# Patient Record
Sex: FEMALE | Race: WHITE | NOT HISPANIC OR LATINO | Employment: OTHER | URBAN - NONMETROPOLITAN AREA
[De-identification: names, ages, dates, MRNs, and addresses within clinical notes are randomized per-mention and may not be internally consistent; named-entity substitution may affect disease eponyms.]

---

## 2017-01-02 ENCOUNTER — OFFICE VISIT (OUTPATIENT)
Dept: INTERNAL MEDICINE | Age: 62
End: 2017-01-02

## 2017-01-02 ENCOUNTER — TELEPHONE (OUTPATIENT)
Dept: ORTHOPEDICS | Age: 62
End: 2017-01-02

## 2017-01-02 VITALS
OXYGEN SATURATION: 99 % | WEIGHT: 141.09 LBS | TEMPERATURE: 99 F | SYSTOLIC BLOOD PRESSURE: 138 MMHG | DIASTOLIC BLOOD PRESSURE: 70 MMHG | BODY MASS INDEX: 24.09 KG/M2 | HEART RATE: 90 BPM | HEIGHT: 64 IN

## 2017-01-02 DIAGNOSIS — E03.9 HYPOTHYROIDISM, UNSPECIFIED TYPE: ICD-10-CM

## 2017-01-02 DIAGNOSIS — Z79.4 TYPE 2 DIABETES MELLITUS WITH BOTH EYES AFFECTED BY MILD NONPROLIFERATIVE RETINOPATHY WITHOUT MACULAR EDEMA, WITH LONG-TERM CURRENT USE OF INSULIN (CMD): Primary | ICD-10-CM

## 2017-01-02 DIAGNOSIS — I25.10 CORONARY ARTERY DISEASE INVOLVING NATIVE CORONARY ARTERY OF NATIVE HEART, ANGINA PRESENCE UNSPECIFIED: ICD-10-CM

## 2017-01-02 DIAGNOSIS — F17.210 CIGARETTE SMOKER: ICD-10-CM

## 2017-01-02 DIAGNOSIS — G47.33 OSA ON CPAP: ICD-10-CM

## 2017-01-02 DIAGNOSIS — F17.200 TOBACCO DEPENDENCE: ICD-10-CM

## 2017-01-02 DIAGNOSIS — G25.0 ESSENTIAL TREMOR: ICD-10-CM

## 2017-01-02 DIAGNOSIS — I10 ESSENTIAL HYPERTENSION: ICD-10-CM

## 2017-01-02 DIAGNOSIS — E11.3293 TYPE 2 DIABETES MELLITUS WITH BOTH EYES AFFECTED BY MILD NONPROLIFERATIVE RETINOPATHY WITHOUT MACULAR EDEMA, WITH LONG-TERM CURRENT USE OF INSULIN (CMD): Primary | ICD-10-CM

## 2017-01-02 DIAGNOSIS — K27.9 PUD (PEPTIC ULCER DISEASE): ICD-10-CM

## 2017-01-02 PROCEDURE — 99214 OFFICE O/P EST MOD 30 MIN: CPT | Performed by: INTERNAL MEDICINE

## 2017-01-02 RX ORDER — METFORMIN HYDROCHLORIDE 500 MG/1
TABLET, EXTENDED RELEASE ORAL
Qty: 120 TABLET | Refills: 0 | OUTPATIENT
Start: 2017-01-02

## 2017-01-02 RX ORDER — OMEPRAZOLE 40 MG/1
40 CAPSULE, DELAYED RELEASE ORAL DAILY
Qty: 30 CAPSULE | Refills: 3 | Status: SHIPPED | OUTPATIENT
Start: 2017-01-02 | End: 2017-03-20 | Stop reason: SDUPTHER

## 2017-01-02 RX ORDER — PEN NEEDLE, DIABETIC, SAFETY 30 GX3/16"
NEEDLE, DISPOSABLE MISCELLANEOUS
Qty: 100 EACH | Refills: 1 | Status: SHIPPED | OUTPATIENT
Start: 2017-01-02 | End: 2017-03-01 | Stop reason: SDUPTHER

## 2017-01-05 ENCOUNTER — TELEPHONE (OUTPATIENT)
Dept: OPHTHALMOLOGY | Age: 62
End: 2017-01-05

## 2017-01-05 ENCOUNTER — OFFICE VISIT (OUTPATIENT)
Dept: ORTHOPEDICS | Age: 62
End: 2017-01-05

## 2017-01-05 VITALS — BODY MASS INDEX: 24.09 KG/M2 | HEIGHT: 64 IN | WEIGHT: 141.09 LBS

## 2017-01-05 DIAGNOSIS — M65.30 TRIGGER FINGER OF LEFT HAND, UNSPECIFIED FINGER: Primary | ICD-10-CM

## 2017-01-05 PROCEDURE — 99214 OFFICE O/P EST MOD 30 MIN: CPT | Performed by: ORTHOPAEDIC SURGERY

## 2017-01-12 ENCOUNTER — OFFICE VISIT (OUTPATIENT)
Dept: CARDIOLOGY | Age: 62
End: 2017-01-12

## 2017-01-12 VITALS
HEART RATE: 77 BPM | HEIGHT: 64 IN | WEIGHT: 139 LBS | DIASTOLIC BLOOD PRESSURE: 81 MMHG | SYSTOLIC BLOOD PRESSURE: 145 MMHG | BODY MASS INDEX: 23.73 KG/M2

## 2017-01-12 DIAGNOSIS — E03.9 HYPOTHYROIDISM, UNSPECIFIED TYPE: Primary | ICD-10-CM

## 2017-01-12 DIAGNOSIS — I25.10 CORONARY ARTERY DISEASE INVOLVING NATIVE CORONARY ARTERY OF NATIVE HEART, ANGINA PRESENCE UNSPECIFIED: ICD-10-CM

## 2017-01-12 DIAGNOSIS — E78.2 MIXED HYPERLIPIDEMIA: ICD-10-CM

## 2017-01-12 DIAGNOSIS — Z79.4 TYPE 2 DIABETES MELLITUS WITH BOTH EYES AFFECTED BY MILD NONPROLIFERATIVE RETINOPATHY WITHOUT MACULAR EDEMA, WITH LONG-TERM CURRENT USE OF INSULIN (CMD): ICD-10-CM

## 2017-01-12 DIAGNOSIS — E11.3293 TYPE 2 DIABETES MELLITUS WITH BOTH EYES AFFECTED BY MILD NONPROLIFERATIVE RETINOPATHY WITHOUT MACULAR EDEMA, WITH LONG-TERM CURRENT USE OF INSULIN (CMD): ICD-10-CM

## 2017-01-12 DIAGNOSIS — I10 ESSENTIAL HYPERTENSION: ICD-10-CM

## 2017-01-12 PROCEDURE — 99214 OFFICE O/P EST MOD 30 MIN: CPT | Performed by: INTERNAL MEDICINE

## 2017-01-12 RX ORDER — NICOTINE 21-14-7MG
KIT TRANSDERMAL
Qty: 1 KIT | Refills: 0 | Status: SHIPPED | OUTPATIENT
Start: 2017-01-12 | End: 2017-01-19

## 2017-01-12 RX ORDER — LOSARTAN POTASSIUM 25 MG/1
25 TABLET ORAL DAILY
Qty: 90 TABLET | Refills: 3 | Status: SHIPPED | OUTPATIENT
Start: 2017-01-12 | End: 2017-02-14 | Stop reason: DRUGHIGH

## 2017-01-18 ENCOUNTER — TELEPHONE (OUTPATIENT)
Dept: NEUROLOGY | Age: 62
End: 2017-01-18

## 2017-01-18 RX ORDER — CARBAMAZEPINE 200 MG/1
200 TABLET ORAL 3 TIMES DAILY
Qty: 60 TABLET | Refills: 3 | Status: SHIPPED
Start: 2017-01-18 | End: 2017-01-19 | Stop reason: SDUPTHER

## 2017-01-19 RX ORDER — CARBAMAZEPINE 200 MG/1
200 TABLET ORAL 3 TIMES DAILY
Qty: 90 TABLET | Refills: 2 | Status: SHIPPED | OUTPATIENT
Start: 2017-01-19 | End: 2017-04-13 | Stop reason: SDUPTHER

## 2017-01-19 ASSESSMENT — ACTIVITIES OF DAILY LIVING (ADL)
ADL_SHORT_OF_BREATH: NO
RECENT_DECLINE_ADL: NO
CHRONIC_PAIN_PRESENT: NO
HISTORY OF FALLING IN THE LAST YEAR (PRIOR TO ADMISSION): NO
NEEDS_ASSIST: NO
ADL_SCORE: 12
ADL_BEFORE_ADMISSION: INDEPENDENT
SENSORY_SUPPORT_DEVICES: EYEGLASSES;DENTURES

## 2017-01-19 ASSESSMENT — COGNITIVE AND FUNCTIONAL STATUS - GENERAL
ARE YOU BLIND OR DO YOU HAVE SERIOUS DIFFICULTY SEEING, EVEN WHEN WEARING GLASSES: NO
ARE YOU DEAF OR DO YOU HAVE SERIOUS DIFFICULTY  HEARING: NO

## 2017-01-20 ENCOUNTER — HOSPITAL ENCOUNTER (OUTPATIENT)
Age: 62
Discharge: HOME | End: 2017-01-20
Attending: ORTHOPAEDIC SURGERY | Admitting: ORTHOPAEDIC SURGERY

## 2017-01-20 ENCOUNTER — SURGERY (OUTPATIENT)
Age: 62
End: 2017-01-20

## 2017-01-20 ENCOUNTER — OFF PREMISE (OUTPATIENT)
Dept: ORTHOPEDICS | Age: 62
End: 2017-01-20

## 2017-01-20 DIAGNOSIS — M65.30 TRIGGER FINGER OF LEFT HAND, UNSPECIFIED FINGER: Primary | ICD-10-CM

## 2017-01-20 PROCEDURE — 10002801 HB RX 250 W/O HCPCS: Performed by: ORTHOPAEDIC SURGERY

## 2017-01-20 PROCEDURE — 26055 INCISE FINGER TENDON SHEATH: CPT | Performed by: ORTHOPAEDIC SURGERY

## 2017-01-20 PROCEDURE — 10004316 HB COUNTER-PREP

## 2017-01-20 PROCEDURE — 10004348 HB COUNTER-EVAL PRE-OP

## 2017-01-20 PROCEDURE — 10002344 HB UPPER/LOWER ARM/WRIST/HAND 1: Performed by: ORTHOPAEDIC SURGERY

## 2017-01-20 RX ORDER — HYDROCODONE BITARTRATE AND ACETAMINOPHEN 5; 325 MG/1; MG/1
1-2 TABLET ORAL EVERY 4 HOURS PRN
Status: DISCONTINUED | OUTPATIENT
Start: 2017-01-20 | End: 2017-01-20 | Stop reason: HOSPADM

## 2017-01-20 RX ORDER — HYDROCODONE BITARTRATE AND ACETAMINOPHEN 5; 325 MG/1; MG/1
TABLET ORAL
Qty: 15 TABLET | Refills: 0 | Status: SHIPPED | OUTPATIENT
Start: 2017-01-20 | End: 2017-01-31 | Stop reason: ALTCHOICE

## 2017-01-20 RX ADMIN — LIDOCAINE HYDROCHLORIDE,EPINEPHRINE BITARTRATE 20 ML: 10; .01 INJECTION, SOLUTION INFILTRATION; PERINEURAL at 10:25

## 2017-01-21 VITALS
BODY MASS INDEX: 23.1 KG/M2 | TEMPERATURE: 98.5 F | HEART RATE: 67 BPM | RESPIRATION RATE: 20 BRPM | SYSTOLIC BLOOD PRESSURE: 162 MMHG | WEIGHT: 138.67 LBS | HEIGHT: 65 IN | OXYGEN SATURATION: 97 % | DIASTOLIC BLOOD PRESSURE: 76 MMHG

## 2017-01-26 RX ORDER — LEVOTHYROXINE SODIUM 0.2 MG/1
TABLET ORAL
Qty: 30 TABLET | Refills: 2 | Status: SHIPPED | OUTPATIENT
Start: 2017-01-26 | End: 2017-04-13 | Stop reason: SDUPTHER

## 2017-01-26 RX ORDER — DULOXETIN HYDROCHLORIDE 60 MG/1
CAPSULE, DELAYED RELEASE ORAL
Qty: 60 CAPSULE | Refills: 2 | Status: SHIPPED | OUTPATIENT
Start: 2017-01-26 | End: 2017-01-31 | Stop reason: SDUPTHER

## 2017-01-26 RX ORDER — LEVOTHYROXINE SODIUM 0.03 MG/1
TABLET ORAL
Qty: 30 TABLET | Refills: 2 | Status: SHIPPED | OUTPATIENT
Start: 2017-01-26 | End: 2017-04-13 | Stop reason: SDUPTHER

## 2017-01-26 RX ORDER — ATORVASTATIN CALCIUM 80 MG/1
TABLET, FILM COATED ORAL
Qty: 30 TABLET | Refills: 2 | Status: SHIPPED | OUTPATIENT
Start: 2017-01-26 | End: 2017-01-31 | Stop reason: SDUPTHER

## 2017-01-27 ENCOUNTER — OFFICE VISIT (OUTPATIENT)
Dept: ORTHOPEDICS | Age: 62
End: 2017-01-27

## 2017-01-27 DIAGNOSIS — M65.30 TRIGGER FINGER (ACQUIRED): Primary | ICD-10-CM

## 2017-01-27 PROCEDURE — 99024 POSTOP FOLLOW-UP VISIT: CPT | Performed by: PHYSICIAN ASSISTANT

## 2017-01-31 ENCOUNTER — OFFICE VISIT (OUTPATIENT)
Dept: PODIATRY | Age: 62
End: 2017-01-31

## 2017-01-31 ENCOUNTER — OFFICE VISIT (OUTPATIENT)
Dept: INTERNAL MEDICINE | Age: 62
End: 2017-01-31

## 2017-01-31 VITALS
DIASTOLIC BLOOD PRESSURE: 62 MMHG | HEART RATE: 78 BPM | OXYGEN SATURATION: 98 % | BODY MASS INDEX: 23.66 KG/M2 | SYSTOLIC BLOOD PRESSURE: 116 MMHG | WEIGHT: 139.99 LBS

## 2017-01-31 DIAGNOSIS — E11.42 DIABETIC POLYNEUROPATHY ASSOCIATED WITH TYPE 2 DIABETES MELLITUS (CMD): ICD-10-CM

## 2017-01-31 DIAGNOSIS — I73.89 OTHER SPECIFIED PERIPHERAL VASCULAR DISEASES (CMD): ICD-10-CM

## 2017-01-31 DIAGNOSIS — I10 ESSENTIAL HYPERTENSION: ICD-10-CM

## 2017-01-31 DIAGNOSIS — Z79.4 TYPE 2 DIABETES MELLITUS WITH BOTH EYES AFFECTED BY MILD NONPROLIFERATIVE RETINOPATHY WITHOUT MACULAR EDEMA, WITH LONG-TERM CURRENT USE OF INSULIN (CMD): ICD-10-CM

## 2017-01-31 DIAGNOSIS — E78.2 MIXED HYPERLIPIDEMIA: Primary | ICD-10-CM

## 2017-01-31 DIAGNOSIS — F17.200 TOBACCO USE DISORDER: ICD-10-CM

## 2017-01-31 DIAGNOSIS — E11.3293 TYPE 2 DIABETES MELLITUS WITH BOTH EYES AFFECTED BY MILD NONPROLIFERATIVE RETINOPATHY WITHOUT MACULAR EDEMA, WITH LONG-TERM CURRENT USE OF INSULIN (CMD): ICD-10-CM

## 2017-01-31 DIAGNOSIS — L60.2 HYPERTROPHY OF NAIL: Primary | ICD-10-CM

## 2017-01-31 DIAGNOSIS — I25.10 CORONARY ARTERY DISEASE INVOLVING NATIVE CORONARY ARTERY OF NATIVE HEART, ANGINA PRESENCE UNSPECIFIED: ICD-10-CM

## 2017-01-31 PROCEDURE — 99214 OFFICE O/P EST MOD 30 MIN: CPT | Performed by: INTERNAL MEDICINE

## 2017-01-31 PROCEDURE — 11721 DEBRIDE NAIL 6 OR MORE: CPT | Performed by: PODIATRIST

## 2017-02-01 ENCOUNTER — TELEPHONE (OUTPATIENT)
Dept: INTERNAL MEDICINE | Age: 62
End: 2017-02-01

## 2017-02-01 DIAGNOSIS — E83.42 HYPOMAGNESEMIA: Primary | ICD-10-CM

## 2017-02-02 ENCOUNTER — OFFICE VISIT (OUTPATIENT)
Dept: OPHTHALMOLOGY | Age: 62
End: 2017-02-02

## 2017-02-02 ENCOUNTER — TELEPHONE (OUTPATIENT)
Dept: CARDIOLOGY | Age: 62
End: 2017-02-02

## 2017-02-02 DIAGNOSIS — E11.3293 TYPE 2 DIABETES MELLITUS WITH BOTH EYES AFFECTED BY MILD NONPROLIFERATIVE RETINOPATHY WITHOUT MACULAR EDEMA, WITH LONG-TERM CURRENT USE OF INSULIN (CMD): Primary | ICD-10-CM

## 2017-02-02 DIAGNOSIS — Z79.4 TYPE 2 DIABETES MELLITUS WITH BOTH EYES AFFECTED BY MILD NONPROLIFERATIVE RETINOPATHY WITHOUT MACULAR EDEMA, WITH LONG-TERM CURRENT USE OF INSULIN (CMD): Primary | ICD-10-CM

## 2017-02-02 PROCEDURE — 92014 COMPRE OPH EXAM EST PT 1/>: CPT | Performed by: OPTOMETRIST

## 2017-02-02 RX ORDER — MECLIZINE HYDROCHLORIDE 25 MG/1
TABLET ORAL
Qty: 30 TABLET | Refills: 0 | Status: SHIPPED | OUTPATIENT
Start: 2017-02-02 | End: 2017-03-31 | Stop reason: SDUPTHER

## 2017-02-02 ASSESSMENT — EXTERNAL EXAM - RIGHT EYE: OD_EXAM: NORMAL

## 2017-02-02 ASSESSMENT — VISUAL ACUITY
OD_SC: 20/100
OS_PH_SC: 20/25-1
OS_SC+: -2
METHOD: SNELLEN - LINEAR
OD_SC: 20/40
OS_SC: 20/30
OS_SC: 20/200
OD_PH_SC: 20/25

## 2017-02-02 ASSESSMENT — SLIT LAMP EXAM - LIDS
COMMENTS: NORMAL
COMMENTS: NORMAL

## 2017-02-02 ASSESSMENT — REFRACTION_WEARINGRX
OD_CYLINDER: +0.75
OD_SPHERE: -0.50
OS_ADD: +2.25
OS_AXIS: 100
OS_SPHERE: -1.00
OD_ADD: +2.25
OS_CYLINDER: +0.75
SPECS_TYPE: BIFOCAL
OD_AXIS: 165

## 2017-02-02 ASSESSMENT — REFRACTION_MANIFEST
OS_CYLINDER: +0.75
OD_ADD: +2.25
OD_SPHERE: -0.50
OS_AXIS: 100
OD_AXIS: 165
OS_ADD: +2.25
OD_CYLINDER: +0.75
OS_SPHERE: -1.00

## 2017-02-02 ASSESSMENT — CONF VISUAL FIELD
OS_NORMAL: 1
OD_NORMAL: 1
METHOD: COUNTING FINGERS

## 2017-02-02 ASSESSMENT — CUP TO DISC RATIO
OD_RATIO: 0.3
OS_RATIO: 0.3

## 2017-02-02 ASSESSMENT — TONOMETRY
OS_IOP_MMHG: 17
IOP_METHOD: APPLANATION
OD_IOP_MMHG: 17

## 2017-02-02 ASSESSMENT — EXTERNAL EXAM - LEFT EYE: OS_EXAM: NORMAL

## 2017-02-07 ENCOUNTER — IMAGING SERVICES (OUTPATIENT)
Dept: GENERAL RADIOLOGY | Age: 62
End: 2017-02-07
Attending: NURSE PRACTITIONER

## 2017-02-07 ENCOUNTER — WALK IN (OUTPATIENT)
Dept: URGENT CARE | Age: 62
End: 2017-02-07

## 2017-02-07 ENCOUNTER — TELEPHONE (OUTPATIENT)
Dept: URGENT CARE | Age: 62
End: 2017-02-07

## 2017-02-07 VITALS
OXYGEN SATURATION: 97 % | BODY MASS INDEX: 23.52 KG/M2 | HEART RATE: 74 BPM | WEIGHT: 139.2 LBS | SYSTOLIC BLOOD PRESSURE: 160 MMHG | TEMPERATURE: 98.8 F | DIASTOLIC BLOOD PRESSURE: 75 MMHG

## 2017-02-07 DIAGNOSIS — M79.672 LEFT FOOT PAIN: Primary | ICD-10-CM

## 2017-02-07 DIAGNOSIS — M79.672 LEFT FOOT PAIN: ICD-10-CM

## 2017-02-07 PROCEDURE — L4350 ANKLE CONTROL ORTHO PRE OTS: HCPCS | Performed by: NURSE PRACTITIONER

## 2017-02-07 PROCEDURE — 99213 OFFICE O/P EST LOW 20 MIN: CPT | Performed by: NURSE PRACTITIONER

## 2017-02-07 PROCEDURE — 73610 X-RAY EXAM OF ANKLE: CPT | Performed by: RADIOLOGY

## 2017-02-07 PROCEDURE — 73630 X-RAY EXAM OF FOOT: CPT | Performed by: RADIOLOGY

## 2017-02-07 RX ORDER — OMEGA-3 FATTY ACIDS/FISH OIL 300-1000MG
200 CAPSULE ORAL EVERY 6 HOURS PRN
Qty: 30 TABLET | Refills: 0 | Status: SHIPPED | OUTPATIENT
Start: 2017-02-07 | End: 2017-04-13 | Stop reason: SDUPTHER

## 2017-02-07 RX ORDER — ACETAMINOPHEN 325 MG/1
650 TABLET ORAL EVERY 4 HOURS PRN
Qty: 30 TABLET | Refills: 0 | Status: SHIPPED | OUTPATIENT
Start: 2017-02-07 | End: 2017-04-13 | Stop reason: SDUPTHER

## 2017-02-07 RX ORDER — IBUPROFEN 200 MG
800 TABLET ORAL ONCE
Status: COMPLETED | OUTPATIENT
Start: 2017-02-07 | End: 2017-02-07

## 2017-02-07 RX ADMIN — Medication 800 MG: at 15:20

## 2017-02-14 ENCOUNTER — TELEPHONE (OUTPATIENT)
Dept: CARDIOLOGY | Age: 62
End: 2017-02-14

## 2017-02-14 RX ORDER — LOSARTAN POTASSIUM 25 MG/1
50 TABLET ORAL DAILY
Qty: 90 TABLET | Refills: 3 | Status: SHIPPED | COMMUNITY
Start: 2017-02-14 | End: 2017-03-01 | Stop reason: SDUPTHER

## 2017-02-16 ENCOUNTER — IMAGING SERVICES (OUTPATIENT)
Dept: CARDIOLOGY | Age: 62
End: 2017-02-16
Attending: INTERNAL MEDICINE

## 2017-02-16 VITALS — DIASTOLIC BLOOD PRESSURE: 86 MMHG | HEART RATE: 76 BPM | SYSTOLIC BLOOD PRESSURE: 132 MMHG

## 2017-02-16 DIAGNOSIS — G89.4 CHRONIC PAIN SYNDROME: ICD-10-CM

## 2017-02-16 DIAGNOSIS — G47.33 OSA ON CPAP: ICD-10-CM

## 2017-02-16 DIAGNOSIS — E03.9 HYPOTHYROIDISM: ICD-10-CM

## 2017-02-16 DIAGNOSIS — Z98.890 HISTORY OF CARDIAC CATHETERIZATION: ICD-10-CM

## 2017-02-16 DIAGNOSIS — F17.200 TOBACCO USE DISORDER: ICD-10-CM

## 2017-02-16 DIAGNOSIS — I25.10 CORONARY ARTERY DISEASE INVOLVING NATIVE CORONARY ARTERY OF NATIVE HEART, ANGINA PRESENCE UNSPECIFIED: Primary | ICD-10-CM

## 2017-02-16 DIAGNOSIS — E87.6 HYPOKALEMIA: ICD-10-CM

## 2017-02-16 DIAGNOSIS — E78.2 MIXED HYPERLIPIDEMIA: ICD-10-CM

## 2017-02-16 DIAGNOSIS — I10 ESSENTIAL HYPERTENSION: ICD-10-CM

## 2017-02-16 DIAGNOSIS — R07.89 OTHER CHEST PAIN: ICD-10-CM

## 2017-02-16 DIAGNOSIS — E55.9 VITAMIN D DEFICIENCY: ICD-10-CM

## 2017-02-16 DIAGNOSIS — E08.43: ICD-10-CM

## 2017-02-16 PROCEDURE — 93351 STRESS TTE COMPLETE: CPT | Performed by: INTERNAL MEDICINE

## 2017-02-21 ENCOUNTER — TELEPHONE (OUTPATIENT)
Dept: CARDIOLOGY | Age: 62
End: 2017-02-21

## 2017-02-21 RX ORDER — POLYETHYLENE GLYCOL 3350 17 G/17G
POWDER, FOR SOLUTION ORAL
Qty: 255 G | Refills: 1 | Status: SHIPPED | OUTPATIENT
Start: 2017-02-21 | End: 2017-03-24 | Stop reason: SDUPTHER

## 2017-02-21 RX ORDER — PREGABALIN 225 MG/1
CAPSULE ORAL
Qty: 180 CAPSULE | Refills: 1 | Status: ON HOLD
Start: 2017-02-21 | End: 2017-04-17 | Stop reason: HOSPADM

## 2017-02-21 RX ORDER — CLOBETASOL PROPIONATE 0.5 MG/G
OINTMENT TOPICAL
Qty: 30 G | Refills: 0 | Status: SHIPPED | OUTPATIENT
Start: 2017-02-21 | End: 2017-03-16 | Stop reason: SDUPTHER

## 2017-02-22 ENCOUNTER — NURSE ONLY (OUTPATIENT)
Dept: INTERNAL MEDICINE | Age: 62
End: 2017-02-22

## 2017-02-22 VITALS — SYSTOLIC BLOOD PRESSURE: 140 MMHG | DIASTOLIC BLOOD PRESSURE: 64 MMHG

## 2017-03-01 RX ORDER — LOSARTAN POTASSIUM 25 MG/1
50 TABLET ORAL DAILY
Qty: 90 TABLET | Refills: 3 | Status: SHIPPED | OUTPATIENT
Start: 2017-03-01 | End: 2017-04-13 | Stop reason: SDUPTHER

## 2017-03-07 RX ORDER — NITROGLYCERIN 0.4 MG/1
TABLET SUBLINGUAL
Qty: 25 TABLET | Refills: 1 | Status: SHIPPED | OUTPATIENT
Start: 2017-03-07 | End: 2017-03-20 | Stop reason: SDUPTHER

## 2017-03-08 ENCOUNTER — TELEPHONE (OUTPATIENT)
Dept: INTERNAL MEDICINE | Age: 62
End: 2017-03-08

## 2017-03-08 DIAGNOSIS — E83.42 HYPOMAGNESEMIA: Primary | ICD-10-CM

## 2017-03-13 RX ORDER — INSULIN GLARGINE 100 [IU]/ML
INJECTION, SOLUTION SUBCUTANEOUS
Qty: 15 ML | Refills: 0 | Status: SHIPPED | OUTPATIENT
Start: 2017-03-13 | End: 2017-04-13 | Stop reason: SDUPTHER

## 2017-03-17 RX ORDER — CLOBETASOL PROPIONATE 0.5 MG/G
OINTMENT TOPICAL
Qty: 30 G | Refills: 0 | Status: SHIPPED | OUTPATIENT
Start: 2017-03-17 | End: 2017-06-02 | Stop reason: SDUPTHER

## 2017-03-19 RX ORDER — TRAZODONE HYDROCHLORIDE 100 MG/1
TABLET ORAL
Qty: 30 TABLET | Refills: 0 | Status: CANCELLED | OUTPATIENT
Start: 2017-03-19

## 2017-03-20 ENCOUNTER — OFFICE VISIT (OUTPATIENT)
Dept: INTERNAL MEDICINE | Age: 62
End: 2017-03-20

## 2017-03-20 VITALS
BODY MASS INDEX: 24.52 KG/M2 | SYSTOLIC BLOOD PRESSURE: 124 MMHG | OXYGEN SATURATION: 99 % | HEART RATE: 82 BPM | WEIGHT: 145.06 LBS | DIASTOLIC BLOOD PRESSURE: 68 MMHG

## 2017-03-20 DIAGNOSIS — E78.00 HYPERCHOLESTEREMIA: ICD-10-CM

## 2017-03-20 DIAGNOSIS — Z79.4 TYPE 2 DIABETES MELLITUS WITH BOTH EYES AFFECTED BY MILD NONPROLIFERATIVE RETINOPATHY WITHOUT MACULAR EDEMA, WITH LONG-TERM CURRENT USE OF INSULIN (CMD): ICD-10-CM

## 2017-03-20 DIAGNOSIS — K27.9 PUD (PEPTIC ULCER DISEASE): ICD-10-CM

## 2017-03-20 DIAGNOSIS — E11.42 DIABETIC POLYNEUROPATHY ASSOCIATED WITH TYPE 2 DIABETES MELLITUS (CMD): ICD-10-CM

## 2017-03-20 DIAGNOSIS — I10 ESSENTIAL HYPERTENSION: ICD-10-CM

## 2017-03-20 DIAGNOSIS — E11.3293 TYPE 2 DIABETES MELLITUS WITH BOTH EYES AFFECTED BY MILD NONPROLIFERATIVE RETINOPATHY WITHOUT MACULAR EDEMA, WITH LONG-TERM CURRENT USE OF INSULIN (CMD): ICD-10-CM

## 2017-03-20 DIAGNOSIS — I25.10 CORONARY ARTERY DISEASE INVOLVING NATIVE CORONARY ARTERY OF NATIVE HEART, ANGINA PRESENCE UNSPECIFIED: ICD-10-CM

## 2017-03-20 DIAGNOSIS — F17.200 TOBACCO DEPENDENCE: ICD-10-CM

## 2017-03-20 DIAGNOSIS — I37.9 PVD (PULMONARY VALVE DISEASE): ICD-10-CM

## 2017-03-20 DIAGNOSIS — E03.9 HYPOTHYROIDISM, UNSPECIFIED TYPE: Primary | ICD-10-CM

## 2017-03-20 DIAGNOSIS — E83.42 HYPOMAGNESEMIA: ICD-10-CM

## 2017-03-20 PROCEDURE — 99407 BEHAV CHNG SMOKING > 10 MIN: CPT | Performed by: INTERNAL MEDICINE

## 2017-03-20 PROCEDURE — 99215 OFFICE O/P EST HI 40 MIN: CPT | Performed by: INTERNAL MEDICINE

## 2017-03-20 RX ORDER — ROSUVASTATIN CALCIUM 20 MG/1
20 TABLET, COATED ORAL DAILY
Qty: 30 TABLET | Refills: 11 | Status: SHIPPED | OUTPATIENT
Start: 2017-03-20 | End: 2017-04-02

## 2017-03-20 RX ORDER — BUPROPION HYDROCHLORIDE 75 MG/1
75 TABLET ORAL 2 TIMES DAILY
Qty: 60 TABLET | Refills: 11 | Status: SHIPPED | OUTPATIENT
Start: 2017-03-20 | End: 2017-04-11 | Stop reason: DRUGHIGH

## 2017-03-20 RX ORDER — TRAZODONE HYDROCHLORIDE 100 MG/1
100 TABLET ORAL NIGHTLY
Qty: 30 TABLET | Refills: 2 | Status: SHIPPED | OUTPATIENT
Start: 2017-03-20 | End: 2017-04-13 | Stop reason: SDUPTHER

## 2017-03-20 RX ORDER — OMEPRAZOLE 40 MG/1
40 CAPSULE, DELAYED RELEASE ORAL 2 TIMES DAILY
Qty: 60 CAPSULE | Refills: 3 | Status: SHIPPED | OUTPATIENT
Start: 2017-03-20 | End: 2017-04-12 | Stop reason: SDUPTHER

## 2017-03-20 RX ORDER — OMEPRAZOLE 40 MG/1
40 CAPSULE, DELAYED RELEASE ORAL 2 TIMES DAILY
Qty: 60 CAPSULE | Refills: 3 | Status: SHIPPED | OUTPATIENT
Start: 2017-03-20 | End: 2017-03-20 | Stop reason: SDUPTHER

## 2017-03-24 ENCOUNTER — LAB SERVICES (OUTPATIENT)
Dept: LAB | Age: 62
End: 2017-03-24

## 2017-03-24 DIAGNOSIS — E11.3293 TYPE 2 DIABETES MELLITUS WITH BOTH EYES AFFECTED BY MILD NONPROLIFERATIVE RETINOPATHY WITHOUT MACULAR EDEMA, WITH LONG-TERM CURRENT USE OF INSULIN (CMD): ICD-10-CM

## 2017-03-24 DIAGNOSIS — K27.9 PUD (PEPTIC ULCER DISEASE): ICD-10-CM

## 2017-03-24 DIAGNOSIS — E83.42 HYPOMAGNESEMIA: ICD-10-CM

## 2017-03-24 DIAGNOSIS — Z79.4 TYPE 2 DIABETES MELLITUS WITH BOTH EYES AFFECTED BY MILD NONPROLIFERATIVE RETINOPATHY WITHOUT MACULAR EDEMA, WITH LONG-TERM CURRENT USE OF INSULIN (CMD): ICD-10-CM

## 2017-03-24 DIAGNOSIS — E78.00 HYPERCHOLESTEREMIA: ICD-10-CM

## 2017-03-24 DIAGNOSIS — I10 ESSENTIAL HYPERTENSION: ICD-10-CM

## 2017-03-24 LAB
ANION GAP SERPL CALC-SCNC: 15 MMOL/L (ref 10–20)
AST SERPL-CCNC: 14 UNITS/L
BASOPHILS # BLD AUTO: 0 K/MCL (ref 0–0.3)
BASOPHILS NFR BLD AUTO: 1 %
BUN SERPL-MCNC: 16 MG/DL (ref 6–20)
BUN/CREAT SERPL: 23 (ref 7–25)
CALCIUM SERPL-MCNC: 8.5 MG/DL (ref 8.4–10.2)
CHLORIDE SERPL-SCNC: 98 MMOL/L (ref 98–107)
CO2 SERPL-SCNC: 27 MMOL/L (ref 21–32)
CREAT SERPL-MCNC: 0.7 MG/DL (ref 0.51–0.95)
DIFFERENTIAL METHOD BLD: NORMAL
EOSINOPHIL # BLD AUTO: 0.1 K/MCL (ref 0.1–0.5)
EOSINOPHIL NFR SPEC: 1 %
ERYTHROCYTE [DISTWIDTH] IN BLOOD: 12.3 % (ref 11–15)
FASTING STATUS PATIENT QL REPORTED: 12 HRS
GLUCOSE SERPL-MCNC: 249 MG/DL (ref 65–99)
HCT VFR BLD CALC: 39.3 % (ref 36–46.5)
HGB BLD-MCNC: 13.7 G/DL (ref 12–15.5)
LYMPHOCYTES # BLD MANUAL: 4 K/MCL (ref 1–4)
LYMPHOCYTES NFR BLD MANUAL: 49 %
MAGNESIUM SERPL-MCNC: 1.8 MG/DL (ref 1.7–2.4)
MCH RBC QN AUTO: 29.8 PG (ref 26–34)
MCHC RBC AUTO-ENTMCNC: 34.9 G/DL (ref 32–36.5)
MCV RBC AUTO: 85.6 FL (ref 78–100)
MONOCYTES # BLD MANUAL: 0.5 K/MCL (ref 0.3–0.9)
MONOCYTES NFR BLD MANUAL: 7 %
NEUTROPHILS # BLD AUTO: 3.5 K/MCL (ref 1.8–7.7)
NEUTROPHILS NFR BLD AUTO: 42 %
PLATELET # BLD: 208 K/MCL (ref 140–450)
POTASSIUM SERPL-SCNC: 4.6 MMOL/L (ref 3.4–5.1)
RBC # BLD: 4.59 MIL/MCL (ref 4–5.2)
SODIUM SERPL-SCNC: 135 MMOL/L (ref 135–145)
WBC # BLD: 8.2 K/MCL (ref 4.2–11)

## 2017-03-24 PROCEDURE — 36415 COLL VENOUS BLD VENIPUNCTURE: CPT | Performed by: INTERNAL MEDICINE

## 2017-03-24 PROCEDURE — 85025 COMPLETE CBC W/AUTO DIFF WBC: CPT | Performed by: INTERNAL MEDICINE

## 2017-03-24 RX ORDER — POLYETHYLENE GLYCOL 3350 17 G/17G
POWDER, FOR SOLUTION ORAL
Qty: 255 G | Refills: 0 | Status: SHIPPED | OUTPATIENT
Start: 2017-03-24 | End: 2017-04-12 | Stop reason: SDUPTHER

## 2017-03-25 LAB — HBA1C MFR BLD: 7.8 % (ref 4.5–5.6)

## 2017-03-27 ENCOUNTER — TELEPHONE (OUTPATIENT)
Dept: INTERNAL MEDICINE | Age: 62
End: 2017-03-27

## 2017-03-30 RX ORDER — OMEPRAZOLE 40 MG/1
40 CAPSULE, DELAYED RELEASE ORAL 2 TIMES DAILY
Qty: 60 CAPSULE | Refills: 3 | Status: CANCELLED | OUTPATIENT
Start: 2017-03-30

## 2017-03-31 ENCOUNTER — TELEPHONE (OUTPATIENT)
Dept: GASTROENTEROLOGY | Age: 62
End: 2017-03-31

## 2017-03-31 RX ORDER — MECLIZINE HYDROCHLORIDE 25 MG/1
TABLET ORAL
Qty: 30 TABLET | Refills: 0 | Status: SHIPPED | OUTPATIENT
Start: 2017-03-31 | End: 2017-04-13 | Stop reason: SDUPTHER

## 2017-04-02 ENCOUNTER — OFF PREMISE CHARGES (OUTPATIENT)
Dept: INTERNAL MEDICINE | Age: 62
End: 2017-04-02

## 2017-04-02 ENCOUNTER — HOSPITAL ENCOUNTER (EMERGENCY)
Age: 62
Discharge: ANOTHER HOSPITAL | End: 2017-04-02
Attending: EMERGENCY MEDICINE

## 2017-04-02 ENCOUNTER — APPOINTMENT (OUTPATIENT)
Dept: CT IMAGING | Age: 62
End: 2017-04-02
Attending: EMERGENCY MEDICINE

## 2017-04-02 VITALS
DIASTOLIC BLOOD PRESSURE: 78 MMHG | RESPIRATION RATE: 20 BRPM | HEART RATE: 74 BPM | OXYGEN SATURATION: 96 % | WEIGHT: 140 LBS | SYSTOLIC BLOOD PRESSURE: 161 MMHG | TEMPERATURE: 99.1 F | HEIGHT: 64 IN | BODY MASS INDEX: 23.9 KG/M2

## 2017-04-02 DIAGNOSIS — I16.0 HYPERTENSIVE URGENCY: ICD-10-CM

## 2017-04-02 DIAGNOSIS — E78.2 MIXED HYPERLIPIDEMIA: ICD-10-CM

## 2017-04-02 DIAGNOSIS — F17.210 CIGARETTE SMOKER: ICD-10-CM

## 2017-04-02 DIAGNOSIS — I25.10 CORONARY ARTERY DISEASE INVOLVING NATIVE CORONARY ARTERY OF NATIVE HEART WITHOUT ANGINA PECTORIS: ICD-10-CM

## 2017-04-02 DIAGNOSIS — I63.9 ISCHEMIC STROKE (CMD): Primary | ICD-10-CM

## 2017-04-02 DIAGNOSIS — E08.43 DIABETES MELLITUS DUE TO UNDERLYING CONDITION WITH DIABETIC AUTONOMIC NEUROPATHY, UNSPECIFIED LONG TERM INSULIN USE STATUS: ICD-10-CM

## 2017-04-02 DIAGNOSIS — R42 DIZZINESS: ICD-10-CM

## 2017-04-02 DIAGNOSIS — R42 DIZZINESS: Primary | ICD-10-CM

## 2017-04-02 LAB
ALBUMIN SERPL-MCNC: 3.2 G/DL (ref 3.6–5.1)
ALBUMIN/GLOB SERPL: 0.9 {RATIO} (ref 1–2.4)
ALP SERPL-CCNC: 80 UNITS/L (ref 45–117)
ALT SERPL-CCNC: 13 UNITS/L
ANION GAP SERPL CALC-SCNC: 12 MMOL/L (ref 10–20)
APPEARANCE UR: CLEAR
APTT PPP: 25 SEC (ref 22–30)
AST SERPL-CCNC: 16 UNITS/L
ATRIAL RATE (BPM): 72
BASOPHILS # BLD AUTO: 0 K/MCL (ref 0–0.3)
BASOPHILS NFR BLD AUTO: 0 %
BILIRUB SERPL-MCNC: 0.4 MG/DL (ref 0.2–1)
BILIRUB UR QL STRIP: NEGATIVE
BUN SERPL-MCNC: 9 MG/DL (ref 6–20)
BUN/CREAT SERPL: 13 (ref 7–25)
CALCIUM SERPL-MCNC: 8.4 MG/DL (ref 8.4–10.2)
CHLORIDE SERPL-SCNC: 92 MMOL/L (ref 98–107)
CHOLEST SERPL-MCNC: 202 MG/DL
CHOLEST/HDLC SERPL: 2.5 {RATIO}
CO2 SERPL-SCNC: 27 MMOL/L (ref 21–32)
COLOR UR: YELLOW
CREAT SERPL-MCNC: 0.7 MG/DL (ref 0.51–0.95)
DIFFERENTIAL METHOD BLD: NORMAL
EOSINOPHIL # BLD AUTO: 0.1 K/MCL (ref 0.1–0.5)
EOSINOPHIL NFR SPEC: 1 %
ERYTHROCYTE [DISTWIDTH] IN BLOOD: 12 % (ref 11–15)
GLOBULIN SER-MCNC: 3.4 G/DL (ref 2–4)
GLUCOSE BLDC GLUCOMTR-MCNC: 264 MG/DL (ref 65–99)
GLUCOSE SERPL-MCNC: 301 MG/DL (ref 65–99)
GLUCOSE UR STRIP-MCNC: >1000 MG/DL
HCT VFR BLD CALC: 38.2 % (ref 36–46.5)
HDLC SERPL-MCNC: 82 MG/DL
HGB BLD-MCNC: 13.2 G/DL (ref 12–15.5)
HGB UR QL STRIP: NEGATIVE
INR PPP: 1
KETONES UR STRIP-MCNC: NEGATIVE MG/DL
LDLC SERPL-MCNC: 84 MG/DL
LEUKOCYTE ESTERASE UR QL STRIP: NEGATIVE
LYMPHOCYTES # BLD MANUAL: 2.7 K/MCL (ref 1–4)
LYMPHOCYTES NFR BLD MANUAL: 41 %
MCH RBC QN AUTO: 29.7 PG (ref 26–34)
MCHC RBC AUTO-ENTMCNC: 34.6 G/DL (ref 32–36.5)
MCV RBC AUTO: 85.8 FL (ref 78–100)
MONOCYTES # BLD MANUAL: 0.4 K/MCL (ref 0.3–0.9)
MONOCYTES NFR BLD MANUAL: 6 %
NEUTROPHILS # BLD AUTO: 3.4 K/MCL (ref 1.8–7.7)
NEUTROPHILS NFR BLD AUTO: 52 %
NITRITE UR QL STRIP: NEGATIVE
NONHDLC SERPL-MCNC: 120 MG/DL
PH UR STRIP: 7.5 UNITS (ref 5–7)
PLATELET # BLD: 188 K/MCL (ref 140–450)
POTASSIUM SERPL-SCNC: 4.3 MMOL/L (ref 3.4–5.1)
PROT SERPL-MCNC: 6.6 G/DL (ref 6.4–8.2)
PROT UR STRIP-MCNC: NEGATIVE MG/DL
PROTHROMBIN TIME: 10.7 SEC (ref 9.7–11.8)
QRS-INTERVAL (MSEC): 72
QT-INTERVAL (MSEC): 368
QTC: 405
R AXIS (DEGREES): 69
RBC # BLD: 4.45 MIL/MCL (ref 4–5.2)
REPORT TEXT: NORMAL
SODIUM SERPL-SCNC: 127 MMOL/L (ref 135–145)
SP GR UR STRIP: 1.01 (ref 1–1.03)
SPECIMEN SOURCE: ABNORMAL
T AXIS (DEGREES): 96
TRIGL SERPL-MCNC: 182 MG/DL
UROBILINOGEN UR STRIP-MCNC: 0.2 MG/DL (ref 0–1)
VENTRICULAR RATE EKG/MIN (BPM): 73
WBC # BLD: 6.6 K/MCL (ref 4.2–11)

## 2017-04-02 PROCEDURE — 10002807 HB RX 258: Performed by: EMERGENCY MEDICINE

## 2017-04-02 PROCEDURE — 99285 EMERGENCY DEPT VISIT HI MDM: CPT

## 2017-04-02 PROCEDURE — 10002803 HB RX 637: Performed by: EMERGENCY MEDICINE

## 2017-04-02 PROCEDURE — 93005 ELECTROCARDIOGRAM TRACING: CPT | Performed by: EMERGENCY MEDICINE

## 2017-04-02 PROCEDURE — 70450 CT HEAD/BRAIN W/O DYE: CPT

## 2017-04-02 PROCEDURE — 96360 HYDRATION IV INFUSION INIT: CPT

## 2017-04-02 PROCEDURE — 81003 URINALYSIS AUTO W/O SCOPE: CPT

## 2017-04-02 PROCEDURE — 36415 COLL VENOUS BLD VENIPUNCTURE: CPT

## 2017-04-02 PROCEDURE — 85730 THROMBOPLASTIN TIME PARTIAL: CPT

## 2017-04-02 PROCEDURE — 85610 PROTHROMBIN TIME: CPT

## 2017-04-02 PROCEDURE — 96372 THER/PROPH/DIAG INJ SC/IM: CPT | Performed by: EMERGENCY MEDICINE

## 2017-04-02 PROCEDURE — 80061 LIPID PANEL: CPT

## 2017-04-02 PROCEDURE — 10002800 HB RX 250 W HCPCS: Performed by: EMERGENCY MEDICINE

## 2017-04-02 PROCEDURE — 10004651 HB RX, NO CHARGE ITEM: Performed by: EMERGENCY MEDICINE

## 2017-04-02 PROCEDURE — 99285 EMERGENCY DEPT VISIT HI MDM: CPT | Performed by: EMERGENCY MEDICINE

## 2017-04-02 PROCEDURE — 83036 HEMOGLOBIN GLYCOSYLATED A1C: CPT

## 2017-04-02 PROCEDURE — 93010 ELECTROCARDIOGRAM REPORT: CPT | Performed by: INTERNAL MEDICINE

## 2017-04-02 PROCEDURE — 80053 COMPREHEN METABOLIC PANEL: CPT

## 2017-04-02 PROCEDURE — 70450 CT HEAD/BRAIN W/O DYE: CPT | Performed by: RADIOLOGY

## 2017-04-02 PROCEDURE — 85025 COMPLETE CBC W/AUTO DIFF WBC: CPT

## 2017-04-02 PROCEDURE — 82962 GLUCOSE BLOOD TEST: CPT

## 2017-04-02 RX ORDER — 0.9 % SODIUM CHLORIDE 0.9 %
2 VIAL (ML) INJECTION PRN
Status: DISCONTINUED | OUTPATIENT
Start: 2017-04-02 | End: 2017-04-03 | Stop reason: HOSPADM

## 2017-04-02 RX ORDER — MECLIZINE HCL 12.5 MG/1
25 TABLET ORAL ONCE
Status: COMPLETED | OUTPATIENT
Start: 2017-04-02 | End: 2017-04-02

## 2017-04-02 RX ORDER — ASPIRIN 325 MG
325 TABLET ORAL ONCE
Status: COMPLETED | OUTPATIENT
Start: 2017-04-02 | End: 2017-04-02

## 2017-04-02 RX ORDER — 0.9 % SODIUM CHLORIDE 0.9 %
2 VIAL (ML) INJECTION EVERY 12 HOURS SCHEDULED
Status: DISCONTINUED | OUTPATIENT
Start: 2017-04-02 | End: 2017-04-03 | Stop reason: HOSPADM

## 2017-04-02 RX ORDER — CLOPIDOGREL BISULFATE 75 MG/1
75 TABLET ORAL ONCE
Status: COMPLETED | OUTPATIENT
Start: 2017-04-02 | End: 2017-04-02

## 2017-04-02 RX ADMIN — SODIUM CHLORIDE 500 ML: 9 INJECTION, SOLUTION INTRAVENOUS at 21:35

## 2017-04-02 RX ADMIN — MECLIZINE HCL 25 MG: 12.5 TABLET ORAL at 20:57

## 2017-04-02 RX ADMIN — CLOPIDOGREL BISULFATE 75 MG: 75 TABLET, FILM COATED ORAL at 22:20

## 2017-04-02 RX ADMIN — ASPIRIN 325 MG: 325 TABLET ORAL at 22:33

## 2017-04-02 RX ADMIN — INSULIN HUMAN 5 UNITS: 100 INJECTION, SOLUTION PARENTERAL at 21:36

## 2017-04-02 ASSESSMENT — MOVEMENT AND STRENGTH ASSESSMENTS
FACE_JAW: NO FACIAL DROOP

## 2017-04-02 ASSESSMENT — PAIN SCALES - GENERAL: PAINLEVEL_OUTOF10: 4

## 2017-04-03 ENCOUNTER — APPOINTMENT (OUTPATIENT)
Dept: MRI IMAGING | Age: 62
End: 2017-04-03
Attending: INTERNAL MEDICINE

## 2017-04-03 ENCOUNTER — APPOINTMENT (OUTPATIENT)
Dept: CARDIOLOGY | Age: 62
End: 2017-04-03
Attending: INTERNAL MEDICINE

## 2017-04-03 ENCOUNTER — HOSPITAL ENCOUNTER (OUTPATIENT)
Age: 62
Setting detail: OBSERVATION
LOS: 1 days | Discharge: HOME HEALTH | End: 2017-04-05
Attending: INTERNAL MEDICINE | Admitting: INTERNAL MEDICINE

## 2017-04-03 ENCOUNTER — APPOINTMENT (OUTPATIENT)
Dept: CT IMAGING | Age: 62
End: 2017-04-03
Attending: PSYCHIATRY & NEUROLOGY

## 2017-04-03 ENCOUNTER — TELEPHONE (OUTPATIENT)
Dept: INTERNAL MEDICINE | Age: 62
End: 2017-04-03

## 2017-04-03 DIAGNOSIS — I63.9 CEREBROVASCULAR ACCIDENT (CVA), UNSPECIFIED MECHANISM (CMD): ICD-10-CM

## 2017-04-03 LAB
BASOPHILS # BLD AUTO: 0 K/MCL (ref 0–0.3)
BASOPHILS NFR BLD AUTO: 1 %
CREAT SERPL-MCNC: 0.68 MG/DL (ref 0.51–0.95)
DIFFERENTIAL METHOD BLD: NORMAL
EOSINOPHIL # BLD AUTO: 0.1 K/MCL (ref 0.1–0.5)
EOSINOPHIL NFR SPEC: 2 %
ERYTHROCYTE [DISTWIDTH] IN BLOOD: 12 % (ref 11–15)
GLUCOSE BLDC GLUCOMTR-MCNC: 165 MG/DL (ref 65–99)
GLUCOSE BLDC GLUCOMTR-MCNC: 299 MG/DL (ref 65–99)
GLUCOSE BLDC GLUCOMTR-MCNC: 351 MG/DL (ref 65–99)
GLUCOSE BLDC GLUCOMTR-MCNC: 513 MG/DL (ref 65–99)
HBA1C MFR BLD: 7.9 % (ref 4.5–5.6)
HCT VFR BLD CALC: 36.6 % (ref 36–46.5)
HGB BLD-MCNC: 12.7 G/DL (ref 12–15.5)
LYMPHOCYTES # BLD MANUAL: 3.8 K/MCL (ref 1–4)
LYMPHOCYTES NFR BLD MANUAL: 62 %
MCH RBC QN AUTO: 29.7 PG (ref 26–34)
MCHC RBC AUTO-ENTMCNC: 34.7 G/DL (ref 32–36.5)
MCV RBC AUTO: 85.5 FL (ref 78–100)
MONOCYTES # BLD MANUAL: 0.3 K/MCL (ref 0.3–0.9)
MONOCYTES NFR BLD MANUAL: 5 %
NEUTROPHILS # BLD AUTO: 1.8 K/MCL (ref 1.8–7.7)
NEUTROPHILS NFR BLD AUTO: 30 %
PLATELET # BLD: 171 K/MCL (ref 140–450)
RBC # BLD: 4.28 MIL/MCL (ref 4–5.2)
WBC # BLD: 6.1 K/MCL (ref 4.2–11)

## 2017-04-03 PROCEDURE — G8997 SWALLOW GOAL STATUS: HCPCS | Performed by: SPEECH-LANGUAGE PATHOLOGIST

## 2017-04-03 PROCEDURE — 70496 CT ANGIOGRAPHY HEAD: CPT | Performed by: RADIOLOGY

## 2017-04-03 PROCEDURE — 97165 OT EVAL LOW COMPLEX 30 MIN: CPT

## 2017-04-03 PROCEDURE — 10002803 HB RX 637: Performed by: STUDENT IN AN ORGANIZED HEALTH CARE EDUCATION/TRAINING PROGRAM

## 2017-04-03 PROCEDURE — 10002803 HB RX 637: Performed by: INTERNAL MEDICINE

## 2017-04-03 PROCEDURE — 70496 CT ANGIOGRAPHY HEAD: CPT

## 2017-04-03 PROCEDURE — 10004172 HB COUNTER-THERAPY VISIT OT

## 2017-04-03 PROCEDURE — 36415 COLL VENOUS BLD VENIPUNCTURE: CPT

## 2017-04-03 PROCEDURE — 10004651 HB RX, NO CHARGE ITEM: Performed by: RADIOLOGY

## 2017-04-03 PROCEDURE — 70498 CT ANGIOGRAPHY NECK: CPT | Performed by: RADIOLOGY

## 2017-04-03 PROCEDURE — 97162 PT EVAL MOD COMPLEX 30 MIN: CPT

## 2017-04-03 PROCEDURE — 92610 EVALUATE SWALLOWING FUNCTION: CPT | Performed by: SPEECH-LANGUAGE PATHOLOGIST

## 2017-04-03 PROCEDURE — G0378 HOSPITAL OBSERVATION PER HR: HCPCS

## 2017-04-03 PROCEDURE — 10002805 HB CONTRAST AGENT: Performed by: RADIOLOGY

## 2017-04-03 PROCEDURE — 10002800 HB RX 250 W HCPCS: Performed by: STUDENT IN AN ORGANIZED HEALTH CARE EDUCATION/TRAINING PROGRAM

## 2017-04-03 PROCEDURE — 82565 ASSAY OF CREATININE: CPT

## 2017-04-03 PROCEDURE — G8996 SWALLOW CURRENT STATUS: HCPCS | Performed by: SPEECH-LANGUAGE PATHOLOGIST

## 2017-04-03 PROCEDURE — 99245 OFF/OP CONSLTJ NEW/EST HI 55: CPT | Performed by: PSYCHIATRY & NEUROLOGY

## 2017-04-03 PROCEDURE — 96372 THER/PROPH/DIAG INJ SC/IM: CPT | Performed by: INTERNAL MEDICINE

## 2017-04-03 PROCEDURE — 85025 COMPLETE CBC W/AUTO DIFF WBC: CPT

## 2017-04-03 PROCEDURE — 82962 GLUCOSE BLOOD TEST: CPT

## 2017-04-03 PROCEDURE — 99220 INITIAL OBSERVATION CARE,LEVL III: CPT | Performed by: INTERNAL MEDICINE

## 2017-04-03 PROCEDURE — 70551 MRI BRAIN STEM W/O DYE: CPT | Performed by: RADIOLOGY

## 2017-04-03 PROCEDURE — 10004651 HB RX, NO CHARGE ITEM: Performed by: INTERNAL MEDICINE

## 2017-04-03 PROCEDURE — 10004173 HB COUNTER-THERAPY VISIT PT

## 2017-04-03 PROCEDURE — 10002807 HB RX 258: Performed by: RADIOLOGY

## 2017-04-03 PROCEDURE — 97535 SELF CARE MNGMENT TRAINING: CPT

## 2017-04-03 PROCEDURE — 97530 THERAPEUTIC ACTIVITIES: CPT

## 2017-04-03 PROCEDURE — 10002800 HB RX 250 W HCPCS: Performed by: INTERNAL MEDICINE

## 2017-04-03 PROCEDURE — 96372 THER/PROPH/DIAG INJ SC/IM: CPT | Performed by: STUDENT IN AN ORGANIZED HEALTH CARE EDUCATION/TRAINING PROGRAM

## 2017-04-03 PROCEDURE — 10004174 HB COUNTER-THERAPY VISIT SP: Performed by: SPEECH-LANGUAGE PATHOLOGIST

## 2017-04-03 PROCEDURE — 70551 MRI BRAIN STEM W/O DYE: CPT

## 2017-04-03 RX ORDER — POTASSIUM CHLORIDE 1.5 G/1.58G
20 POWDER, FOR SOLUTION ORAL EVERY 4 HOURS PRN
Status: DISCONTINUED | OUTPATIENT
Start: 2017-04-03 | End: 2017-04-05 | Stop reason: HOSPADM

## 2017-04-03 RX ORDER — SIMVASTATIN 20 MG
20 TABLET ORAL NIGHTLY
Status: DISCONTINUED | OUTPATIENT
Start: 2017-04-03 | End: 2017-04-04

## 2017-04-03 RX ORDER — SIMVASTATIN 20 MG
20 TABLET ORAL NIGHTLY
COMMUNITY
End: 2017-04-13 | Stop reason: SDUPTHER

## 2017-04-03 RX ORDER — POTASSIUM CHLORIDE 1.5 G/1.58G
40 POWDER, FOR SOLUTION ORAL EVERY 4 HOURS PRN
Status: DISCONTINUED | OUTPATIENT
Start: 2017-04-03 | End: 2017-04-05 | Stop reason: HOSPADM

## 2017-04-03 RX ORDER — INSULIN GLARGINE 100 [IU]/ML
20 INJECTION, SOLUTION SUBCUTANEOUS NIGHTLY
Status: DISCONTINUED | OUTPATIENT
Start: 2017-04-03 | End: 2017-04-04

## 2017-04-03 RX ORDER — 0.9 % SODIUM CHLORIDE 0.9 %
2 VIAL (ML) INJECTION EVERY 12 HOURS SCHEDULED
Status: DISCONTINUED | OUTPATIENT
Start: 2017-04-03 | End: 2017-04-05 | Stop reason: HOSPADM

## 2017-04-03 RX ORDER — NITROGLYCERIN 0.4 MG/1
0.4 TABLET SUBLINGUAL EVERY 5 MIN PRN
Status: DISCONTINUED | OUTPATIENT
Start: 2017-04-03 | End: 2017-04-05 | Stop reason: HOSPADM

## 2017-04-03 RX ORDER — LEVOTHYROXINE SODIUM 0.07 MG/1
225 TABLET ORAL
Status: DISCONTINUED | OUTPATIENT
Start: 2017-04-03 | End: 2017-04-05 | Stop reason: HOSPADM

## 2017-04-03 RX ORDER — NICOTINE POLACRILEX 4 MG
15 LOZENGE BUCCAL PRN
Status: DISCONTINUED | OUTPATIENT
Start: 2017-04-03 | End: 2017-04-05 | Stop reason: HOSPADM

## 2017-04-03 RX ORDER — 0.9 % SODIUM CHLORIDE 0.9 %
2 VIAL (ML) INJECTION PRN
Status: DISCONTINUED | OUTPATIENT
Start: 2017-04-03 | End: 2017-04-05 | Stop reason: HOSPADM

## 2017-04-03 RX ORDER — TRAZODONE HYDROCHLORIDE 100 MG/1
100 TABLET ORAL NIGHTLY
Status: DISCONTINUED | OUTPATIENT
Start: 2017-04-03 | End: 2017-04-05 | Stop reason: HOSPADM

## 2017-04-03 RX ORDER — PRIMIDONE 50 MG/1
75 TABLET ORAL 3 TIMES DAILY
Status: DISCONTINUED | OUTPATIENT
Start: 2017-04-03 | End: 2017-04-05 | Stop reason: HOSPADM

## 2017-04-03 RX ORDER — DEXTROSE MONOHYDRATE 25 G/50ML
25 INJECTION, SOLUTION INTRAVENOUS PRN
Status: DISCONTINUED | OUTPATIENT
Start: 2017-04-03 | End: 2017-04-05 | Stop reason: HOSPADM

## 2017-04-03 RX ORDER — ENOXAPARIN SODIUM 100 MG/ML
40 INJECTION SUBCUTANEOUS EVERY 24 HOURS
Status: DISCONTINUED | OUTPATIENT
Start: 2017-04-03 | End: 2017-04-05 | Stop reason: HOSPADM

## 2017-04-03 RX ORDER — POTASSIUM CHLORIDE 20 MEQ/1
20 TABLET, EXTENDED RELEASE ORAL EVERY 4 HOURS PRN
Status: DISCONTINUED | OUTPATIENT
Start: 2017-04-03 | End: 2017-04-05 | Stop reason: HOSPADM

## 2017-04-03 RX ORDER — PANTOPRAZOLE SODIUM 40 MG/1
40 TABLET, DELAYED RELEASE ORAL
Status: DISCONTINUED | OUTPATIENT
Start: 2017-04-03 | End: 2017-04-05 | Stop reason: HOSPADM

## 2017-04-03 RX ORDER — LOSARTAN POTASSIUM 50 MG/1
50 TABLET ORAL DAILY
Status: DISCONTINUED | OUTPATIENT
Start: 2017-04-03 | End: 2017-04-05 | Stop reason: HOSPADM

## 2017-04-03 RX ORDER — SUCRALFATE 1 G/1
1 TABLET ORAL 4 TIMES DAILY
Status: DISCONTINUED | OUTPATIENT
Start: 2017-04-03 | End: 2017-04-05 | Stop reason: HOSPADM

## 2017-04-03 RX ORDER — AMOXICILLIN 250 MG
2 CAPSULE ORAL DAILY PRN
Status: DISCONTINUED | OUTPATIENT
Start: 2017-04-03 | End: 2017-04-05 | Stop reason: HOSPADM

## 2017-04-03 RX ORDER — MAGNESIUM HYDROXIDE/ALUMINUM HYDROXICE/SIMETHICONE 120; 1200; 1200 MG/30ML; MG/30ML; MG/30ML
30 SUSPENSION ORAL EVERY 4 HOURS PRN
Status: DISCONTINUED | OUTPATIENT
Start: 2017-04-03 | End: 2017-04-05 | Stop reason: HOSPADM

## 2017-04-03 RX ORDER — BISACODYL 10 MG
10 SUPPOSITORY, RECTAL RECTAL DAILY PRN
Status: DISCONTINUED | OUTPATIENT
Start: 2017-04-03 | End: 2017-04-05 | Stop reason: HOSPADM

## 2017-04-03 RX ORDER — DULOXETIN HYDROCHLORIDE 60 MG/1
60 CAPSULE, DELAYED RELEASE ORAL 2 TIMES DAILY
Status: DISCONTINUED | OUTPATIENT
Start: 2017-04-03 | End: 2017-04-05 | Stop reason: HOSPADM

## 2017-04-03 RX ORDER — BUPROPION HYDROCHLORIDE 75 MG/1
75 TABLET ORAL 2 TIMES DAILY
Status: DISCONTINUED | OUTPATIENT
Start: 2017-04-03 | End: 2017-04-05 | Stop reason: HOSPADM

## 2017-04-03 RX ORDER — ACETAMINOPHEN 325 MG/1
650 TABLET ORAL EVERY 4 HOURS PRN
Status: DISCONTINUED | OUTPATIENT
Start: 2017-04-03 | End: 2017-04-05 | Stop reason: HOSPADM

## 2017-04-03 RX ORDER — ONDANSETRON 2 MG/ML
4 INJECTION INTRAMUSCULAR; INTRAVENOUS 2 TIMES DAILY PRN
Status: DISCONTINUED | OUTPATIENT
Start: 2017-04-03 | End: 2017-04-05 | Stop reason: HOSPADM

## 2017-04-03 RX ORDER — POTASSIUM CHLORIDE 20 MEQ/1
40 TABLET, EXTENDED RELEASE ORAL EVERY 4 HOURS PRN
Status: DISCONTINUED | OUTPATIENT
Start: 2017-04-03 | End: 2017-04-05 | Stop reason: HOSPADM

## 2017-04-03 RX ORDER — DEXTROSE MONOHYDRATE 50 MG/ML
INJECTION, SOLUTION INTRAVENOUS CONTINUOUS PRN
Status: DISCONTINUED | OUTPATIENT
Start: 2017-04-03 | End: 2017-04-05 | Stop reason: HOSPADM

## 2017-04-03 RX ORDER — POTASSIUM CHLORIDE 14.9 MG/ML
40 INJECTION INTRAVENOUS EVERY 4 HOURS PRN
Status: DISCONTINUED | OUTPATIENT
Start: 2017-04-03 | End: 2017-04-05 | Stop reason: HOSPADM

## 2017-04-03 RX ORDER — POTASSIUM CHLORIDE 14.9 MG/ML
20 INJECTION INTRAVENOUS EVERY 4 HOURS PRN
Status: DISCONTINUED | OUTPATIENT
Start: 2017-04-03 | End: 2017-04-05 | Stop reason: HOSPADM

## 2017-04-03 RX ORDER — ALBUTEROL SULFATE 90 UG/1
2 AEROSOL, METERED RESPIRATORY (INHALATION)
Status: DISCONTINUED | OUTPATIENT
Start: 2017-04-03 | End: 2017-04-05 | Stop reason: HOSPADM

## 2017-04-03 RX ORDER — PREGABALIN 75 MG/1
225 CAPSULE ORAL 2 TIMES DAILY
Status: DISCONTINUED | OUTPATIENT
Start: 2017-04-03 | End: 2017-04-05 | Stop reason: HOSPADM

## 2017-04-03 RX ORDER — CARBAMAZEPINE 200 MG/1
200 TABLET ORAL 3 TIMES DAILY
Status: DISCONTINUED | OUTPATIENT
Start: 2017-04-03 | End: 2017-04-05 | Stop reason: HOSPADM

## 2017-04-03 RX ORDER — ATORVASTATIN CALCIUM 80 MG/1
80 TABLET, FILM COATED ORAL DAILY
Status: DISCONTINUED | OUTPATIENT
Start: 2017-04-03 | End: 2017-04-03

## 2017-04-03 RX ORDER — HYDROCODONE BITARTRATE AND ACETAMINOPHEN 5; 325 MG/1; MG/1
1-2 TABLET ORAL EVERY 4 HOURS PRN
Status: DISCONTINUED | OUTPATIENT
Start: 2017-04-03 | End: 2017-04-05 | Stop reason: HOSPADM

## 2017-04-03 RX ORDER — HYDRALAZINE HYDROCHLORIDE 20 MG/ML
10 INJECTION INTRAMUSCULAR; INTRAVENOUS EVERY 6 HOURS PRN
Status: DISCONTINUED | OUTPATIENT
Start: 2017-04-03 | End: 2017-04-05 | Stop reason: HOSPADM

## 2017-04-03 RX ADMIN — SUCRALFATE 1 G: 1 TABLET ORAL at 09:31

## 2017-04-03 RX ADMIN — PRIMIDONE 75 MG: 50 TABLET ORAL at 14:13

## 2017-04-03 RX ADMIN — TRAZODONE HYDROCHLORIDE 100 MG: 100 TABLET ORAL at 20:43

## 2017-04-03 RX ADMIN — CARBAMAZEPINE 200 MG: 200 TABLET ORAL at 09:31

## 2017-04-03 RX ADMIN — CARBAMAZEPINE 200 MG: 200 TABLET ORAL at 20:44

## 2017-04-03 RX ADMIN — SODIUM CHLORIDE 100 ML: 9 INJECTION, SOLUTION INTRAVENOUS at 16:15

## 2017-04-03 RX ADMIN — ASPIRIN 325 MG: 325 TABLET, COATED ORAL at 09:31

## 2017-04-03 RX ADMIN — INSULIN LISPRO 15 UNITS: 100 INJECTION, SOLUTION INTRAVENOUS; SUBCUTANEOUS at 13:45

## 2017-04-03 RX ADMIN — PANTOPRAZOLE SODIUM 40 MG: 40 TABLET, DELAYED RELEASE ORAL at 06:44

## 2017-04-03 RX ADMIN — SODIUM CHLORIDE 10 ML: 9 INJECTION, SOLUTION INTRAMUSCULAR; INTRAVENOUS; SUBCUTANEOUS at 16:17

## 2017-04-03 RX ADMIN — LOSARTAN POTASSIUM 50 MG: 50 TABLET, FILM COATED ORAL at 09:31

## 2017-04-03 RX ADMIN — INSULIN GLARGINE 20 UNITS: 100 INJECTION, SOLUTION SUBCUTANEOUS at 20:50

## 2017-04-03 RX ADMIN — BUPROPION HYDROCHLORIDE 75 MG: 75 TABLET, FILM COATED ORAL at 09:31

## 2017-04-03 RX ADMIN — TRAZODONE HYDROCHLORIDE 100 MG: 100 TABLET ORAL at 22:30

## 2017-04-03 RX ADMIN — BUPROPION HYDROCHLORIDE 75 MG: 75 TABLET, FILM COATED ORAL at 17:02

## 2017-04-03 RX ADMIN — PRIMIDONE 75 MG: 50 TABLET ORAL at 09:31

## 2017-04-03 RX ADMIN — INSULIN LISPRO 10 UNITS: 100 INJECTION, SOLUTION INTRAVENOUS; SUBCUTANEOUS at 17:57

## 2017-04-03 RX ADMIN — SUCRALFATE 1 G: 1 TABLET ORAL at 13:46

## 2017-04-03 RX ADMIN — ENOXAPARIN SODIUM 40 MG: 40 INJECTION SUBCUTANEOUS at 09:31

## 2017-04-03 RX ADMIN — DULOXETINE 60 MG: 60 CAPSULE, DELAYED RELEASE ORAL at 09:31

## 2017-04-03 RX ADMIN — SUCRALFATE 1 G: 1 TABLET ORAL at 20:43

## 2017-04-03 RX ADMIN — HYDROCODONE BITARTRATE AND ACETAMINOPHEN 2 TABLET: 5; 325 TABLET ORAL at 22:30

## 2017-04-03 RX ADMIN — PREGABALIN 225 MG: 75 CAPSULE ORAL at 17:00

## 2017-04-03 RX ADMIN — PRIMIDONE 75 MG: 50 TABLET ORAL at 20:44

## 2017-04-03 RX ADMIN — DULOXETINE 60 MG: 60 CAPSULE, DELAYED RELEASE ORAL at 17:00

## 2017-04-03 RX ADMIN — PANTOPRAZOLE SODIUM 40 MG: 40 TABLET, DELAYED RELEASE ORAL at 17:00

## 2017-04-03 RX ADMIN — Medication 2 ML: at 20:50

## 2017-04-03 RX ADMIN — SUCRALFATE 1 G: 1 TABLET ORAL at 17:00

## 2017-04-03 RX ADMIN — LEVOTHYROXINE SODIUM 225 MCG: 75 TABLET ORAL at 06:44

## 2017-04-03 RX ADMIN — CARBAMAZEPINE 200 MG: 200 TABLET ORAL at 13:46

## 2017-04-03 RX ADMIN — SIMVASTATIN 20 MG: 20 TABLET, FILM COATED ORAL at 20:43

## 2017-04-03 RX ADMIN — IOPAMIDOL 100 ML: 755 INJECTION, SOLUTION INTRAVENOUS at 16:15

## 2017-04-03 ASSESSMENT — ENCOUNTER SYMPTOMS
FEVER: 0
CLUMSINESS: 0
CHEST TIGHTNESS: 0
HEADACHES: 0
ABDOMINAL PAIN: 0
NERVOUS/ANXIOUS: 0
BOWEL INCONTINENCE: 0
SLURRED SPEECH: 0
VERTIGO: 0
SHORTNESS OF BREATH: 0
CONFUSION: 0
BACK PAIN: 0
LIGHT-HEADEDNESS: 0
COUGH: 0
ACTIVITY CHANGE: 0
RHINORRHEA: 0
NAUSEA: 0
CHILLS: 0
VOMITING: 0
DIZZINESS: 1
SEIZURES: 0
WEAKNESS: 1
DIAPHORESIS: 0
EYE DISCHARGE: 0

## 2017-04-03 ASSESSMENT — ACTIVITIES OF DAILY LIVING (ADL)
EATING: MODIFIED INDEPENDENT
GROOMING: MODIFIED INDEPENDENT
RECENT_DECLINE_ADL: NO
BATHING: NEEDS ASSISTANCE
CHRONIC_PAIN_PRESENT: NO
ADL_SHORT_OF_BREATH: NO
TOILETING: NEEDS ASSISTANCE
ADL_SCORE: 8
PRIOR_ADL_TOILETING: MODIFIED INDEPENDENT
PRIOR_ADL: MODIFIED INDEPENDENT
FEEDING: INDEPENDENT
PRIOR_ADL_BATHING: MODIFIED INDEPENDENT
DRESSING: NEEDS ASSISTANCE
ADL_SCORE: 12
ADL_BEFORE_ADMISSION: INDEPENDENT

## 2017-04-03 ASSESSMENT — LIFESTYLE VARIABLES
E-CIGARETTE_USE: NO E-CIGARETTES USE IN THE LAST 30 DAYS
HOW OFTEN DO YOU HAVE 6 OR MORE DRINKS ON ONE OCCASION: NEVER
HOW OFTEN DO YOU HAVE A DRINK CONTAINING ALCOHOL: NEVER
HOW MANY STANDARD DRINKS CONTAINING ALCOHOL DO YOU HAVE ON A TYPICAL DAY: 0,1 OR 2
TOBACCO_USE_STATUS_IN_THE_LAST_30_DAYS: NO TOBACCO USED IN THE LAST 30 DAYS
AUDIT-C TOTAL SCORE: 0
ALCOHOL_USE_STATUS: NO OR LOW RISK WITH VALIDATED TOOL

## 2017-04-03 ASSESSMENT — PAIN SCALES - GENERAL
PAIN_LEVEL_AT_REST: 0
PAIN_LEVEL_AT_REST: 7
PAIN_LEVEL_AT_REST: 0

## 2017-04-03 ASSESSMENT — COGNITIVE AND FUNCTIONAL STATUS - GENERAL
ARE YOU DEAF OR DO YOU HAVE SERIOUS DIFFICULTY  HEARING: NO
ARE YOU BLIND OR DO YOU HAVE SERIOUS DIFFICULTY SEEING, EVEN WHEN WEARING GLASSES: NO

## 2017-04-04 ENCOUNTER — APPOINTMENT (OUTPATIENT)
Dept: CARDIOLOGY | Age: 62
End: 2017-04-04
Attending: INTERNAL MEDICINE

## 2017-04-04 LAB
BASOPHILS # BLD AUTO: 0 K/MCL (ref 0–0.3)
BASOPHILS NFR BLD AUTO: 1 %
DIFFERENTIAL METHOD BLD: NORMAL
EOSINOPHIL # BLD AUTO: 0.1 K/MCL (ref 0.1–0.5)
EOSINOPHIL NFR SPEC: 2 %
ERYTHROCYTE [DISTWIDTH] IN BLOOD: 12.3 % (ref 11–15)
GLUCOSE BLDC GLUCOMTR-MCNC: 101 MG/DL (ref 65–99)
GLUCOSE BLDC GLUCOMTR-MCNC: 179 MG/DL (ref 65–99)
GLUCOSE BLDC GLUCOMTR-MCNC: 337 MG/DL (ref 65–99)
GLUCOSE BLDC GLUCOMTR-MCNC: 368 MG/DL (ref 65–99)
GLUCOSE BLDC GLUCOMTR-MCNC: 376 MG/DL (ref 65–99)
GLUCOSE BLDC GLUCOMTR-MCNC: 387 MG/DL (ref 65–99)
GLUCOSE BLDC GLUCOMTR-MCNC: 408 MG/DL (ref 65–99)
HCT VFR BLD CALC: 37.6 % (ref 36–46.5)
HGB BLD-MCNC: 13 G/DL (ref 12–15.5)
LYMPHOCYTES # BLD MANUAL: 2.5 K/MCL (ref 1–4)
LYMPHOCYTES NFR BLD MANUAL: 53 %
MCH RBC QN AUTO: 30 PG (ref 26–34)
MCHC RBC AUTO-ENTMCNC: 34.6 G/DL (ref 32–36.5)
MCV RBC AUTO: 86.6 FL (ref 78–100)
MONOCYTES # BLD MANUAL: 0.3 K/MCL (ref 0.3–0.9)
MONOCYTES NFR BLD MANUAL: 5 %
NEUTROPHILS # BLD AUTO: 1.9 K/MCL (ref 1.8–7.7)
NEUTROPHILS NFR BLD AUTO: 39 %
PLATELET # BLD: 171 K/MCL (ref 140–450)
POTASSIUM SERPL-SCNC: 4.1 MMOL/L (ref 3.4–5.1)
RBC # BLD: 4.34 MIL/MCL (ref 4–5.2)
WBC # BLD: 4.8 K/MCL (ref 4.2–11)

## 2017-04-04 PROCEDURE — 84132 ASSAY OF SERUM POTASSIUM: CPT

## 2017-04-04 PROCEDURE — 97530 THERAPEUTIC ACTIVITIES: CPT

## 2017-04-04 PROCEDURE — 10002803 HB RX 637: Performed by: INTERNAL MEDICINE

## 2017-04-04 PROCEDURE — 10004173 HB COUNTER-THERAPY VISIT PT

## 2017-04-04 PROCEDURE — 96372 THER/PROPH/DIAG INJ SC/IM: CPT | Performed by: HOSPITALIST

## 2017-04-04 PROCEDURE — 97116 GAIT TRAINING THERAPY: CPT

## 2017-04-04 PROCEDURE — 10004174 HB COUNTER-THERAPY VISIT SP: Performed by: SPEECH-LANGUAGE PATHOLOGIST

## 2017-04-04 PROCEDURE — 36415 COLL VENOUS BLD VENIPUNCTURE: CPT

## 2017-04-04 PROCEDURE — 10004651 HB RX, NO CHARGE ITEM: Performed by: INTERNAL MEDICINE

## 2017-04-04 PROCEDURE — G0378 HOSPITAL OBSERVATION PER HR: HCPCS

## 2017-04-04 PROCEDURE — 99214 OFFICE O/P EST MOD 30 MIN: CPT | Performed by: PSYCHIATRY & NEUROLOGY

## 2017-04-04 PROCEDURE — 99225 SUBSEQUENT OBSERVATION CARE LEVEL II: CPT | Performed by: HOSPITALIST

## 2017-04-04 PROCEDURE — 76376 3D RENDER W/INTRP POSTPROCES: CPT

## 2017-04-04 PROCEDURE — 76376 3D RENDER W/INTRP POSTPROCES: CPT | Performed by: INTERNAL MEDICINE

## 2017-04-04 PROCEDURE — 96372 THER/PROPH/DIAG INJ SC/IM: CPT | Performed by: INTERNAL MEDICINE

## 2017-04-04 PROCEDURE — 82962 GLUCOSE BLOOD TEST: CPT

## 2017-04-04 PROCEDURE — 85025 COMPLETE CBC W/AUTO DIFF WBC: CPT

## 2017-04-04 PROCEDURE — 10002800 HB RX 250 W HCPCS: Performed by: HOSPITALIST

## 2017-04-04 PROCEDURE — 10002803 HB RX 637: Performed by: HOSPITALIST

## 2017-04-04 PROCEDURE — 97535 SELF CARE MNGMENT TRAINING: CPT

## 2017-04-04 PROCEDURE — 10002803 HB RX 637: Performed by: STUDENT IN AN ORGANIZED HEALTH CARE EDUCATION/TRAINING PROGRAM

## 2017-04-04 PROCEDURE — 0399T ECHO M-MODE/2D/DOPPLER (ROUTINE): CPT | Performed by: INTERNAL MEDICINE

## 2017-04-04 PROCEDURE — 92526 ORAL FUNCTION THERAPY: CPT | Performed by: SPEECH-LANGUAGE PATHOLOGIST

## 2017-04-04 PROCEDURE — 92523 SPEECH SOUND LANG COMPREHEN: CPT | Performed by: SPEECH-LANGUAGE PATHOLOGIST

## 2017-04-04 PROCEDURE — 10004172 HB COUNTER-THERAPY VISIT OT

## 2017-04-04 PROCEDURE — 93306 TTE W/DOPPLER COMPLETE: CPT | Performed by: INTERNAL MEDICINE

## 2017-04-04 PROCEDURE — 10002800 HB RX 250 W HCPCS: Performed by: INTERNAL MEDICINE

## 2017-04-04 PROCEDURE — 93306 TTE W/DOPPLER COMPLETE: CPT

## 2017-04-04 RX ORDER — ATORVASTATIN CALCIUM 80 MG/1
80 TABLET, FILM COATED ORAL NIGHTLY
Status: DISCONTINUED | OUTPATIENT
Start: 2017-04-04 | End: 2017-04-05 | Stop reason: HOSPADM

## 2017-04-04 RX ORDER — INSULIN GLARGINE 100 [IU]/ML
30 INJECTION, SOLUTION SUBCUTANEOUS NIGHTLY
Status: DISCONTINUED | OUTPATIENT
Start: 2017-04-04 | End: 2017-04-05 | Stop reason: HOSPADM

## 2017-04-04 RX ADMIN — ACETAMINOPHEN 650 MG: 325 TABLET ORAL at 10:20

## 2017-04-04 RX ADMIN — PRIMIDONE 75 MG: 50 TABLET ORAL at 13:22

## 2017-04-04 RX ADMIN — SUCRALFATE 1 G: 1 TABLET ORAL at 17:08

## 2017-04-04 RX ADMIN — INSULIN LISPRO 12 UNITS: 100 INJECTION, SOLUTION INTRAVENOUS; SUBCUTANEOUS at 07:00

## 2017-04-04 RX ADMIN — CARBAMAZEPINE 200 MG: 200 TABLET ORAL at 10:17

## 2017-04-04 RX ADMIN — SUCRALFATE 1 G: 1 TABLET ORAL at 20:29

## 2017-04-04 RX ADMIN — ASPIRIN 325 MG: 325 TABLET, COATED ORAL at 10:17

## 2017-04-04 RX ADMIN — ENOXAPARIN SODIUM 40 MG: 40 INJECTION SUBCUTANEOUS at 10:17

## 2017-04-04 RX ADMIN — ATORVASTATIN CALCIUM 80 MG: 80 TABLET, FILM COATED ORAL at 20:29

## 2017-04-04 RX ADMIN — LOSARTAN POTASSIUM 50 MG: 50 TABLET, FILM COATED ORAL at 10:17

## 2017-04-04 RX ADMIN — SUCRALFATE 1 G: 1 TABLET ORAL at 10:17

## 2017-04-04 RX ADMIN — PREGABALIN 225 MG: 75 CAPSULE ORAL at 17:10

## 2017-04-04 RX ADMIN — Medication 2 ML: at 20:31

## 2017-04-04 RX ADMIN — PANTOPRAZOLE SODIUM 40 MG: 40 TABLET, DELAYED RELEASE ORAL at 06:19

## 2017-04-04 RX ADMIN — PRIMIDONE 75 MG: 50 TABLET ORAL at 10:17

## 2017-04-04 RX ADMIN — PANTOPRAZOLE SODIUM 40 MG: 40 TABLET, DELAYED RELEASE ORAL at 17:08

## 2017-04-04 RX ADMIN — TRAZODONE HYDROCHLORIDE 100 MG: 100 TABLET ORAL at 20:30

## 2017-04-04 RX ADMIN — SUCRALFATE 1 G: 1 TABLET ORAL at 13:22

## 2017-04-04 RX ADMIN — INSULIN LISPRO 10 UNITS: 100 INJECTION, SOLUTION INTRAVENOUS; SUBCUTANEOUS at 14:56

## 2017-04-04 RX ADMIN — PREGABALIN 225 MG: 75 CAPSULE ORAL at 10:17

## 2017-04-04 RX ADMIN — DULOXETINE 60 MG: 60 CAPSULE, DELAYED RELEASE ORAL at 17:10

## 2017-04-04 RX ADMIN — HYDROCODONE BITARTRATE AND ACETAMINOPHEN 2 TABLET: 5; 325 TABLET ORAL at 20:29

## 2017-04-04 RX ADMIN — BUPROPION HYDROCHLORIDE 75 MG: 75 TABLET, FILM COATED ORAL at 10:17

## 2017-04-04 RX ADMIN — BUPROPION HYDROCHLORIDE 75 MG: 75 TABLET, FILM COATED ORAL at 17:09

## 2017-04-04 RX ADMIN — CARBAMAZEPINE 200 MG: 200 TABLET ORAL at 13:22

## 2017-04-04 RX ADMIN — Medication 2 ML: at 10:21

## 2017-04-04 RX ADMIN — PRIMIDONE 75 MG: 50 TABLET ORAL at 20:30

## 2017-04-04 RX ADMIN — INSULIN GLARGINE 30 UNITS: 100 INJECTION, SOLUTION SUBCUTANEOUS at 20:31

## 2017-04-04 RX ADMIN — CARBAMAZEPINE 200 MG: 200 TABLET ORAL at 20:30

## 2017-04-04 RX ADMIN — DULOXETINE 60 MG: 60 CAPSULE, DELAYED RELEASE ORAL at 10:17

## 2017-04-04 RX ADMIN — LEVOTHYROXINE SODIUM 225 MCG: 75 TABLET ORAL at 06:19

## 2017-04-04 ASSESSMENT — PAIN SCALES - GENERAL
PAIN_LEVEL_AT_REST: 3
PAIN_LEVEL_AT_REST: 7
PAIN_LEVEL_AT_REST: 0
PAIN_LEVEL_AT_REST: 2
PAIN_LEVEL_AT_REST: 0
PAIN_LEVEL_AT_REST: 0
PAIN_LEVEL_AT_REST: 2

## 2017-04-04 ASSESSMENT — ACTIVITIES OF DAILY LIVING (ADL)
BATHING: NEEDS ASSISTANCE
DRESSING: NEEDS ASSISTANCE
BATHING: NEEDS ASSISTANCE
FEEDING: INDEPENDENT
BATHING: NEEDS ASSISTANCE
FEEDING: INDEPENDENT
DRESSING: NEEDS ASSISTANCE
ADL_SCORE: 8
ADL_SCORE: 8
TOILETING: NEEDS ASSISTANCE
DRESSING: NEEDS ASSISTANCE
ADL_SCORE: 8
TOILETING: NEEDS ASSISTANCE
FEEDING: INDEPENDENT
TOILETING: NEEDS ASSISTANCE

## 2017-04-05 VITALS
OXYGEN SATURATION: 96 % | SYSTOLIC BLOOD PRESSURE: 160 MMHG | WEIGHT: 147.05 LBS | DIASTOLIC BLOOD PRESSURE: 67 MMHG | HEART RATE: 66 BPM | HEIGHT: 64 IN | TEMPERATURE: 97.9 F | BODY MASS INDEX: 25.1 KG/M2 | RESPIRATION RATE: 18 BRPM

## 2017-04-05 LAB
BASOPHILS # BLD AUTO: 0 K/MCL (ref 0–0.3)
BASOPHILS NFR BLD AUTO: 0 %
DIFFERENTIAL METHOD BLD: NORMAL
EOSINOPHIL # BLD AUTO: 0.2 K/MCL (ref 0.1–0.5)
EOSINOPHIL NFR SPEC: 4 %
ERYTHROCYTE [DISTWIDTH] IN BLOOD: 12.4 % (ref 11–15)
GLUCOSE BLDC GLUCOMTR-MCNC: 234 MG/DL (ref 65–99)
GLUCOSE BLDC GLUCOMTR-MCNC: 76 MG/DL (ref 65–99)
HCT VFR BLD CALC: 37.1 % (ref 36–46.5)
HGB BLD-MCNC: 12.5 G/DL (ref 12–15.5)
LYMPHOCYTES # BLD MANUAL: 3.1 K/MCL (ref 1–4)
LYMPHOCYTES NFR BLD MANUAL: 56 %
MCH RBC QN AUTO: 29.6 PG (ref 26–34)
MCHC RBC AUTO-ENTMCNC: 33.7 G/DL (ref 32–36.5)
MCV RBC AUTO: 87.9 FL (ref 78–100)
MONOCYTES # BLD MANUAL: 0.3 K/MCL (ref 0.3–0.9)
MONOCYTES NFR BLD MANUAL: 6 %
NEUTROPHILS # BLD AUTO: 1.9 K/MCL (ref 1.8–7.7)
NEUTROPHILS NFR BLD AUTO: 34 %
PLATELET # BLD: 176 K/MCL (ref 140–450)
RBC # BLD: 4.22 MIL/MCL (ref 4–5.2)
WBC # BLD: 5.5 K/MCL (ref 4.2–11)

## 2017-04-05 PROCEDURE — 10002803 HB RX 637: Performed by: STUDENT IN AN ORGANIZED HEALTH CARE EDUCATION/TRAINING PROGRAM

## 2017-04-05 PROCEDURE — 36415 COLL VENOUS BLD VENIPUNCTURE: CPT

## 2017-04-05 PROCEDURE — 99225 SUBSEQUENT OBSERVATION CARE LEVEL II: CPT | Performed by: HOSPITALIST

## 2017-04-05 PROCEDURE — 10002803 HB RX 637: Performed by: INTERNAL MEDICINE

## 2017-04-05 PROCEDURE — 82962 GLUCOSE BLOOD TEST: CPT

## 2017-04-05 PROCEDURE — 97535 SELF CARE MNGMENT TRAINING: CPT

## 2017-04-05 PROCEDURE — 10004172 HB COUNTER-THERAPY VISIT OT

## 2017-04-05 PROCEDURE — G0378 HOSPITAL OBSERVATION PER HR: HCPCS

## 2017-04-05 PROCEDURE — 85025 COMPLETE CBC W/AUTO DIFF WBC: CPT

## 2017-04-05 PROCEDURE — 96372 THER/PROPH/DIAG INJ SC/IM: CPT | Performed by: INTERNAL MEDICINE

## 2017-04-05 PROCEDURE — 97530 THERAPEUTIC ACTIVITIES: CPT

## 2017-04-05 PROCEDURE — 10004651 HB RX, NO CHARGE ITEM: Performed by: INTERNAL MEDICINE

## 2017-04-05 PROCEDURE — 99214 OFFICE O/P EST MOD 30 MIN: CPT | Performed by: PSYCHIATRY & NEUROLOGY

## 2017-04-05 PROCEDURE — 10002800 HB RX 250 W HCPCS: Performed by: INTERNAL MEDICINE

## 2017-04-05 RX ADMIN — SUCRALFATE 1 G: 1 TABLET ORAL at 16:40

## 2017-04-05 RX ADMIN — SUCRALFATE 1 G: 1 TABLET ORAL at 13:07

## 2017-04-05 RX ADMIN — ACETAMINOPHEN 650 MG: 325 TABLET ORAL at 02:37

## 2017-04-05 RX ADMIN — ENOXAPARIN SODIUM 40 MG: 40 INJECTION SUBCUTANEOUS at 09:40

## 2017-04-05 RX ADMIN — INSULIN LISPRO 4 UNITS: 100 INJECTION, SOLUTION INTRAVENOUS; SUBCUTANEOUS at 13:09

## 2017-04-05 RX ADMIN — Medication 2 ML: at 09:41

## 2017-04-05 RX ADMIN — PANTOPRAZOLE SODIUM 40 MG: 40 TABLET, DELAYED RELEASE ORAL at 15:09

## 2017-04-05 RX ADMIN — PREGABALIN 225 MG: 75 CAPSULE ORAL at 09:39

## 2017-04-05 RX ADMIN — CARBAMAZEPINE 200 MG: 200 TABLET ORAL at 13:12

## 2017-04-05 RX ADMIN — LEVOTHYROXINE SODIUM 225 MCG: 75 TABLET ORAL at 06:00

## 2017-04-05 RX ADMIN — SUCRALFATE 1 G: 1 TABLET ORAL at 09:39

## 2017-04-05 RX ADMIN — LOSARTAN POTASSIUM 50 MG: 50 TABLET, FILM COATED ORAL at 09:39

## 2017-04-05 RX ADMIN — BUPROPION HYDROCHLORIDE 75 MG: 75 TABLET, FILM COATED ORAL at 09:40

## 2017-04-05 RX ADMIN — ASPIRIN 325 MG: 325 TABLET, COATED ORAL at 09:40

## 2017-04-05 RX ADMIN — ACETAMINOPHEN 650 MG: 325 TABLET ORAL at 13:07

## 2017-04-05 RX ADMIN — ACETAMINOPHEN 650 MG: 325 TABLET ORAL at 09:43

## 2017-04-05 RX ADMIN — PRIMIDONE 75 MG: 50 TABLET ORAL at 13:12

## 2017-04-05 RX ADMIN — PRIMIDONE 75 MG: 50 TABLET ORAL at 09:39

## 2017-04-05 RX ADMIN — CARBAMAZEPINE 200 MG: 200 TABLET ORAL at 09:39

## 2017-04-05 RX ADMIN — DULOXETINE 60 MG: 60 CAPSULE, DELAYED RELEASE ORAL at 09:39

## 2017-04-05 RX ADMIN — PANTOPRAZOLE SODIUM 40 MG: 40 TABLET, DELAYED RELEASE ORAL at 06:00

## 2017-04-05 ASSESSMENT — ACTIVITIES OF DAILY LIVING (ADL)
ADL_SCORE: 8
FEEDING: INDEPENDENT
TOILETING: NEEDS ASSISTANCE
BATHING: NEEDS ASSISTANCE
DRESSING: NEEDS ASSISTANCE

## 2017-04-05 ASSESSMENT — PAIN SCALES - GENERAL
PAIN_LEVEL_AT_REST: 1
PAIN_LEVEL_AT_REST: 4
PAIN_LEVEL_AT_REST: 3
PAIN_LEVEL_AT_REST: 2
PAIN_LEVEL_AT_REST: 3

## 2017-04-06 ENCOUNTER — TELEPHONE (OUTPATIENT)
Dept: INTERNAL MEDICINE | Age: 62
End: 2017-04-06

## 2017-04-06 RX ORDER — SUCRALFATE 1 G/1
TABLET ORAL
Qty: 120 TABLET | Refills: 0 | OUTPATIENT
Start: 2017-04-06

## 2017-04-06 RX ORDER — SUCRALFATE 1 G/1
1 TABLET ORAL 4 TIMES DAILY
Qty: 120 TABLET | Refills: 0 | Status: SHIPPED | OUTPATIENT
Start: 2017-04-06 | End: 2017-04-13 | Stop reason: SDUPTHER

## 2017-04-07 ENCOUNTER — TELEPHONE (OUTPATIENT)
Dept: INTERNAL MEDICINE | Age: 62
End: 2017-04-07

## 2017-04-07 ENCOUNTER — NURSE TRIAGE (OUTPATIENT)
Dept: TELEHEALTH | Age: 62
End: 2017-04-07

## 2017-04-07 ENCOUNTER — TELEPHONE (OUTPATIENT)
Dept: NEUROLOGY | Age: 62
End: 2017-04-07

## 2017-04-07 RX ORDER — TRAZODONE HYDROCHLORIDE 100 MG/1
100 TABLET ORAL NIGHTLY
Qty: 31 TABLET | Refills: 0 | Status: CANCELLED | OUTPATIENT
Start: 2017-04-07

## 2017-04-07 RX ORDER — SUCRALFATE 1 G/1
1 TABLET ORAL 4 TIMES DAILY
Qty: 124 TABLET | Refills: 0 | Status: CANCELLED | OUTPATIENT
Start: 2017-04-07

## 2017-04-07 RX ORDER — ALBUTEROL SULFATE 90 UG/1
2 AEROSOL, METERED RESPIRATORY (INHALATION)
Qty: 18 G | Refills: 0 | Status: CANCELLED | OUTPATIENT
Start: 2017-04-07

## 2017-04-07 RX ORDER — OMEPRAZOLE 40 MG/1
40 CAPSULE, DELAYED RELEASE ORAL 2 TIMES DAILY
Qty: 62 CAPSULE | Refills: 0 | Status: CANCELLED | OUTPATIENT
Start: 2017-04-07

## 2017-04-07 RX ORDER — POLYETHYLENE GLYCOL 3350 17 G/17G
17 POWDER, FOR SOLUTION ORAL 2 TIMES DAILY PRN
Qty: 255 G | Refills: 0 | Status: CANCELLED | OUTPATIENT
Start: 2017-04-07

## 2017-04-07 RX ORDER — MECLIZINE HYDROCHLORIDE 25 MG/1
25 TABLET ORAL 3 TIMES DAILY PRN
Qty: 30 TABLET | Refills: 0 | Status: CANCELLED | OUTPATIENT
Start: 2017-04-07

## 2017-04-07 RX ORDER — NICOTINE 21 MG/24HR
1 PATCH, TRANSDERMAL 24 HOURS TRANSDERMAL EVERY 24 HOURS
Qty: 28 PATCH | Refills: 0 | Status: SHIPPED | OUTPATIENT
Start: 2017-04-07 | End: 2017-05-24 | Stop reason: SDUPTHER

## 2017-04-07 RX ORDER — SIMVASTATIN 20 MG
20 TABLET ORAL NIGHTLY
Qty: 31 TABLET | Refills: 0 | Status: CANCELLED | OUTPATIENT
Start: 2017-04-07

## 2017-04-07 RX ORDER — DULOXETIN HYDROCHLORIDE 60 MG/1
60 CAPSULE, DELAYED RELEASE ORAL 2 TIMES DAILY
Qty: 62 CAPSULE | Refills: 0 | Status: CANCELLED | OUTPATIENT
Start: 2017-04-07

## 2017-04-07 RX ORDER — LEVOTHYROXINE SODIUM 0.03 MG/1
25 TABLET ORAL DAILY
Qty: 31 TABLET | Refills: 0 | Status: CANCELLED | OUTPATIENT
Start: 2017-04-07

## 2017-04-07 RX ORDER — LEVOTHYROXINE SODIUM 0.2 MG/1
200 TABLET ORAL DAILY
Qty: 31 TABLET | Refills: 0 | Status: CANCELLED | OUTPATIENT
Start: 2017-04-07

## 2017-04-07 RX ORDER — BUPROPION HYDROCHLORIDE 75 MG/1
75 TABLET ORAL 2 TIMES DAILY
Qty: 62 TABLET | Status: CANCELLED | OUTPATIENT
Start: 2017-04-07

## 2017-04-07 RX ORDER — BACLOFEN 20 MG
500 TABLET ORAL DAILY
Qty: 31 TABLET | Refills: 0 | Status: CANCELLED | OUTPATIENT
Start: 2017-04-07

## 2017-04-07 RX ORDER — LOSARTAN POTASSIUM 25 MG/1
50 TABLET ORAL DAILY
Qty: 90 TABLET | Refills: 3 | Status: CANCELLED | OUTPATIENT
Start: 2017-04-07

## 2017-04-07 RX ORDER — ASPIRIN 325 MG
325 TABLET, DELAYED RELEASE (ENTERIC COATED) ORAL DAILY
Qty: 31 TABLET | Refills: 0 | Status: CANCELLED | OUTPATIENT
Start: 2017-04-07

## 2017-04-08 ENCOUNTER — NURSE TRIAGE (OUTPATIENT)
Dept: TELEHEALTH | Age: 62
End: 2017-04-08

## 2017-04-10 LAB
AV PEAK GRADIENT (AVPG): 5.9 MMHG
AV PEAK VELOCITY (AVPV): 1.2 M/S
DOP CALC LVOT PEAK VEL (LVOTPV): 0.9 M/S
E WAVE DECELARATION TIME (MDT): 221.8 MS
INTERVENTRICULAR SEPTUM IN END DIASTOLE (IVSD): 1 CM
LEFT INTERNAL DIMENSION IN SYSTOLE (LVSD): 2.8 CM
LEFT VENTRICULAR INTERNAL DIMENSION IN DIASTOLE (LVDD): 4.2 CM
LEFT VENTRICULAR POSTERIOR WALL IN END DIASTOLE (LVPW): 1 CM
LV EF: 62 %
LV END SYSTOLIC LONGITUDINAL STRAIN GLOBAL (LVGS): -22 %
LVOT 2D (LVOTD): 1.9 CM
MV E TISSUE VEL MED (MESV): 5.2 CM/S
MV E WAVE VEL/E TISSUE VEL MED(MSR): 13
MV PEAK A VELOCITY (MVPAV): 0.8 M/S
MV PEAK E VELOCITY (MVPEV): 0.7 M/S
PV PEAK VELOCITY (PVPV): 0.7 M/S

## 2017-04-11 ENCOUNTER — OFFICE VISIT (OUTPATIENT)
Dept: INTERNAL MEDICINE | Age: 62
End: 2017-04-11

## 2017-04-11 VITALS
DIASTOLIC BLOOD PRESSURE: 62 MMHG | SYSTOLIC BLOOD PRESSURE: 118 MMHG | OXYGEN SATURATION: 98 % | HEART RATE: 75 BPM | WEIGHT: 142.86 LBS | BODY MASS INDEX: 24.52 KG/M2

## 2017-04-11 DIAGNOSIS — F17.200 TOBACCO DEPENDENCE: ICD-10-CM

## 2017-04-11 DIAGNOSIS — G47.33 OSA ON CPAP: ICD-10-CM

## 2017-04-11 DIAGNOSIS — E11.3293 TYPE 2 DIABETES MELLITUS WITH BOTH EYES AFFECTED BY MILD NONPROLIFERATIVE RETINOPATHY WITHOUT MACULAR EDEMA, WITH LONG-TERM CURRENT USE OF INSULIN (CMD): ICD-10-CM

## 2017-04-11 DIAGNOSIS — G89.4 CHRONIC PAIN SYNDROME: ICD-10-CM

## 2017-04-11 DIAGNOSIS — E11.42 DIABETIC POLYNEUROPATHY ASSOCIATED WITH TYPE 2 DIABETES MELLITUS (CMD): ICD-10-CM

## 2017-04-11 DIAGNOSIS — E55.9 VITAMIN D DEFICIENCY: ICD-10-CM

## 2017-04-11 DIAGNOSIS — E11.3299 BDR (BACKGROUND DIABETIC RETINOPATHY) (CMD): ICD-10-CM

## 2017-04-11 DIAGNOSIS — E78.2 MIXED HYPERLIPIDEMIA: ICD-10-CM

## 2017-04-11 DIAGNOSIS — I25.10 CORONARY ARTERY DISEASE INVOLVING NATIVE CORONARY ARTERY OF NATIVE HEART WITHOUT ANGINA PECTORIS: ICD-10-CM

## 2017-04-11 DIAGNOSIS — E08.43 DIABETES MELLITUS DUE TO UNDERLYING CONDITION WITH DIABETIC AUTONOMIC NEUROPATHY, UNSPECIFIED LONG TERM INSULIN USE STATUS: ICD-10-CM

## 2017-04-11 DIAGNOSIS — Z79.4 TYPE 2 DIABETES MELLITUS WITH BOTH EYES AFFECTED BY MILD NONPROLIFERATIVE RETINOPATHY WITHOUT MACULAR EDEMA, WITH LONG-TERM CURRENT USE OF INSULIN (CMD): ICD-10-CM

## 2017-04-11 DIAGNOSIS — F17.200 TOBACCO USE DISORDER: ICD-10-CM

## 2017-04-11 DIAGNOSIS — K31.84 DIABETIC GASTROPARESIS (CMD): ICD-10-CM

## 2017-04-11 DIAGNOSIS — I10 ESSENTIAL HYPERTENSION: ICD-10-CM

## 2017-04-11 DIAGNOSIS — E11.43 DIABETIC GASTROPARESIS (CMD): ICD-10-CM

## 2017-04-11 DIAGNOSIS — K31.84 GASTROPARESIS: ICD-10-CM

## 2017-04-11 DIAGNOSIS — G25.0 BENIGN ESSENTIAL TREMOR: ICD-10-CM

## 2017-04-11 DIAGNOSIS — E03.9 HYPOTHYROIDISM, UNSPECIFIED TYPE: Primary | ICD-10-CM

## 2017-04-11 DIAGNOSIS — M79.672 LEFT FOOT PAIN: ICD-10-CM

## 2017-04-11 PROCEDURE — 99214 OFFICE O/P EST MOD 30 MIN: CPT | Performed by: INTERNAL MEDICINE

## 2017-04-11 RX ORDER — INSULIN GLARGINE 100 [IU]/ML
INJECTION, SOLUTION SUBCUTANEOUS
Qty: 15 ML | Refills: 0 | OUTPATIENT
Start: 2017-04-11

## 2017-04-11 RX ORDER — BUPROPION HYDROCHLORIDE 200 MG/1
200 TABLET, EXTENDED RELEASE ORAL 2 TIMES DAILY
Qty: 30 TABLET | Refills: 2 | Status: ON HOLD | OUTPATIENT
Start: 2017-04-11 | End: 2017-04-17 | Stop reason: HOSPADM

## 2017-04-11 RX ORDER — POLYETHYLENE GLYCOL 3350 17 G/17G
POWDER, FOR SOLUTION ORAL
Qty: 255 G | Refills: 0 | OUTPATIENT
Start: 2017-04-11

## 2017-04-12 ENCOUNTER — TELEPHONE (OUTPATIENT)
Dept: INTERNAL MEDICINE | Age: 62
End: 2017-04-12

## 2017-04-12 DIAGNOSIS — M79.672 LEFT FOOT PAIN: ICD-10-CM

## 2017-04-12 RX ORDER — OMEPRAZOLE 40 MG/1
40 CAPSULE, DELAYED RELEASE ORAL 2 TIMES DAILY
Qty: 60 CAPSULE | Refills: 0 | Status: SHIPPED | OUTPATIENT
Start: 2017-04-12 | End: 2017-05-10 | Stop reason: ALTCHOICE

## 2017-04-12 RX ORDER — POLYETHYLENE GLYCOL 3350 17 G/17G
17 POWDER, FOR SOLUTION ORAL 2 TIMES DAILY
Qty: 255 G | Refills: 0 | Status: SHIPPED | OUTPATIENT
Start: 2017-04-12 | End: 2017-05-24 | Stop reason: SDUPTHER

## 2017-04-13 RX ORDER — NITROGLYCERIN 0.4 MG/1
0.4 TABLET SUBLINGUAL EVERY 5 MIN PRN
Qty: 25 TABLET | Refills: 0 | Status: SHIPPED | OUTPATIENT
Start: 2017-04-13 | End: 2017-11-03 | Stop reason: SDUPTHER

## 2017-04-13 RX ORDER — OMEGA-3 FATTY ACIDS/FISH OIL 300-1000MG
200 CAPSULE ORAL EVERY 6 HOURS PRN
Qty: 31 TABLET | Refills: 0 | Status: ON HOLD | OUTPATIENT
Start: 2017-04-13 | End: 2017-04-15 | Stop reason: CLARIF

## 2017-04-13 RX ORDER — BACLOFEN 20 MG
500 TABLET ORAL DAILY
Qty: 31 TABLET | Refills: 0 | Status: SHIPPED | OUTPATIENT
Start: 2017-04-13 | End: 2017-06-13 | Stop reason: DRUGHIGH

## 2017-04-13 RX ORDER — LEVOTHYROXINE SODIUM 0.03 MG/1
25 TABLET ORAL DAILY
Qty: 31 TABLET | Refills: 0 | Status: ON HOLD | OUTPATIENT
Start: 2017-04-13 | End: 2017-04-17

## 2017-04-13 RX ORDER — LEVOTHYROXINE SODIUM 0.2 MG/1
200 TABLET ORAL DAILY
Qty: 31 TABLET | Refills: 0 | Status: ON HOLD | OUTPATIENT
Start: 2017-04-13 | End: 2017-04-17

## 2017-04-13 RX ORDER — ASPIRIN 325 MG
325 TABLET, DELAYED RELEASE (ENTERIC COATED) ORAL DAILY
Qty: 31 TABLET | Refills: 0 | Status: SHIPPED | OUTPATIENT
Start: 2017-04-13 | End: 2021-03-18 | Stop reason: DRUGHIGH

## 2017-04-13 RX ORDER — PRIMIDONE 50 MG/1
75 TABLET ORAL 3 TIMES DAILY
Qty: 140 TABLET | Refills: 0 | Status: SHIPPED | OUTPATIENT
Start: 2017-04-13 | End: 2017-06-11 | Stop reason: SDUPTHER

## 2017-04-13 RX ORDER — SIMVASTATIN 20 MG
20 TABLET ORAL NIGHTLY
Qty: 31 TABLET | Refills: 0 | Status: SHIPPED | OUTPATIENT
Start: 2017-04-13 | End: 2017-05-22 | Stop reason: SDUPTHER

## 2017-04-13 RX ORDER — TRAZODONE HYDROCHLORIDE 100 MG/1
100 TABLET ORAL NIGHTLY
Qty: 31 TABLET | Refills: 0 | Status: SHIPPED | OUTPATIENT
Start: 2017-04-13 | End: 2017-05-22 | Stop reason: SDUPTHER

## 2017-04-13 RX ORDER — CARBAMAZEPINE 200 MG/1
TABLET ORAL
Qty: 90 TABLET | Refills: 0 | Status: SHIPPED | OUTPATIENT
Start: 2017-04-13 | End: 2017-05-13

## 2017-04-13 RX ORDER — LOSARTAN POTASSIUM 25 MG/1
50 TABLET ORAL DAILY
Qty: 62 TABLET | Refills: 0 | Status: ON HOLD | OUTPATIENT
Start: 2017-04-13 | End: 2017-04-17 | Stop reason: HOSPADM

## 2017-04-13 RX ORDER — SUCRALFATE 1 G/1
1 TABLET ORAL 4 TIMES DAILY
Qty: 124 TABLET | Refills: 0 | Status: SHIPPED | OUTPATIENT
Start: 2017-04-13 | End: 2017-05-22 | Stop reason: SDUPTHER

## 2017-04-13 RX ORDER — MECLIZINE HYDROCHLORIDE 25 MG/1
25 TABLET ORAL 3 TIMES DAILY PRN
Qty: 31 TABLET | Refills: 0 | Status: SHIPPED | OUTPATIENT
Start: 2017-04-13 | End: 2017-05-08 | Stop reason: SDUPTHER

## 2017-04-13 RX ORDER — DULOXETIN HYDROCHLORIDE 60 MG/1
60 CAPSULE, DELAYED RELEASE ORAL 2 TIMES DAILY
Qty: 62 CAPSULE | Refills: 0 | Status: ON HOLD | OUTPATIENT
Start: 2017-04-13 | End: 2017-04-17 | Stop reason: HOSPADM

## 2017-04-13 RX ORDER — ACETAMINOPHEN 325 MG/1
650 TABLET ORAL EVERY 4 HOURS PRN
Qty: 31 TABLET | Refills: 0 | Status: SHIPPED | OUTPATIENT
Start: 2017-04-13 | End: 2020-12-16

## 2017-04-13 RX ORDER — ALBUTEROL SULFATE 90 UG/1
2 AEROSOL, METERED RESPIRATORY (INHALATION)
Qty: 18 G | Refills: 0 | Status: SHIPPED | OUTPATIENT
Start: 2017-04-13 | End: 2017-05-23 | Stop reason: ALTCHOICE

## 2017-04-15 ENCOUNTER — HOSPITAL ENCOUNTER (INPATIENT)
Age: 62
LOS: 2 days | Discharge: HOME HEALTH | DRG: 918 | End: 2017-04-17
Attending: EMERGENCY MEDICINE | Admitting: INTERNAL MEDICINE

## 2017-04-15 ENCOUNTER — APPOINTMENT (OUTPATIENT)
Dept: CT IMAGING | Age: 62
DRG: 918 | End: 2017-04-15
Attending: EMERGENCY MEDICINE

## 2017-04-15 ENCOUNTER — OFF PREMISE CHARGES (OUTPATIENT)
Dept: INTERNAL MEDICINE | Age: 62
End: 2017-04-15
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
## 2017-04-15 DIAGNOSIS — E87.1 HYPONATREMIA: ICD-10-CM

## 2017-04-15 DIAGNOSIS — F17.200 TOBACCO DEPENDENCE: ICD-10-CM

## 2017-04-15 DIAGNOSIS — Z86.73 HISTORY OF RECENT STROKE: ICD-10-CM
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
## 2017-04-15 DIAGNOSIS — I25.10 CORONARY ARTERY DISEASE INVOLVING NATIVE CORONARY ARTERY OF NATIVE HEART WITHOUT ANGINA PECTORIS: ICD-10-CM

## 2017-04-15 DIAGNOSIS — G89.4 CHRONIC PAIN SYNDROME: ICD-10-CM

## 2017-04-15 DIAGNOSIS — R26.9 GAIT DISTURBANCE, POST-STROKE: ICD-10-CM

## 2017-04-15 DIAGNOSIS — E03.9 HYPOTHYROIDISM, UNSPECIFIED TYPE: ICD-10-CM

## 2017-04-15 DIAGNOSIS — R73.9 HYPERGLYCEMIA: ICD-10-CM

## 2017-04-15 DIAGNOSIS — R29.6 FALLS FREQUENTLY: Primary | ICD-10-CM

## 2017-04-15 DIAGNOSIS — I69.391 DYSPHAGIA DUE TO RECENT STROKE: ICD-10-CM

## 2017-04-15 DIAGNOSIS — I69.398 GAIT DISTURBANCE, POST-STROKE: ICD-10-CM

## 2017-04-15 DIAGNOSIS — E11.42 DIABETIC POLYNEUROPATHY ASSOCIATED WITH TYPE 2 DIABETES MELLITUS (CMD): ICD-10-CM

## 2017-04-15 DIAGNOSIS — R29.6 FREQUENT FALLS: ICD-10-CM

## 2017-04-15 DIAGNOSIS — G25.0 BENIGN ESSENTIAL TREMOR: ICD-10-CM

## 2017-04-15 LAB
ALBUMIN SERPL-MCNC: 3.2 G/DL (ref 3.6–5.1)
ALBUMIN/GLOB SERPL: 1 {RATIO} (ref 1–2.4)
ALP SERPL-CCNC: 82 UNITS/L (ref 45–117)
ALT SERPL-CCNC: 12 UNITS/L
ANION GAP SERPL CALC-SCNC: 14 MMOL/L (ref 10–20)
APTT PPP: 25 SEC (ref 22–30)
AST SERPL-CCNC: 14 UNITS/L
BASOPHILS # BLD AUTO: 0 K/MCL (ref 0–0.3)
BASOPHILS NFR BLD AUTO: 1 %
BILIRUB SERPL-MCNC: 0.3 MG/DL (ref 0.2–1)
BUN SERPL-MCNC: 6 MG/DL (ref 6–20)
BUN/CREAT SERPL: 12 (ref 7–25)
CALCIUM SERPL-MCNC: 8.5 MG/DL (ref 8.4–10.2)
CHLORIDE SERPL-SCNC: 92 MMOL/L (ref 98–107)
CHOLEST SERPL-MCNC: 204 MG/DL
CHOLEST/HDLC SERPL: 2.5 {RATIO}
CO2 SERPL-SCNC: 27 MMOL/L (ref 21–32)
CREAT SERPL-MCNC: 0.52 MG/DL (ref 0.51–0.95)
DIFFERENTIAL METHOD BLD: NORMAL
EOSINOPHIL # BLD AUTO: 0.1 K/MCL (ref 0.1–0.5)
EOSINOPHIL NFR SPEC: 2 %
ERYTHROCYTE [DISTWIDTH] IN BLOOD: 12.4 % (ref 11–15)
GLOBULIN SER-MCNC: 3.3 G/DL (ref 2–4)
GLUCOSE BLDC GLUCOMTR-MCNC: 152 MG/DL (ref 65–99)
GLUCOSE BLDC GLUCOMTR-MCNC: 264 MG/DL (ref 65–99)
GLUCOSE SERPL-MCNC: 314 MG/DL (ref 65–99)
HBA1C MFR BLD: 8.1 % (ref 4.5–5.6)
HCT VFR BLD CALC: 38.1 % (ref 36–46.5)
HDLC SERPL-MCNC: 81 MG/DL
HGB BLD-MCNC: 13.3 G/DL (ref 12–15.5)
INR PPP: 0.9
LDLC SERPL-MCNC: 83 MG/DL
LYMPHOCYTES # BLD MANUAL: 2.6 K/MCL (ref 1–4)
LYMPHOCYTES NFR BLD MANUAL: 39 %
MCH RBC QN AUTO: 29.8 PG (ref 26–34)
MCHC RBC AUTO-ENTMCNC: 34.9 G/DL (ref 32–36.5)
MCV RBC AUTO: 85.2 FL (ref 78–100)
MONOCYTES # BLD MANUAL: 0.4 K/MCL (ref 0.3–0.9)
MONOCYTES NFR BLD MANUAL: 7 %
NEUTROPHILS # BLD AUTO: 3.3 K/MCL (ref 1.8–7.7)
NEUTROPHILS NFR BLD AUTO: 51 %
NONHDLC SERPL-MCNC: 123 MG/DL
PLATELET # BLD: 180 K/MCL (ref 140–450)
POTASSIUM SERPL-SCNC: 4.1 MMOL/L (ref 3.4–5.1)
PROT SERPL-MCNC: 6.5 G/DL (ref 6.4–8.2)
PROTHROMBIN TIME: 10 SEC (ref 9.7–11.8)
RBC # BLD: 4.47 MIL/MCL (ref 4–5.2)
SODIUM SERPL-SCNC: 129 MMOL/L (ref 135–145)
TRIGL SERPL-MCNC: 198 MG/DL
TROPONIN I SERPL-MCNC: <0.02 NG/ML
WBC # BLD: 6.5 K/MCL (ref 4.2–11)

## 2017-04-15 PROCEDURE — 83036 HEMOGLOBIN GLYCOSYLATED A1C: CPT

## 2017-04-15 PROCEDURE — 82962 GLUCOSE BLOOD TEST: CPT

## 2017-04-15 PROCEDURE — 36415 COLL VENOUS BLD VENIPUNCTURE: CPT

## 2017-04-15 PROCEDURE — 80061 LIPID PANEL: CPT

## 2017-04-15 PROCEDURE — 93005 ELECTROCARDIOGRAM TRACING: CPT | Performed by: EMERGENCY MEDICINE

## 2017-04-15 PROCEDURE — 80184 ASSAY OF PHENOBARBITAL: CPT

## 2017-04-15 PROCEDURE — 10002800 HB RX 250 W HCPCS: Performed by: INTERNAL MEDICINE

## 2017-04-15 PROCEDURE — 99285 EMERGENCY DEPT VISIT HI MDM: CPT | Performed by: EMERGENCY MEDICINE

## 2017-04-15 PROCEDURE — 80053 COMPREHEN METABOLIC PANEL: CPT

## 2017-04-15 PROCEDURE — 85025 COMPLETE CBC W/AUTO DIFF WBC: CPT

## 2017-04-15 PROCEDURE — 70450 CT HEAD/BRAIN W/O DYE: CPT

## 2017-04-15 PROCEDURE — 80188 ASSAY OF PRIMIDONE: CPT

## 2017-04-15 PROCEDURE — 80156 ASSAY CARBAMAZEPINE TOTAL: CPT

## 2017-04-15 PROCEDURE — 99285 EMERGENCY DEPT VISIT HI MDM: CPT

## 2017-04-15 PROCEDURE — 85730 THROMBOPLASTIN TIME PARTIAL: CPT

## 2017-04-15 PROCEDURE — 93010 ELECTROCARDIOGRAM REPORT: CPT | Performed by: INTERNAL MEDICINE

## 2017-04-15 PROCEDURE — 84484 ASSAY OF TROPONIN QUANT: CPT

## 2017-04-15 PROCEDURE — 10004281 HB COUNTER-STAFF TIME PER 15 MIN

## 2017-04-15 PROCEDURE — 10000002 HB ROOM CHARGE MED SURG

## 2017-04-15 PROCEDURE — 99223 1ST HOSP IP/OBS HIGH 75: CPT | Performed by: INTERNAL MEDICINE

## 2017-04-15 PROCEDURE — 10004651 HB RX, NO CHARGE ITEM: Performed by: EMERGENCY MEDICINE

## 2017-04-15 PROCEDURE — 80366 DRUG SCREENING PREGABALIN: CPT

## 2017-04-15 PROCEDURE — 70450 CT HEAD/BRAIN W/O DYE: CPT | Performed by: RADIOLOGY

## 2017-04-15 PROCEDURE — 85610 PROTHROMBIN TIME: CPT

## 2017-04-15 PROCEDURE — 10002803 HB RX 637: Performed by: INTERNAL MEDICINE

## 2017-04-15 RX ORDER — 0.9 % SODIUM CHLORIDE 0.9 %
2 VIAL (ML) INJECTION PRN
Status: DISCONTINUED | OUTPATIENT
Start: 2017-04-15 | End: 2017-04-17 | Stop reason: HOSPADM

## 2017-04-15 RX ORDER — TRAZODONE HYDROCHLORIDE 50 MG/1
50 TABLET ORAL NIGHTLY
Status: DISCONTINUED | OUTPATIENT
Start: 2017-04-15 | End: 2017-04-17 | Stop reason: HOSPADM

## 2017-04-15 RX ORDER — SODIUM CHLORIDE AND POTASSIUM CHLORIDE 150; 900 MG/100ML; MG/100ML
INJECTION, SOLUTION INTRAVENOUS CONTINUOUS
Status: DISCONTINUED | OUTPATIENT
Start: 2017-04-15 | End: 2017-04-16

## 2017-04-15 RX ORDER — 0.9 % SODIUM CHLORIDE 0.9 %
2 VIAL (ML) INJECTION EVERY 12 HOURS SCHEDULED
Status: DISCONTINUED | OUTPATIENT
Start: 2017-04-15 | End: 2017-04-17 | Stop reason: HOSPADM

## 2017-04-15 RX ORDER — NITROGLYCERIN 0.4 MG/1
0.4 TABLET SUBLINGUAL EVERY 5 MIN PRN
Status: DISCONTINUED | OUTPATIENT
Start: 2017-04-15 | End: 2017-04-17 | Stop reason: HOSPADM

## 2017-04-15 RX ORDER — LEVOTHYROXINE SODIUM 0.03 MG/1
25 TABLET ORAL
Status: DISCONTINUED | OUTPATIENT
Start: 2017-04-16 | End: 2017-04-17 | Stop reason: HOSPADM

## 2017-04-15 RX ORDER — PRIMIDONE 50 MG/1
75 TABLET ORAL 3 TIMES DAILY
Status: DISCONTINUED | OUTPATIENT
Start: 2017-04-15 | End: 2017-04-17 | Stop reason: HOSPADM

## 2017-04-15 RX ORDER — DEXTROSE MONOHYDRATE 25 G/50ML
25 INJECTION, SOLUTION INTRAVENOUS PRN
Status: DISCONTINUED | OUTPATIENT
Start: 2017-04-15 | End: 2017-04-17 | Stop reason: HOSPADM

## 2017-04-15 RX ORDER — ACETAMINOPHEN 325 MG/1
650 TABLET ORAL EVERY 4 HOURS PRN
Status: DISCONTINUED | OUTPATIENT
Start: 2017-04-15 | End: 2017-04-17 | Stop reason: HOSPADM

## 2017-04-15 RX ORDER — LOSARTAN POTASSIUM 50 MG/1
50 TABLET ORAL DAILY
Status: DISCONTINUED | OUTPATIENT
Start: 2017-04-16 | End: 2017-04-17 | Stop reason: HOSPADM

## 2017-04-15 RX ORDER — SUCRALFATE 1 G/1
1 TABLET ORAL
Status: DISCONTINUED | OUTPATIENT
Start: 2017-04-16 | End: 2017-04-17 | Stop reason: HOSPADM

## 2017-04-15 RX ORDER — PANTOPRAZOLE SODIUM 40 MG/1
40 TABLET, DELAYED RELEASE ORAL
Status: DISCONTINUED | OUTPATIENT
Start: 2017-04-16 | End: 2017-04-17 | Stop reason: HOSPADM

## 2017-04-15 RX ORDER — PREGABALIN 50 MG/1
100 CAPSULE ORAL EVERY 12 HOURS SCHEDULED
Status: DISCONTINUED | OUTPATIENT
Start: 2017-04-15 | End: 2017-04-17 | Stop reason: HOSPADM

## 2017-04-15 RX ORDER — DULOXETIN HYDROCHLORIDE 60 MG/1
60 CAPSULE, DELAYED RELEASE ORAL 2 TIMES DAILY
Status: DISCONTINUED | OUTPATIENT
Start: 2017-04-15 | End: 2017-04-16

## 2017-04-15 RX ORDER — SIMVASTATIN 20 MG
20 TABLET ORAL NIGHTLY
Status: DISCONTINUED | OUTPATIENT
Start: 2017-04-15 | End: 2017-04-17 | Stop reason: HOSPADM

## 2017-04-15 RX ORDER — INSULIN GLARGINE 100 [IU]/ML
22 INJECTION, SOLUTION SUBCUTANEOUS NIGHTLY
Status: DISCONTINUED | OUTPATIENT
Start: 2017-04-15 | End: 2017-04-16

## 2017-04-15 RX ORDER — CARBAMAZEPINE 200 MG/1
200 TABLET ORAL 3 TIMES DAILY
Status: DISCONTINUED | OUTPATIENT
Start: 2017-04-15 | End: 2017-04-17 | Stop reason: HOSPADM

## 2017-04-15 RX ORDER — NICOTINE POLACRILEX 4 MG
15 LOZENGE BUCCAL PRN
Status: DISCONTINUED | OUTPATIENT
Start: 2017-04-15 | End: 2017-04-17 | Stop reason: HOSPADM

## 2017-04-15 RX ORDER — BUPROPION HYDROCHLORIDE 100 MG/1
200 TABLET, EXTENDED RELEASE ORAL 2 TIMES DAILY
Status: DISCONTINUED | OUTPATIENT
Start: 2017-04-15 | End: 2017-04-16

## 2017-04-15 RX ORDER — INSULIN LISPRO 100 [IU]/ML
6 INJECTION, SOLUTION INTRAVENOUS; SUBCUTANEOUS ONCE
Status: COMPLETED | OUTPATIENT
Start: 2017-04-15 | End: 2017-04-15

## 2017-04-15 RX ORDER — ASPIRIN 325 MG
325 TABLET ORAL ONCE
Status: COMPLETED | OUTPATIENT
Start: 2017-04-15 | End: 2017-04-15

## 2017-04-15 RX ORDER — POLYETHYLENE GLYCOL 3350 17 G/17G
17 POWDER, FOR SOLUTION ORAL 2 TIMES DAILY
Status: DISCONTINUED | OUTPATIENT
Start: 2017-04-15 | End: 2017-04-17 | Stop reason: HOSPADM

## 2017-04-15 RX ORDER — MAGNESIUM L-LACTATE 84 MG
84 TABLET, EXTENDED RELEASE ORAL
Status: DISCONTINUED | OUTPATIENT
Start: 2017-04-16 | End: 2017-04-17 | Stop reason: HOSPADM

## 2017-04-15 RX ORDER — NICOTINE 21 MG/24HR
1 PATCH, TRANSDERMAL 24 HOURS TRANSDERMAL DAILY
Status: DISCONTINUED | OUTPATIENT
Start: 2017-04-16 | End: 2017-04-17 | Stop reason: HOSPADM

## 2017-04-15 RX ORDER — ALBUTEROL SULFATE 90 UG/1
2 AEROSOL, METERED RESPIRATORY (INHALATION)
Status: DISCONTINUED | OUTPATIENT
Start: 2017-04-15 | End: 2017-04-17 | Stop reason: HOSPADM

## 2017-04-15 RX ORDER — DEXTROSE MONOHYDRATE 50 MG/ML
INJECTION, SOLUTION INTRAVENOUS CONTINUOUS PRN
Status: DISCONTINUED | OUTPATIENT
Start: 2017-04-15 | End: 2017-04-17 | Stop reason: HOSPADM

## 2017-04-15 RX ADMIN — POTASSIUM CHLORIDE AND SODIUM CHLORIDE 50 ML/HR: 900; 150 INJECTION, SOLUTION INTRAVENOUS at 19:59

## 2017-04-15 RX ADMIN — ASPIRIN 325 MG: 325 TABLET ORAL at 17:18

## 2017-04-15 RX ADMIN — BUPROPION HYDROCHLORIDE 200 MG: 100 TABLET, FILM COATED, EXTENDED RELEASE ORAL at 22:31

## 2017-04-15 RX ADMIN — PRIMIDONE 75 MG: 50 TABLET ORAL at 22:34

## 2017-04-15 RX ADMIN — TRAZODONE HYDROCHLORIDE 50 MG: 50 TABLET, FILM COATED ORAL at 22:33

## 2017-04-15 RX ADMIN — POLYETHYLENE GLYCOL (3350) 17 G: 17 POWDER, FOR SOLUTION ORAL at 22:35

## 2017-04-15 RX ADMIN — CARBAMAZEPINE 200 MG: 200 TABLET ORAL at 22:33

## 2017-04-15 RX ADMIN — INSULIN LISPRO 6 UNITS: 100 INJECTION, SOLUTION INTRAVENOUS; SUBCUTANEOUS at 19:43

## 2017-04-15 RX ADMIN — INSULIN GLARGINE 22 UNITS: 100 INJECTION, SOLUTION SUBCUTANEOUS at 23:02

## 2017-04-15 RX ADMIN — PREGABALIN 100 MG: 50 CAPSULE ORAL at 22:32

## 2017-04-15 RX ADMIN — DULOXETINE 60 MG: 60 CAPSULE, DELAYED RELEASE ORAL at 22:32

## 2017-04-15 RX ADMIN — SIMVASTATIN 20 MG: 20 TABLET, FILM COATED ORAL at 22:33

## 2017-04-15 ASSESSMENT — ENCOUNTER SYMPTOMS
SHORTNESS OF BREATH: 0
ACTIVITY CHANGE: 0
WEAKNESS: 0
COUGH: 0
EYE REDNESS: 0
NUMBNESS: 1
FATIGUE: 0
WOUND: 0
CHEST TIGHTNESS: 0
HEADACHES: 1
VOMITING: 0
EYE PAIN: 0
NAUSEA: 0
FEVER: 0
CHILLS: 0
BACK PAIN: 0
SORE THROAT: 0
DIARRHEA: 0
APPETITE CHANGE: 0
SPEECH DIFFICULTY: 0
DIZZINESS: 1
CONFUSION: 0
ABDOMINAL PAIN: 0

## 2017-04-15 ASSESSMENT — PAIN SCALES - GENERAL
PAIN_LEVEL_WITH_ACTIVITY: 0
PAIN_LEVEL_AT_REST: 0
PAIN_LEVEL_AT_REST: 0

## 2017-04-15 ASSESSMENT — MOVEMENT AND STRENGTH ASSESSMENTS
FACE_JAW: NO FACIAL DROOP
FACE_JAW: NO FACIAL DROOP

## 2017-04-15 ASSESSMENT — LIFESTYLE VARIABLES
HOW OFTEN DO YOU HAVE 6 OR MORE DRINKS ON ONE OCCASION: NEVER
AUDIT-C TOTAL SCORE: 0
E-CIGARETTE_USE: NO E-CIGARETTES USE IN THE LAST 30 DAYS
AMOUNT_OF_TOBACCO_USED: FOUR OR FEWER CIGARETTES AND/OR SMOKELESS TOBACCO AND/OR SMOKED TOBACCO BUT NOT DAILY
HOW OFTEN DO YOU HAVE A DRINK CONTAINING ALCOHOL: NEVER
HOW MANY STANDARD DRINKS CONTAINING ALCOHOL DO YOU HAVE ON A TYPICAL DAY: 0,1 OR 2
TOBACCO_USE_STATUS_IN_THE_LAST_30_DAYS: USED TOBACCO IN THE LAST 30 DAYS
TYPE_OF_TOBACCO_USED: CIGARETTES
ALCOHOL_USE_STATUS: NO OR LOW RISK WITH VALIDATED TOOL

## 2017-04-15 ASSESSMENT — ACTIVITIES OF DAILY LIVING (ADL)
MOBILITY_ASSIST_DEVICES: FOUR WHEEL WALKER
CHRONIC_PAIN_PRESENT: NO
ADL_BEFORE_ADMISSION: INDEPENDENT
RECENT_DECLINE_ADL: YES, DECLINE IN AMBULATION/TRANSFERRING, COLLABORATE WITH PROVIDER (T)
ADL_SCORE: 12
ADL_SHORT_OF_BREATH: NO

## 2017-04-16 LAB
ANION GAP SERPL CALC-SCNC: 12 MMOL/L (ref 10–20)
APPEARANCE UR: CLEAR
ATRIAL RATE (BPM): 79
BILIRUB UR QL STRIP: NEGATIVE
BUN SERPL-MCNC: 9 MG/DL (ref 6–20)
BUN/CREAT SERPL: 15 (ref 7–25)
CALCIUM SERPL-MCNC: 8.7 MG/DL (ref 8.4–10.2)
CARBAMAZEPINE SERPL-MCNC: 5.1 MCG/ML (ref 4–12)
CHLORIDE SERPL-SCNC: 98 MMOL/L (ref 98–107)
CO2 SERPL-SCNC: 28 MMOL/L (ref 21–32)
COLOR UR: YELLOW
CREAT SERPL-MCNC: 0.62 MG/DL (ref 0.51–0.95)
GLUCOSE BLDC GLUCOMTR-MCNC: 121 MG/DL (ref 65–99)
GLUCOSE BLDC GLUCOMTR-MCNC: 129 MG/DL (ref 65–99)
GLUCOSE BLDC GLUCOMTR-MCNC: 158 MG/DL (ref 65–99)
GLUCOSE BLDC GLUCOMTR-MCNC: 371 MG/DL (ref 65–99)
GLUCOSE BLDC GLUCOMTR-MCNC: 460 MG/DL (ref 65–99)
GLUCOSE SERPL-MCNC: 144 MG/DL (ref 65–99)
GLUCOSE UR STRIP-MCNC: >1000 MG/DL
HGB UR QL STRIP: NEGATIVE
KETONES UR STRIP-MCNC: NEGATIVE MG/DL
LEUKOCYTE ESTERASE UR QL STRIP: NEGATIVE
MRSA DNA SPEC QL NAA+PROBE: NOT DETECTED
NITRITE UR QL STRIP: NEGATIVE
P AXIS (DEGREES): 52
PH UR STRIP: 7 UNITS (ref 5–7)
POTASSIUM SERPL-SCNC: 3.7 MMOL/L (ref 3.4–5.1)
PR-INTERVAL (MSEC): 178
PROT UR STRIP-MCNC: ABNORMAL MG/DL
QRS-INTERVAL (MSEC): 64
QT-INTERVAL (MSEC): 354
QTC: 405
R AXIS (DEGREES): 55
REPORT TEXT: NORMAL
SODIUM SERPL-SCNC: 134 MMOL/L (ref 135–145)
SP GR UR STRIP: 1.01 (ref 1–1.03)
SPECIMEN SOURCE: ABNORMAL
SPECIMEN SOURCE: NORMAL
T AXIS (DEGREES): 59
T3FREE SERPL-MCNC: 2 PG/ML (ref 2.2–4)
T4 FREE SERPL-MCNC: 1.1 NG/DL (ref 0.8–1.5)
TSH SERPL-ACNC: 0.15 MCUNITS/ML (ref 0.35–5)
UROBILINOGEN UR STRIP-MCNC: 0.2 MG/DL (ref 0–1)
VENTRICULAR RATE EKG/MIN (BPM): 79

## 2017-04-16 PROCEDURE — 10004173 HB COUNTER-THERAPY VISIT PT

## 2017-04-16 PROCEDURE — 82962 GLUCOSE BLOOD TEST: CPT

## 2017-04-16 PROCEDURE — 10004651 HB RX, NO CHARGE ITEM: Performed by: EMERGENCY MEDICINE

## 2017-04-16 PROCEDURE — 97116 GAIT TRAINING THERAPY: CPT

## 2017-04-16 PROCEDURE — 94664 DEMO&/EVAL PT USE INHALER: CPT

## 2017-04-16 PROCEDURE — 97161 PT EVAL LOW COMPLEX 20 MIN: CPT

## 2017-04-16 PROCEDURE — 80048 BASIC METABOLIC PNL TOTAL CA: CPT

## 2017-04-16 PROCEDURE — 97166 OT EVAL MOD COMPLEX 45 MIN: CPT

## 2017-04-16 PROCEDURE — 10002800 HB RX 250 W HCPCS: Performed by: INTERNAL MEDICINE

## 2017-04-16 PROCEDURE — 10004172 HB COUNTER-THERAPY VISIT OT

## 2017-04-16 PROCEDURE — 10002803 HB RX 637: Performed by: INTERNAL MEDICINE

## 2017-04-16 PROCEDURE — 99232 SBSQ HOSP IP/OBS MODERATE 35: CPT | Performed by: INTERNAL MEDICINE

## 2017-04-16 PROCEDURE — 10004651 HB RX, NO CHARGE ITEM: Performed by: INTERNAL MEDICINE

## 2017-04-16 PROCEDURE — 97535 SELF CARE MNGMENT TRAINING: CPT

## 2017-04-16 PROCEDURE — 81003 URINALYSIS AUTO W/O SCOPE: CPT

## 2017-04-16 PROCEDURE — 87641 MR-STAPH DNA AMP PROBE: CPT

## 2017-04-16 PROCEDURE — 97530 THERAPEUTIC ACTIVITIES: CPT

## 2017-04-16 PROCEDURE — 10000002 HB ROOM CHARGE MED SURG

## 2017-04-16 PROCEDURE — 94640 AIRWAY INHALATION TREATMENT: CPT

## 2017-04-16 PROCEDURE — 84481 FREE ASSAY (FT-3): CPT

## 2017-04-16 PROCEDURE — 36415 COLL VENOUS BLD VENIPUNCTURE: CPT

## 2017-04-16 PROCEDURE — 84439 ASSAY OF FREE THYROXINE: CPT

## 2017-04-16 PROCEDURE — 84443 ASSAY THYROID STIM HORMONE: CPT

## 2017-04-16 RX ORDER — INSULIN LISPRO 100 [IU]/ML
14 INJECTION, SOLUTION INTRAVENOUS; SUBCUTANEOUS ONCE
Status: COMPLETED | OUTPATIENT
Start: 2017-04-16 | End: 2017-04-16

## 2017-04-16 RX ORDER — INSULIN GLARGINE 100 [IU]/ML
26 INJECTION, SOLUTION SUBCUTANEOUS NIGHTLY
Status: DISCONTINUED | OUTPATIENT
Start: 2017-04-16 | End: 2017-04-17 | Stop reason: HOSPADM

## 2017-04-16 RX ORDER — BUPROPION HYDROCHLORIDE 150 MG/1
150 TABLET, EXTENDED RELEASE ORAL 2 TIMES DAILY
Status: DISCONTINUED | OUTPATIENT
Start: 2017-04-16 | End: 2017-04-17 | Stop reason: HOSPADM

## 2017-04-16 RX ORDER — INSULIN GLARGINE 100 [IU]/ML
27 INJECTION, SOLUTION SUBCUTANEOUS NIGHTLY
Status: DISCONTINUED | OUTPATIENT
Start: 2017-04-16 | End: 2017-04-16

## 2017-04-16 RX ORDER — INSULIN GLARGINE 100 [IU]/ML
22 INJECTION, SOLUTION SUBCUTANEOUS ONCE
Status: COMPLETED | OUTPATIENT
Start: 2017-04-16 | End: 2017-04-16

## 2017-04-16 RX ORDER — DULOXETIN HYDROCHLORIDE 20 MG/1
40 CAPSULE, DELAYED RELEASE ORAL 2 TIMES DAILY
Status: DISCONTINUED | OUTPATIENT
Start: 2017-04-17 | End: 2017-04-17 | Stop reason: HOSPADM

## 2017-04-16 RX ADMIN — PREGABALIN 100 MG: 50 CAPSULE ORAL at 10:01

## 2017-04-16 RX ADMIN — DULOXETINE 60 MG: 60 CAPSULE, DELAYED RELEASE ORAL at 10:01

## 2017-04-16 RX ADMIN — POLYETHYLENE GLYCOL (3350) 17 G: 17 POWDER, FOR SOLUTION ORAL at 09:09

## 2017-04-16 RX ADMIN — CARBAMAZEPINE 200 MG: 200 TABLET ORAL at 09:58

## 2017-04-16 RX ADMIN — LOSARTAN POTASSIUM 50 MG: 50 TABLET, FILM COATED ORAL at 09:59

## 2017-04-16 RX ADMIN — PRIMIDONE 75 MG: 50 TABLET ORAL at 21:25

## 2017-04-16 RX ADMIN — SIMVASTATIN 20 MG: 20 TABLET, FILM COATED ORAL at 21:25

## 2017-04-16 RX ADMIN — NICOTINE 1 PATCH: 21 PATCH TRANSDERMAL at 09:13

## 2017-04-16 RX ADMIN — POTASSIUM CHLORIDE AND SODIUM CHLORIDE 50 ML/HR: 900; 150 INJECTION, SOLUTION INTRAVENOUS at 15:18

## 2017-04-16 RX ADMIN — Medication 84 MG: at 10:02

## 2017-04-16 RX ADMIN — PREGABALIN 100 MG: 50 CAPSULE ORAL at 21:25

## 2017-04-16 RX ADMIN — SUCRALFATE 1 G: 1 TABLET ORAL at 22:27

## 2017-04-16 RX ADMIN — ACETAMINOPHEN 650 MG: 325 TABLET ORAL at 10:04

## 2017-04-16 RX ADMIN — ALBUTEROL SULFATE 2 PUFF: 90 AEROSOL, METERED RESPIRATORY (INHALATION) at 20:01

## 2017-04-16 RX ADMIN — DULOXETINE 60 MG: 60 CAPSULE, DELAYED RELEASE ORAL at 17:15

## 2017-04-16 RX ADMIN — INSULIN GLARGINE 22 UNITS: 100 INJECTION, SOLUTION SUBCUTANEOUS at 21:27

## 2017-04-16 RX ADMIN — PRIMIDONE 75 MG: 50 TABLET ORAL at 14:00

## 2017-04-16 RX ADMIN — CARBAMAZEPINE 200 MG: 200 TABLET ORAL at 21:25

## 2017-04-16 RX ADMIN — INSULIN LISPRO 14 UNITS: 100 INJECTION, SOLUTION INTRAVENOUS; SUBCUTANEOUS at 12:29

## 2017-04-16 RX ADMIN — POLYETHYLENE GLYCOL (3350) 17 G: 17 POWDER, FOR SOLUTION ORAL at 17:16

## 2017-04-16 RX ADMIN — BUPROPION HYDROCHLORIDE 200 MG: 100 TABLET, FILM COATED, EXTENDED RELEASE ORAL at 09:58

## 2017-04-16 RX ADMIN — LEVOTHYROXINE SODIUM 25 MCG: 25 TABLET ORAL at 06:49

## 2017-04-16 RX ADMIN — LEVOTHYROXINE SODIUM 200 MCG: 112 TABLET ORAL at 06:50

## 2017-04-16 RX ADMIN — ALBUTEROL SULFATE 2 PUFF: 90 AEROSOL, METERED RESPIRATORY (INHALATION) at 06:55

## 2017-04-16 RX ADMIN — CARBAMAZEPINE 200 MG: 200 TABLET ORAL at 14:00

## 2017-04-16 RX ADMIN — PANTOPRAZOLE SODIUM 40 MG: 40 TABLET, DELAYED RELEASE ORAL at 16:47

## 2017-04-16 RX ADMIN — PRIMIDONE 75 MG: 50 TABLET ORAL at 09:06

## 2017-04-16 RX ADMIN — ASPIRIN 325 MG: 325 TABLET, COATED ORAL at 09:58

## 2017-04-16 RX ADMIN — ALBUTEROL SULFATE 2 PUFF: 90 AEROSOL, METERED RESPIRATORY (INHALATION) at 16:08

## 2017-04-16 RX ADMIN — BUPROPION HYDROCHLORIDE 150 MG: 150 TABLET, FILM COATED, EXTENDED RELEASE ORAL at 21:25

## 2017-04-16 RX ADMIN — ALBUTEROL SULFATE 2 PUFF: 90 AEROSOL, METERED RESPIRATORY (INHALATION) at 11:26

## 2017-04-16 RX ADMIN — TRAZODONE HYDROCHLORIDE 50 MG: 50 TABLET, FILM COATED ORAL at 21:25

## 2017-04-16 RX ADMIN — Medication 2 ML: at 21:29

## 2017-04-16 RX ADMIN — PANTOPRAZOLE SODIUM 40 MG: 40 TABLET, DELAYED RELEASE ORAL at 06:50

## 2017-04-16 ASSESSMENT — PAIN SCALES - GENERAL
PAIN_LEVEL_WITH_ACTIVITY: 0
PAIN_LEVEL_AT_REST: 0
PAIN_LEVEL_AT_REST: 0
PAIN_LEVEL_WITH_ACTIVITY: 0
PAIN_LEVEL_AT_REST: 5
PAIN_LEVEL_AT_REST: 0
PAIN_LEVEL_AT_REST: 0

## 2017-04-16 ASSESSMENT — ACTIVITIES OF DAILY LIVING (ADL)
GROOMING: MODIFIED INDEPENDENT
PRIOR_ADL_TOILETING: INDEPENDENT
EATING: INDEPENDENT
PRIOR_ADL_BATHING: MODIFIED INDEPENDENT

## 2017-04-17 ENCOUNTER — APPOINTMENT (OUTPATIENT)
Dept: REHABILITATION | Age: 62
DRG: 918 | End: 2017-04-17

## 2017-04-17 VITALS
DIASTOLIC BLOOD PRESSURE: 68 MMHG | HEIGHT: 64 IN | HEART RATE: 76 BPM | OXYGEN SATURATION: 96 % | RESPIRATION RATE: 20 BRPM | WEIGHT: 147.71 LBS | BODY MASS INDEX: 25.22 KG/M2 | TEMPERATURE: 98.4 F | SYSTOLIC BLOOD PRESSURE: 124 MMHG

## 2017-04-17 LAB
GLUCOSE BLDC GLUCOMTR-MCNC: 313 MG/DL (ref 65–99)
GLUCOSE BLDC GLUCOMTR-MCNC: 90 MG/DL (ref 65–99)

## 2017-04-17 PROCEDURE — 10004174 HB COUNTER-THERAPY VISIT SP: Performed by: SPEECH-LANGUAGE PATHOLOGIST

## 2017-04-17 PROCEDURE — 10004651 HB RX, NO CHARGE ITEM: Performed by: INTERNAL MEDICINE

## 2017-04-17 PROCEDURE — 97530 THERAPEUTIC ACTIVITIES: CPT

## 2017-04-17 PROCEDURE — 99239 HOSP IP/OBS DSCHRG MGMT >30: CPT | Performed by: INTERNAL MEDICINE

## 2017-04-17 PROCEDURE — 10002803 HB RX 637: Performed by: INTERNAL MEDICINE

## 2017-04-17 PROCEDURE — 10004173 HB COUNTER-THERAPY VISIT PT

## 2017-04-17 PROCEDURE — 94640 AIRWAY INHALATION TREATMENT: CPT

## 2017-04-17 PROCEDURE — 82962 GLUCOSE BLOOD TEST: CPT

## 2017-04-17 PROCEDURE — 97535 SELF CARE MNGMENT TRAINING: CPT

## 2017-04-17 PROCEDURE — 10004172 HB COUNTER-THERAPY VISIT OT

## 2017-04-17 PROCEDURE — 10004651 HB RX, NO CHARGE ITEM: Performed by: EMERGENCY MEDICINE

## 2017-04-17 PROCEDURE — 92610 EVALUATE SWALLOWING FUNCTION: CPT | Performed by: SPEECH-LANGUAGE PATHOLOGIST

## 2017-04-17 RX ORDER — PREGABALIN 100 MG/1
100 CAPSULE ORAL EVERY 12 HOURS SCHEDULED
Qty: 60 CAPSULE | Refills: 3 | Status: SHIPPED | OUTPATIENT
Start: 2017-04-17 | End: 2017-04-17

## 2017-04-17 RX ORDER — LOSARTAN POTASSIUM 50 MG/1
50 TABLET ORAL DAILY
Qty: 60 TABLET | Refills: 2 | Status: SHIPPED | OUTPATIENT
Start: 2017-04-17 | End: 2017-05-22 | Stop reason: SDUPTHER

## 2017-04-17 RX ORDER — PREGABALIN 100 MG/1
100 CAPSULE ORAL EVERY 12 HOURS SCHEDULED
Qty: 60 CAPSULE | Refills: 3 | Status: SHIPPED | OUTPATIENT
Start: 2017-04-17 | End: 2017-05-31 | Stop reason: SDUPTHER

## 2017-04-17 RX ORDER — LEVOTHYROXINE SODIUM 0.03 MG/1
25 TABLET ORAL DAILY
Qty: 31 TABLET | Refills: 0 | Status: SHIPPED | COMMUNITY
Start: 2017-04-17 | End: 2017-05-23 | Stop reason: SDUPTHER

## 2017-04-17 RX ORDER — DULOXETINE 40 MG/1
40 CAPSULE, DELAYED RELEASE ORAL 2 TIMES DAILY
Qty: 60 CAPSULE | Refills: 2 | Status: SHIPPED | OUTPATIENT
Start: 2017-04-17 | End: 2017-05-10 | Stop reason: DRUGHIGH

## 2017-04-17 RX ORDER — BUPROPION HYDROCHLORIDE 150 MG/1
150 TABLET, EXTENDED RELEASE ORAL 2 TIMES DAILY
Qty: 60 TABLET | Refills: 2 | Status: CANCELLED | OUTPATIENT
Start: 2017-04-17

## 2017-04-17 RX ORDER — BUPROPION HYDROCHLORIDE 150 MG/1
150 TABLET, EXTENDED RELEASE ORAL 2 TIMES DAILY
Qty: 60 TABLET | Refills: 2 | Status: SHIPPED | OUTPATIENT
Start: 2017-04-17 | End: 2017-05-22 | Stop reason: SDUPTHER

## 2017-04-17 RX ORDER — DULOXETINE 40 MG/1
40 CAPSULE, DELAYED RELEASE ORAL 2 TIMES DAILY
Qty: 60 CAPSULE | Refills: 2 | Status: CANCELLED | OUTPATIENT
Start: 2017-04-17

## 2017-04-17 RX ORDER — LEVOTHYROXINE SODIUM 0.2 MG/1
200 TABLET ORAL DAILY
Qty: 31 TABLET | Refills: 0 | Status: SHIPPED | COMMUNITY
Start: 2017-04-17 | End: 2017-07-13 | Stop reason: SDUPTHER

## 2017-04-17 RX ADMIN — PRIMIDONE 75 MG: 50 TABLET ORAL at 08:25

## 2017-04-17 RX ADMIN — ACETAMINOPHEN 650 MG: 325 TABLET ORAL at 09:34

## 2017-04-17 RX ADMIN — LOSARTAN POTASSIUM 50 MG: 50 TABLET, FILM COATED ORAL at 08:26

## 2017-04-17 RX ADMIN — PANTOPRAZOLE SODIUM 40 MG: 40 TABLET, DELAYED RELEASE ORAL at 06:33

## 2017-04-17 RX ADMIN — SUCRALFATE 1 G: 1 TABLET ORAL at 06:33

## 2017-04-17 RX ADMIN — NICOTINE 1 PATCH: 21 PATCH TRANSDERMAL at 08:26

## 2017-04-17 RX ADMIN — Medication 2 ML: at 08:34

## 2017-04-17 RX ADMIN — Medication 84 MG: at 08:26

## 2017-04-17 RX ADMIN — CARBAMAZEPINE 200 MG: 200 TABLET ORAL at 13:54

## 2017-04-17 RX ADMIN — SUCRALFATE 1 G: 1 TABLET ORAL at 11:51

## 2017-04-17 RX ADMIN — DULOXETINE 40 MG: 20 CAPSULE, DELAYED RELEASE ORAL at 08:25

## 2017-04-17 RX ADMIN — LEVOTHYROXINE SODIUM 25 MCG: 25 TABLET ORAL at 06:32

## 2017-04-17 RX ADMIN — ALBUTEROL SULFATE 2 PUFF: 90 AEROSOL, METERED RESPIRATORY (INHALATION) at 06:46

## 2017-04-17 RX ADMIN — ASPIRIN 325 MG: 325 TABLET, COATED ORAL at 08:26

## 2017-04-17 RX ADMIN — CARBAMAZEPINE 200 MG: 200 TABLET ORAL at 08:25

## 2017-04-17 RX ADMIN — POLYETHYLENE GLYCOL (3350) 17 G: 17 POWDER, FOR SOLUTION ORAL at 08:26

## 2017-04-17 RX ADMIN — BUPROPION HYDROCHLORIDE 150 MG: 150 TABLET, FILM COATED, EXTENDED RELEASE ORAL at 08:24

## 2017-04-17 RX ADMIN — LEVOTHYROXINE SODIUM 200 MCG: 112 TABLET ORAL at 06:33

## 2017-04-17 RX ADMIN — PREGABALIN 100 MG: 50 CAPSULE ORAL at 08:24

## 2017-04-17 RX ADMIN — PRIMIDONE 75 MG: 50 TABLET ORAL at 13:54

## 2017-04-17 RX ADMIN — ALBUTEROL SULFATE 2 PUFF: 90 AEROSOL, METERED RESPIRATORY (INHALATION) at 10:15

## 2017-04-17 ASSESSMENT — PAIN SCALES - GENERAL
PAIN_LEVEL_AT_REST: 4
PAIN_LEVEL_AT_REST: 1
PAIN_LEVEL_AT_REST: 0
PAIN_LEVEL_AT_REST: 0
PAIN_LEVEL_WITH_ACTIVITY: 1
PAIN_LEVEL_WITH_ACTIVITY: 4
PAIN_LEVEL_AT_REST: 5

## 2017-04-18 LAB
PHENOBARB SERPL-MCNC: 3.8 MCG/ML (ref 15–40)
PRIMIDONE SERPL-MCNC: <2.5 MCG/ML (ref 5–12)

## 2017-04-19 ENCOUNTER — TELEPHONE (OUTPATIENT)
Dept: INTERNAL MEDICINE | Age: 62
End: 2017-04-19

## 2017-04-20 ENCOUNTER — OFFICE VISIT (OUTPATIENT)
Dept: INTERNAL MEDICINE | Age: 62
End: 2017-04-20

## 2017-04-20 VITALS
HEART RATE: 77 BPM | DIASTOLIC BLOOD PRESSURE: 78 MMHG | OXYGEN SATURATION: 100 % | TEMPERATURE: 98.7 F | WEIGHT: 145 LBS | BODY MASS INDEX: 24.89 KG/M2 | SYSTOLIC BLOOD PRESSURE: 126 MMHG

## 2017-04-20 DIAGNOSIS — G47.33 OSA ON CPAP: ICD-10-CM

## 2017-04-20 DIAGNOSIS — F17.200 TOBACCO USE DISORDER: Primary | ICD-10-CM

## 2017-04-20 DIAGNOSIS — I25.10 CORONARY ARTERY DISEASE INVOLVING NATIVE CORONARY ARTERY OF NATIVE HEART WITHOUT ANGINA PECTORIS: ICD-10-CM

## 2017-04-20 DIAGNOSIS — I10 ESSENTIAL HYPERTENSION: ICD-10-CM

## 2017-04-20 DIAGNOSIS — F17.200 TOBACCO DEPENDENCE: ICD-10-CM

## 2017-04-20 DIAGNOSIS — E03.9 HYPOTHYROIDISM, UNSPECIFIED TYPE: ICD-10-CM

## 2017-04-20 DIAGNOSIS — E11.3293 TYPE 2 DIABETES MELLITUS WITH BOTH EYES AFFECTED BY MILD NONPROLIFERATIVE RETINOPATHY WITHOUT MACULAR EDEMA, WITH LONG-TERM CURRENT USE OF INSULIN (CMD): ICD-10-CM

## 2017-04-20 DIAGNOSIS — H60.502 ACUTE OTITIS EXTERNA OF LEFT EAR, UNSPECIFIED TYPE: ICD-10-CM

## 2017-04-20 DIAGNOSIS — Z79.4 TYPE 2 DIABETES MELLITUS WITH BOTH EYES AFFECTED BY MILD NONPROLIFERATIVE RETINOPATHY WITHOUT MACULAR EDEMA, WITH LONG-TERM CURRENT USE OF INSULIN (CMD): ICD-10-CM

## 2017-04-20 DIAGNOSIS — G25.0 BENIGN ESSENTIAL TREMOR: ICD-10-CM

## 2017-04-20 DIAGNOSIS — E53.8 FOLATE DEFICIENCY: ICD-10-CM

## 2017-04-20 PROCEDURE — 99496 TRANSJ CARE MGMT HIGH F2F 7D: CPT | Performed by: INTERNAL MEDICINE

## 2017-04-20 RX ORDER — NEOMYCIN SULFATE, POLYMYXIN B SULFATE, HYDROCORTISONE 3.5; 10000; 1 MG/ML; [USP'U]/ML; MG/ML
4 SOLUTION/ DROPS AURICULAR (OTIC) 4 TIMES DAILY
Qty: 10 ML | Refills: 1 | Status: SHIPPED | OUTPATIENT
Start: 2017-04-20 | End: 2017-05-23 | Stop reason: ALTCHOICE

## 2017-04-25 ENCOUNTER — TELEPHONE (OUTPATIENT)
Dept: INTERNAL MEDICINE | Age: 62
End: 2017-04-25

## 2017-04-25 LAB — PREGABALIN SERPL-MCNC: 3.1 UG/ML

## 2017-04-27 ENCOUNTER — TELEPHONE (OUTPATIENT)
Dept: INTERNAL MEDICINE | Age: 62
End: 2017-04-27

## 2017-04-28 ENCOUNTER — LAB SERVICES (OUTPATIENT)
Dept: LAB | Age: 62
End: 2017-04-28

## 2017-04-28 ENCOUNTER — IMAGING SERVICES (OUTPATIENT)
Dept: GENERAL RADIOLOGY | Age: 62
End: 2017-04-28
Attending: INTERNAL MEDICINE

## 2017-04-28 ENCOUNTER — OFFICE VISIT (OUTPATIENT)
Dept: INTERNAL MEDICINE | Age: 62
End: 2017-04-28

## 2017-04-28 VITALS
BODY MASS INDEX: 24.55 KG/M2 | HEART RATE: 81 BPM | WEIGHT: 143 LBS | DIASTOLIC BLOOD PRESSURE: 68 MMHG | OXYGEN SATURATION: 97 % | SYSTOLIC BLOOD PRESSURE: 140 MMHG

## 2017-04-28 DIAGNOSIS — Z11.59 NEED FOR HEPATITIS C SCREENING TEST: ICD-10-CM

## 2017-04-28 DIAGNOSIS — M54.2 NECK PAIN: ICD-10-CM

## 2017-04-28 DIAGNOSIS — H91.8X2 OTHER SPECIFIED HEARING LOSS OF LEFT EAR, UNSPECIFIED HEARING STATUS ON CONTRALATERAL SIDE: ICD-10-CM

## 2017-04-28 DIAGNOSIS — H81.4 VERTIGO OF CENTRAL ORIGIN, UNSPECIFIED LATERALITY: Primary | ICD-10-CM

## 2017-04-28 DIAGNOSIS — Z00.00 MEDICARE ANNUAL WELLNESS VISIT, INITIAL: ICD-10-CM

## 2017-04-28 DIAGNOSIS — Z78.0 POSTMENOPAUSAL STATUS (AGE-RELATED) (NATURAL): ICD-10-CM

## 2017-04-28 DIAGNOSIS — E11.3293 TYPE 2 DIABETES MELLITUS WITH BOTH EYES AFFECTED BY MILD NONPROLIFERATIVE RETINOPATHY WITHOUT MACULAR EDEMA, WITH LONG-TERM CURRENT USE OF INSULIN (CMD): ICD-10-CM

## 2017-04-28 DIAGNOSIS — I10 ESSENTIAL HYPERTENSION: ICD-10-CM

## 2017-04-28 DIAGNOSIS — F17.219 CIGARETTE NICOTINE DEPENDENCE WITH NICOTINE-INDUCED DISORDER: ICD-10-CM

## 2017-04-28 DIAGNOSIS — Z87.891 HISTORY OF TOBACCO USE, PRESENTING HAZARDS TO HEALTH: ICD-10-CM

## 2017-04-28 DIAGNOSIS — Z79.4 TYPE 2 DIABETES MELLITUS WITH BOTH EYES AFFECTED BY MILD NONPROLIFERATIVE RETINOPATHY WITHOUT MACULAR EDEMA, WITH LONG-TERM CURRENT USE OF INSULIN (CMD): ICD-10-CM

## 2017-04-28 LAB
ANION GAP SERPL CALC-SCNC: 15 MMOL/L (ref 10–20)
BUN SERPL-MCNC: 17 MG/DL (ref 6–20)
BUN/CREAT SERPL: 21 (ref 7–25)
CALCIUM SERPL-MCNC: 8.9 MG/DL (ref 8.4–10.2)
CHLORIDE SERPL-SCNC: 93 MMOL/L (ref 98–107)
CO2 SERPL-SCNC: 28 MMOL/L (ref 21–32)
CREAT SERPL-MCNC: 0.8 MG/DL (ref 0.51–0.95)
FASTING STATUS PATIENT QL REPORTED: 3.5 HRS
GLUCOSE SERPL-MCNC: 449 MG/DL (ref 65–99)
POTASSIUM SERPL-SCNC: 5.4 MMOL/L (ref 3.4–5.1)
SODIUM SERPL-SCNC: 131 MMOL/L (ref 135–145)

## 2017-04-28 PROCEDURE — 99214 OFFICE O/P EST MOD 30 MIN: CPT | Performed by: INTERNAL MEDICINE

## 2017-04-28 PROCEDURE — 72040 X-RAY EXAM NECK SPINE 2-3 VW: CPT | Performed by: RADIOLOGY

## 2017-04-28 PROCEDURE — 36415 COLL VENOUS BLD VENIPUNCTURE: CPT | Performed by: INTERNAL MEDICINE

## 2017-04-28 ASSESSMENT — PATIENT HEALTH QUESTIONNAIRE - PHQ9
1. LITTLE INTEREST OR PLEASURE IN DOING THINGS: NO
2. FEELING DOWN, DEPRESSED OR HOPELESS: NO

## 2017-04-29 LAB — HCV AB SER QL: NEGATIVE

## 2017-05-01 DIAGNOSIS — E87.5 HYPERKALEMIA: Primary | ICD-10-CM

## 2017-05-03 ENCOUNTER — OFFICE VISIT (OUTPATIENT)
Dept: OTOLARYNGOLOGY | Age: 62
End: 2017-05-03

## 2017-05-03 ENCOUNTER — OFFICE VISIT (OUTPATIENT)
Dept: AUDIOLOGY | Age: 62
End: 2017-05-03

## 2017-05-03 VITALS
WEIGHT: 144.7 LBS | DIASTOLIC BLOOD PRESSURE: 72 MMHG | HEIGHT: 64 IN | BODY MASS INDEX: 24.7 KG/M2 | SYSTOLIC BLOOD PRESSURE: 124 MMHG

## 2017-05-03 DIAGNOSIS — R26.89 IMBALANCE: ICD-10-CM

## 2017-05-03 DIAGNOSIS — H90.3 BILATERAL SENSORINEURAL HEARING LOSS: Primary | ICD-10-CM

## 2017-05-03 DIAGNOSIS — H93.13 TINNITUS, BILATERAL: ICD-10-CM

## 2017-05-03 DIAGNOSIS — R42 DIZZINESS: Primary | ICD-10-CM

## 2017-05-03 DIAGNOSIS — H90.3 BILATERAL SENSORINEURAL HEARING LOSS: ICD-10-CM

## 2017-05-03 LAB
LEFT EAR CANAL VOLUME: NORMAL ML (ref 0.2–2)
LEFT EAR CURVE TYPE: NORMAL
LEFT EAR CURVE: NORMAL
RIGHT EAR CANAL VOLUME: NORMAL ML (ref 0.2–2)
RIGHT EAR CURVE TYPE: NORMAL
RIGHT EAR CURVE: NORMAL

## 2017-05-03 PROCEDURE — 92557 COMPREHENSIVE HEARING TEST: CPT | Performed by: AUDIOLOGIST

## 2017-05-03 PROCEDURE — 92567 TYMPANOMETRY: CPT | Performed by: AUDIOLOGIST

## 2017-05-03 PROCEDURE — 99243 OFF/OP CNSLTJ NEW/EST LOW 30: CPT | Performed by: OTOLARYNGOLOGY

## 2017-05-08 RX ORDER — MECLIZINE HYDROCHLORIDE 25 MG/1
TABLET ORAL
Qty: 30 TABLET | Refills: 5 | Status: SHIPPED | OUTPATIENT
Start: 2017-05-08 | End: 2020-12-16 | Stop reason: SDUPTHER

## 2017-05-09 ENCOUNTER — HOSPITAL ENCOUNTER (OUTPATIENT)
Dept: CT IMAGING | Age: 62
Discharge: HOME | End: 2017-05-09
Attending: INTERNAL MEDICINE

## 2017-05-09 ENCOUNTER — HOSPITAL ENCOUNTER (OUTPATIENT)
Dept: OBGYN | Age: 62
Discharge: HOME | End: 2017-05-09
Attending: INTERNAL MEDICINE

## 2017-05-09 DIAGNOSIS — Z87.891 HISTORY OF TOBACCO USE, PRESENTING HAZARDS TO HEALTH: ICD-10-CM

## 2017-05-09 DIAGNOSIS — F17.219 CIGARETTE NICOTINE DEPENDENCE WITH NICOTINE-INDUCED DISORDER: ICD-10-CM

## 2017-05-09 DIAGNOSIS — Z78.0 POSTMENOPAUSAL STATUS (AGE-RELATED) (NATURAL): ICD-10-CM

## 2017-05-09 PROCEDURE — G0297 LDCT FOR LUNG CA SCREEN: HCPCS

## 2017-05-09 PROCEDURE — 77080 DXA BONE DENSITY AXIAL: CPT

## 2017-05-09 PROCEDURE — 77080 DXA BONE DENSITY AXIAL: CPT | Performed by: RADIOLOGY

## 2017-05-09 PROCEDURE — G0297 LDCT FOR LUNG CA SCREEN: HCPCS | Performed by: RADIOLOGY

## 2017-05-10 ENCOUNTER — OFFICE VISIT (OUTPATIENT)
Dept: NEUROLOGY | Age: 62
End: 2017-05-10

## 2017-05-10 VITALS — DIASTOLIC BLOOD PRESSURE: 72 MMHG | SYSTOLIC BLOOD PRESSURE: 148 MMHG

## 2017-05-10 DIAGNOSIS — I67.9 CEREBROVASCULAR DISEASE: ICD-10-CM

## 2017-05-10 DIAGNOSIS — E10.42 DIABETIC POLYNEUROPATHY ASSOCIATED WITH TYPE 1 DIABETES MELLITUS (CMD): ICD-10-CM

## 2017-05-10 DIAGNOSIS — G25.0 ESSENTIAL TREMOR: ICD-10-CM

## 2017-05-10 DIAGNOSIS — I63.81 LACUNAR STROKE (CMD): Primary | ICD-10-CM

## 2017-05-10 DIAGNOSIS — F17.200 TOBACCO USE DISORDER: ICD-10-CM

## 2017-05-10 PROCEDURE — 99214 OFFICE O/P EST MOD 30 MIN: CPT | Performed by: PSYCHIATRY & NEUROLOGY

## 2017-05-10 RX ORDER — PANTOPRAZOLE SODIUM 40 MG/1
40 TABLET, DELAYED RELEASE ORAL DAILY
Qty: 30 TABLET | Refills: 3 | Status: SHIPPED | OUTPATIENT
Start: 2017-05-10 | End: 2017-09-05 | Stop reason: SDUPTHER

## 2017-05-10 RX ORDER — DULOXETIN HYDROCHLORIDE 60 MG/1
60 CAPSULE, DELAYED RELEASE ORAL 2 TIMES DAILY
Qty: 60 CAPSULE | Refills: 3 | Status: SHIPPED | OUTPATIENT
Start: 2017-05-10 | End: 2017-08-02 | Stop reason: SDUPTHER

## 2017-05-10 RX ORDER — CLOPIDOGREL BISULFATE 75 MG/1
75 TABLET ORAL DAILY
Qty: 30 TABLET | Refills: 3 | Status: SHIPPED | OUTPATIENT
Start: 2017-05-10 | End: 2017-09-05 | Stop reason: SDUPTHER

## 2017-05-12 ENCOUNTER — OFFICE VISIT (OUTPATIENT)
Dept: INTERNAL MEDICINE | Age: 62
End: 2017-05-12

## 2017-05-12 ENCOUNTER — LAB SERVICES (OUTPATIENT)
Dept: LAB | Age: 62
End: 2017-05-12

## 2017-05-12 VITALS
HEART RATE: 81 BPM | BODY MASS INDEX: 24.2 KG/M2 | SYSTOLIC BLOOD PRESSURE: 142 MMHG | DIASTOLIC BLOOD PRESSURE: 78 MMHG | OXYGEN SATURATION: 98 % | WEIGHT: 141 LBS

## 2017-05-12 DIAGNOSIS — M81.8 OTHER OSTEOPOROSIS WITHOUT CURRENT PATHOLOGICAL FRACTURE: ICD-10-CM

## 2017-05-12 DIAGNOSIS — Z79.4 TYPE 2 DIABETES MELLITUS WITH BOTH EYES AFFECTED BY MILD NONPROLIFERATIVE RETINOPATHY WITHOUT MACULAR EDEMA, WITH LONG-TERM CURRENT USE OF INSULIN (CMD): ICD-10-CM

## 2017-05-12 DIAGNOSIS — E11.3293 TYPE 2 DIABETES MELLITUS WITH BOTH EYES AFFECTED BY MILD NONPROLIFERATIVE RETINOPATHY WITHOUT MACULAR EDEMA, WITH LONG-TERM CURRENT USE OF INSULIN (CMD): ICD-10-CM

## 2017-05-12 DIAGNOSIS — F32.A DEPRESSION, UNSPECIFIED DEPRESSION TYPE: ICD-10-CM

## 2017-05-12 DIAGNOSIS — E87.5 HYPERKALEMIA: ICD-10-CM

## 2017-05-12 DIAGNOSIS — I10 ESSENTIAL HYPERTENSION: Primary | ICD-10-CM

## 2017-05-12 DIAGNOSIS — E03.9 HYPOTHYROIDISM, UNSPECIFIED TYPE: ICD-10-CM

## 2017-05-12 DIAGNOSIS — I25.10 CORONARY ARTERY DISEASE INVOLVING NATIVE CORONARY ARTERY OF NATIVE HEART WITHOUT ANGINA PECTORIS: ICD-10-CM

## 2017-05-12 DIAGNOSIS — G47.33 OSA ON CPAP: ICD-10-CM

## 2017-05-12 DIAGNOSIS — F17.200 TOBACCO DEPENDENCY: ICD-10-CM

## 2017-05-12 LAB
ANION GAP SERPL CALC-SCNC: 14 MMOL/L (ref 10–20)
BUN SERPL-MCNC: 9 MG/DL (ref 6–20)
BUN/CREAT SERPL: 11 (ref 7–25)
CALCIUM SERPL-MCNC: 8.8 MG/DL (ref 8.4–10.2)
CHLORIDE SERPL-SCNC: 93 MMOL/L (ref 98–107)
CO2 SERPL-SCNC: 29 MMOL/L (ref 21–32)
CREAT SERPL-MCNC: 0.79 MG/DL (ref 0.51–0.95)
FASTING STATUS PATIENT QL REPORTED: ABNORMAL HRS
GLUCOSE SERPL-MCNC: 252 MG/DL (ref 65–99)
POTASSIUM SERPL-SCNC: 4.7 MMOL/L (ref 3.4–5.1)
SODIUM SERPL-SCNC: 131 MMOL/L (ref 135–145)

## 2017-05-12 PROCEDURE — 99213 OFFICE O/P EST LOW 20 MIN: CPT | Performed by: INTERNAL MEDICINE

## 2017-05-12 PROCEDURE — 36415 COLL VENOUS BLD VENIPUNCTURE: CPT | Performed by: INTERNAL MEDICINE

## 2017-05-12 RX ORDER — ALENDRONATE SODIUM 35 MG/1
35 TABLET ORAL
Qty: 5 TABLET | Refills: 11 | Status: SHIPPED | OUTPATIENT
Start: 2017-05-12 | End: 2017-05-18 | Stop reason: SDUPTHER

## 2017-05-15 ENCOUNTER — TELEPHONE (OUTPATIENT)
Dept: INTERNAL MEDICINE | Age: 62
End: 2017-05-15

## 2017-05-15 ENCOUNTER — TELEPHONE (OUTPATIENT)
Dept: OTOLARYNGOLOGY | Age: 62
End: 2017-05-15

## 2017-05-17 ENCOUNTER — OFFICE VISIT (OUTPATIENT)
Dept: GASTROENTEROLOGY | Age: 62
End: 2017-05-17

## 2017-05-17 ENCOUNTER — PREP FOR CASE (OUTPATIENT)
Dept: GASTROENTEROLOGY | Age: 62
End: 2017-05-17

## 2017-05-17 VITALS
WEIGHT: 143.08 LBS | BODY MASS INDEX: 24.56 KG/M2 | DIASTOLIC BLOOD PRESSURE: 60 MMHG | HEART RATE: 76 BPM | SYSTOLIC BLOOD PRESSURE: 133 MMHG

## 2017-05-17 DIAGNOSIS — R10.13 EPIGASTRIC ABDOMINAL PAIN: Primary | ICD-10-CM

## 2017-05-17 DIAGNOSIS — R10.13 EPIGASTRIC ABDOMINAL PAIN: ICD-10-CM

## 2017-05-17 DIAGNOSIS — R19.8 CHANGE IN BOWEL FUNCTION: ICD-10-CM

## 2017-05-17 DIAGNOSIS — R19.4 CHANGE IN BOWEL HABITS: Primary | ICD-10-CM

## 2017-05-17 PROCEDURE — 99243 OFF/OP CNSLTJ NEW/EST LOW 30: CPT | Performed by: INTERNAL MEDICINE

## 2017-05-17 RX ORDER — POLYETHYLENE GLYCOL 3350, SODIUM CHLORIDE, SODIUM BICARBONATE, POTASSIUM CHLORIDE 420; 11.2; 5.72; 1.48 G/4L; G/4L; G/4L; G/4L
4000 POWDER, FOR SOLUTION ORAL ONCE
Qty: 4000 ML | Refills: 0 | Status: SHIPPED | OUTPATIENT
Start: 2017-05-17 | End: 2017-05-17

## 2017-05-18 RX ORDER — ALENDRONATE SODIUM 70 MG/1
TABLET ORAL
Qty: 2 TABLET | Refills: 11 | Status: SHIPPED | OUTPATIENT
Start: 2017-05-18 | End: 2017-05-18 | Stop reason: SDUPTHER

## 2017-05-18 RX ORDER — ALENDRONATE SODIUM 70 MG/1
TABLET ORAL
Qty: 2 TABLET | Refills: 11 | Status: SHIPPED | OUTPATIENT
Start: 2017-05-18 | End: 2020-12-16

## 2017-05-22 RX ORDER — LEVOTHYROXINE SODIUM 0.03 MG/1
TABLET ORAL
Qty: 31 TABLET | Refills: 1 | Status: SHIPPED | OUTPATIENT
Start: 2017-05-22 | End: 2017-07-10 | Stop reason: SDUPTHER

## 2017-05-22 RX ORDER — BUPROPION HYDROCHLORIDE 150 MG/1
TABLET, EXTENDED RELEASE ORAL
Qty: 62 TABLET | Refills: 1 | Status: SHIPPED | OUTPATIENT
Start: 2017-05-22 | End: 2017-07-10 | Stop reason: SDUPTHER

## 2017-05-22 RX ORDER — SIMVASTATIN 20 MG
TABLET ORAL
Qty: 31 TABLET | Refills: 1 | Status: SHIPPED | OUTPATIENT
Start: 2017-05-22 | End: 2017-07-10 | Stop reason: SDUPTHER

## 2017-05-22 RX ORDER — LOSARTAN POTASSIUM 50 MG/1
TABLET ORAL
Qty: 31 TABLET | Refills: 1 | Status: SHIPPED | OUTPATIENT
Start: 2017-05-22 | End: 2017-07-10 | Stop reason: SDUPTHER

## 2017-05-22 RX ORDER — TRAZODONE HYDROCHLORIDE 100 MG/1
TABLET ORAL
Qty: 31 TABLET | Refills: 1 | Status: SHIPPED | OUTPATIENT
Start: 2017-05-22 | End: 2017-07-10 | Stop reason: SDUPTHER

## 2017-05-22 RX ORDER — SUCRALFATE 1 G/1
1 TABLET ORAL 4 TIMES DAILY
Qty: 124 TABLET | Refills: 1 | Status: SHIPPED | OUTPATIENT
Start: 2017-05-22 | End: 2017-07-10 | Stop reason: SDUPTHER

## 2017-05-22 RX ORDER — LEVOTHYROXINE SODIUM 0.2 MG/1
TABLET ORAL
Qty: 31 TABLET | Refills: 1 | Status: SHIPPED | OUTPATIENT
Start: 2017-05-22 | End: 2017-07-10 | Stop reason: SDUPTHER

## 2017-05-23 ENCOUNTER — ANESTHESIA EVENT (OUTPATIENT)
Dept: SURGERY | Age: 62
End: 2017-05-23

## 2017-05-23 DIAGNOSIS — R19.4 CHANGE IN BOWEL HABITS: Primary | ICD-10-CM

## 2017-05-23 RX ORDER — 0.9 % SODIUM CHLORIDE 0.9 %
2 VIAL (ML) INJECTION PRN
Status: CANCELLED | OUTPATIENT
Start: 2017-05-23

## 2017-05-23 RX ORDER — SODIUM CHLORIDE 9 MG/ML
INJECTION, SOLUTION INTRAVENOUS CONTINUOUS
Status: CANCELLED | OUTPATIENT
Start: 2017-05-23

## 2017-05-23 RX ORDER — 0.9 % SODIUM CHLORIDE 0.9 %
2 VIAL (ML) INJECTION EVERY 12 HOURS SCHEDULED
Status: CANCELLED | OUTPATIENT
Start: 2017-05-23

## 2017-05-23 RX ORDER — ONDANSETRON 2 MG/ML
4 INJECTION INTRAMUSCULAR; INTRAVENOUS 2 TIMES DAILY PRN
Status: CANCELLED | OUTPATIENT
Start: 2017-05-23

## 2017-05-23 ASSESSMENT — COGNITIVE AND FUNCTIONAL STATUS - GENERAL
ARE YOU DEAF OR DO YOU HAVE SERIOUS DIFFICULTY  HEARING: YES
ARE YOU BLIND OR DO YOU HAVE SERIOUS DIFFICULTY SEEING, EVEN WHEN WEARING GLASSES: NO

## 2017-05-23 ASSESSMENT — ACTIVITIES OF DAILY LIVING (ADL)
ADL_BEFORE_ADMISSION: INDEPENDENT
SENSORY_SUPPORT_DEVICES: EYEGLASSES;DENTURES;HEARING AIDS
RECENT_DECLINE_ADL: NO
CHRONIC_PAIN_PRESENT: YES, CHRONIC
DESCRIBE HOW PAIN IMPACTS YOUR LIFE: PHYSICAL ACTIVITY;MOBILITY
ADL_SCORE: 12
HISTORY OF FALLING IN THE LAST YEAR (PRIOR TO ADMISSION): NO
NEEDS_ASSIST: NO
ADL_SHORT_OF_BREATH: NO

## 2017-05-24 ENCOUNTER — SURGERY (OUTPATIENT)
Age: 62
End: 2017-05-24

## 2017-05-24 ENCOUNTER — TELEPHONE (OUTPATIENT)
Dept: NEUROLOGY | Age: 62
End: 2017-05-24

## 2017-05-24 ENCOUNTER — OFF PREMISE CHARGES (OUTPATIENT)
Dept: GASTROENTEROLOGY | Age: 62
End: 2017-05-24

## 2017-05-24 ENCOUNTER — ANESTHESIA (OUTPATIENT)
Dept: SURGERY | Age: 62
End: 2017-05-24

## 2017-05-24 ENCOUNTER — OFF PREMISE (OUTPATIENT)
Dept: GASTROENTEROLOGY | Age: 62
End: 2017-05-24

## 2017-05-24 ENCOUNTER — HOSPITAL ENCOUNTER (OUTPATIENT)
Age: 62
Discharge: HOME | End: 2017-05-24
Attending: INTERNAL MEDICINE | Admitting: INTERNAL MEDICINE

## 2017-05-24 DIAGNOSIS — K29.50 CHRONIC GASTRITIS WITHOUT BLEEDING, UNSPECIFIED GASTRITIS TYPE: ICD-10-CM

## 2017-05-24 DIAGNOSIS — R10.13 EPIGASTRIC ABDOMINAL PAIN: Primary | ICD-10-CM

## 2017-05-24 DIAGNOSIS — R19.4 CHANGE IN BOWEL HABITS: ICD-10-CM

## 2017-05-24 LAB
GLUCOSE BLDC GLUCOMTR-MCNC: 256 MG/DL (ref 65–99)
UREASE TISS QL: NEGATIVE

## 2017-05-24 PROCEDURE — 10004348 HB COUNTER-EVAL PRE-OP

## 2017-05-24 PROCEDURE — 10003436 HB UPPER GI ENDOSCOPY: Performed by: INTERNAL MEDICINE

## 2017-05-24 PROCEDURE — 10001568 HB ANESTH MAC IV ADD'L 1/2 HR: Performed by: INTERNAL MEDICINE

## 2017-05-24 PROCEDURE — 10003428 HB COLONOSCOPY: Performed by: INTERNAL MEDICINE

## 2017-05-24 PROCEDURE — 82962 GLUCOSE BLOOD TEST: CPT

## 2017-05-24 PROCEDURE — 87077 CULTURE AEROBIC IDENTIFY: CPT | Performed by: INTERNAL MEDICINE

## 2017-05-24 PROCEDURE — 45378 DIAGNOSTIC COLONOSCOPY: CPT | Performed by: INTERNAL MEDICINE

## 2017-05-24 PROCEDURE — 43239 EGD BIOPSY SINGLE/MULTIPLE: CPT | Performed by: NURSE ANESTHETIST, CERTIFIED REGISTERED

## 2017-05-24 PROCEDURE — 10002807 HB RX 258: Performed by: INTERNAL MEDICINE

## 2017-05-24 PROCEDURE — 10004560 HB COUNTER-EXTENDED RECOVERY PER HOUR

## 2017-05-24 PROCEDURE — 10004316 HB COUNTER-PREP

## 2017-05-24 PROCEDURE — 10002362 HB ANESTH MAC IV 1ST 1/2 HR: Performed by: INTERNAL MEDICINE

## 2017-05-24 PROCEDURE — 10002800 HB RX 250 W HCPCS: Performed by: NURSE ANESTHETIST, CERTIFIED REGISTERED

## 2017-05-24 PROCEDURE — 43239 EGD BIOPSY SINGLE/MULTIPLE: CPT | Performed by: INTERNAL MEDICINE

## 2017-05-24 RX ORDER — PROPOFOL 10 MG/ML
INJECTION, EMULSION INTRAVENOUS PRN
Status: DISCONTINUED | OUTPATIENT
Start: 2017-05-24 | End: 2017-05-24

## 2017-05-24 RX ORDER — 0.9 % SODIUM CHLORIDE 0.9 %
2 VIAL (ML) INJECTION PRN
Status: DISCONTINUED | OUTPATIENT
Start: 2017-05-24 | End: 2017-05-24 | Stop reason: HOSPADM

## 2017-05-24 RX ORDER — SODIUM CHLORIDE 9 MG/ML
INJECTION, SOLUTION INTRAVENOUS CONTINUOUS
Status: DISCONTINUED | OUTPATIENT
Start: 2017-05-24 | End: 2017-05-24 | Stop reason: HOSPADM

## 2017-05-24 RX ORDER — ONDANSETRON 2 MG/ML
4 INJECTION INTRAMUSCULAR; INTRAVENOUS 2 TIMES DAILY PRN
Status: DISCONTINUED | OUTPATIENT
Start: 2017-05-24 | End: 2017-05-24 | Stop reason: HOSPADM

## 2017-05-24 RX ORDER — POLYETHYLENE GLYCOL 3350 17 G/17G
POWDER, FOR SOLUTION ORAL
Qty: 527 G | Refills: 0 | Status: SHIPPED | OUTPATIENT
Start: 2017-05-24 | End: 2017-07-06 | Stop reason: SDUPTHER

## 2017-05-24 RX ORDER — 0.9 % SODIUM CHLORIDE 0.9 %
2 VIAL (ML) INJECTION EVERY 12 HOURS SCHEDULED
Status: DISCONTINUED | OUTPATIENT
Start: 2017-05-24 | End: 2017-05-24 | Stop reason: HOSPADM

## 2017-05-24 RX ORDER — MIDAZOLAM HYDROCHLORIDE 1 MG/ML
INJECTION, SOLUTION INTRAMUSCULAR; INTRAVENOUS PRN
Status: DISCONTINUED | OUTPATIENT
Start: 2017-05-24 | End: 2017-05-24

## 2017-05-24 RX ORDER — NICOTINE 21 MG/24HR
PATCH, TRANSDERMAL 24 HOURS TRANSDERMAL
Qty: 28 EACH | Refills: 0 | Status: SHIPPED | OUTPATIENT
Start: 2017-05-24 | End: 2017-11-30 | Stop reason: SINTOL

## 2017-05-24 RX ADMIN — PROPOFOL 70 MG: 10 INJECTION, EMULSION INTRAVENOUS at 10:36

## 2017-05-24 RX ADMIN — PROPOFOL 20 MG: 10 INJECTION, EMULSION INTRAVENOUS at 11:05

## 2017-05-24 RX ADMIN — PROPOFOL 30 MG: 10 INJECTION, EMULSION INTRAVENOUS at 10:50

## 2017-05-24 RX ADMIN — PROPOFOL 30 MG: 10 INJECTION, EMULSION INTRAVENOUS at 10:43

## 2017-05-24 RX ADMIN — MIDAZOLAM HYDROCHLORIDE 2 MG: 1 INJECTION, SOLUTION INTRAMUSCULAR; INTRAVENOUS at 10:34

## 2017-05-24 RX ADMIN — SODIUM CHLORIDE: 9 INJECTION, SOLUTION INTRAVENOUS at 10:27

## 2017-05-24 RX ADMIN — PROPOFOL 30 MG: 10 INJECTION, EMULSION INTRAVENOUS at 10:53

## 2017-05-24 RX ADMIN — PROPOFOL 40 MG: 10 INJECTION, EMULSION INTRAVENOUS at 10:58

## 2017-05-24 ASSESSMENT — PAIN SCALES - GENERAL
PAIN_LEVEL_AT_REST: 0
PAIN_LEVEL_AT_REST: 0
PAIN_LEVEL_AT_REST: 3

## 2017-05-24 ASSESSMENT — LIFESTYLE VARIABLES
PACK_YEARS: 4.3
SMOKING_STATUS: FORMER

## 2017-05-24 ASSESSMENT — ENCOUNTER SYMPTOMS
HEADACHES: 1
EXERCISE TOLERANCE: GOOD

## 2017-05-30 ENCOUNTER — TELEPHONE (OUTPATIENT)
Dept: NEUROLOGY | Age: 62
End: 2017-05-30

## 2017-05-30 DIAGNOSIS — E10.42 DIABETIC POLYNEUROPATHY ASSOCIATED WITH TYPE 1 DIABETES MELLITUS (CMD): Primary | ICD-10-CM

## 2017-05-31 ENCOUNTER — TELEPHONE (OUTPATIENT)
Dept: NEUROLOGY | Age: 62
End: 2017-05-31

## 2017-06-02 RX ORDER — PREGABALIN 100 MG/1
CAPSULE ORAL
Qty: 3 CAPSULE | Refills: 0 | Status: SHIPPED | OUTPATIENT
Start: 2017-06-02 | End: 2017-06-10

## 2017-06-02 RX ORDER — PREGABALIN 225 MG/1
225 CAPSULE ORAL 2 TIMES DAILY
Qty: 60 CAPSULE | Refills: 1 | Status: SHIPPED | OUTPATIENT
Start: 2017-06-11 | End: 2017-07-10 | Stop reason: SDUPTHER

## 2017-06-05 ENCOUNTER — TELEPHONE (OUTPATIENT)
Dept: INTERNAL MEDICINE | Age: 62
End: 2017-06-05

## 2017-06-06 ENCOUNTER — TELEPHONE (OUTPATIENT)
Dept: NEUROLOGY | Age: 62
End: 2017-06-06

## 2017-06-06 ENCOUNTER — TELEPHONE (OUTPATIENT)
Dept: INTERNAL MEDICINE | Age: 62
End: 2017-06-06

## 2017-06-06 RX ORDER — PEN NEEDLE, DIABETIC, SAFETY 30 GX3/16"
NEEDLE, DISPOSABLE MISCELLANEOUS
Qty: 200 EACH | Refills: 5 | Status: SHIPPED | OUTPATIENT
Start: 2017-06-06 | End: 2020-12-16 | Stop reason: SDUPTHER

## 2017-06-06 RX ORDER — INSULIN GLARGINE 100 [IU]/ML
INJECTION, SOLUTION SUBCUTANEOUS
Qty: 15 ML | Refills: 5 | Status: SHIPPED | OUTPATIENT
Start: 2017-06-06 | End: 2017-07-21 | Stop reason: SDUPTHER

## 2017-06-06 RX ORDER — INSULIN ASPART 100 [IU]/ML
INJECTION, SOLUTION INTRAVENOUS; SUBCUTANEOUS
Qty: 15 ML | Refills: 0 | Status: SHIPPED | OUTPATIENT
Start: 2017-06-06 | End: 2017-08-15 | Stop reason: SDUPTHER

## 2017-06-06 RX ORDER — CLOBETASOL PROPIONATE 0.5 MG/G
OINTMENT TOPICAL
Qty: 60 G | Refills: 0 | Status: SHIPPED | OUTPATIENT
Start: 2017-06-06 | End: 2017-08-15 | Stop reason: SDUPTHER

## 2017-06-07 ENCOUNTER — TELEPHONE (OUTPATIENT)
Dept: BEHAVIORAL HEALTH | Age: 62
End: 2017-06-07

## 2017-06-08 DIAGNOSIS — E10.59 TYPE 1 DIABETES MELLITUS WITH OTHER CIRCULATORY COMPLICATION (CMD): Primary | ICD-10-CM

## 2017-06-13 ENCOUNTER — TELEPHONE (OUTPATIENT)
Dept: INTERNAL MEDICINE | Age: 62
End: 2017-06-13

## 2017-06-13 RX ORDER — CALCIUM CARBONATE/VITAMIN D3 500-10/5ML
400 LIQUID (ML) ORAL DAILY
Qty: 1 CAPSULE | Refills: 0 | Status: SHIPPED | COMMUNITY
Start: 2017-06-13 | End: 2018-01-24 | Stop reason: SDUPTHER

## 2017-06-13 RX ORDER — PRIMIDONE 50 MG/1
50 TABLET ORAL 3 TIMES DAILY
Qty: 84 TABLET | Refills: 1 | Status: SHIPPED | OUTPATIENT
Start: 2017-06-13 | End: 2017-07-07 | Stop reason: SDUPTHER

## 2017-06-15 ENCOUNTER — TELEPHONE (OUTPATIENT)
Dept: INTERNAL MEDICINE | Age: 62
End: 2017-06-15

## 2017-06-27 ENCOUNTER — IMAGING SERVICES (OUTPATIENT)
Dept: MAMMOGRAPHY | Age: 62
End: 2017-06-27
Attending: INTERNAL MEDICINE

## 2017-06-27 DIAGNOSIS — Z12.31 VISIT FOR SCREENING MAMMOGRAM: ICD-10-CM

## 2017-06-27 PROCEDURE — 77067 SCR MAMMO BI INCL CAD: CPT | Performed by: RADIOLOGY

## 2017-06-28 ENCOUNTER — TELEPHONE (OUTPATIENT)
Dept: INTERNAL MEDICINE | Age: 62
End: 2017-06-28

## 2017-06-28 DIAGNOSIS — R92.8 ABNORMAL MAMMOGRAM: Primary | ICD-10-CM

## 2017-07-03 ENCOUNTER — TELEPHONE (OUTPATIENT)
Dept: NEUROLOGY | Age: 62
End: 2017-07-03

## 2017-07-03 ENCOUNTER — TELEPHONE (OUTPATIENT)
Dept: INTERNAL MEDICINE | Age: 62
End: 2017-07-03

## 2017-07-05 ENCOUNTER — TELEPHONE (OUTPATIENT)
Dept: EDUCATION SERVICES | Age: 62
End: 2017-07-05

## 2017-07-05 DIAGNOSIS — E08.43 DIABETES MELLITUS DUE TO UNDERLYING CONDITION WITH DIABETIC AUTONOMIC NEUROPATHY, UNSPECIFIED LONG TERM INSULIN USE STATUS: Primary | ICD-10-CM

## 2017-07-05 RX ORDER — CARBAMAZEPINE 100 MG/1
100 TABLET, EXTENDED RELEASE ORAL NIGHTLY
Qty: 30 TABLET | Refills: 1 | Status: SHIPPED | OUTPATIENT
Start: 2017-07-05 | End: 2017-08-02 | Stop reason: SDUPTHER

## 2017-07-06 ENCOUNTER — TELEPHONE (OUTPATIENT)
Dept: INTERNAL MEDICINE | Age: 62
End: 2017-07-06

## 2017-07-06 RX ORDER — POLYETHYLENE GLYCOL 3350 17 G/17G
POWDER, FOR SOLUTION ORAL
Qty: 527 G | Refills: 1 | Status: SHIPPED | OUTPATIENT
Start: 2017-07-06 | End: 2017-09-20 | Stop reason: SDUPTHER

## 2017-07-07 ENCOUNTER — TELEPHONE (OUTPATIENT)
Dept: NEUROLOGY | Age: 62
End: 2017-07-07

## 2017-07-07 RX ORDER — PRIMIDONE 50 MG/1
TABLET ORAL
Qty: 98 TABLET | Refills: 1 | Status: SHIPPED | OUTPATIENT
Start: 2017-07-07 | End: 2017-09-05 | Stop reason: SDUPTHER

## 2017-07-10 ENCOUNTER — IMAGING SERVICES (OUTPATIENT)
Dept: ULTRASOUND IMAGING | Age: 62
End: 2017-07-10
Attending: INTERNAL MEDICINE

## 2017-07-10 ENCOUNTER — IMAGING SERVICES (OUTPATIENT)
Dept: MAMMOGRAPHY | Age: 62
End: 2017-07-10
Attending: INTERNAL MEDICINE

## 2017-07-10 DIAGNOSIS — E10.42 DIABETIC POLYNEUROPATHY ASSOCIATED WITH TYPE 1 DIABETES MELLITUS (CMD): ICD-10-CM

## 2017-07-10 DIAGNOSIS — R92.8 ABNORMAL MAMMOGRAM: ICD-10-CM

## 2017-07-10 PROCEDURE — 76642 ULTRASOUND BREAST LIMITED: CPT | Performed by: RADIOLOGY

## 2017-07-10 PROCEDURE — 77065 DX MAMMO INCL CAD UNI: CPT | Performed by: RADIOLOGY

## 2017-07-10 RX ORDER — SIMVASTATIN 20 MG
TABLET ORAL
Qty: 31 TABLET | Refills: 1 | Status: SHIPPED | OUTPATIENT
Start: 2017-07-10 | End: 2017-09-05 | Stop reason: SDUPTHER

## 2017-07-10 RX ORDER — TRAZODONE HYDROCHLORIDE 100 MG/1
TABLET ORAL
Qty: 31 TABLET | Refills: 1 | Status: SHIPPED | OUTPATIENT
Start: 2017-07-10 | End: 2017-09-05 | Stop reason: SDUPTHER

## 2017-07-10 RX ORDER — SUCRALFATE 1 G/1
TABLET ORAL
Qty: 124 TABLET | Refills: 1 | Status: SHIPPED | OUTPATIENT
Start: 2017-07-10 | End: 2017-09-05 | Stop reason: SDUPTHER

## 2017-07-10 RX ORDER — BUPROPION HYDROCHLORIDE 150 MG/1
TABLET, EXTENDED RELEASE ORAL
Qty: 62 TABLET | Refills: 1 | Status: SHIPPED | OUTPATIENT
Start: 2017-07-10 | End: 2017-09-05 | Stop reason: SDUPTHER

## 2017-07-10 RX ORDER — LOSARTAN POTASSIUM 50 MG/1
TABLET ORAL
Qty: 31 TABLET | Refills: 1 | Status: SHIPPED | OUTPATIENT
Start: 2017-07-10 | End: 2017-09-05 | Stop reason: SDUPTHER

## 2017-07-10 RX ORDER — LEVOTHYROXINE SODIUM 0.03 MG/1
TABLET ORAL
Qty: 31 TABLET | Refills: 1 | Status: SHIPPED | OUTPATIENT
Start: 2017-07-10 | End: 2017-07-14 | Stop reason: DRUGHIGH

## 2017-07-10 RX ORDER — LEVOTHYROXINE SODIUM 0.2 MG/1
TABLET ORAL
Qty: 31 TABLET | Refills: 1 | Status: SHIPPED | OUTPATIENT
Start: 2017-07-10 | End: 2017-07-14 | Stop reason: DRUGHIGH

## 2017-07-11 ENCOUNTER — OFFICE VISIT (OUTPATIENT)
Dept: EDUCATION SERVICES | Age: 62
End: 2017-07-11

## 2017-07-11 VITALS — WEIGHT: 149.4 LBS | BODY MASS INDEX: 25.64 KG/M2

## 2017-07-11 DIAGNOSIS — E08.43 DIABETES MELLITUS DUE TO UNDERLYING CONDITION WITH DIABETIC AUTONOMIC NEUROPATHY, UNSPECIFIED LONG TERM INSULIN USE STATUS: Primary | ICD-10-CM

## 2017-07-11 PROCEDURE — 99211 OFF/OP EST MAY X REQ PHY/QHP: CPT | Performed by: REGISTERED NURSE

## 2017-07-13 ENCOUNTER — OFFICE VISIT (OUTPATIENT)
Dept: INTERNAL MEDICINE | Age: 62
End: 2017-07-13

## 2017-07-13 ENCOUNTER — OFFICE VISIT (OUTPATIENT)
Dept: OPHTHALMOLOGY | Age: 62
End: 2017-07-13

## 2017-07-13 ENCOUNTER — LAB SERVICES (OUTPATIENT)
Dept: LAB | Age: 62
End: 2017-07-13

## 2017-07-13 VITALS
DIASTOLIC BLOOD PRESSURE: 68 MMHG | OXYGEN SATURATION: 98 % | HEART RATE: 89 BPM | WEIGHT: 146.16 LBS | BODY MASS INDEX: 25.09 KG/M2 | SYSTOLIC BLOOD PRESSURE: 134 MMHG

## 2017-07-13 DIAGNOSIS — E78.00 HYPERCHOLESTEREMIA: ICD-10-CM

## 2017-07-13 DIAGNOSIS — E78.00 HYPERCHOLESTEROLEMIA: ICD-10-CM

## 2017-07-13 DIAGNOSIS — G25.0 BENIGN ESSENTIAL TREMOR: ICD-10-CM

## 2017-07-13 DIAGNOSIS — I10 ESSENTIAL HYPERTENSION: ICD-10-CM

## 2017-07-13 DIAGNOSIS — E03.9 HYPOTHYROIDISM, UNSPECIFIED TYPE: ICD-10-CM

## 2017-07-13 DIAGNOSIS — H52.203 ASTIGMATISM OF BOTH EYES WITH PRESBYOPIA: ICD-10-CM

## 2017-07-13 DIAGNOSIS — F17.200 TOBACCO USE DISORDER: ICD-10-CM

## 2017-07-13 DIAGNOSIS — E11.3299 BDR (BACKGROUND DIABETIC RETINOPATHY) (CMD): ICD-10-CM

## 2017-07-13 DIAGNOSIS — E11.42 TYPE 2 DIABETES MELLITUS WITH DIABETIC POLYNEUROPATHY, WITH LONG-TERM CURRENT USE OF INSULIN (CMD): ICD-10-CM

## 2017-07-13 DIAGNOSIS — I25.10 CORONARY ARTERY DISEASE INVOLVING NATIVE CORONARY ARTERY OF NATIVE HEART WITHOUT ANGINA PECTORIS: ICD-10-CM

## 2017-07-13 DIAGNOSIS — E10.42 DIABETIC POLYNEUROPATHY ASSOCIATED WITH TYPE 1 DIABETES MELLITUS (CMD): ICD-10-CM

## 2017-07-13 DIAGNOSIS — E11.3292 TYPE 2 DIABETES MELLITUS WITH LEFT EYE AFFECTED BY MILD NONPROLIFERATIVE RETINOPATHY WITHOUT MACULAR EDEMA, WITH LONG-TERM CURRENT USE OF INSULIN (CMD): Primary | ICD-10-CM

## 2017-07-13 DIAGNOSIS — E11.3293 TYPE 2 DIABETES MELLITUS WITH BOTH EYES AFFECTED BY MILD NONPROLIFERATIVE RETINOPATHY WITHOUT MACULAR EDEMA, WITH LONG-TERM CURRENT USE OF INSULIN (CMD): ICD-10-CM

## 2017-07-13 DIAGNOSIS — Z79.4 TYPE 2 DIABETES MELLITUS WITH LEFT EYE AFFECTED BY MILD NONPROLIFERATIVE RETINOPATHY WITHOUT MACULAR EDEMA, WITH LONG-TERM CURRENT USE OF INSULIN (CMD): Primary | ICD-10-CM

## 2017-07-13 DIAGNOSIS — Z79.4 TYPE 2 DIABETES MELLITUS WITH BOTH EYES AFFECTED BY MILD NONPROLIFERATIVE RETINOPATHY WITHOUT MACULAR EDEMA, WITH LONG-TERM CURRENT USE OF INSULIN (CMD): ICD-10-CM

## 2017-07-13 DIAGNOSIS — Z79.4 TYPE 2 DIABETES MELLITUS WITH DIABETIC POLYNEUROPATHY, WITH LONG-TERM CURRENT USE OF INSULIN (CMD): ICD-10-CM

## 2017-07-13 DIAGNOSIS — F32.A DEPRESSION, UNSPECIFIED DEPRESSION TYPE: Primary | ICD-10-CM

## 2017-07-13 DIAGNOSIS — H52.4 ASTIGMATISM OF BOTH EYES WITH PRESBYOPIA: ICD-10-CM

## 2017-07-13 LAB
ANION GAP SERPL CALC-SCNC: 13 MMOL/L (ref 10–20)
AST SERPL-CCNC: 14 UNITS/L
BUN SERPL-MCNC: 15 MG/DL (ref 6–20)
BUN/CREAT SERPL: 17 (ref 7–25)
CALCIUM SERPL-MCNC: 9.1 MG/DL (ref 8.4–10.2)
CHLORIDE SERPL-SCNC: 99 MMOL/L (ref 98–107)
CO2 SERPL-SCNC: 29 MMOL/L (ref 21–32)
CREAT SERPL-MCNC: 0.86 MG/DL (ref 0.51–0.95)
FASTING STATUS PATIENT QL REPORTED: ABNORMAL HRS
GLUCOSE SERPL-MCNC: 331 MG/DL (ref 65–99)
POTASSIUM SERPL-SCNC: 4.8 MMOL/L (ref 3.4–5.1)
SODIUM SERPL-SCNC: 136 MMOL/L (ref 135–145)
T3FREE SERPL-MCNC: 2.9 PG/ML (ref 2.2–4)
T4 FREE SERPL-MCNC: 1.2 NG/DL (ref 0.8–1.5)
TSH SERPL-ACNC: 0.02 MCUNITS/ML (ref 0.35–5)

## 2017-07-13 PROCEDURE — 92015 DETERMINE REFRACTIVE STATE: CPT | Performed by: OPTOMETRIST

## 2017-07-13 PROCEDURE — 84450 TRANSFERASE (AST) (SGOT): CPT | Performed by: INTERNAL MEDICINE

## 2017-07-13 PROCEDURE — 84481 FREE ASSAY (FT-3): CPT | Performed by: INTERNAL MEDICINE

## 2017-07-13 PROCEDURE — 36415 COLL VENOUS BLD VENIPUNCTURE: CPT | Performed by: INTERNAL MEDICINE

## 2017-07-13 PROCEDURE — 92014 COMPRE OPH EXAM EST PT 1/>: CPT | Performed by: OPTOMETRIST

## 2017-07-13 PROCEDURE — 80048 BASIC METABOLIC PNL TOTAL CA: CPT | Performed by: INTERNAL MEDICINE

## 2017-07-13 PROCEDURE — 84439 ASSAY OF FREE THYROXINE: CPT | Performed by: INTERNAL MEDICINE

## 2017-07-13 PROCEDURE — 99214 OFFICE O/P EST MOD 30 MIN: CPT | Performed by: INTERNAL MEDICINE

## 2017-07-13 PROCEDURE — 84443 ASSAY THYROID STIM HORMONE: CPT | Performed by: INTERNAL MEDICINE

## 2017-07-13 ASSESSMENT — VISUAL ACUITY
OD_SC: 20/50
METHOD: SNELLEN - LINEAR
OS_PH_SC+: -2
METHOD_MR_RETINOSCOPY: 1
OD_PH_SC: 20/30
OS_SC: 20/40
OS_SC: 20/200
OD_SC: 20/100
OS_PH_SC: 20/30

## 2017-07-13 ASSESSMENT — CUP TO DISC RATIO
OS_RATIO: 0.3
OD_RATIO: 0.3

## 2017-07-13 ASSESSMENT — REFRACTION_MANIFEST
OD_ADD: +2.50
OD_AXIS: 165
OS_AXIS: 100
OD_AXIS: 165
OS_ADD: +2.50
OD_SPHERE: -0.50
OS_SPHERE: -1.00
OS_CYLINDER: +0.75
OS_AXIS: 100
OD_CYLINDER: +0.25
OS_CYLINDER: +0.75
OS_SPHERE: -1.00
OD_CYLINDER: +0.75
OD_SPHERE: -0.50

## 2017-07-13 ASSESSMENT — REFRACTION_WEARINGRX
OS_CYLINDER: +0.75
SPECS_TYPE: BIFOCAL
OD_ADD: +2.25
OD_CYLINDER: +0.75
OS_ADD: +2.25
OD_SPHERE: -0.50
OS_AXIS: 100
OD_AXIS: 165
OS_SPHERE: -1.00

## 2017-07-13 ASSESSMENT — CONF VISUAL FIELD
OD_NORMAL: 1
OS_NORMAL: 1
METHOD: COUNTING FINGERS

## 2017-07-13 ASSESSMENT — SLIT LAMP EXAM - LIDS
COMMENTS: NORMAL
COMMENTS: NORMAL

## 2017-07-13 ASSESSMENT — TONOMETRY
OS_IOP_MMHG: 17
IOP_METHOD: APPLANATION
OD_IOP_MMHG: 16

## 2017-07-13 ASSESSMENT — EXTERNAL EXAM - RIGHT EYE: OD_EXAM: NORMAL

## 2017-07-13 ASSESSMENT — EXTERNAL EXAM - LEFT EYE: OS_EXAM: NORMAL

## 2017-07-14 LAB
CREAT UR-MCNC: 74.8 MG/DL
HBA1C MFR BLD: 7.7 % (ref 4.5–5.6)
MICROALBUMIN UR-MCNC: 11.7 MG/DL
MICROALBUMIN/CREAT UR: 156.4 MCG/MG

## 2017-07-14 RX ORDER — LEVOTHYROXINE SODIUM 112 UG/1
112 TABLET ORAL DAILY
Qty: 90 TABLET | Refills: 1 | Status: SHIPPED | OUTPATIENT
Start: 2017-07-14 | End: 2017-12-01 | Stop reason: DRUGHIGH

## 2017-07-14 RX ORDER — LEVOTHYROXINE SODIUM 0.1 MG/1
100 TABLET ORAL DAILY
Qty: 90 TABLET | Refills: 1 | Status: SHIPPED | OUTPATIENT
Start: 2017-07-14 | End: 2017-12-26 | Stop reason: DRUGHIGH

## 2017-07-17 ENCOUNTER — TELEPHONE (OUTPATIENT)
Dept: INTERNAL MEDICINE | Age: 62
End: 2017-07-17

## 2017-07-18 ENCOUNTER — TELEPHONE (OUTPATIENT)
Dept: INTERNAL MEDICINE | Age: 62
End: 2017-07-18

## 2017-07-19 RX ORDER — PREGABALIN 225 MG/1
CAPSULE ORAL
Qty: 168 CAPSULE | Refills: 0 | Status: SHIPPED | OUTPATIENT
Start: 2017-07-19 | End: 2017-09-25 | Stop reason: SDUPTHER

## 2017-07-20 ENCOUNTER — TELEPHONE (OUTPATIENT)
Dept: EDUCATION SERVICES | Age: 62
End: 2017-07-20

## 2017-07-21 ENCOUNTER — TELEPHONE (OUTPATIENT)
Dept: EDUCATION SERVICES | Age: 62
End: 2017-07-21

## 2017-07-21 DIAGNOSIS — E08.43 DIABETES MELLITUS DUE TO UNDERLYING CONDITION WITH DIABETIC AUTONOMIC NEUROPATHY, UNSPECIFIED LONG TERM INSULIN USE STATUS: Primary | ICD-10-CM

## 2017-07-26 ENCOUNTER — OFFICE VISIT (OUTPATIENT)
Dept: NEUROLOGY | Age: 62
End: 2017-07-26

## 2017-07-26 VITALS — DIASTOLIC BLOOD PRESSURE: 62 MMHG | SYSTOLIC BLOOD PRESSURE: 102 MMHG | OXYGEN SATURATION: 97 % | HEART RATE: 70 BPM

## 2017-07-26 DIAGNOSIS — E11.42 DIABETIC POLYNEUROPATHY ASSOCIATED WITH TYPE 2 DIABETES MELLITUS (CMD): ICD-10-CM

## 2017-07-26 DIAGNOSIS — R20.2 NUMBNESS AND TINGLING IN RIGHT HAND: Primary | ICD-10-CM

## 2017-07-26 DIAGNOSIS — R20.0 NUMBNESS AND TINGLING IN RIGHT HAND: Primary | ICD-10-CM

## 2017-07-26 DIAGNOSIS — G57.93 NEUROPATHIC PAIN OF BOTH LEGS: ICD-10-CM

## 2017-07-26 DIAGNOSIS — G25.0 ESSENTIAL TREMOR: ICD-10-CM

## 2017-07-26 PROCEDURE — 99214 OFFICE O/P EST MOD 30 MIN: CPT | Performed by: PSYCHIATRY & NEUROLOGY

## 2017-07-27 ENCOUNTER — OFFICE VISIT (OUTPATIENT)
Dept: ORTHOPEDICS | Age: 62
End: 2017-07-27

## 2017-07-27 DIAGNOSIS — M65.311 TRIGGER THUMB OF RIGHT HAND: Primary | ICD-10-CM

## 2017-07-27 DIAGNOSIS — M65.341 TRIGGER RING FINGER OF RIGHT HAND: ICD-10-CM

## 2017-07-27 PROCEDURE — 99213 OFFICE O/P EST LOW 20 MIN: CPT | Performed by: ORTHOPAEDIC SURGERY

## 2017-07-28 ENCOUNTER — PREP FOR CASE (OUTPATIENT)
Dept: ORTHOPEDICS | Age: 62
End: 2017-07-28

## 2017-07-28 ENCOUNTER — TELEPHONE (OUTPATIENT)
Dept: INTERNAL MEDICINE | Age: 62
End: 2017-07-28

## 2017-07-28 DIAGNOSIS — M65.311 TRIGGER THUMB OF RIGHT HAND: Primary | ICD-10-CM

## 2017-07-28 DIAGNOSIS — M65.341 TRIGGER RING FINGER OF RIGHT HAND: ICD-10-CM

## 2017-08-02 DIAGNOSIS — G25.0 ESSENTIAL TREMOR: ICD-10-CM

## 2017-08-02 DIAGNOSIS — E10.42 DIABETIC POLYNEUROPATHY ASSOCIATED WITH TYPE 1 DIABETES MELLITUS (CMD): ICD-10-CM

## 2017-08-02 DIAGNOSIS — F17.200 TOBACCO USE DISORDER: ICD-10-CM

## 2017-08-02 DIAGNOSIS — I63.81 LACUNAR STROKE (CMD): ICD-10-CM

## 2017-08-02 DIAGNOSIS — I67.9 CEREBROVASCULAR DISEASE: ICD-10-CM

## 2017-08-02 RX ORDER — CARBAMAZEPINE 100 MG/1
TABLET, EXTENDED RELEASE ORAL
Qty: 28 TABLET | Refills: 5 | Status: SHIPPED | OUTPATIENT
Start: 2017-08-02 | End: 2017-11-22 | Stop reason: SDUPTHER

## 2017-08-02 RX ORDER — DULOXETIN HYDROCHLORIDE 60 MG/1
60 CAPSULE, DELAYED RELEASE ORAL 2 TIMES DAILY
Qty: 60 CAPSULE | Refills: 5 | Status: SHIPPED | OUTPATIENT
Start: 2017-08-02 | End: 2017-09-28 | Stop reason: SDUPTHER

## 2017-08-02 RX ORDER — DULOXETIN HYDROCHLORIDE 20 MG/1
CAPSULE, DELAYED RELEASE ORAL
Qty: 29 CAPSULE | OUTPATIENT
Start: 2017-08-02

## 2017-08-03 ASSESSMENT — ACTIVITIES OF DAILY LIVING (ADL)
ADL_SHORT_OF_BREATH: NO
MOBILITY_ASSIST_DEVICES: SPLINT/BRACE
HISTORY OF FALLING IN THE LAST YEAR (PRIOR TO ADMISSION): YES
CHRONIC_PAIN_PRESENT: YES, CHRONIC
SENSORY_SUPPORT_DEVICES: EYEGLASSES
ADL_SCORE: 12
RECENT_DECLINE_ADL: NO
NEEDS_ASSIST: NO
ADL_BEFORE_ADMISSION: INDEPENDENT

## 2017-08-04 ENCOUNTER — OFF PREMISE (OUTPATIENT)
Dept: ORTHOPEDICS | Age: 62
End: 2017-08-04

## 2017-08-04 ENCOUNTER — HOSPITAL ENCOUNTER (OUTPATIENT)
Age: 62
Discharge: HOME | End: 2017-08-04
Attending: ORTHOPAEDIC SURGERY | Admitting: ORTHOPAEDIC SURGERY

## 2017-08-04 ENCOUNTER — SURGERY (OUTPATIENT)
Age: 62
End: 2017-08-04

## 2017-08-04 LAB
GLUCOSE BLDC GLUCOMTR-MCNC: 246 MG/DL (ref 65–99)
GLUCOSE BLDC GLUCOMTR-MCNC: 369 MG/DL (ref 65–99)

## 2017-08-04 PROCEDURE — 10002800 HB RX 250 W HCPCS

## 2017-08-04 PROCEDURE — 82962 GLUCOSE BLOOD TEST: CPT

## 2017-08-04 PROCEDURE — 10002801 HB RX 250 W/O HCPCS: Performed by: ORTHOPAEDIC SURGERY

## 2017-08-04 PROCEDURE — 10002117 HB ADDITIONAL SURGERY TIME/30 MIN: Performed by: ORTHOPAEDIC SURGERY

## 2017-08-04 PROCEDURE — 10002344 HB UPPER/LOWER ARM/WRIST/HAND 1: Performed by: ORTHOPAEDIC SURGERY

## 2017-08-04 PROCEDURE — 26055 INCISE FINGER TENDON SHEATH: CPT | Performed by: ORTHOPAEDIC SURGERY

## 2017-08-04 PROCEDURE — 10004316 HB COUNTER-PREP

## 2017-08-04 PROCEDURE — 10004348 HB COUNTER-EVAL PRE-OP

## 2017-08-04 PROCEDURE — 10002767 HB EXTENDED RECOVERY PER HOUR: Performed by: ORTHOPAEDIC SURGERY

## 2017-08-04 RX ORDER — HYDROCODONE BITARTRATE AND ACETAMINOPHEN 5; 325 MG/1; MG/1
1 TABLET ORAL EVERY 6 HOURS PRN
Qty: 20 TABLET | Refills: 0 | Status: SHIPPED | OUTPATIENT
Start: 2017-08-04 | End: 2017-11-03 | Stop reason: ALTCHOICE

## 2017-08-04 RX ORDER — LIDOCAINE HYDROCHLORIDE AND EPINEPHRINE 10; 10 MG/ML; UG/ML
INJECTION, SOLUTION INFILTRATION; PERINEURAL PRN
Status: DISCONTINUED | OUTPATIENT
Start: 2017-08-04 | End: 2017-08-04 | Stop reason: HOSPADM

## 2017-08-04 RX ORDER — HYDROCODONE BITARTRATE AND ACETAMINOPHEN 5; 325 MG/1; MG/1
TABLET ORAL
Qty: 10 TABLET | Refills: 0 | Status: SHIPPED | OUTPATIENT
Start: 2017-08-04 | End: 2017-11-03 | Stop reason: ALTCHOICE

## 2017-08-04 RX ORDER — INSULIN LISPRO 100 [IU]/ML
INJECTION, SOLUTION INTRAVENOUS; SUBCUTANEOUS
Status: COMPLETED
Start: 2017-08-04 | End: 2017-08-04

## 2017-08-04 RX ORDER — INSULIN ASPART 100 [IU]/ML
4 INJECTION, SUSPENSION SUBCUTANEOUS ONCE
Status: DISCONTINUED | OUTPATIENT
Start: 2017-08-04 | End: 2017-08-04

## 2017-08-04 RX ORDER — INSULIN LISPRO 100 [IU]/ML
4 INJECTION, SOLUTION INTRAVENOUS; SUBCUTANEOUS ONCE
Status: COMPLETED | OUTPATIENT
Start: 2017-08-04 | End: 2017-08-04

## 2017-08-04 RX ORDER — HYDROCODONE BITARTRATE AND ACETAMINOPHEN 5; 325 MG/1; MG/1
1-2 TABLET ORAL EVERY 4 HOURS PRN
Status: DISCONTINUED | OUTPATIENT
Start: 2017-08-04 | End: 2017-08-04 | Stop reason: HOSPADM

## 2017-08-04 RX ADMIN — INSULIN LISPRO 4 UNITS: 100 INJECTION, SOLUTION INTRAVENOUS; SUBCUTANEOUS at 13:49

## 2017-08-04 RX ADMIN — LIDOCAINE HYDROCHLORIDE AND EPINEPHRINE 7 ML: 10; 10 INJECTION, SOLUTION INFILTRATION; PERINEURAL at 14:22

## 2017-08-04 ASSESSMENT — PAIN SCALES - GENERAL
PAIN_LEVEL_AT_REST: 0

## 2017-08-05 VITALS
SYSTOLIC BLOOD PRESSURE: 140 MMHG | DIASTOLIC BLOOD PRESSURE: 65 MMHG | OXYGEN SATURATION: 96 % | HEART RATE: 78 BPM | TEMPERATURE: 99 F | RESPIRATION RATE: 16 BRPM

## 2017-08-07 RX ORDER — DULOXETIN HYDROCHLORIDE 60 MG/1
60 CAPSULE, DELAYED RELEASE ORAL 2 TIMES DAILY
Qty: 60 CAPSULE | Refills: 0 | Status: SHIPPED | OUTPATIENT
Start: 2017-08-07 | End: 2017-09-19 | Stop reason: SDUPTHER

## 2017-08-16 RX ORDER — CLOBETASOL PROPIONATE 0.5 MG/G
OINTMENT TOPICAL
Qty: 60 G | Refills: 0 | Status: SHIPPED | OUTPATIENT
Start: 2017-08-16 | End: 2017-09-20 | Stop reason: SDUPTHER

## 2017-08-16 RX ORDER — INSULIN ASPART 100 [IU]/ML
INJECTION, SOLUTION INTRAVENOUS; SUBCUTANEOUS
Qty: 15 ML | Refills: 0 | Status: SHIPPED | OUTPATIENT
Start: 2017-08-16 | End: 2017-10-17 | Stop reason: SDUPTHER

## 2017-08-24 ENCOUNTER — OFFICE VISIT (OUTPATIENT)
Dept: ORTHOPEDICS | Age: 62
End: 2017-08-24

## 2017-08-24 DIAGNOSIS — M65.311 TRIGGER THUMB OF RIGHT HAND: ICD-10-CM

## 2017-08-24 DIAGNOSIS — M65.341 TRIGGER RING FINGER OF RIGHT HAND: Primary | ICD-10-CM

## 2017-08-24 PROCEDURE — 99024 POSTOP FOLLOW-UP VISIT: CPT | Performed by: PHYSICIAN ASSISTANT

## 2017-08-24 RX ORDER — CEPHALEXIN 500 MG/1
500 CAPSULE ORAL 4 TIMES DAILY
Qty: 20 CAPSULE | Refills: 0 | Status: SHIPPED | OUTPATIENT
Start: 2017-08-24 | End: 2017-08-29

## 2017-08-24 RX ORDER — ACETAMINOPHEN AND CODEINE PHOSPHATE 300; 30 MG/1; MG/1
1 TABLET ORAL EVERY 4 HOURS PRN
Qty: 5 TABLET | Refills: 0 | Status: SHIPPED | OUTPATIENT
Start: 2017-08-24 | End: 2017-11-03 | Stop reason: ALTCHOICE

## 2017-08-28 ENCOUNTER — TELEPHONE (OUTPATIENT)
Dept: ORTHOPEDICS | Age: 62
End: 2017-08-28

## 2017-08-30 ENCOUNTER — OFFICE VISIT (OUTPATIENT)
Dept: INTERNAL MEDICINE | Age: 62
End: 2017-08-30

## 2017-08-30 VITALS
DIASTOLIC BLOOD PRESSURE: 72 MMHG | OXYGEN SATURATION: 98 % | TEMPERATURE: 97.6 F | BODY MASS INDEX: 26.64 KG/M2 | SYSTOLIC BLOOD PRESSURE: 128 MMHG | WEIGHT: 155.2 LBS | HEART RATE: 76 BPM

## 2017-08-30 DIAGNOSIS — M54.2 CERVICALGIA: Primary | ICD-10-CM

## 2017-08-30 DIAGNOSIS — E78.00 HYPERCHOLESTEROLEMIA: ICD-10-CM

## 2017-08-30 DIAGNOSIS — I10 ESSENTIAL HYPERTENSION: ICD-10-CM

## 2017-08-30 DIAGNOSIS — E11.42 TYPE 2 DIABETES MELLITUS WITH DIABETIC POLYNEUROPATHY, WITH LONG-TERM CURRENT USE OF INSULIN (CMD): ICD-10-CM

## 2017-08-30 DIAGNOSIS — Z79.4 TYPE 2 DIABETES MELLITUS WITH DIABETIC POLYNEUROPATHY, WITH LONG-TERM CURRENT USE OF INSULIN (CMD): ICD-10-CM

## 2017-08-30 DIAGNOSIS — E03.9 HYPOTHYROIDISM, UNSPECIFIED TYPE: ICD-10-CM

## 2017-08-30 DIAGNOSIS — I25.10 CORONARY ARTERY DISEASE INVOLVING NATIVE CORONARY ARTERY OF NATIVE HEART WITHOUT ANGINA PECTORIS: ICD-10-CM

## 2017-08-30 DIAGNOSIS — R20.0 BILATERAL HAND NUMBNESS: ICD-10-CM

## 2017-08-30 PROCEDURE — 99214 OFFICE O/P EST MOD 30 MIN: CPT | Performed by: INTERNAL MEDICINE

## 2017-08-30 RX ORDER — TIZANIDINE HYDROCHLORIDE 2 MG/1
CAPSULE, GELATIN COATED ORAL
Qty: 30 CAPSULE | Refills: 0 | Status: SHIPPED | OUTPATIENT
Start: 2017-08-30 | End: 2017-12-12 | Stop reason: SDUPTHER

## 2017-08-31 ENCOUNTER — TELEPHONE (OUTPATIENT)
Dept: OPHTHALMOLOGY | Age: 62
End: 2017-08-31

## 2017-08-31 ENCOUNTER — OFFICE VISIT (OUTPATIENT)
Dept: ORTHOPEDICS | Age: 62
End: 2017-08-31

## 2017-08-31 DIAGNOSIS — M65.311 TRIGGER THUMB OF RIGHT HAND: Primary | ICD-10-CM

## 2017-08-31 DIAGNOSIS — M65.341 TRIGGER RING FINGER OF RIGHT HAND: ICD-10-CM

## 2017-08-31 PROCEDURE — 99024 POSTOP FOLLOW-UP VISIT: CPT | Performed by: PHYSICIAN ASSISTANT

## 2017-09-05 DIAGNOSIS — I67.9 CEREBROVASCULAR DISEASE: ICD-10-CM

## 2017-09-05 DIAGNOSIS — F17.200 TOBACCO USE DISORDER: ICD-10-CM

## 2017-09-05 DIAGNOSIS — E10.42 DIABETIC POLYNEUROPATHY ASSOCIATED WITH TYPE 1 DIABETES MELLITUS (CMD): ICD-10-CM

## 2017-09-05 DIAGNOSIS — G25.0 ESSENTIAL TREMOR: ICD-10-CM

## 2017-09-05 DIAGNOSIS — I63.81 LACUNAR STROKE (CMD): ICD-10-CM

## 2017-09-05 RX ORDER — PANTOPRAZOLE SODIUM 40 MG/1
40 TABLET, DELAYED RELEASE ORAL DAILY
Qty: 28 TABLET | Refills: 4 | Status: SHIPPED | OUTPATIENT
Start: 2017-09-05 | End: 2018-01-19 | Stop reason: SDUPTHER

## 2017-09-05 RX ORDER — PRIMIDONE 50 MG/1
TABLET ORAL
Qty: 98 TABLET | Refills: 4 | Status: SHIPPED | OUTPATIENT
Start: 2017-09-05 | End: 2018-01-19 | Stop reason: SDUPTHER

## 2017-09-05 RX ORDER — CLOPIDOGREL BISULFATE 75 MG/1
75 TABLET ORAL DAILY
Qty: 28 TABLET | Refills: 4 | Status: SHIPPED | OUTPATIENT
Start: 2017-09-05 | End: 2017-11-22 | Stop reason: SDUPTHER

## 2017-09-06 RX ORDER — BUPROPION HYDROCHLORIDE 150 MG/1
TABLET, EXTENDED RELEASE ORAL
Qty: 56 TABLET | Refills: 1 | Status: SHIPPED | OUTPATIENT
Start: 2017-09-06 | End: 2017-10-23 | Stop reason: SDUPTHER

## 2017-09-06 RX ORDER — SIMVASTATIN 20 MG
TABLET ORAL
Qty: 28 TABLET | Refills: 1 | Status: SHIPPED | OUTPATIENT
Start: 2017-09-06 | End: 2017-10-23 | Stop reason: SDUPTHER

## 2017-09-06 RX ORDER — TRAZODONE HYDROCHLORIDE 100 MG/1
TABLET ORAL
Qty: 28 TABLET | Refills: 1 | Status: SHIPPED | OUTPATIENT
Start: 2017-09-06 | End: 2017-10-23 | Stop reason: SDUPTHER

## 2017-09-06 RX ORDER — SUCRALFATE 1 G/1
TABLET ORAL
Qty: 112 TABLET | Refills: 1 | Status: SHIPPED | OUTPATIENT
Start: 2017-09-06 | End: 2017-10-23 | Stop reason: SDUPTHER

## 2017-09-06 RX ORDER — LOSARTAN POTASSIUM 50 MG/1
TABLET ORAL
Qty: 28 TABLET | Refills: 1 | Status: SHIPPED | OUTPATIENT
Start: 2017-09-06 | End: 2017-10-23 | Stop reason: SDUPTHER

## 2017-09-07 ENCOUNTER — HOSPITAL ENCOUNTER (OUTPATIENT)
Dept: REHABILITATION | Age: 62
Discharge: STILL ADMITTED OR ADMINISTRATIVE | End: 2017-09-07
Attending: INTERNAL MEDICINE

## 2017-09-07 DIAGNOSIS — M54.2 CERVICALGIA: ICD-10-CM

## 2017-09-07 PROCEDURE — 97110 THERAPEUTIC EXERCISES: CPT | Performed by: PHYSICAL THERAPIST

## 2017-09-07 PROCEDURE — 97162 PT EVAL MOD COMPLEX 30 MIN: CPT | Performed by: PHYSICAL THERAPIST

## 2017-09-07 PROCEDURE — 10004173 HB COUNTER-THERAPY VISIT PT: Performed by: PHYSICAL THERAPIST

## 2017-09-07 PROCEDURE — 97140 MANUAL THERAPY 1/> REGIONS: CPT | Performed by: PHYSICAL THERAPIST

## 2017-09-11 ASSESSMENT — ENCOUNTER SYMPTOMS
SHORTNESS OF BREATH: 0
FEVER: 0
HEADACHES: 0
FOCAL WEAKNESS: 1
TINGLING: 1
CHILLS: 0
SENSORY CHANGE: 1

## 2017-09-17 ENCOUNTER — WALK IN (OUTPATIENT)
Dept: URGENT CARE | Age: 62
End: 2017-09-17

## 2017-09-17 VITALS
OXYGEN SATURATION: 99 % | BODY MASS INDEX: 26.09 KG/M2 | DIASTOLIC BLOOD PRESSURE: 65 MMHG | RESPIRATION RATE: 16 BRPM | SYSTOLIC BLOOD PRESSURE: 132 MMHG | TEMPERATURE: 98.3 F | WEIGHT: 152 LBS | HEART RATE: 77 BPM

## 2017-09-17 DIAGNOSIS — R35.0 FREQUENCY OF URINATION: Primary | ICD-10-CM

## 2017-09-17 DIAGNOSIS — N89.8 VAGINAL DISCHARGE: ICD-10-CM

## 2017-09-17 LAB
APPEARANCE UR: CLEAR
BILIRUB UR QL: ABNORMAL
COLOR UR: YELLOW
GLUCOSE UR-MCNC: 1000 MG/DL
HGB UR QL: ABNORMAL
KETONES UR-MCNC: ABNORMAL MG/DL
LEUKOCYTE ESTERASE UR QL STRIP: ABNORMAL
NITRITE UR QL: ABNORMAL
PH UR: 8 [PH]
PROT UR QL: ABNORMAL
SP GR UR: 1
SPECIMEN SOURCE: ABNORMAL
UROBILINOGEN UR QL: 0.2

## 2017-09-17 PROCEDURE — 99214 OFFICE O/P EST MOD 30 MIN: CPT | Performed by: NURSE PRACTITIONER

## 2017-09-17 PROCEDURE — 81002 URINALYSIS NONAUTO W/O SCOPE: CPT | Performed by: NURSE PRACTITIONER

## 2017-09-17 RX ORDER — METRONIDAZOLE 7.5 MG/G
1 GEL VAGINAL NIGHTLY
Qty: 70 G | Refills: 0 | Status: SHIPPED | OUTPATIENT
Start: 2017-09-17 | End: 2017-09-22

## 2017-09-17 RX ORDER — PHENAZOPYRIDINE HYDROCHLORIDE 200 MG/1
200 TABLET, FILM COATED ORAL 3 TIMES DAILY PRN
Qty: 10 TABLET | Refills: 0 | Status: SHIPPED | OUTPATIENT
Start: 2017-09-17 | End: 2017-11-03 | Stop reason: ALTCHOICE

## 2017-09-19 ENCOUNTER — HOSPITAL ENCOUNTER (OUTPATIENT)
Dept: REHABILITATION | Age: 62
Discharge: STILL ADMITTED OR ADMINISTRATIVE | End: 2017-09-19
Attending: INTERNAL MEDICINE

## 2017-09-19 ENCOUNTER — LAB SERVICES (OUTPATIENT)
Dept: LAB | Age: 62
End: 2017-09-19

## 2017-09-19 ENCOUNTER — OFFICE VISIT (OUTPATIENT)
Dept: PODIATRY | Age: 62
End: 2017-09-19

## 2017-09-19 ENCOUNTER — OFFICE VISIT (OUTPATIENT)
Dept: INTERNAL MEDICINE | Age: 62
End: 2017-09-19

## 2017-09-19 VITALS
BODY MASS INDEX: 26.04 KG/M2 | TEMPERATURE: 98.2 F | SYSTOLIC BLOOD PRESSURE: 128 MMHG | WEIGHT: 151.68 LBS | OXYGEN SATURATION: 100 % | DIASTOLIC BLOOD PRESSURE: 76 MMHG | HEART RATE: 68 BPM

## 2017-09-19 VITALS
WEIGHT: 151.68 LBS | BODY MASS INDEX: 26.04 KG/M2 | DIASTOLIC BLOOD PRESSURE: 64 MMHG | HEART RATE: 76 BPM | SYSTOLIC BLOOD PRESSURE: 120 MMHG

## 2017-09-19 DIAGNOSIS — M54.2 CERVICALGIA: ICD-10-CM

## 2017-09-19 DIAGNOSIS — I73.89 OTHER SPECIFIED PERIPHERAL VASCULAR DISEASES (CMD): ICD-10-CM

## 2017-09-19 DIAGNOSIS — E11.42 DIABETIC POLYNEUROPATHY ASSOCIATED WITH TYPE 2 DIABETES MELLITUS (CMD): ICD-10-CM

## 2017-09-19 DIAGNOSIS — R10.9 ACUTE RIGHT FLANK PAIN: Primary | ICD-10-CM

## 2017-09-19 DIAGNOSIS — L60.2 HYPERTROPHY OF NAIL: Primary | ICD-10-CM

## 2017-09-19 DIAGNOSIS — R10.9 ACUTE RIGHT FLANK PAIN: ICD-10-CM

## 2017-09-19 LAB
APPEARANCE UR: CLEAR
BACTERIA UR CULT: NORMAL
BASOPHILS # BLD AUTO: 0 K/MCL (ref 0–0.3)
BASOPHILS NFR BLD AUTO: 0 %
BILIRUB UR QL: NORMAL
COLOR UR: YELLOW
DIFFERENTIAL METHOD BLD: ABNORMAL
EOSINOPHIL # BLD AUTO: 0.1 K/MCL (ref 0.1–0.5)
EOSINOPHIL NFR SPEC: 2 %
ERYTHROCYTE [DISTWIDTH] IN BLOOD: 12.7 % (ref 11–15)
GLUCOSE UR-MCNC: NORMAL MG/DL
HCT VFR BLD CALC: 35.9 % (ref 36–46.5)
HGB BLD-MCNC: 12.4 G/DL (ref 12–15.5)
HGB UR QL: NORMAL
KETONES UR-MCNC: NORMAL MG/DL
LEUKOCYTE ESTERASE UR QL STRIP: NORMAL
LYMPHOCYTES # BLD MANUAL: 3.2 K/MCL (ref 1–4)
LYMPHOCYTES NFR BLD MANUAL: 42 %
MCH RBC QN AUTO: 29 PG (ref 26–34)
MCHC RBC AUTO-ENTMCNC: 34.5 G/DL (ref 32–36.5)
MCV RBC AUTO: 84.1 FL (ref 78–100)
MONOCYTES # BLD MANUAL: 0.5 K/MCL (ref 0.3–0.9)
MONOCYTES NFR BLD MANUAL: 6 %
NEUTROPHILS # BLD: 3.8 K/MCL (ref 1.8–7.7)
NEUTROPHILS NFR BLD AUTO: 50 %
NITRITE UR QL: NORMAL
PH UR: 5 [PH]
PLATELET # BLD: 182 K/MCL (ref 140–450)
PROT UR QL: NORMAL
RBC # BLD: 4.27 MIL/MCL (ref 4–5.2)
REPORT STATUS (RPT): NORMAL
SP GR UR: 1
SPECIMEN SOURCE: NORMAL
SPECIMEN SOURCE: NORMAL
UROBILINOGEN UR QL: 0.2
WBC # BLD: 7.6 K/MCL (ref 4.2–11)

## 2017-09-19 PROCEDURE — 10004173 HB COUNTER-THERAPY VISIT PT: Performed by: PHYSICAL THERAPIST

## 2017-09-19 PROCEDURE — 85025 COMPLETE CBC W/AUTO DIFF WBC: CPT | Performed by: INTERNAL MEDICINE

## 2017-09-19 PROCEDURE — 36415 COLL VENOUS BLD VENIPUNCTURE: CPT | Performed by: INTERNAL MEDICINE

## 2017-09-19 PROCEDURE — 97110 THERAPEUTIC EXERCISES: CPT | Performed by: PHYSICAL THERAPIST

## 2017-09-19 PROCEDURE — 80053 COMPREHEN METABOLIC PANEL: CPT | Performed by: INTERNAL MEDICINE

## 2017-09-19 PROCEDURE — 11721 DEBRIDE NAIL 6 OR MORE: CPT | Performed by: PODIATRIST

## 2017-09-19 PROCEDURE — 99214 OFFICE O/P EST MOD 30 MIN: CPT | Performed by: INTERNAL MEDICINE

## 2017-09-19 PROCEDURE — 81002 URINALYSIS NONAUTO W/O SCOPE: CPT | Performed by: INTERNAL MEDICINE

## 2017-09-19 PROCEDURE — 97140 MANUAL THERAPY 1/> REGIONS: CPT | Performed by: PHYSICAL THERAPIST

## 2017-09-20 ENCOUNTER — TELEPHONE (OUTPATIENT)
Dept: INTERNAL MEDICINE | Age: 62
End: 2017-09-20

## 2017-09-20 ENCOUNTER — HOSPITAL ENCOUNTER (OUTPATIENT)
Dept: CT IMAGING | Age: 62
Discharge: HOME | End: 2017-09-20
Attending: INTERNAL MEDICINE

## 2017-09-20 DIAGNOSIS — R10.9 ACUTE RIGHT FLANK PAIN: ICD-10-CM

## 2017-09-20 LAB
ALBUMIN SERPL-MCNC: 3.6 G/DL (ref 3.6–5.1)
ALBUMIN/GLOB SERPL: 1.2 {RATIO} (ref 1–2.4)
ALP SERPL-CCNC: 81 UNITS/L (ref 45–117)
ALT SERPL-CCNC: 14 UNITS/L
ANION GAP SERPL CALC-SCNC: 15 MMOL/L (ref 10–20)
AST SERPL-CCNC: 16 UNITS/L
BILIRUB SERPL-MCNC: 0.4 MG/DL (ref 0.2–1)
BUN SERPL-MCNC: 11 MG/DL (ref 6–20)
BUN/CREAT SERPL: 13 (ref 7–25)
CALCIUM SERPL-MCNC: 8.6 MG/DL (ref 8.4–10.2)
CHLORIDE SERPL-SCNC: 96 MMOL/L (ref 98–107)
CO2 SERPL-SCNC: 26 MMOL/L (ref 21–32)
CREAT SERPL-MCNC: 0.84 MG/DL (ref 0.51–0.95)
FASTING STATUS PATIENT QL REPORTED: ABNORMAL HRS
GLOBULIN SER-MCNC: 3 G/DL (ref 2–4)
GLUCOSE SERPL-MCNC: 96 MG/DL (ref 65–99)
POTASSIUM SERPL-SCNC: 4 MMOL/L (ref 3.4–5.1)
PROT SERPL-MCNC: 6.6 G/DL (ref 6.4–8.2)
SODIUM SERPL-SCNC: 133 MMOL/L (ref 135–145)

## 2017-09-20 PROCEDURE — 74176 CT ABD & PELVIS W/O CONTRAST: CPT

## 2017-09-20 PROCEDURE — 74176 CT ABD & PELVIS W/O CONTRAST: CPT | Performed by: RADIOLOGY

## 2017-09-20 RX ORDER — CLOBETASOL PROPIONATE 0.5 MG/G
OINTMENT TOPICAL
Qty: 60 G | Refills: 1 | Status: SHIPPED | OUTPATIENT
Start: 2017-09-20 | End: 2017-12-26 | Stop reason: SDUPTHER

## 2017-09-21 ENCOUNTER — HOSPITAL ENCOUNTER (OUTPATIENT)
Dept: REHABILITATION | Age: 62
Discharge: STILL ADMITTED OR ADMINISTRATIVE | End: 2017-09-21
Attending: INTERNAL MEDICINE

## 2017-09-21 DIAGNOSIS — M54.2 CERVICALGIA: ICD-10-CM

## 2017-09-21 PROCEDURE — 97035 APP MDLTY 1+ULTRASOUND EA 15: CPT | Performed by: PHYSICAL THERAPIST

## 2017-09-21 PROCEDURE — 97140 MANUAL THERAPY 1/> REGIONS: CPT | Performed by: PHYSICAL THERAPIST

## 2017-09-21 PROCEDURE — 97110 THERAPEUTIC EXERCISES: CPT | Performed by: PHYSICAL THERAPIST

## 2017-09-21 PROCEDURE — 10004173 HB COUNTER-THERAPY VISIT PT: Performed by: PHYSICAL THERAPIST

## 2017-09-21 RX ORDER — POLYETHYLENE GLYCOL 3350 17 G/17G
POWDER, FOR SOLUTION ORAL
Qty: 527 G | Refills: 0 | Status: SHIPPED | OUTPATIENT
Start: 2017-09-21 | End: 2017-10-17 | Stop reason: SDUPTHER

## 2017-09-25 ENCOUNTER — TELEPHONE (OUTPATIENT)
Dept: INTERNAL MEDICINE | Age: 62
End: 2017-09-25

## 2017-09-25 DIAGNOSIS — E10.42 DIABETIC POLYNEUROPATHY ASSOCIATED WITH TYPE 1 DIABETES MELLITUS (CMD): ICD-10-CM

## 2017-09-26 RX ORDER — DULOXETIN HYDROCHLORIDE 60 MG/1
CAPSULE, DELAYED RELEASE ORAL
Qty: 56 CAPSULE | Refills: 4 | OUTPATIENT
Start: 2017-09-26

## 2017-09-26 RX ORDER — PREGABALIN 225 MG/1
CAPSULE ORAL
Qty: 56 CAPSULE | Refills: 4 | Status: SHIPPED | OUTPATIENT
Start: 2017-09-26 | End: 2020-12-16 | Stop reason: SDUPTHER

## 2017-09-28 ENCOUNTER — APPOINTMENT (OUTPATIENT)
Dept: REHABILITATION | Age: 62
End: 2017-09-28
Attending: INTERNAL MEDICINE

## 2017-09-28 ENCOUNTER — TELEPHONE (OUTPATIENT)
Dept: INTERNAL MEDICINE | Age: 62
End: 2017-09-28

## 2017-09-28 DIAGNOSIS — M54.5 ACUTE RIGHT-SIDED LOW BACK PAIN, WITH SCIATICA PRESENCE UNSPECIFIED: Primary | ICD-10-CM

## 2017-09-28 RX ORDER — DULOXETIN HYDROCHLORIDE 60 MG/1
CAPSULE, DELAYED RELEASE ORAL
Qty: 56 CAPSULE | Refills: 6 | Status: SHIPPED | OUTPATIENT
Start: 2017-09-28 | End: 2017-11-22 | Stop reason: SDUPTHER

## 2017-10-03 ENCOUNTER — APPOINTMENT (OUTPATIENT)
Dept: REHABILITATION | Age: 62
End: 2017-10-03
Attending: INTERNAL MEDICINE

## 2017-10-05 ENCOUNTER — APPOINTMENT (OUTPATIENT)
Dept: REHABILITATION | Age: 62
End: 2017-10-05
Attending: INTERNAL MEDICINE

## 2017-10-10 ENCOUNTER — TELEPHONE (OUTPATIENT)
Dept: REHABILITATION | Age: 62
End: 2017-10-10

## 2017-10-10 ENCOUNTER — APPOINTMENT (OUTPATIENT)
Dept: REHABILITATION | Age: 62
End: 2017-10-10

## 2017-10-12 ENCOUNTER — APPOINTMENT (OUTPATIENT)
Dept: REHABILITATION | Age: 62
End: 2017-10-12

## 2017-10-18 ENCOUNTER — APPOINTMENT (OUTPATIENT)
Dept: REHABILITATION | Age: 62
End: 2017-10-18
Attending: INTERNAL MEDICINE

## 2017-10-18 RX ORDER — POLYETHYLENE GLYCOL 3350 17 G/17G
POWDER, FOR SOLUTION ORAL
Qty: 527 G | Refills: 0 | Status: SHIPPED | OUTPATIENT
Start: 2017-10-18 | End: 2017-11-13 | Stop reason: SDUPTHER

## 2017-10-25 ENCOUNTER — TELEPHONE (OUTPATIENT)
Dept: INTERNAL MEDICINE | Age: 62
End: 2017-10-25

## 2017-10-25 RX ORDER — SUCRALFATE 1 G/1
TABLET ORAL
Qty: 112 TABLET | Refills: 0 | Status: SHIPPED | OUTPATIENT
Start: 2017-10-25 | End: 2017-11-20 | Stop reason: SDUPTHER

## 2017-10-25 RX ORDER — SIMVASTATIN 20 MG
TABLET ORAL
Qty: 28 TABLET | Refills: 0 | Status: SHIPPED | OUTPATIENT
Start: 2017-10-25 | End: 2017-11-20 | Stop reason: SDUPTHER

## 2017-10-25 RX ORDER — LOSARTAN POTASSIUM 50 MG/1
TABLET ORAL
Qty: 28 TABLET | Refills: 0 | Status: SHIPPED | OUTPATIENT
Start: 2017-10-25 | End: 2017-11-20 | Stop reason: SDUPTHER

## 2017-10-25 RX ORDER — BUPROPION HYDROCHLORIDE 150 MG/1
TABLET, EXTENDED RELEASE ORAL
Qty: 56 TABLET | Refills: 0 | Status: SHIPPED | OUTPATIENT
Start: 2017-10-25 | End: 2017-11-22 | Stop reason: SDUPTHER

## 2017-10-25 RX ORDER — TRAZODONE HYDROCHLORIDE 100 MG/1
TABLET ORAL
Qty: 28 TABLET | Refills: 0 | Status: SHIPPED | OUTPATIENT
Start: 2017-10-25 | End: 2017-11-20 | Stop reason: SDUPTHER

## 2017-10-27 ENCOUNTER — TELEPHONE (OUTPATIENT)
Dept: INTERNAL MEDICINE | Age: 62
End: 2017-10-27

## 2017-11-03 ENCOUNTER — OFFICE VISIT (OUTPATIENT)
Dept: INTERNAL MEDICINE | Age: 62
End: 2017-11-03
Attending: INTERNAL MEDICINE

## 2017-11-03 ENCOUNTER — LAB SERVICES (OUTPATIENT)
Dept: LAB | Age: 62
End: 2017-11-03

## 2017-11-03 ENCOUNTER — OFFICE VISIT (OUTPATIENT)
Dept: INTERNAL MEDICINE | Age: 62
End: 2017-11-03

## 2017-11-03 ENCOUNTER — IMAGING SERVICES (OUTPATIENT)
Dept: GENERAL RADIOLOGY | Age: 62
End: 2017-11-03
Attending: INTERNAL MEDICINE

## 2017-11-03 VITALS
TEMPERATURE: 98.3 F | DIASTOLIC BLOOD PRESSURE: 68 MMHG | HEART RATE: 72 BPM | WEIGHT: 155.2 LBS | OXYGEN SATURATION: 97 % | BODY MASS INDEX: 26.64 KG/M2 | SYSTOLIC BLOOD PRESSURE: 120 MMHG

## 2017-11-03 DIAGNOSIS — R07.9 CHEST PAIN IN ADULT: ICD-10-CM

## 2017-11-03 DIAGNOSIS — F17.210 CIGARETTE SMOKER: ICD-10-CM

## 2017-11-03 DIAGNOSIS — E03.9 HYPOTHYROIDISM, UNSPECIFIED TYPE: ICD-10-CM

## 2017-11-03 DIAGNOSIS — Z79.4 TYPE 2 DIABETES MELLITUS WITH DIABETIC POLYNEUROPATHY, WITH LONG-TERM CURRENT USE OF INSULIN (CMD): ICD-10-CM

## 2017-11-03 DIAGNOSIS — I25.119 CORONARY ARTERY DISEASE INVOLVING NATIVE CORONARY ARTERY OF NATIVE HEART WITH ANGINA PECTORIS (CMD): ICD-10-CM

## 2017-11-03 DIAGNOSIS — Z23 NEED FOR VACCINATION: ICD-10-CM

## 2017-11-03 DIAGNOSIS — R07.9 CHEST PAIN IN ADULT: Primary | ICD-10-CM

## 2017-11-03 DIAGNOSIS — E11.42 TYPE 2 DIABETES MELLITUS WITH DIABETIC POLYNEUROPATHY, WITH LONG-TERM CURRENT USE OF INSULIN (CMD): ICD-10-CM

## 2017-11-03 DIAGNOSIS — R20.0 BILATERAL HAND NUMBNESS: ICD-10-CM

## 2017-11-03 DIAGNOSIS — G47.33 OSA ON CPAP: ICD-10-CM

## 2017-11-03 LAB
ALBUMIN SERPL-MCNC: 3.5 G/DL (ref 3.6–5.1)
ALBUMIN/GLOB SERPL: 1.2 {RATIO} (ref 1–2.4)
ALP SERPL-CCNC: 79 UNITS/L (ref 45–117)
ALT SERPL-CCNC: 16 UNITS/L
ANION GAP SERPL CALC-SCNC: 13 MMOL/L (ref 10–20)
AST SERPL-CCNC: 14 UNITS/L
BASOPHILS # BLD AUTO: 0 K/MCL (ref 0–0.3)
BASOPHILS NFR BLD AUTO: 1 %
BILIRUB SERPL-MCNC: 0.2 MG/DL (ref 0.2–1)
BUN SERPL-MCNC: 15 MG/DL (ref 6–20)
BUN/CREAT SERPL: 16 (ref 7–25)
CALCIUM SERPL-MCNC: 9.2 MG/DL (ref 8.4–10.2)
CHLORIDE SERPL-SCNC: 102 MMOL/L (ref 98–107)
CO2 SERPL-SCNC: 28 MMOL/L (ref 21–32)
CREAT SERPL-MCNC: 0.94 MG/DL (ref 0.51–0.95)
D DIMER PPP FEU-MCNC: <0.19 MG/L (FEU)
DIFFERENTIAL METHOD BLD: NORMAL
EKG IMPRESSION: NORMAL
EOSINOPHIL # BLD AUTO: 0.1 K/MCL (ref 0.1–0.5)
EOSINOPHIL NFR SPEC: 2 %
ERYTHROCYTE [DISTWIDTH] IN BLOOD: 13.2 % (ref 11–15)
FASTING STATUS PATIENT QL REPORTED: ABNORMAL HRS
GLOBULIN SER-MCNC: 2.9 G/DL (ref 2–4)
GLUCOSE SERPL-MCNC: 276 MG/DL (ref 65–99)
HCT VFR BLD CALC: 36.2 % (ref 36–46.5)
HGB BLD-MCNC: 12.1 G/DL (ref 12–15.5)
LYMPHOCYTES # BLD MANUAL: 2.6 K/MCL (ref 1–4)
LYMPHOCYTES NFR BLD MANUAL: 44 %
MAGNESIUM SERPL-MCNC: 2 MG/DL (ref 1.7–2.4)
MCH RBC QN AUTO: 29.3 PG (ref 26–34)
MCHC RBC AUTO-ENTMCNC: 33.4 G/DL (ref 32–36.5)
MCV RBC AUTO: 87.7 FL (ref 78–100)
MONOCYTES # BLD MANUAL: 0.4 K/MCL (ref 0.3–0.9)
MONOCYTES NFR BLD MANUAL: 7 %
NEUTROPHILS # BLD: 2.8 K/MCL (ref 1.8–7.7)
NEUTROPHILS NFR BLD AUTO: 46 %
PLATELET # BLD: 177 K/MCL (ref 140–450)
POTASSIUM SERPL-SCNC: 4.4 MMOL/L (ref 3.4–5.1)
PROT SERPL-MCNC: 6.4 G/DL (ref 6.4–8.2)
RBC # BLD: 4.13 MIL/MCL (ref 4–5.2)
SODIUM SERPL-SCNC: 139 MMOL/L (ref 135–145)
T4 FREE SERPL-MCNC: 1 NG/DL (ref 0.8–1.5)
TSH SERPL-ACNC: 10.51 MCUNITS/ML (ref 0.35–5)
WBC # BLD: 5.9 K/MCL (ref 4.2–11)

## 2017-11-03 PROCEDURE — 71020 XR CHEST PA AND LATERAL: CPT | Performed by: RADIOLOGY

## 2017-11-03 PROCEDURE — 80050 GENERAL HEALTH PANEL: CPT | Performed by: INTERNAL MEDICINE

## 2017-11-03 PROCEDURE — 83735 ASSAY OF MAGNESIUM: CPT | Performed by: INTERNAL MEDICINE

## 2017-11-03 PROCEDURE — 36415 COLL VENOUS BLD VENIPUNCTURE: CPT | Performed by: INTERNAL MEDICINE

## 2017-11-03 PROCEDURE — 93000 ELECTROCARDIOGRAM COMPLETE: CPT | Performed by: INTERNAL MEDICINE

## 2017-11-03 PROCEDURE — 99214 OFFICE O/P EST MOD 30 MIN: CPT | Performed by: INTERNAL MEDICINE

## 2017-11-03 PROCEDURE — 85379 FIBRIN DEGRADATION QUANT: CPT | Performed by: INTERNAL MEDICINE

## 2017-11-03 PROCEDURE — 84439 ASSAY OF FREE THYROXINE: CPT | Performed by: INTERNAL MEDICINE

## 2017-11-03 PROCEDURE — 90688 IIV4 VACCINE SPLT 0.5 ML IM: CPT | Performed by: INTERNAL MEDICINE

## 2017-11-03 RX ORDER — NITROGLYCERIN 0.4 MG/1
0.4 TABLET SUBLINGUAL EVERY 5 MIN PRN
Qty: 25 TABLET | Refills: 6 | Status: SHIPPED | OUTPATIENT
Start: 2017-11-03 | End: 2021-02-16 | Stop reason: SDUPTHER

## 2017-11-03 ASSESSMENT — ENCOUNTER SYMPTOMS
DIZZINESS: 1
LOSS OF CONSCIOUSNESS: 0
HEARTBURN: 0
FEVER: 0
SENSORY CHANGE: 0
ABDOMINAL PAIN: 0
BACK PAIN: 1
NAUSEA: 0
VOMITING: 0
ORTHOPNEA: 0
TINGLING: 1
WEIGHT LOSS: 0
CHILLS: 0
FOCAL WEAKNESS: 1
SHORTNESS OF BREATH: 0
HEADACHES: 0
COUGH: 0
WHEEZING: 0

## 2017-11-04 LAB
HBA1C MFR BLD: 8.8 % (ref 4.5–5.6)
VIT B12 SERPL-MCNC: 324 PG/ML (ref 211–911)

## 2017-11-06 ENCOUNTER — TELEPHONE (OUTPATIENT)
Dept: INTERNAL MEDICINE | Age: 62
End: 2017-11-06

## 2017-11-13 ENCOUNTER — TELEPHONE (OUTPATIENT)
Dept: INTERNAL MEDICINE | Age: 62
End: 2017-11-13

## 2017-11-13 RX ORDER — POLYETHYLENE GLYCOL 3350 17 G/17G
POWDER, FOR SOLUTION ORAL
Qty: 527 G | Refills: 1 | Status: SHIPPED | OUTPATIENT
Start: 2017-11-13 | End: 2018-01-23 | Stop reason: SDUPTHER

## 2017-11-15 ENCOUNTER — TELEPHONE (OUTPATIENT)
Dept: INTERNAL MEDICINE | Age: 62
End: 2017-11-15

## 2017-11-20 RX ORDER — SUCRALFATE 1 G/1
TABLET ORAL
Qty: 120 TABLET | Refills: 2 | Status: SHIPPED | OUTPATIENT
Start: 2017-11-20 | End: 2020-12-16

## 2017-11-20 RX ORDER — SIMVASTATIN 20 MG
TABLET ORAL
Qty: 30 TABLET | Refills: 2 | Status: SHIPPED | OUTPATIENT
Start: 2017-11-20 | End: 2020-12-16 | Stop reason: DRUGHIGH

## 2017-11-20 RX ORDER — TRAZODONE HYDROCHLORIDE 100 MG/1
TABLET ORAL
Qty: 30 TABLET | Refills: 2 | Status: SHIPPED | OUTPATIENT
Start: 2017-11-20 | End: 2020-12-16 | Stop reason: SDUPTHER

## 2017-11-20 RX ORDER — LOSARTAN POTASSIUM 50 MG/1
TABLET ORAL
Qty: 30 TABLET | Refills: 2 | Status: SHIPPED | OUTPATIENT
Start: 2017-11-20 | End: 2020-12-16

## 2017-11-22 DIAGNOSIS — G25.0 ESSENTIAL TREMOR: ICD-10-CM

## 2017-11-22 DIAGNOSIS — I67.9 CEREBROVASCULAR DISEASE: ICD-10-CM

## 2017-11-22 DIAGNOSIS — F17.200 TOBACCO USE DISORDER: ICD-10-CM

## 2017-11-22 DIAGNOSIS — E10.42 DIABETIC POLYNEUROPATHY ASSOCIATED WITH TYPE 1 DIABETES MELLITUS (CMD): ICD-10-CM

## 2017-11-22 DIAGNOSIS — I63.81 LACUNAR STROKE (CMD): ICD-10-CM

## 2017-11-22 RX ORDER — BUPROPION HYDROCHLORIDE 150 MG/1
TABLET, EXTENDED RELEASE ORAL
Qty: 56 TABLET | Refills: 0 | Status: SHIPPED | OUTPATIENT
Start: 2017-11-22 | End: 2017-12-01 | Stop reason: DRUGHIGH

## 2017-11-24 RX ORDER — DULOXETIN HYDROCHLORIDE 60 MG/1
CAPSULE, DELAYED RELEASE ORAL
Qty: 56 CAPSULE | Refills: 3 | Status: SHIPPED | OUTPATIENT
Start: 2017-11-24 | End: 2020-12-16 | Stop reason: SDUPTHER

## 2017-11-24 RX ORDER — CARBAMAZEPINE 100 MG/1
TABLET, EXTENDED RELEASE ORAL
Qty: 28 TABLET | Refills: 3 | Status: SHIPPED | OUTPATIENT
Start: 2017-11-24 | End: 2017-11-30

## 2017-11-24 RX ORDER — CLOPIDOGREL BISULFATE 75 MG/1
75 TABLET ORAL DAILY
Qty: 28 TABLET | Refills: 3 | Status: SHIPPED | OUTPATIENT
Start: 2017-11-24 | End: 2020-12-16 | Stop reason: ALTCHOICE

## 2017-11-30 ENCOUNTER — OFFICE VISIT (OUTPATIENT)
Dept: CARDIOLOGY | Age: 62
End: 2017-11-30

## 2017-11-30 VITALS
DIASTOLIC BLOOD PRESSURE: 77 MMHG | BODY MASS INDEX: 25.62 KG/M2 | HEIGHT: 65 IN | SYSTOLIC BLOOD PRESSURE: 136 MMHG | WEIGHT: 153.8 LBS | HEART RATE: 78 BPM

## 2017-11-30 DIAGNOSIS — E08.43 DIABETES MELLITUS DUE TO UNDERLYING CONDITION WITH DIABETIC AUTONOMIC NEUROPATHY, UNSPECIFIED LONG TERM INSULIN USE STATUS: Primary | ICD-10-CM

## 2017-11-30 DIAGNOSIS — G47.33 OSA ON CPAP: ICD-10-CM

## 2017-11-30 DIAGNOSIS — I25.119 CORONARY ARTERY DISEASE INVOLVING NATIVE CORONARY ARTERY OF NATIVE HEART WITH ANGINA PECTORIS (CMD): ICD-10-CM

## 2017-11-30 DIAGNOSIS — E78.2 MIXED HYPERLIPIDEMIA: ICD-10-CM

## 2017-11-30 DIAGNOSIS — E03.9 HYPOTHYROIDISM, UNSPECIFIED TYPE: ICD-10-CM

## 2017-11-30 DIAGNOSIS — I10 ESSENTIAL HYPERTENSION: ICD-10-CM

## 2017-11-30 PROCEDURE — 99214 OFFICE O/P EST MOD 30 MIN: CPT | Performed by: INTERNAL MEDICINE

## 2017-12-01 ENCOUNTER — TELEPHONE (OUTPATIENT)
Dept: INTERNAL MEDICINE | Age: 62
End: 2017-12-01

## 2017-12-01 ENCOUNTER — OFFICE VISIT (OUTPATIENT)
Dept: INTERNAL MEDICINE | Age: 62
End: 2017-12-01

## 2017-12-01 VITALS
BODY MASS INDEX: 25.31 KG/M2 | WEIGHT: 151.9 LBS | HEART RATE: 84 BPM | SYSTOLIC BLOOD PRESSURE: 142 MMHG | DIASTOLIC BLOOD PRESSURE: 82 MMHG | TEMPERATURE: 98.6 F | OXYGEN SATURATION: 96 % | HEIGHT: 65 IN

## 2017-12-01 DIAGNOSIS — E78.00 HYPERCHOLESTEROLEMIA: ICD-10-CM

## 2017-12-01 DIAGNOSIS — I25.119 CORONARY ARTERY DISEASE INVOLVING NATIVE CORONARY ARTERY OF NATIVE HEART WITH ANGINA PECTORIS (CMD): ICD-10-CM

## 2017-12-01 DIAGNOSIS — Z72.0 TOBACCO USE: ICD-10-CM

## 2017-12-01 DIAGNOSIS — Z86.73 HISTORY OF STROKE: ICD-10-CM

## 2017-12-01 DIAGNOSIS — E03.9 HYPOTHYROIDISM, UNSPECIFIED TYPE: ICD-10-CM

## 2017-12-01 DIAGNOSIS — I10 ESSENTIAL HYPERTENSION: ICD-10-CM

## 2017-12-01 DIAGNOSIS — G47.33 OSA ON CPAP: ICD-10-CM

## 2017-12-01 PROCEDURE — 99214 OFFICE O/P EST MOD 30 MIN: CPT | Performed by: INTERNAL MEDICINE

## 2017-12-01 RX ORDER — VARENICLINE TARTRATE 0.5 MG/1
TABLET, FILM COATED ORAL
Qty: 53 TABLET | Refills: 0 | Status: SHIPPED | OUTPATIENT
Start: 2017-12-01 | End: 2018-01-24 | Stop reason: SDUPTHER

## 2017-12-01 RX ORDER — ISOSORBIDE MONONITRATE 30 MG/1
30 TABLET, EXTENDED RELEASE ORAL DAILY
Qty: 30 TABLET | Refills: 0 | Status: SHIPPED | OUTPATIENT
Start: 2017-12-01 | End: 2017-12-26 | Stop reason: SDUPTHER

## 2017-12-01 RX ORDER — BUPROPION HYDROCHLORIDE 150 MG/1
150 TABLET, EXTENDED RELEASE ORAL DAILY
Qty: 90 TABLET | Refills: 1 | Status: SHIPPED
Start: 2017-12-01 | End: 2018-01-18 | Stop reason: SDUPTHER

## 2017-12-01 RX ORDER — LEVOTHYROXINE SODIUM 0.12 MG/1
125 TABLET ORAL DAILY
Qty: 30 TABLET | Refills: 0 | Status: SHIPPED | OUTPATIENT
Start: 2017-12-01 | End: 2017-12-02 | Stop reason: SDUPTHER

## 2017-12-01 RX ORDER — INSULIN ASPART 100 [IU]/ML
INJECTION, SOLUTION INTRAVENOUS; SUBCUTANEOUS
Qty: 15 ML | OUTPATIENT
Start: 2017-12-01

## 2017-12-02 RX ORDER — LEVOTHYROXINE SODIUM 0.12 MG/1
125 TABLET ORAL DAILY
Qty: 30 TABLET | Refills: 0 | Status: SHIPPED | OUTPATIENT
Start: 2017-12-02 | End: 2017-12-26 | Stop reason: SDUPTHER

## 2017-12-05 ASSESSMENT — ENCOUNTER SYMPTOMS
WEIGHT LOSS: 0
FOCAL WEAKNESS: 1
SENSORY CHANGE: 0
SHORTNESS OF BREATH: 0
LOSS OF CONSCIOUSNESS: 0
FEVER: 0
NERVOUS/ANXIOUS: 0
DIZZINESS: 0
COUGH: 0
DEPRESSION: 0
ORTHOPNEA: 0
SEIZURES: 0
ABDOMINAL PAIN: 0
CHILLS: 0

## 2017-12-06 ENCOUNTER — TELEPHONE (OUTPATIENT)
Dept: INTERNAL MEDICINE | Age: 62
End: 2017-12-06

## 2017-12-08 ENCOUNTER — TELEPHONE (OUTPATIENT)
Dept: INTERNAL MEDICINE | Age: 62
End: 2017-12-08

## 2017-12-12 DIAGNOSIS — M54.2 CERVICALGIA: ICD-10-CM

## 2017-12-12 RX ORDER — TIZANIDINE 2 MG/1
TABLET ORAL
Qty: 30 TABLET | Refills: 0 | Status: SHIPPED | OUTPATIENT
Start: 2017-12-12 | End: 2017-12-29 | Stop reason: SDUPTHER

## 2017-12-26 ENCOUNTER — TELEPHONE (OUTPATIENT)
Dept: INTERNAL MEDICINE | Age: 62
End: 2017-12-26

## 2017-12-26 DIAGNOSIS — M54.2 CERVICALGIA: ICD-10-CM

## 2017-12-26 RX ORDER — ISOSORBIDE MONONITRATE 30 MG/1
30 TABLET, EXTENDED RELEASE ORAL DAILY
Qty: 30 TABLET | Refills: 2 | Status: SHIPPED | OUTPATIENT
Start: 2017-12-26 | End: 2018-01-24 | Stop reason: SINTOL

## 2017-12-26 RX ORDER — LEVOTHYROXINE SODIUM 0.12 MG/1
TABLET ORAL
Qty: 30 TABLET | Refills: 2 | Status: SHIPPED | OUTPATIENT
Start: 2017-12-26 | End: 2020-12-16 | Stop reason: ALTCHOICE

## 2017-12-27 RX ORDER — LEVOTHYROXINE SODIUM 0.1 MG/1
100 TABLET ORAL DAILY
Qty: 30 TABLET | Refills: 2 | Status: SHIPPED | OUTPATIENT
Start: 2017-12-27 | End: 2020-12-16 | Stop reason: ALTCHOICE

## 2017-12-27 RX ORDER — CLOBETASOL PROPIONATE 0.5 MG/G
OINTMENT TOPICAL
Qty: 60 G | Refills: 1 | Status: SHIPPED | OUTPATIENT
Start: 2017-12-27 | End: 2021-11-30 | Stop reason: ALTCHOICE

## 2017-12-27 RX ORDER — TIZANIDINE 2 MG/1
TABLET ORAL
Qty: 30 TABLET | OUTPATIENT
Start: 2017-12-27

## 2017-12-28 ENCOUNTER — APPOINTMENT (OUTPATIENT)
Dept: CT IMAGING | Age: 62
End: 2017-12-28
Attending: EMERGENCY MEDICINE

## 2017-12-28 ENCOUNTER — HOSPITAL ENCOUNTER (EMERGENCY)
Age: 62
Discharge: HOME | End: 2017-12-28
Attending: EMERGENCY MEDICINE

## 2017-12-28 VITALS
BODY MASS INDEX: 35.52 KG/M2 | WEIGHT: 213.19 LBS | RESPIRATION RATE: 12 BRPM | HEIGHT: 65 IN | OXYGEN SATURATION: 96 % | SYSTOLIC BLOOD PRESSURE: 156 MMHG | HEART RATE: 66 BPM | DIASTOLIC BLOOD PRESSURE: 65 MMHG | TEMPERATURE: 96.7 F

## 2017-12-28 DIAGNOSIS — S09.90XA CLOSED HEAD INJURY WITHOUT LOSS OF CONSCIOUSNESS, INITIAL ENCOUNTER: ICD-10-CM

## 2017-12-28 DIAGNOSIS — E16.1 HYPOGLYCEMIC REACTION: Primary | ICD-10-CM

## 2017-12-28 LAB
ALBUMIN SERPL-MCNC: 3.3 G/DL (ref 3.6–5.1)
ALBUMIN/GLOB SERPL: 0.9 {RATIO} (ref 1–2.4)
ALP SERPL-CCNC: 82 UNITS/L (ref 45–117)
ALT SERPL-CCNC: 14 UNITS/L
ANION GAP SERPL CALC-SCNC: 13 MMOL/L (ref 10–20)
AST SERPL-CCNC: 11 UNITS/L
ATRIAL RATE (BPM): 69
BASOPHILS # BLD AUTO: 0 K/MCL (ref 0–0.3)
BASOPHILS NFR BLD AUTO: 0 %
BILIRUB SERPL-MCNC: 0.3 MG/DL (ref 0.2–1)
BUN SERPL-MCNC: 13 MG/DL (ref 6–20)
BUN/CREAT SERPL: 15 (ref 7–25)
CALCIUM SERPL-MCNC: 9.3 MG/DL (ref 8.4–10.2)
CHLORIDE SERPL-SCNC: 100 MMOL/L (ref 98–107)
CO2 SERPL-SCNC: 31 MMOL/L (ref 21–32)
CREAT SERPL-MCNC: 0.86 MG/DL (ref 0.51–0.95)
DIFFERENTIAL METHOD BLD: NORMAL
EOSINOPHIL # BLD AUTO: 0.2 K/MCL (ref 0.1–0.5)
EOSINOPHIL NFR SPEC: 2 %
ERYTHROCYTE [DISTWIDTH] IN BLOOD: 12.9 % (ref 11–15)
GLOBULIN SER-MCNC: 3.6 G/DL (ref 2–4)
GLUCOSE BLDC GLUCOMTR-MCNC: 157 MG/DL (ref 65–99)
GLUCOSE BLDC GLUCOMTR-MCNC: 164 MG/DL (ref 65–99)
GLUCOSE BLDC GLUCOMTR-MCNC: 228 MG/DL (ref 65–99)
GLUCOSE BLDC GLUCOMTR-MCNC: 69 MG/DL (ref 65–99)
GLUCOSE SERPL-MCNC: 62 MG/DL (ref 65–99)
HCT VFR BLD CALC: 38.3 % (ref 36–46.5)
HGB BLD-MCNC: 12.6 G/DL (ref 12–15.5)
INR PPP: 0.9
LYMPHOCYTES # BLD MANUAL: 3.9 K/MCL (ref 1–4)
LYMPHOCYTES NFR BLD MANUAL: 50 %
MCH RBC QN AUTO: 28.4 PG (ref 26–34)
MCHC RBC AUTO-ENTMCNC: 32.9 G/DL (ref 32–36.5)
MCV RBC AUTO: 86.3 FL (ref 78–100)
MONOCYTES # BLD MANUAL: 0.5 K/MCL (ref 0.3–0.9)
MONOCYTES NFR BLD MANUAL: 6 %
NEUTROPHILS # BLD: 3.3 K/MCL (ref 1.8–7.7)
NEUTROPHILS NFR BLD AUTO: 42 %
P AXIS (DEGREES): 86
PLATELET # BLD: 241 K/MCL (ref 140–450)
POTASSIUM SERPL-SCNC: 3.8 MMOL/L (ref 3.4–5.1)
PR-INTERVAL (MSEC): 174
PROT SERPL-MCNC: 6.9 G/DL (ref 6.4–8.2)
PROTHROMBIN TIME: 9.3 SEC (ref 9.7–11.8)
QRS-INTERVAL (MSEC): 86
QT-INTERVAL (MSEC): 406
QTC: 435
R AXIS (DEGREES): 55
RBC # BLD: 4.44 MIL/MCL (ref 4–5.2)
REPORT TEXT: NORMAL
SODIUM SERPL-SCNC: 140 MMOL/L (ref 135–145)
T AXIS (DEGREES): 56
TROPONIN I SERPL-MCNC: <0.02 NG/ML
VENTRICULAR RATE EKG/MIN (BPM): 69
WBC # BLD: 7.8 K/MCL (ref 4.2–11)

## 2017-12-28 PROCEDURE — 70450 CT HEAD/BRAIN W/O DYE: CPT

## 2017-12-28 PROCEDURE — 93005 ELECTROCARDIOGRAM TRACING: CPT | Performed by: EMERGENCY MEDICINE

## 2017-12-28 PROCEDURE — 96374 THER/PROPH/DIAG INJ IV PUSH: CPT

## 2017-12-28 PROCEDURE — 10002807 HB RX 258: Performed by: EMERGENCY MEDICINE

## 2017-12-28 PROCEDURE — 72125 CT NECK SPINE W/O DYE: CPT | Performed by: RADIOLOGY

## 2017-12-28 PROCEDURE — 85610 PROTHROMBIN TIME: CPT

## 2017-12-28 PROCEDURE — 99285 EMERGENCY DEPT VISIT HI MDM: CPT

## 2017-12-28 PROCEDURE — 36415 COLL VENOUS BLD VENIPUNCTURE: CPT

## 2017-12-28 PROCEDURE — 80053 COMPREHEN METABOLIC PANEL: CPT

## 2017-12-28 PROCEDURE — 84484 ASSAY OF TROPONIN QUANT: CPT

## 2017-12-28 PROCEDURE — 85025 COMPLETE CBC W/AUTO DIFF WBC: CPT

## 2017-12-28 PROCEDURE — 72125 CT NECK SPINE W/O DYE: CPT

## 2017-12-28 PROCEDURE — 99285 EMERGENCY DEPT VISIT HI MDM: CPT | Performed by: EMERGENCY MEDICINE

## 2017-12-28 PROCEDURE — 10002803 HB RX 637: Performed by: EMERGENCY MEDICINE

## 2017-12-28 PROCEDURE — 82962 GLUCOSE BLOOD TEST: CPT

## 2017-12-28 PROCEDURE — 10002801 HB RX 250 W/O HCPCS: Performed by: EMERGENCY MEDICINE

## 2017-12-28 PROCEDURE — 96361 HYDRATE IV INFUSION ADD-ON: CPT

## 2017-12-28 PROCEDURE — 93010 ELECTROCARDIOGRAM REPORT: CPT | Performed by: INTERNAL MEDICINE

## 2017-12-28 PROCEDURE — 70450 CT HEAD/BRAIN W/O DYE: CPT | Performed by: RADIOLOGY

## 2017-12-28 PROCEDURE — 10002800 HB RX 250 W HCPCS: Performed by: EMERGENCY MEDICINE

## 2017-12-28 RX ORDER — CYCLOBENZAPRINE HCL 10 MG
10 TABLET ORAL ONCE
Status: COMPLETED | OUTPATIENT
Start: 2017-12-28 | End: 2017-12-28

## 2017-12-28 RX ORDER — DEXTROSE MONOHYDRATE 25 G/50ML
12.5 INJECTION, SOLUTION INTRAVENOUS ONCE
Status: COMPLETED | OUTPATIENT
Start: 2017-12-28 | End: 2017-12-28

## 2017-12-28 RX ADMIN — KETOROLAC TROMETHAMINE 15 MG: 15 INJECTION, SOLUTION INTRAMUSCULAR; INTRAVENOUS at 11:39

## 2017-12-28 RX ADMIN — DEXTROSE MONOHYDRATE 12.5 G: 25 INJECTION, SOLUTION INTRAVENOUS at 09:58

## 2017-12-28 RX ADMIN — SODIUM CHLORIDE 1000 ML: 9 INJECTION, SOLUTION INTRAVENOUS at 11:38

## 2017-12-28 RX ADMIN — CYCLOBENZAPRINE HYDROCHLORIDE 10 MG: 10 TABLET, FILM COATED ORAL at 11:40

## 2017-12-28 ASSESSMENT — ENCOUNTER SYMPTOMS
VOMITING: 0
ACTIVITY CHANGE: 0
FEVER: 0
CHEST TIGHTNESS: 0
EYE REDNESS: 0
NAUSEA: 0
WOUND: 0
SPEECH DIFFICULTY: 0
CONFUSION: 0
FATIGUE: 0
BACK PAIN: 0
HEADACHES: 1
DIARRHEA: 0
SHORTNESS OF BREATH: 0
ABDOMINAL PAIN: 0
COUGH: 0
DIZZINESS: 1
WEAKNESS: 0
EYE PAIN: 0
CHILLS: 0
APPETITE CHANGE: 0
SORE THROAT: 0

## 2017-12-28 ASSESSMENT — PAIN SCALES - GENERAL
PAINLEVEL_OUTOF10: 8
PAINLEVEL_OUTOF10: 5

## 2017-12-29 ENCOUNTER — OFFICE VISIT (OUTPATIENT)
Dept: INTERNAL MEDICINE | Age: 62
End: 2017-12-29

## 2017-12-29 ENCOUNTER — TELEPHONE (OUTPATIENT)
Dept: INTERNAL MEDICINE | Age: 62
End: 2017-12-29

## 2017-12-29 ENCOUNTER — NURSE ONLY (OUTPATIENT)
Dept: INTERNAL MEDICINE | Age: 62
End: 2017-12-29

## 2017-12-29 VITALS
WEIGHT: 163.14 LBS | TEMPERATURE: 99.9 F | SYSTOLIC BLOOD PRESSURE: 130 MMHG | HEIGHT: 65 IN | DIASTOLIC BLOOD PRESSURE: 76 MMHG | HEART RATE: 77 BPM | OXYGEN SATURATION: 97 % | BODY MASS INDEX: 27.18 KG/M2

## 2017-12-29 DIAGNOSIS — R68.89 FLU-LIKE SYMPTOMS: Primary | ICD-10-CM

## 2017-12-29 DIAGNOSIS — M54.2 CERVICALGIA: ICD-10-CM

## 2017-12-29 DIAGNOSIS — I25.119 CORONARY ARTERY DISEASE INVOLVING NATIVE CORONARY ARTERY OF NATIVE HEART WITH ANGINA PECTORIS (CMD): ICD-10-CM

## 2017-12-29 LAB
FLUAV AG SPEC QL IF: NEGATIVE
FLUBV AG SPEC QL IF: NEGATIVE
SPECIMEN SOURCE: NORMAL

## 2017-12-29 PROCEDURE — 87804 INFLUENZA ASSAY W/OPTIC: CPT | Performed by: INTERNAL MEDICINE

## 2017-12-29 PROCEDURE — 99214 OFFICE O/P EST MOD 30 MIN: CPT | Performed by: INTERNAL MEDICINE

## 2017-12-29 RX ORDER — BUPROPION HYDROCHLORIDE 150 MG/1
TABLET, EXTENDED RELEASE ORAL
Qty: 56 TABLET | OUTPATIENT
Start: 2017-12-29

## 2017-12-29 RX ORDER — AMOXICILLIN AND CLAVULANATE POTASSIUM 875; 125 MG/1; MG/1
1 TABLET, FILM COATED ORAL 2 TIMES DAILY
Qty: 20 TABLET | Refills: 0 | Status: SHIPPED | OUTPATIENT
Start: 2017-12-29 | End: 2018-01-08

## 2017-12-29 RX ORDER — TIZANIDINE 2 MG/1
TABLET ORAL
Qty: 30 TABLET | Refills: 0 | Status: CANCELLED | OUTPATIENT
Start: 2017-12-29

## 2017-12-29 RX ORDER — LEVOTHYROXINE SODIUM 0.12 MG/1
TABLET ORAL
Qty: 30 TABLET | Refills: 2 | OUTPATIENT
Start: 2017-12-29

## 2017-12-29 RX ORDER — TIZANIDINE 2 MG/1
TABLET ORAL
Qty: 90 TABLET | Refills: 1 | Status: SHIPPED | OUTPATIENT
Start: 2017-12-29 | End: 2020-12-16

## 2018-01-05 ENCOUNTER — TELEPHONE (OUTPATIENT)
Dept: INTERNAL MEDICINE | Age: 63
End: 2018-01-05

## 2018-01-16 ASSESSMENT — ENCOUNTER SYMPTOMS
SINUS PAIN: 1
COUGH: 0
DIZZINESS: 0
SHORTNESS OF BREATH: 0
FEVER: 0
CHILLS: 0

## 2018-01-18 ENCOUNTER — TELEPHONE (OUTPATIENT)
Dept: INTERNAL MEDICINE | Age: 63
End: 2018-01-18

## 2018-01-18 RX ORDER — BUPROPION HYDROCHLORIDE 150 MG/1
150 TABLET, EXTENDED RELEASE ORAL DAILY
Qty: 90 TABLET | Refills: 0 | Status: SHIPPED | OUTPATIENT
Start: 2018-01-18 | End: 2018-01-24 | Stop reason: SDUPTHER

## 2018-01-18 RX ORDER — BUPROPION HYDROCHLORIDE 150 MG/1
TABLET, EXTENDED RELEASE ORAL
Qty: 56 TABLET | Refills: 0 | OUTPATIENT
Start: 2018-01-18

## 2018-01-19 DIAGNOSIS — F17.200 TOBACCO USE DISORDER: ICD-10-CM

## 2018-01-19 DIAGNOSIS — I67.9 CEREBROVASCULAR DISEASE: ICD-10-CM

## 2018-01-19 DIAGNOSIS — I63.81 LACUNAR STROKE (CMD): ICD-10-CM

## 2018-01-19 DIAGNOSIS — G25.0 ESSENTIAL TREMOR: ICD-10-CM

## 2018-01-19 DIAGNOSIS — E10.42 DIABETIC POLYNEUROPATHY ASSOCIATED WITH TYPE 1 DIABETES MELLITUS (CMD): ICD-10-CM

## 2018-01-19 RX ORDER — PANTOPRAZOLE SODIUM 40 MG/1
40 TABLET, DELAYED RELEASE ORAL DAILY
Qty: 28 TABLET | Refills: 1 | Status: SHIPPED | OUTPATIENT
Start: 2018-01-19 | End: 2020-12-16 | Stop reason: SDUPTHER

## 2018-01-19 RX ORDER — PRIMIDONE 50 MG/1
TABLET ORAL
Qty: 98 TABLET | Refills: 1 | Status: SHIPPED | OUTPATIENT
Start: 2018-01-19 | End: 2021-04-07

## 2018-01-24 ENCOUNTER — LAB SERVICES (OUTPATIENT)
Dept: LAB | Age: 63
End: 2018-01-24

## 2018-01-24 ENCOUNTER — TELEPHONE (OUTPATIENT)
Dept: FAMILY MEDICINE | Age: 63
End: 2018-01-24

## 2018-01-24 ENCOUNTER — OFFICE VISIT (OUTPATIENT)
Dept: INTERNAL MEDICINE | Age: 63
End: 2018-01-24

## 2018-01-24 VITALS
WEIGHT: 157.19 LBS | TEMPERATURE: 98.7 F | DIASTOLIC BLOOD PRESSURE: 78 MMHG | HEART RATE: 81 BPM | OXYGEN SATURATION: 97 % | HEIGHT: 65 IN | BODY MASS INDEX: 26.19 KG/M2 | SYSTOLIC BLOOD PRESSURE: 134 MMHG

## 2018-01-24 DIAGNOSIS — F32.A DEPRESSION, UNSPECIFIED DEPRESSION TYPE: ICD-10-CM

## 2018-01-24 DIAGNOSIS — M81.0 OSTEOPOROSIS WITHOUT CURRENT PATHOLOGICAL FRACTURE, UNSPECIFIED OSTEOPOROSIS TYPE: ICD-10-CM

## 2018-01-24 DIAGNOSIS — M54.2 CERVICALGIA: ICD-10-CM

## 2018-01-24 DIAGNOSIS — F51.04 CHRONIC INSOMNIA: ICD-10-CM

## 2018-01-24 DIAGNOSIS — E78.00 HYPERCHOLESTEROLEMIA: ICD-10-CM

## 2018-01-24 DIAGNOSIS — E03.9 ACQUIRED HYPOTHYROIDISM: ICD-10-CM

## 2018-01-24 DIAGNOSIS — F32.A DEPRESSIVE DISORDER: ICD-10-CM

## 2018-01-24 DIAGNOSIS — G47.33 OSA ON CPAP: ICD-10-CM

## 2018-01-24 DIAGNOSIS — I10 ESSENTIAL HYPERTENSION: ICD-10-CM

## 2018-01-24 DIAGNOSIS — R42 DIZZINESS: Primary | ICD-10-CM

## 2018-01-24 DIAGNOSIS — G25.0 BENIGN ESSENTIAL TREMOR: ICD-10-CM

## 2018-01-24 DIAGNOSIS — E10.42 DIABETIC POLYNEUROPATHY ASSOCIATED WITH TYPE 1 DIABETES MELLITUS (CMD): ICD-10-CM

## 2018-01-24 DIAGNOSIS — K21.9 CHRONIC GERD: ICD-10-CM

## 2018-01-24 DIAGNOSIS — Z72.0 TOBACCO USE: ICD-10-CM

## 2018-01-24 DIAGNOSIS — Z86.73 HISTORY OF STROKE: ICD-10-CM

## 2018-01-24 DIAGNOSIS — I25.10 CORONARY ARTERY DISEASE INVOLVING NATIVE CORONARY ARTERY OF NATIVE HEART WITHOUT ANGINA PECTORIS: ICD-10-CM

## 2018-01-24 LAB — TSH SERPL-ACNC: 0.86 MCUNITS/ML (ref 0.35–5)

## 2018-01-24 PROCEDURE — 36415 COLL VENOUS BLD VENIPUNCTURE: CPT | Performed by: INTERNAL MEDICINE

## 2018-01-24 PROCEDURE — 84443 ASSAY THYROID STIM HORMONE: CPT | Performed by: INTERNAL MEDICINE

## 2018-01-24 PROCEDURE — 99214 OFFICE O/P EST MOD 30 MIN: CPT | Performed by: INTERNAL MEDICINE

## 2018-01-24 RX ORDER — INSULIN ASPART 100 [IU]/ML
INJECTION, SOLUTION INTRAVENOUS; SUBCUTANEOUS
Qty: 15 ML | Refills: 2 | Status: SHIPPED | OUTPATIENT
Start: 2018-01-24 | End: 2020-12-16 | Stop reason: SDUPTHER

## 2018-01-24 RX ORDER — BUPROPION HYDROCHLORIDE 150 MG/1
150 TABLET, EXTENDED RELEASE ORAL 2 TIMES DAILY
Qty: 180 TABLET | Refills: 0 | Status: SHIPPED | OUTPATIENT
Start: 2018-01-24 | End: 2020-12-16

## 2018-01-24 RX ORDER — VARENICLINE TARTRATE 0.5 MG/1
0.5 TABLET, FILM COATED ORAL 2 TIMES DAILY
Qty: 180 TABLET | Refills: 0 | Status: SHIPPED | OUTPATIENT
Start: 2018-01-24 | End: 2020-12-16

## 2018-01-24 RX ORDER — POLYETHYLENE GLYCOL 3350 17 G/17G
POWDER, FOR SOLUTION ORAL
Qty: 527 G | Refills: 0 | Status: SHIPPED | OUTPATIENT
Start: 2018-01-24 | End: 2020-12-29 | Stop reason: SDUPTHER

## 2018-01-24 RX ORDER — CALCIUM CARBONATE/VITAMIN D3 500-10/5ML
1 LIQUID (ML) ORAL DAILY
Qty: 90 CAPSULE | Refills: 0 | Status: SHIPPED | OUTPATIENT
Start: 2018-01-24 | End: 2021-01-13

## 2018-02-12 ASSESSMENT — ENCOUNTER SYMPTOMS
FOCAL WEAKNESS: 1
NAUSEA: 0
ORTHOPNEA: 0
WHEEZING: 0
SENSORY CHANGE: 0
CHILLS: 0
COUGH: 0
BLURRED VISION: 0
TREMORS: 1
DOUBLE VISION: 0
SHORTNESS OF BREATH: 0
DIARRHEA: 0
LOSS OF CONSCIOUSNESS: 0
DEPRESSION: 1
INSOMNIA: 1
ABDOMINAL PAIN: 0
HEARTBURN: 1
SINUS PAIN: 0
BACK PAIN: 0
BLOOD IN STOOL: 0
VOMITING: 0
FEVER: 0
DIZZINESS: 1

## 2019-01-28 ENCOUNTER — OFFICE VISIT (OUTPATIENT)
Dept: URBAN - METROPOLITAN AREA CLINIC 62 | Facility: CLINIC | Age: 64
End: 2019-01-28
Payer: COMMERCIAL

## 2019-01-28 DIAGNOSIS — H04.123 DRY EYE SYNDROME OF BILATERAL LACRIMAL GLANDS: ICD-10-CM

## 2019-01-28 DIAGNOSIS — E11.9 TYPE 2 DIABETES MELLITUS W/O COMPLICATION: Primary | ICD-10-CM

## 2019-01-28 DIAGNOSIS — H26.491 OTHER SECONDARY CATARACT, RIGHT EYE: ICD-10-CM

## 2019-01-28 PROCEDURE — 92004 COMPRE OPH EXAM NEW PT 1/>: CPT | Performed by: OPTOMETRIST

## 2019-01-28 ASSESSMENT — INTRAOCULAR PRESSURE
OS: 13
OD: 13

## 2019-01-28 ASSESSMENT — KERATOMETRY
OS: 41.50
OD: 41.50

## 2019-01-28 NOTE — IMPRESSION/PLAN
Impression: Type 2 diabetes mellitus w/o complication: E19.9. Plan: Diabetes type II: no background retinopathy, no signs of neovascularization noted. Discussed ocular and systemic benefits of blood sugar control. Emphasize importance of annual dilated exams.

## 2019-01-28 NOTE — IMPRESSION/PLAN
Impression: Other secondary cataract, right eye: H26.491. Plan: Cataracts account for the patient's complaints. No treatment currently recommended. The patient will monitor vision changes and contact us with any decrease in vision.

## 2019-07-18 ENCOUNTER — TELEPHONE (OUTPATIENT)
Dept: INTERNAL MEDICINE | Age: 64
End: 2019-07-18

## 2020-11-09 ENCOUNTER — APPOINTMENT (OUTPATIENT)
Dept: INTERNAL MEDICINE | Age: 65
End: 2020-11-09

## 2020-11-30 ENCOUNTER — APPOINTMENT (OUTPATIENT)
Dept: CT IMAGING | Age: 65
End: 2020-11-30
Attending: PHYSICIAN ASSISTANT

## 2020-11-30 ENCOUNTER — HOSPITAL ENCOUNTER (EMERGENCY)
Age: 65
Discharge: HOME | End: 2020-11-30

## 2020-11-30 VITALS
OXYGEN SATURATION: 97 % | TEMPERATURE: 99.2 F | RESPIRATION RATE: 16 BRPM | BODY MASS INDEX: 26.19 KG/M2 | WEIGHT: 157.19 LBS | DIASTOLIC BLOOD PRESSURE: 67 MMHG | SYSTOLIC BLOOD PRESSURE: 159 MMHG | HEIGHT: 65 IN | HEART RATE: 75 BPM

## 2020-11-30 DIAGNOSIS — N10 ACUTE PYELONEPHRITIS: Primary | ICD-10-CM

## 2020-11-30 LAB
ALBUMIN SERPL-MCNC: 3.4 G/DL (ref 3.6–5.1)
ALBUMIN/GLOB SERPL: 1.1 {RATIO} (ref 1–2.4)
ALP SERPL-CCNC: 74 UNITS/L (ref 45–117)
ALT SERPL-CCNC: 21 UNITS/L
ANION GAP SERPL CALC-SCNC: 14 MMOL/L (ref 10–20)
APPEARANCE UR: CLEAR
AST SERPL-CCNC: 17 UNITS/L
BACTERIA #/AREA URNS HPF: ABNORMAL /HPF
BASOPHILS # BLD: 0 K/MCL (ref 0–0.3)
BASOPHILS NFR BLD: 0 %
BILIRUB SERPL-MCNC: 0.6 MG/DL (ref 0.2–1)
BILIRUB UR QL STRIP: NEGATIVE
BUN SERPL-MCNC: 16 MG/DL (ref 6–20)
BUN/CREAT SERPL: 18 (ref 7–25)
CALCIUM SERPL-MCNC: 8.7 MG/DL (ref 8.4–10.2)
CHLORIDE SERPL-SCNC: 101 MMOL/L (ref 98–107)
CO2 SERPL-SCNC: 24 MMOL/L (ref 21–32)
COLOR UR: YELLOW
CREAT SERPL-MCNC: 0.91 MG/DL (ref 0.51–0.95)
DEPRECATED RDW RBC: 44.2 FL (ref 39–50)
EOSINOPHIL # BLD: 0.1 K/MCL (ref 0–0.5)
EOSINOPHIL NFR BLD: 1 %
ERYTHROCYTE [DISTWIDTH] IN BLOOD: 13.4 % (ref 11–15)
FASTING DURATION TIME PATIENT: ABNORMAL H
GFR SERPLBLD BASED ON 1.73 SQ M-ARVRAT: 67 ML/MIN/1.73M2
GLOBULIN SER-MCNC: 3 G/DL (ref 2–4)
GLUCOSE SERPL-MCNC: 324 MG/DL (ref 65–99)
GLUCOSE UR STRIP-MCNC: 250 MG/DL
HCT VFR BLD CALC: 41.3 % (ref 36–46.5)
HGB BLD-MCNC: 13.6 G/DL (ref 12–15.5)
HGB UR QL STRIP: ABNORMAL
HYALINE CASTS #/AREA URNS LPF: ABNORMAL /LPF
IMM GRANULOCYTES # BLD AUTO: 0 K/MCL (ref 0–0.2)
IMM GRANULOCYTES # BLD: 0 %
KETONES UR STRIP-MCNC: ABNORMAL MG/DL
LEUKOCYTE ESTERASE UR QL STRIP: ABNORMAL
LIPASE SERPL-CCNC: <50 UNITS/L (ref 73–393)
LYMPHOCYTES # BLD: 2.9 K/MCL (ref 1–4)
LYMPHOCYTES NFR BLD: 28 %
MCH RBC QN AUTO: 29.4 PG (ref 26–34)
MCHC RBC AUTO-ENTMCNC: 32.9 G/DL (ref 32–36.5)
MCV RBC AUTO: 89.2 FL (ref 78–100)
MONOCYTES # BLD: 0.5 K/MCL (ref 0.3–0.9)
MONOCYTES NFR BLD: 5 %
NEUTROPHILS # BLD: 6.7 K/MCL (ref 1.8–7.7)
NEUTROPHILS NFR BLD: 66 %
NITRITE UR QL STRIP: NEGATIVE
NRBC BLD MANUAL-RTO: 0 /100 WBC
PH UR STRIP: 5 [PH] (ref 5–7)
PLATELET # BLD AUTO: 190 K/MCL (ref 140–450)
POTASSIUM SERPL-SCNC: 4.3 MMOL/L (ref 3.4–5.1)
PROT SERPL-MCNC: 6.4 G/DL (ref 6.4–8.2)
PROT UR STRIP-MCNC: ABNORMAL MG/DL
RBC # BLD: 4.63 MIL/MCL (ref 4–5.2)
RBC #/AREA URNS HPF: ABNORMAL /HPF
SODIUM SERPL-SCNC: 135 MMOL/L (ref 135–145)
SP GR UR STRIP: >1.03 (ref 1–1.03)
SQUAMOUS #/AREA URNS HPF: ABNORMAL /HPF
UROBILINOGEN UR STRIP-MCNC: 0.2 MG/DL
WBC # BLD: 10.2 K/MCL (ref 4.2–11)
WBC #/AREA URNS HPF: >100 /HPF

## 2020-11-30 PROCEDURE — 36415 COLL VENOUS BLD VENIPUNCTURE: CPT

## 2020-11-30 PROCEDURE — 96374 THER/PROPH/DIAG INJ IV PUSH: CPT

## 2020-11-30 PROCEDURE — 74176 CT ABD & PELVIS W/O CONTRAST: CPT

## 2020-11-30 PROCEDURE — 99284 EMERGENCY DEPT VISIT MOD MDM: CPT

## 2020-11-30 PROCEDURE — 83690 ASSAY OF LIPASE: CPT | Performed by: PHYSICIAN ASSISTANT

## 2020-11-30 PROCEDURE — 74176 CT ABD & PELVIS W/O CONTRAST: CPT | Performed by: RADIOLOGY

## 2020-11-30 PROCEDURE — 80053 COMPREHEN METABOLIC PANEL: CPT | Performed by: PHYSICIAN ASSISTANT

## 2020-11-30 PROCEDURE — 85025 COMPLETE CBC W/AUTO DIFF WBC: CPT | Performed by: PHYSICIAN ASSISTANT

## 2020-11-30 PROCEDURE — 10002800 HB RX 250 W HCPCS: Performed by: PHYSICIAN ASSISTANT

## 2020-11-30 PROCEDURE — 81001 URINALYSIS AUTO W/SCOPE: CPT | Performed by: PHYSICIAN ASSISTANT

## 2020-11-30 PROCEDURE — 10002807 HB RX 258: Performed by: PHYSICIAN ASSISTANT

## 2020-11-30 PROCEDURE — 87086 URINE CULTURE/COLONY COUNT: CPT | Performed by: PHYSICIAN ASSISTANT

## 2020-11-30 PROCEDURE — 96375 TX/PRO/DX INJ NEW DRUG ADDON: CPT

## 2020-11-30 PROCEDURE — 99284 EMERGENCY DEPT VISIT MOD MDM: CPT | Performed by: PHYSICIAN ASSISTANT

## 2020-11-30 RX ORDER — CEPHALEXIN 500 MG/1
500 CAPSULE ORAL 4 TIMES DAILY
Qty: 40 CAPSULE | Refills: 0 | Status: SHIPPED | OUTPATIENT
Start: 2020-11-30 | End: 2020-12-10

## 2020-11-30 RX ORDER — CEFAZOLIN SODIUM/WATER 1 G/10 ML
1000 SYRINGE (ML) INTRAVENOUS ONCE
Status: COMPLETED | OUTPATIENT
Start: 2020-11-30 | End: 2020-11-30

## 2020-11-30 RX ADMIN — KETOROLAC TROMETHAMINE 15 MG: 15 INJECTION, SOLUTION INTRAMUSCULAR; INTRAVENOUS at 18:42

## 2020-11-30 RX ADMIN — CEFTRIAXONE SODIUM 1000 MG: 10 INJECTION, POWDER, FOR SOLUTION INTRAVENOUS at 20:27

## 2020-11-30 RX ADMIN — MORPHINE SULFATE 2 MG: 2 INJECTION, SOLUTION INTRAMUSCULAR; INTRAVENOUS at 19:30

## 2020-11-30 ASSESSMENT — ENCOUNTER SYMPTOMS
FEVER: 0
BACK PAIN: 1
ABDOMINAL PAIN: 0
CHILLS: 0
VOMITING: 0
APPETITE CHANGE: 0
SHORTNESS OF BREATH: 0
HEADACHES: 1
DIARRHEA: 0
SORE THROAT: 0
NAUSEA: 0
COUGH: 0

## 2020-11-30 ASSESSMENT — PAIN DESCRIPTION - PAIN TYPE
TYPE: ACUTE PAIN
TYPE: ACUTE PAIN

## 2020-11-30 ASSESSMENT — PAIN SCALES - GENERAL
PAINLEVEL_OUTOF10: 7
PAINLEVEL_OUTOF10: 3
PAINLEVEL_OUTOF10: 7

## 2020-12-01 LAB
RAINBOW EXTRA TUBES HOLD SPECIMEN: NORMAL
RAINBOW EXTRA TUBES HOLD SPECIMEN: NORMAL

## 2020-12-02 LAB — BACTERIA UR CULT: NORMAL

## 2020-12-05 ENCOUNTER — APPOINTMENT (OUTPATIENT)
Dept: GENERAL RADIOLOGY | Age: 65
End: 2020-12-05
Attending: EMERGENCY MEDICINE

## 2020-12-05 ENCOUNTER — HOSPITAL ENCOUNTER (EMERGENCY)
Age: 65
Discharge: HOME | End: 2020-12-05
Attending: EMERGENCY MEDICINE

## 2020-12-05 VITALS
BODY MASS INDEX: 25.79 KG/M2 | HEIGHT: 65 IN | WEIGHT: 154.76 LBS | OXYGEN SATURATION: 99 % | SYSTOLIC BLOOD PRESSURE: 119 MMHG | TEMPERATURE: 97.4 F | DIASTOLIC BLOOD PRESSURE: 58 MMHG | HEART RATE: 67 BPM | RESPIRATION RATE: 16 BRPM

## 2020-12-05 DIAGNOSIS — M62.830 SPASM OF THORACIC BACK MUSCLE: Primary | ICD-10-CM

## 2020-12-05 LAB
APPEARANCE UR: CLEAR
BILIRUB UR QL STRIP: NEGATIVE
COLOR UR: YELLOW
GLUCOSE UR STRIP-MCNC: 250 MG/DL
HGB UR QL STRIP: NEGATIVE
KETONES UR STRIP-MCNC: NEGATIVE MG/DL
LEUKOCYTE ESTERASE UR QL STRIP: NEGATIVE
NITRITE UR QL STRIP: NEGATIVE
PH UR STRIP: 7 [PH] (ref 5–7)
PROT UR STRIP-MCNC: NEGATIVE MG/DL
SP GR UR STRIP: 1.01 (ref 1–1.03)
UROBILINOGEN UR STRIP-MCNC: 0.2 MG/DL

## 2020-12-05 PROCEDURE — 81003 URINALYSIS AUTO W/O SCOPE: CPT | Performed by: EMERGENCY MEDICINE

## 2020-12-05 PROCEDURE — 10004651 HB RX, NO CHARGE ITEM: Performed by: EMERGENCY MEDICINE

## 2020-12-05 PROCEDURE — 10002803 HB RX 637: Performed by: EMERGENCY MEDICINE

## 2020-12-05 PROCEDURE — 99284 EMERGENCY DEPT VISIT MOD MDM: CPT | Performed by: EMERGENCY MEDICINE

## 2020-12-05 PROCEDURE — 72070 X-RAY EXAM THORAC SPINE 2VWS: CPT | Performed by: RADIOLOGY

## 2020-12-05 PROCEDURE — 99283 EMERGENCY DEPT VISIT LOW MDM: CPT

## 2020-12-05 PROCEDURE — 72070 X-RAY EXAM THORAC SPINE 2VWS: CPT

## 2020-12-05 RX ORDER — LIDOCAINE 4 G/G
1 PATCH TOPICAL DAILY
Status: DISCONTINUED | OUTPATIENT
Start: 2020-12-05 | End: 2020-12-05 | Stop reason: HOSPADM

## 2020-12-05 RX ORDER — ACETAMINOPHEN 500 MG
1000 TABLET ORAL ONCE
Status: COMPLETED | OUTPATIENT
Start: 2020-12-05 | End: 2020-12-05

## 2020-12-05 RX ORDER — METAXALONE 800 MG/1
800 TABLET ORAL 3 TIMES DAILY PRN
Qty: 15 TABLET | Refills: 0 | Status: SHIPPED | OUTPATIENT
Start: 2020-12-05 | End: 2020-12-16

## 2020-12-05 RX ORDER — MELOXICAM 15 MG/1
15 TABLET ORAL DAILY
Qty: 7 TABLET | Refills: 0 | Status: SHIPPED | OUTPATIENT
Start: 2020-12-05 | End: 2020-12-16

## 2020-12-05 RX ADMIN — ACETAMINOPHEN 1000 MG: 500 TABLET ORAL at 06:43

## 2020-12-05 RX ADMIN — LIDOCAINE 1 PATCH: 246 PATCH TOPICAL at 06:54

## 2020-12-05 SDOH — HEALTH STABILITY: MENTAL HEALTH: HOW MANY STANDARD DRINKS CONTAINING ALCOHOL DO YOU HAVE ON A TYPICAL DAY?: 1 OR 2

## 2020-12-05 SDOH — HEALTH STABILITY: MENTAL HEALTH: HOW OFTEN DO YOU HAVE 6 OR MORE DRINKS ON ONE OCCASION?: NEVER

## 2020-12-05 SDOH — HEALTH STABILITY: MENTAL HEALTH: HOW OFTEN DO YOU HAVE A DRINK CONTAINING ALCOHOL?: NEVER

## 2020-12-05 ASSESSMENT — PAIN SCALES - GENERAL
PAINLEVEL_OUTOF10: 4
PAINLEVEL_OUTOF10: 2

## 2020-12-05 ASSESSMENT — PAIN DESCRIPTION - PAIN TYPE: TYPE: ACUTE PAIN

## 2020-12-09 ENCOUNTER — WALK IN (OUTPATIENT)
Dept: URGENT CARE | Age: 65
End: 2020-12-09

## 2020-12-09 ENCOUNTER — APPOINTMENT (OUTPATIENT)
Dept: LAB | Age: 65
End: 2020-12-09

## 2020-12-09 ENCOUNTER — LAB SERVICES (OUTPATIENT)
Dept: LAB | Age: 65
End: 2020-12-09

## 2020-12-09 VITALS
BODY MASS INDEX: 25.68 KG/M2 | DIASTOLIC BLOOD PRESSURE: 70 MMHG | OXYGEN SATURATION: 100 % | HEART RATE: 82 BPM | SYSTOLIC BLOOD PRESSURE: 158 MMHG | WEIGHT: 154.32 LBS | TEMPERATURE: 97.9 F

## 2020-12-09 DIAGNOSIS — E11.3292 TYPE 2 DIABETES MELLITUS WITH LEFT EYE AFFECTED BY MILD NONPROLIFERATIVE RETINOPATHY WITHOUT MACULAR EDEMA, WITH LONG-TERM CURRENT USE OF INSULIN (CMD): ICD-10-CM

## 2020-12-09 DIAGNOSIS — R42 DIZZINESS: ICD-10-CM

## 2020-12-09 DIAGNOSIS — Z79.4 TYPE 2 DIABETES MELLITUS WITH LEFT EYE AFFECTED BY MILD NONPROLIFERATIVE RETINOPATHY WITHOUT MACULAR EDEMA, WITH LONG-TERM CURRENT USE OF INSULIN (CMD): ICD-10-CM

## 2020-12-09 DIAGNOSIS — R39.9 GU (GENITOURINARY) SYMPTOMS: Primary | ICD-10-CM

## 2020-12-09 DIAGNOSIS — R10.9 FLANK PAIN: ICD-10-CM

## 2020-12-09 LAB
ALBUMIN SERPL-MCNC: 4 G/DL (ref 3.6–5.1)
ALBUMIN/GLOB SERPL: 1.3 {RATIO} (ref 1–2.4)
ALP SERPL-CCNC: 73 UNITS/L (ref 45–117)
ALT SERPL-CCNC: 18 UNITS/L
ANION GAP SERPL CALC-SCNC: 9 MMOL/L (ref 10–20)
APPEARANCE UR: CLEAR
AST SERPL-CCNC: 28 UNITS/L
BACTERIA #/AREA URNS HPF: ABNORMAL /HPF
BASOPHILS # BLD: 0 K/MCL (ref 0–0.3)
BASOPHILS NFR BLD: 0 %
BILIRUB SERPL-MCNC: 0.8 MG/DL (ref 0.2–1)
BILIRUB UR QL STRIP: NEGATIVE
BUN SERPL-MCNC: 16 MG/DL (ref 6–20)
BUN/CREAT SERPL: 18 (ref 7–25)
CALCIUM SERPL-MCNC: 10.1 MG/DL (ref 8.4–10.2)
CHLORIDE SERPL-SCNC: 104 MMOL/L (ref 98–107)
CO2 SERPL-SCNC: 26 MMOL/L (ref 21–32)
COLOR UR: YELLOW
CREAT SERPL-MCNC: 0.9 MG/DL (ref 0.51–0.95)
DEPRECATED RDW RBC: 42.4 FL (ref 39–50)
EOSINOPHIL # BLD: 0.1 K/MCL (ref 0–0.5)
EOSINOPHIL NFR BLD: 1 %
ERYTHROCYTE [DISTWIDTH] IN BLOOD: 13.5 % (ref 11–15)
FASTING DURATION TIME PATIENT: ABNORMAL H
GFR SERPLBLD BASED ON 1.73 SQ M-ARVRAT: 68 ML/MIN/1.73M2
GLOBULIN SER-MCNC: 3.2 G/DL (ref 2–4)
GLUCOSE SERPL-MCNC: 379 MG/DL (ref 65–99)
GLUCOSE UR STRIP-MCNC: 500 MG/DL
HCT VFR BLD CALC: 44.1 % (ref 36–46.5)
HGB BLD-MCNC: 14.9 G/DL (ref 12–15.5)
HGB UR QL STRIP: NEGATIVE
HYALINE CASTS #/AREA URNS LPF: ABNORMAL /LPF
KETONES UR STRIP-MCNC: ABNORMAL MG/DL
LEUKOCYTE ESTERASE UR QL STRIP: NEGATIVE
LYMPHOCYTES # BLD: 3.7 K/MCL (ref 1–4)
LYMPHOCYTES NFR BLD: 31 %
MCH RBC QN AUTO: 29.4 PG (ref 26–34)
MCHC RBC AUTO-ENTMCNC: 33.8 G/DL (ref 32–36.5)
MCV RBC AUTO: 87 FL (ref 78–100)
MONOCYTES # BLD: 0.8 K/MCL (ref 0.3–0.9)
MONOCYTES NFR BLD: 7 %
NEUTROPHILS # BLD: 7.3 K/MCL (ref 1.8–7.7)
NEUTROPHILS NFR BLD: 61 %
NITRITE UR QL STRIP: NEGATIVE
PH UR STRIP: 5.5 [PH] (ref 5–7)
PLATELET # BLD AUTO: 190 K/MCL (ref 140–450)
POTASSIUM SERPL-SCNC: 4.5 MMOL/L (ref 3.4–5.1)
PROT SERPL-MCNC: 7.2 G/DL (ref 6.4–8.2)
PROT UR STRIP-MCNC: 30 MG/DL
RBC # BLD: 5.07 MIL/MCL (ref 4–5.2)
RBC #/AREA URNS HPF: ABNORMAL /HPF
SODIUM SERPL-SCNC: 134 MMOL/L (ref 135–145)
SP GR UR STRIP: 1.02 (ref 1–1.03)
SQUAMOUS #/AREA URNS HPF: ABNORMAL /HPF
UROBILINOGEN UR STRIP-MCNC: 0.2 MG/DL
WBC # BLD: 12 K/MCL (ref 4.2–11)
WBC #/AREA URNS HPF: ABNORMAL /HPF

## 2020-12-09 PROCEDURE — 84443 ASSAY THYROID STIM HORMONE: CPT | Performed by: CLINICAL MEDICAL LABORATORY

## 2020-12-09 PROCEDURE — 81001 URINALYSIS AUTO W/SCOPE: CPT | Performed by: INTERNAL MEDICINE

## 2020-12-09 PROCEDURE — 87086 URINE CULTURE/COLONY COUNT: CPT | Performed by: CLINICAL MEDICAL LABORATORY

## 2020-12-09 PROCEDURE — 36415 COLL VENOUS BLD VENIPUNCTURE: CPT | Performed by: INTERNAL MEDICINE

## 2020-12-09 PROCEDURE — 80053 COMPREHEN METABOLIC PANEL: CPT | Performed by: INTERNAL MEDICINE

## 2020-12-09 PROCEDURE — 85025 COMPLETE CBC W/AUTO DIFF WBC: CPT | Performed by: INTERNAL MEDICINE

## 2020-12-09 PROCEDURE — 99214 OFFICE O/P EST MOD 30 MIN: CPT | Performed by: NURSE PRACTITIONER

## 2020-12-09 RX ORDER — CEFDINIR 300 MG/1
300 CAPSULE ORAL 2 TIMES DAILY
Qty: 14 CAPSULE | Refills: 0 | Status: SHIPPED | OUTPATIENT
Start: 2020-12-09 | End: 2020-12-16

## 2020-12-10 ENCOUNTER — TELEPHONE (OUTPATIENT)
Dept: URGENT CARE | Age: 65
End: 2020-12-10

## 2020-12-10 LAB
BACTERIA UR CULT: NORMAL
TSH SERPL-ACNC: 0.03 MCUNITS/ML (ref 0.35–5)

## 2020-12-11 ENCOUNTER — TELEPHONE (OUTPATIENT)
Dept: URGENT CARE | Age: 65
End: 2020-12-11

## 2020-12-12 ENCOUNTER — HOSPITAL ENCOUNTER (EMERGENCY)
Age: 65
Discharge: HOME | End: 2020-12-12
Attending: EMERGENCY MEDICINE

## 2020-12-12 ENCOUNTER — NURSE TRIAGE (OUTPATIENT)
Dept: TELEHEALTH | Age: 65
End: 2020-12-12

## 2020-12-12 VITALS
RESPIRATION RATE: 29 BRPM | DIASTOLIC BLOOD PRESSURE: 57 MMHG | OXYGEN SATURATION: 100 % | SYSTOLIC BLOOD PRESSURE: 113 MMHG | HEART RATE: 74 BPM

## 2020-12-12 DIAGNOSIS — R42 LIGHT HEADED: ICD-10-CM

## 2020-12-12 DIAGNOSIS — R53.1 WEAKNESS: Primary | ICD-10-CM

## 2020-12-12 LAB
ALBUMIN SERPL-MCNC: 3.4 G/DL (ref 3.6–5.1)
ALBUMIN/GLOB SERPL: 1.1 {RATIO} (ref 1–2.4)
ALP SERPL-CCNC: 78 UNITS/L (ref 45–117)
ALT SERPL-CCNC: 20 UNITS/L
ANION GAP SERPL CALC-SCNC: 15 MMOL/L (ref 10–20)
APPEARANCE UR: CLEAR
AST SERPL-CCNC: 14 UNITS/L
BASOPHILS # BLD: 0 K/MCL (ref 0–0.3)
BASOPHILS NFR BLD: 0 %
BILIRUB SERPL-MCNC: 0.7 MG/DL (ref 0.2–1)
BILIRUB UR QL STRIP: NEGATIVE
BUN SERPL-MCNC: 24 MG/DL (ref 6–20)
BUN/CREAT SERPL: 30 (ref 7–25)
CALCIUM SERPL-MCNC: 9.2 MG/DL (ref 8.4–10.2)
CHLORIDE SERPL-SCNC: 101 MMOL/L (ref 98–107)
CO2 SERPL-SCNC: 26 MMOL/L (ref 21–32)
COLOR UR: YELLOW
CREAT SERPL-MCNC: 0.81 MG/DL (ref 0.51–0.95)
DEPRECATED RDW RBC: 42.1 FL (ref 39–50)
EOSINOPHIL # BLD: 0.1 K/MCL (ref 0–0.5)
EOSINOPHIL NFR BLD: 1 %
ERYTHROCYTE [DISTWIDTH] IN BLOOD: 13.1 % (ref 11–15)
FASTING DURATION TIME PATIENT: ABNORMAL H
GFR SERPLBLD BASED ON 1.73 SQ M-ARVRAT: 77 ML/MIN/1.73M2
GLOBULIN SER-MCNC: 3.1 G/DL (ref 2–4)
GLUCOSE SERPL-MCNC: 251 MG/DL (ref 65–99)
GLUCOSE UR STRIP-MCNC: >1000 MG/DL
HCT VFR BLD CALC: 40.7 % (ref 36–46.5)
HGB BLD-MCNC: 13.5 G/DL (ref 12–15.5)
HGB UR QL STRIP: NEGATIVE
IMM GRANULOCYTES # BLD AUTO: 0 K/MCL (ref 0–0.2)
IMM GRANULOCYTES # BLD: 0 %
KETONES UR STRIP-MCNC: ABNORMAL MG/DL
LEUKOCYTE ESTERASE UR QL STRIP: NEGATIVE
LYMPHOCYTES # BLD: 3.3 K/MCL (ref 1–4)
LYMPHOCYTES NFR BLD: 33 %
MCH RBC QN AUTO: 29.2 PG (ref 26–34)
MCHC RBC AUTO-ENTMCNC: 33.2 G/DL (ref 32–36.5)
MCV RBC AUTO: 88.1 FL (ref 78–100)
MONOCYTES # BLD: 0.6 K/MCL (ref 0.3–0.9)
MONOCYTES NFR BLD: 6 %
NEUTROPHILS # BLD: 6.1 K/MCL (ref 1.8–7.7)
NEUTROPHILS NFR BLD: 60 %
NITRITE UR QL STRIP: NEGATIVE
NRBC BLD MANUAL-RTO: 0 /100 WBC
PH UR STRIP: 5 [PH] (ref 5–7)
PLATELET # BLD AUTO: 188 K/MCL (ref 140–450)
POTASSIUM SERPL-SCNC: 4 MMOL/L (ref 3.4–5.1)
PROT SERPL-MCNC: 6.5 G/DL (ref 6.4–8.2)
PROT UR STRIP-MCNC: NEGATIVE MG/DL
RAINBOW EXTRA TUBES HOLD SPECIMEN: NORMAL
RBC # BLD: 4.62 MIL/MCL (ref 4–5.2)
SODIUM SERPL-SCNC: 138 MMOL/L (ref 135–145)
SP GR UR STRIP: 1.01 (ref 1–1.03)
UROBILINOGEN UR STRIP-MCNC: 0.2 MG/DL
WBC # BLD: 10.1 K/MCL (ref 4.2–11)

## 2020-12-12 PROCEDURE — 80053 COMPREHEN METABOLIC PANEL: CPT | Performed by: EMERGENCY MEDICINE

## 2020-12-12 PROCEDURE — 36415 COLL VENOUS BLD VENIPUNCTURE: CPT | Performed by: EMERGENCY MEDICINE

## 2020-12-12 PROCEDURE — 85025 COMPLETE CBC W/AUTO DIFF WBC: CPT | Performed by: EMERGENCY MEDICINE

## 2020-12-12 PROCEDURE — 99284 EMERGENCY DEPT VISIT MOD MDM: CPT | Performed by: EMERGENCY MEDICINE

## 2020-12-12 PROCEDURE — 81003 URINALYSIS AUTO W/O SCOPE: CPT | Performed by: EMERGENCY MEDICINE

## 2020-12-12 PROCEDURE — 93005 ELECTROCARDIOGRAM TRACING: CPT | Performed by: EMERGENCY MEDICINE

## 2020-12-12 PROCEDURE — 93010 ELECTROCARDIOGRAM REPORT: CPT | Performed by: INTERNAL MEDICINE

## 2020-12-12 PROCEDURE — 99283 EMERGENCY DEPT VISIT LOW MDM: CPT

## 2020-12-12 ASSESSMENT — PAIN SCALES - GENERAL: PAINLEVEL_OUTOF10: 4

## 2020-12-12 ASSESSMENT — ENCOUNTER SYMPTOMS
WEAKNESS: 1
DIZZINESS: 1

## 2020-12-12 ASSESSMENT — PAIN DESCRIPTION - PAIN TYPE: TYPE: CHRONIC PAIN

## 2020-12-14 LAB
ATRIAL RATE (BPM): 76
DIASTOLIC BLOOD PRESSURE: 60
P AXIS (DEGREES): 73
PR-INTERVAL (MSEC): 160
QRS-INTERVAL (MSEC): 78
QT-INTERVAL (MSEC): 380
QTC: 427
R AXIS (DEGREES): 56
REPORT TEXT: NORMAL
SYSTOLIC BLOOD PRESSURE: 125
T AXIS (DEGREES): 67
VENTRICULAR RATE EKG/MIN (BPM): 76

## 2020-12-16 ENCOUNTER — NURSE TRIAGE (OUTPATIENT)
Dept: INTERNAL MEDICINE | Age: 65
End: 2020-12-16

## 2020-12-16 ENCOUNTER — OFFICE VISIT (OUTPATIENT)
Dept: INTERNAL MEDICINE | Age: 65
End: 2020-12-16

## 2020-12-16 VITALS
SYSTOLIC BLOOD PRESSURE: 110 MMHG | TEMPERATURE: 98.8 F | DIASTOLIC BLOOD PRESSURE: 58 MMHG | HEART RATE: 100 BPM | BODY MASS INDEX: 24.13 KG/M2 | WEIGHT: 145 LBS | OXYGEN SATURATION: 99 %

## 2020-12-16 DIAGNOSIS — E03.9 ACQUIRED HYPOTHYROIDISM: ICD-10-CM

## 2020-12-16 DIAGNOSIS — L60.2 OVERGROWN TOENAILS: ICD-10-CM

## 2020-12-16 DIAGNOSIS — K21.9 GASTROESOPHAGEAL REFLUX DISEASE, UNSPECIFIED WHETHER ESOPHAGITIS PRESENT: ICD-10-CM

## 2020-12-16 DIAGNOSIS — E13.9 LADA (LATENT AUTOIMMUNE DIABETES IN ADULTS), MANAGED AS TYPE 1 (CMD): Primary | ICD-10-CM

## 2020-12-16 DIAGNOSIS — E08.319 DIABETIC RETINOPATHY OF BOTH EYES ASSOCIATED WITH DIABETES MELLITUS DUE TO UNDERLYING CONDITION, MACULAR EDEMA PRESENCE UNSPECIFIED, UNSPECIFIED RETINOPATHY SEVERITY (CMD): ICD-10-CM

## 2020-12-16 DIAGNOSIS — I25.10 CAD, MULTIPLE VESSEL: ICD-10-CM

## 2020-12-16 DIAGNOSIS — I10 ESSENTIAL HYPERTENSION: ICD-10-CM

## 2020-12-16 DIAGNOSIS — F51.04 CHRONIC INSOMNIA: ICD-10-CM

## 2020-12-16 DIAGNOSIS — E08.42 DIABETIC POLYNEUROPATHY ASSOCIATED WITH DIABETES MELLITUS DUE TO UNDERLYING CONDITION (CMD): ICD-10-CM

## 2020-12-16 DIAGNOSIS — R42 VERTIGO: ICD-10-CM

## 2020-12-16 PROCEDURE — 99215 OFFICE O/P EST HI 40 MIN: CPT | Performed by: INTERNAL MEDICINE

## 2020-12-16 RX ORDER — LEVOTHYROXINE SODIUM 175 UG/1
175 TABLET ORAL DAILY
Qty: 90 TABLET | Refills: 1 | Status: CANCELLED | OUTPATIENT
Start: 2020-12-16

## 2020-12-16 RX ORDER — LISINOPRIL 40 MG/1
40 TABLET ORAL DAILY
Qty: 90 TABLET | Refills: 1 | Status: SHIPPED | OUTPATIENT
Start: 2020-12-16 | End: 2021-03-18 | Stop reason: DRUGHIGH

## 2020-12-16 RX ORDER — LISINOPRIL 40 MG/1
40 TABLET ORAL DAILY
COMMUNITY
End: 2020-12-16 | Stop reason: SDUPTHER

## 2020-12-16 RX ORDER — SIMVASTATIN 80 MG
80 TABLET ORAL DAILY
Qty: 90 TABLET | Refills: 0 | Status: SHIPPED | OUTPATIENT
Start: 2020-12-16 | End: 2021-03-22 | Stop reason: SDUPTHER

## 2020-12-16 RX ORDER — PEN NEEDLE, DIABETIC, SAFETY 30 GX3/16"
NEEDLE, DISPOSABLE MISCELLANEOUS 4 TIMES DAILY
Qty: 200 EACH | Refills: 5 | Status: SHIPPED | OUTPATIENT
Start: 2020-12-16 | End: 2020-12-29 | Stop reason: SDUPTHER

## 2020-12-16 RX ORDER — INSULIN GLARGINE 100 [IU]/ML
INJECTION, SOLUTION SUBCUTANEOUS
Qty: 15 ML | Refills: 5 | Status: SHIPPED | OUTPATIENT
Start: 2020-12-16 | End: 2021-03-04 | Stop reason: DRUGHIGH

## 2020-12-16 RX ORDER — LEVOTHYROXINE SODIUM 0.15 MG/1
150 TABLET ORAL DAILY
Qty: 90 TABLET | Refills: 0 | Status: SHIPPED | OUTPATIENT
Start: 2020-12-16 | End: 2021-03-12 | Stop reason: SDUPTHER

## 2020-12-16 RX ORDER — PANTOPRAZOLE SODIUM 40 MG/1
40 TABLET, DELAYED RELEASE ORAL DAILY
Qty: 90 TABLET | Refills: 3 | OUTPATIENT
Start: 2020-12-16 | End: 2021-03-10

## 2020-12-16 RX ORDER — MECLIZINE HYDROCHLORIDE 25 MG/1
TABLET ORAL
Qty: 90 TABLET | Refills: 1 | Status: SHIPPED | OUTPATIENT
Start: 2020-12-16 | End: 2020-12-29

## 2020-12-16 RX ORDER — LIDOCAINE 50 MG/G
PATCH TOPICAL
Qty: 180 PATCH | Refills: 1 | Status: SHIPPED | OUTPATIENT
Start: 2020-12-16 | End: 2021-03-31

## 2020-12-16 RX ORDER — PREGABALIN 225 MG/1
225 CAPSULE ORAL 2 TIMES DAILY
Qty: 60 CAPSULE | Refills: 0 | Status: SHIPPED | OUTPATIENT
Start: 2020-12-16 | End: 2021-03-02 | Stop reason: SDUPTHER

## 2020-12-16 RX ORDER — LEVOTHYROXINE SODIUM 175 UG/1
175 TABLET ORAL
COMMUNITY
End: 2020-12-16 | Stop reason: DRUGHIGH

## 2020-12-16 RX ORDER — TRAZODONE HYDROCHLORIDE 100 MG/1
100 TABLET ORAL AT BEDTIME
Qty: 90 TABLET | Refills: 1 | Status: SHIPPED | OUTPATIENT
Start: 2020-12-16 | End: 2021-06-11 | Stop reason: SDUPTHER

## 2020-12-16 RX ORDER — DULOXETIN HYDROCHLORIDE 60 MG/1
60 CAPSULE, DELAYED RELEASE ORAL 2 TIMES DAILY
Qty: 180 CAPSULE | Refills: 3 | Status: SHIPPED | OUTPATIENT
Start: 2020-12-16 | End: 2021-12-29 | Stop reason: SDUPTHER

## 2020-12-19 ENCOUNTER — NURSE TRIAGE (OUTPATIENT)
Dept: TELEHEALTH | Age: 65
End: 2020-12-19

## 2020-12-19 ENCOUNTER — HOSPITAL ENCOUNTER (EMERGENCY)
Age: 65
Discharge: HOME | End: 2020-12-19
Attending: EMERGENCY MEDICINE

## 2020-12-19 ENCOUNTER — APPOINTMENT (OUTPATIENT)
Dept: CT IMAGING | Age: 65
End: 2020-12-19
Attending: EMERGENCY MEDICINE

## 2020-12-19 VITALS
OXYGEN SATURATION: 98 % | TEMPERATURE: 98.2 F | BODY MASS INDEX: 23.88 KG/M2 | HEART RATE: 84 BPM | SYSTOLIC BLOOD PRESSURE: 106 MMHG | DIASTOLIC BLOOD PRESSURE: 55 MMHG | RESPIRATION RATE: 24 BRPM | WEIGHT: 143.52 LBS

## 2020-12-19 DIAGNOSIS — R42 DIZZINESS: ICD-10-CM

## 2020-12-19 DIAGNOSIS — E10.65 HYPERGLYCEMIA DUE TO TYPE 1 DIABETES MELLITUS (CMD): ICD-10-CM

## 2020-12-19 DIAGNOSIS — E86.0 DEHYDRATION: Primary | ICD-10-CM

## 2020-12-19 LAB
ALBUMIN SERPL-MCNC: 3.2 G/DL (ref 3.6–5.1)
ALBUMIN/GLOB SERPL: 0.9 {RATIO} (ref 1–2.4)
ALP SERPL-CCNC: 95 UNITS/L (ref 45–117)
ALT SERPL-CCNC: 22 UNITS/L
ANION GAP SERPL CALC-SCNC: 23 MMOL/L (ref 10–20)
APPEARANCE UR: CLEAR
AST SERPL-CCNC: 16 UNITS/L
BASOPHILS # BLD: 0.1 K/MCL (ref 0–0.3)
BASOPHILS NFR BLD: 0 %
BILIRUB SERPL-MCNC: 0.5 MG/DL (ref 0.2–1)
BILIRUB UR QL STRIP: POSITIVE
BUN SERPL-MCNC: 44 MG/DL (ref 6–20)
BUN/CREAT SERPL: 36 (ref 7–25)
CALCIUM SERPL-MCNC: 10 MG/DL (ref 8.4–10.2)
CHLORIDE SERPL-SCNC: 98 MMOL/L (ref 98–107)
CO2 SERPL-SCNC: 19 MMOL/L (ref 21–32)
COLOR UR: YELLOW
CREAT SERPL-MCNC: 1.22 MG/DL (ref 0.51–0.95)
DEPRECATED RDW RBC: 42.3 FL (ref 39–50)
EOSINOPHIL # BLD: 0.1 K/MCL (ref 0–0.5)
EOSINOPHIL NFR BLD: 1 %
ERYTHROCYTE [DISTWIDTH] IN BLOOD: 13.1 % (ref 11–15)
FASTING DURATION TIME PATIENT: ABNORMAL H
GFR SERPLBLD BASED ON 1.73 SQ M-ARVRAT: 47 ML/MIN/1.73M2
GLOBULIN SER-MCNC: 3.4 G/DL (ref 2–4)
GLUCOSE SERPL-MCNC: 380 MG/DL (ref 65–99)
GLUCOSE UR STRIP-MCNC: >1000 MG/DL
HCT VFR BLD CALC: 42.5 % (ref 36–46.5)
HGB BLD-MCNC: 14 G/DL (ref 12–15.5)
HGB UR QL STRIP: NEGATIVE
IMM GRANULOCYTES # BLD AUTO: 0.1 K/MCL (ref 0–0.2)
IMM GRANULOCYTES # BLD: 0 %
KETONES UR STRIP-MCNC: >80 MG/DL
LEUKOCYTE ESTERASE UR QL STRIP: NEGATIVE
LYMPHOCYTES # BLD: 2.9 K/MCL (ref 1–4)
LYMPHOCYTES NFR BLD: 22 %
MCH RBC QN AUTO: 28.8 PG (ref 26–34)
MCHC RBC AUTO-ENTMCNC: 32.9 G/DL (ref 32–36.5)
MCV RBC AUTO: 87.4 FL (ref 78–100)
MONOCYTES # BLD: 0.8 K/MCL (ref 0.3–0.9)
MONOCYTES NFR BLD: 6 %
NEUTROPHILS # BLD: 9.3 K/MCL (ref 1.8–7.7)
NEUTROPHILS NFR BLD: 71 %
NITRITE UR QL STRIP: NEGATIVE
NRBC BLD MANUAL-RTO: 0 /100 WBC
PH UR STRIP: 5.5 [PH] (ref 5–7)
PLATELET # BLD AUTO: 219 K/MCL (ref 140–450)
POTASSIUM SERPL-SCNC: 4.8 MMOL/L (ref 3.4–5.1)
PROT SERPL-MCNC: 6.6 G/DL (ref 6.4–8.2)
PROT UR STRIP-MCNC: NEGATIVE MG/DL
RAINBOW EXTRA TUBES HOLD SPECIMEN: NORMAL
RBC # BLD: 4.86 MIL/MCL (ref 4–5.2)
SODIUM SERPL-SCNC: 135 MMOL/L (ref 135–145)
SP GR UR STRIP: 1.02 (ref 1–1.03)
TROPONIN I SERPL HS-MCNC: <0.02 NG/ML
UROBILINOGEN UR STRIP-MCNC: 0.2 MG/DL
WBC # BLD: 13.2 K/MCL (ref 4.2–11)

## 2020-12-19 PROCEDURE — 70450 CT HEAD/BRAIN W/O DYE: CPT

## 2020-12-19 PROCEDURE — 85025 COMPLETE CBC W/AUTO DIFF WBC: CPT | Performed by: EMERGENCY MEDICINE

## 2020-12-19 PROCEDURE — 10002807 HB RX 258: Performed by: EMERGENCY MEDICINE

## 2020-12-19 PROCEDURE — 84484 ASSAY OF TROPONIN QUANT: CPT | Performed by: EMERGENCY MEDICINE

## 2020-12-19 PROCEDURE — 70450 CT HEAD/BRAIN W/O DYE: CPT | Performed by: RADIOLOGY

## 2020-12-19 PROCEDURE — 99284 EMERGENCY DEPT VISIT MOD MDM: CPT

## 2020-12-19 PROCEDURE — 93005 ELECTROCARDIOGRAM TRACING: CPT | Performed by: EMERGENCY MEDICINE

## 2020-12-19 PROCEDURE — 93010 ELECTROCARDIOGRAM REPORT: CPT | Performed by: INTERNAL MEDICINE

## 2020-12-19 PROCEDURE — 99284 EMERGENCY DEPT VISIT MOD MDM: CPT | Performed by: EMERGENCY MEDICINE

## 2020-12-19 PROCEDURE — 81003 URINALYSIS AUTO W/O SCOPE: CPT | Performed by: EMERGENCY MEDICINE

## 2020-12-19 PROCEDURE — 80053 COMPREHEN METABOLIC PANEL: CPT | Performed by: EMERGENCY MEDICINE

## 2020-12-19 RX ADMIN — SODIUM CHLORIDE 1000 ML: 9 INJECTION, SOLUTION INTRAVENOUS at 12:10

## 2020-12-19 RX ADMIN — SODIUM CHLORIDE 500 ML: 9 INJECTION, SOLUTION INTRAVENOUS at 11:30

## 2020-12-19 SDOH — HEALTH STABILITY: MENTAL HEALTH: HOW MANY STANDARD DRINKS CONTAINING ALCOHOL DO YOU HAVE ON A TYPICAL DAY?: 1 OR 2

## 2020-12-19 SDOH — HEALTH STABILITY: MENTAL HEALTH: HOW OFTEN DO YOU HAVE 6 OR MORE DRINKS ON ONE OCCASION?: NEVER

## 2020-12-19 SDOH — HEALTH STABILITY: MENTAL HEALTH: HOW OFTEN DO YOU HAVE A DRINK CONTAINING ALCOHOL?: NEVER

## 2020-12-20 LAB
ATRIAL RATE (BPM): 87
DIASTOLIC BLOOD PRESSURE: 55
P AXIS (DEGREES): 34
PR-INTERVAL (MSEC): 130
QRS-INTERVAL (MSEC): 76
QT-INTERVAL (MSEC): 338
QTC: 407
R AXIS (DEGREES): 64
REPORT TEXT: NORMAL
SYSTOLIC BLOOD PRESSURE: 105
T AXIS (DEGREES): 67
VENTRICULAR RATE EKG/MIN (BPM): 87

## 2020-12-27 ASSESSMENT — ENCOUNTER SYMPTOMS
CHILLS: 0
WHEEZING: 0
LIGHT-HEADEDNESS: 1
ACTIVITY CHANGE: 1
NAUSEA: 0
TROUBLE SWALLOWING: 0
VOMITING: 0
WOUND: 0
SLEEP DISTURBANCE: 0
SHORTNESS OF BREATH: 0
COUGH: 0
STRIDOR: 0
SORE THROAT: 0
CONFUSION: 0
SEIZURES: 0
DIARRHEA: 0
ABDOMINAL DISTENTION: 0
COLOR CHANGE: 0
FEVER: 0
BRUISES/BLEEDS EASILY: 0
ABDOMINAL PAIN: 0
SPEECH DIFFICULTY: 0
FATIGUE: 1
HEADACHES: 0
FACIAL ASYMMETRY: 0
VOICE CHANGE: 0

## 2020-12-29 ENCOUNTER — IMAGING SERVICES (OUTPATIENT)
Dept: GENERAL RADIOLOGY | Age: 65
End: 2020-12-29
Attending: INTERNAL MEDICINE

## 2020-12-29 ENCOUNTER — LAB SERVICES (OUTPATIENT)
Dept: LAB | Age: 65
End: 2020-12-29

## 2020-12-29 ENCOUNTER — OFFICE VISIT (OUTPATIENT)
Dept: PODIATRY | Age: 65
End: 2020-12-29

## 2020-12-29 ENCOUNTER — OFFICE VISIT (OUTPATIENT)
Dept: INTERNAL MEDICINE | Age: 65
End: 2020-12-29

## 2020-12-29 VITALS
HEART RATE: 82 BPM | HEIGHT: 65 IN | TEMPERATURE: 98.7 F | DIASTOLIC BLOOD PRESSURE: 60 MMHG | WEIGHT: 148.6 LBS | OXYGEN SATURATION: 100 % | SYSTOLIC BLOOD PRESSURE: 118 MMHG | BODY MASS INDEX: 24.76 KG/M2

## 2020-12-29 DIAGNOSIS — I73.89 OTHER SPECIFIED PERIPHERAL VASCULAR DISEASES (CMD): ICD-10-CM

## 2020-12-29 DIAGNOSIS — E13.9 LADA (LATENT AUTOIMMUNE DIABETES IN ADULTS), MANAGED AS TYPE 1 (CMD): ICD-10-CM

## 2020-12-29 DIAGNOSIS — G89.29 CHRONIC RIGHT-SIDED LOW BACK PAIN WITHOUT SCIATICA: ICD-10-CM

## 2020-12-29 DIAGNOSIS — Z86.73 HISTORY OF MULTIPLE STROKES: ICD-10-CM

## 2020-12-29 DIAGNOSIS — L60.2 HYPERTROPHY OF NAIL: Primary | ICD-10-CM

## 2020-12-29 DIAGNOSIS — Z00.00 MEDICARE ANNUAL WELLNESS VISIT, INITIAL: Primary | ICD-10-CM

## 2020-12-29 DIAGNOSIS — Z23 NEED FOR VACCINATION: ICD-10-CM

## 2020-12-29 DIAGNOSIS — R29.6 FALLS FREQUENTLY: ICD-10-CM

## 2020-12-29 DIAGNOSIS — E11.42 DIABETIC POLYNEUROPATHY ASSOCIATED WITH TYPE 2 DIABETES MELLITUS (CMD): ICD-10-CM

## 2020-12-29 DIAGNOSIS — M54.50 CHRONIC RIGHT-SIDED LOW BACK PAIN WITHOUT SCIATICA: ICD-10-CM

## 2020-12-29 DIAGNOSIS — E03.9 ACQUIRED HYPOTHYROIDISM: ICD-10-CM

## 2020-12-29 DIAGNOSIS — Z12.31 ENCOUNTER FOR SCREENING MAMMOGRAM FOR MALIGNANT NEOPLASM OF BREAST: ICD-10-CM

## 2020-12-29 DIAGNOSIS — R25.1 TREMOR: ICD-10-CM

## 2020-12-29 DIAGNOSIS — H91.90 HEARING LOSS, UNSPECIFIED HEARING LOSS TYPE, UNSPECIFIED LATERALITY: ICD-10-CM

## 2020-12-29 LAB
BASOPHILS # BLD: 0 K/MCL (ref 0–0.3)
BASOPHILS NFR BLD: 0 %
DEPRECATED RDW RBC: 44.5 FL (ref 39–50)
EOSINOPHIL # BLD: 0.1 K/MCL (ref 0–0.5)
EOSINOPHIL NFR BLD: 1 %
ERYTHROCYTE [DISTWIDTH] IN BLOOD: 13.7 % (ref 11–15)
HCT VFR BLD CALC: 40.6 % (ref 36–46.5)
HGB BLD-MCNC: 13.3 G/DL (ref 12–15.5)
LYMPHOCYTES # BLD: 3.5 K/MCL (ref 1–4)
LYMPHOCYTES NFR BLD: 37 %
MCH RBC QN AUTO: 29.5 PG (ref 26–34)
MCHC RBC AUTO-ENTMCNC: 32.8 G/DL (ref 32–36.5)
MCV RBC AUTO: 90 FL (ref 78–100)
MONOCYTES # BLD: 0.6 K/MCL (ref 0.3–0.9)
MONOCYTES NFR BLD: 7 %
NEUTROPHILS # BLD: 5.1 K/MCL (ref 1.8–7.7)
NEUTROPHILS NFR BLD: 55 %
PLATELET # BLD AUTO: 238 K/MCL (ref 140–450)
RBC # BLD: 4.51 MIL/MCL (ref 4–5.2)
WBC # BLD: 9.3 K/MCL (ref 4.2–11)

## 2020-12-29 PROCEDURE — G0008 ADMIN INFLUENZA VIRUS VAC: HCPCS | Performed by: INTERNAL MEDICINE

## 2020-12-29 PROCEDURE — 84443 ASSAY THYROID STIM HORMONE: CPT | Performed by: CLINICAL MEDICAL LABORATORY

## 2020-12-29 PROCEDURE — 11721 DEBRIDE NAIL 6 OR MORE: CPT | Performed by: PODIATRIST

## 2020-12-29 PROCEDURE — 90662 IIV NO PRSV INCREASED AG IM: CPT | Performed by: INTERNAL MEDICINE

## 2020-12-29 PROCEDURE — G0402 INITIAL PREVENTIVE EXAM: HCPCS | Performed by: INTERNAL MEDICINE

## 2020-12-29 PROCEDURE — 80053 COMPREHEN METABOLIC PANEL: CPT | Performed by: CLINICAL MEDICAL LABORATORY

## 2020-12-29 PROCEDURE — 99214 OFFICE O/P EST MOD 30 MIN: CPT | Performed by: INTERNAL MEDICINE

## 2020-12-29 PROCEDURE — 83036 HEMOGLOBIN GLYCOSYLATED A1C: CPT | Performed by: CLINICAL MEDICAL LABORATORY

## 2020-12-29 PROCEDURE — 82043 UR ALBUMIN QUANTITATIVE: CPT | Performed by: CLINICAL MEDICAL LABORATORY

## 2020-12-29 PROCEDURE — 90732 PPSV23 VACC 2 YRS+ SUBQ/IM: CPT | Performed by: INTERNAL MEDICINE

## 2020-12-29 PROCEDURE — 36415 COLL VENOUS BLD VENIPUNCTURE: CPT | Performed by: INTERNAL MEDICINE

## 2020-12-29 PROCEDURE — G0009 ADMIN PNEUMOCOCCAL VACCINE: HCPCS | Performed by: INTERNAL MEDICINE

## 2020-12-29 PROCEDURE — 72110 X-RAY EXAM L-2 SPINE 4/>VWS: CPT | Performed by: RADIOLOGY

## 2020-12-29 PROCEDURE — 85025 COMPLETE CBC W/AUTO DIFF WBC: CPT | Performed by: INTERNAL MEDICINE

## 2020-12-29 PROCEDURE — 80061 LIPID PANEL: CPT | Performed by: CLINICAL MEDICAL LABORATORY

## 2020-12-29 PROCEDURE — 82570 ASSAY OF URINE CREATININE: CPT | Performed by: CLINICAL MEDICAL LABORATORY

## 2020-12-29 RX ORDER — SENNOSIDES 8.6 MG
8.6 TABLET ORAL EVERY OTHER DAY
Qty: 30 TABLET | Refills: 0 | Status: SHIPPED | OUTPATIENT
Start: 2020-12-29 | End: 2021-12-17

## 2020-12-29 RX ORDER — PEN NEEDLE, DIABETIC, SAFETY 30 GX3/16"
NEEDLE, DISPOSABLE MISCELLANEOUS 4 TIMES DAILY
Qty: 200 EACH | Refills: 5 | Status: SHIPPED | OUTPATIENT
Start: 2020-12-29 | End: 2022-06-06 | Stop reason: ALTCHOICE

## 2020-12-29 RX ORDER — POLYETHYLENE GLYCOL 3350 17 G/17G
POWDER, FOR SOLUTION ORAL
Qty: 527 G | Refills: 3 | Status: SHIPPED | OUTPATIENT
Start: 2020-12-29

## 2020-12-29 SDOH — HEALTH STABILITY: MENTAL HEALTH: HOW OFTEN DO YOU HAVE A DRINK CONTAINING ALCOHOL?: NEVER

## 2020-12-29 SDOH — HEALTH STABILITY: MENTAL HEALTH: HOW MANY STANDARD DRINKS CONTAINING ALCOHOL DO YOU HAVE ON A TYPICAL DAY?: 1 OR 2

## 2020-12-29 SDOH — HEALTH STABILITY: MENTAL HEALTH: HOW OFTEN DO YOU HAVE 6 OR MORE DRINKS ON ONE OCCASION?: NEVER

## 2020-12-29 ASSESSMENT — PATIENT HEALTH QUESTIONNAIRE - PHQ9
SUM OF ALL RESPONSES TO PHQ9 QUESTIONS 1 AND 2: 0
1. LITTLE INTEREST OR PLEASURE IN DOING THINGS: NOT AT ALL
SUM OF ALL RESPONSES TO PHQ9 QUESTIONS 1 AND 2: 0
CLINICAL INTERPRETATION OF PHQ9 SCORE: NO FURTHER SCREENING NEEDED
2. FEELING DOWN, DEPRESSED OR HOPELESS: NOT AT ALL
CLINICAL INTERPRETATION OF PHQ2 SCORE: NO FURTHER SCREENING NEEDED

## 2020-12-30 ENCOUNTER — TELEPHONE (OUTPATIENT)
Dept: INTERNAL MEDICINE | Age: 65
End: 2020-12-30

## 2020-12-30 LAB
ALBUMIN SERPL-MCNC: 3.3 G/DL (ref 3.6–5.1)
ALBUMIN/GLOB SERPL: 1 {RATIO} (ref 1–2.4)
ALP SERPL-CCNC: 89 UNITS/L (ref 45–117)
ALT SERPL-CCNC: 25 UNITS/L
ANION GAP SERPL CALC-SCNC: 12 MMOL/L (ref 10–20)
AST SERPL-CCNC: 18 UNITS/L
BILIRUB SERPL-MCNC: 0.5 MG/DL (ref 0.2–1)
BUN SERPL-MCNC: 17 MG/DL (ref 6–20)
BUN/CREAT SERPL: 22 (ref 7–25)
CALCIUM SERPL-MCNC: 9.5 MG/DL (ref 8.4–10.2)
CHLORIDE SERPL-SCNC: 102 MMOL/L (ref 98–107)
CHOLEST SERPL-MCNC: 181 MG/DL
CHOLEST/HDLC SERPL: 2.2 {RATIO}
CO2 SERPL-SCNC: 26 MMOL/L (ref 21–32)
CREAT SERPL-MCNC: 0.77 MG/DL (ref 0.51–0.95)
CREAT UR-MCNC: 72.8 MG/DL
FASTING DURATION TIME PATIENT: 12 HOURS
FASTING DURATION TIME PATIENT: 12 HOURS
GFR SERPLBLD BASED ON 1.73 SQ M-ARVRAT: 81 ML/MIN/1.73M2
GLOBULIN SER-MCNC: 3.2 G/DL (ref 2–4)
GLUCOSE SERPL-MCNC: 158 MG/DL (ref 65–99)
HBA1C MFR BLD: 9.8 % (ref 4.5–5.6)
HDLC SERPL-MCNC: 81 MG/DL
LDLC SERPL CALC-MCNC: 77 MG/DL
MICROALBUMIN UR-MCNC: 2.74 MG/DL
MICROALBUMIN/CREAT UR: 37.6 MG/G
NONHDLC SERPL-MCNC: 100 MG/DL
POTASSIUM SERPL-SCNC: 4.8 MMOL/L (ref 3.4–5.1)
PROT SERPL-MCNC: 6.5 G/DL (ref 6.4–8.2)
SODIUM SERPL-SCNC: 135 MMOL/L (ref 135–145)
TRIGL SERPL-MCNC: 117 MG/DL
TSH SERPL-ACNC: 1.41 MCUNITS/ML (ref 0.35–5)

## 2021-01-01 ENCOUNTER — EXTERNAL RECORD (OUTPATIENT)
Dept: OTHER | Age: 66
End: 2021-01-01

## 2021-01-02 ENCOUNTER — TELEPHONE (OUTPATIENT)
Dept: SCHEDULING | Age: 66
End: 2021-01-02

## 2021-01-04 ENCOUNTER — TELEPHONE (OUTPATIENT)
Dept: INTERNAL MEDICINE | Age: 66
End: 2021-01-04

## 2021-01-10 ENCOUNTER — HOSPITAL ENCOUNTER (EMERGENCY)
Age: 66
Discharge: HOME | End: 2021-01-10
Attending: EMERGENCY MEDICINE

## 2021-01-10 ENCOUNTER — NURSE TRIAGE (OUTPATIENT)
Dept: TELEHEALTH | Age: 66
End: 2021-01-10

## 2021-01-10 VITALS
WEIGHT: 146.16 LBS | SYSTOLIC BLOOD PRESSURE: 126 MMHG | HEIGHT: 65 IN | TEMPERATURE: 97.6 F | OXYGEN SATURATION: 96 % | HEART RATE: 80 BPM | BODY MASS INDEX: 24.35 KG/M2 | DIASTOLIC BLOOD PRESSURE: 56 MMHG | RESPIRATION RATE: 31 BRPM

## 2021-01-10 DIAGNOSIS — R42 DIZZINESS: Primary | ICD-10-CM

## 2021-01-10 LAB
ALBUMIN SERPL-MCNC: 3.2 G/DL (ref 3.6–5.1)
ALBUMIN/GLOB SERPL: 1 {RATIO} (ref 1–2.4)
ALP SERPL-CCNC: 98 UNITS/L (ref 45–117)
ALT SERPL-CCNC: 23 UNITS/L
ANION GAP SERPL CALC-SCNC: 17 MMOL/L (ref 10–20)
AST SERPL-CCNC: 17 UNITS/L
BASOPHILS # BLD: 0 K/MCL (ref 0–0.3)
BASOPHILS NFR BLD: 0 %
BILIRUB SERPL-MCNC: 0.5 MG/DL (ref 0.2–1)
BUN SERPL-MCNC: 37 MG/DL (ref 6–20)
BUN/CREAT SERPL: 31 (ref 7–25)
CALCIUM SERPL-MCNC: 9.9 MG/DL (ref 8.4–10.2)
CHLORIDE SERPL-SCNC: 97 MMOL/L (ref 98–107)
CO2 SERPL-SCNC: 27 MMOL/L (ref 21–32)
CREAT SERPL-MCNC: 1.18 MG/DL (ref 0.51–0.95)
DEPRECATED RDW RBC: 45 FL (ref 39–50)
EOSINOPHIL # BLD: 0.2 K/MCL (ref 0–0.5)
EOSINOPHIL NFR BLD: 2 %
ERYTHROCYTE [DISTWIDTH] IN BLOOD: 13.8 % (ref 11–15)
FASTING DURATION TIME PATIENT: ABNORMAL H
GFR SERPLBLD BASED ON 1.73 SQ M-ARVRAT: 49 ML/MIN/1.73M2
GLOBULIN SER-MCNC: 3.3 G/DL (ref 2–4)
GLUCOSE SERPL-MCNC: 182 MG/DL (ref 65–99)
HCT VFR BLD CALC: 37.2 % (ref 36–46.5)
HGB BLD-MCNC: 12.6 G/DL (ref 12–15.5)
IMM GRANULOCYTES # BLD AUTO: 0 K/MCL (ref 0–0.2)
IMM GRANULOCYTES # BLD: 0 %
LIPASE SERPL-CCNC: 65 UNITS/L (ref 73–393)
LYMPHOCYTES # BLD: 3.4 K/MCL (ref 1–4)
LYMPHOCYTES NFR BLD: 34 %
MCH RBC QN AUTO: 30 PG (ref 26–34)
MCHC RBC AUTO-ENTMCNC: 33.9 G/DL (ref 32–36.5)
MCV RBC AUTO: 88.6 FL (ref 78–100)
MONOCYTES # BLD: 0.8 K/MCL (ref 0.3–0.9)
MONOCYTES NFR BLD: 8 %
NEUTROPHILS # BLD: 5.5 K/MCL (ref 1.8–7.7)
NEUTROPHILS NFR BLD: 56 %
NRBC BLD MANUAL-RTO: 0 /100 WBC
PLATELET # BLD AUTO: 236 K/MCL (ref 140–450)
POTASSIUM SERPL-SCNC: 3.8 MMOL/L (ref 3.4–5.1)
PROT SERPL-MCNC: 6.5 G/DL (ref 6.4–8.2)
RBC # BLD: 4.2 MIL/MCL (ref 4–5.2)
SODIUM SERPL-SCNC: 137 MMOL/L (ref 135–145)
TROPONIN I SERPL HS-MCNC: <0.02 NG/ML
WBC # BLD: 9.9 K/MCL (ref 4.2–11)

## 2021-01-10 PROCEDURE — 84484 ASSAY OF TROPONIN QUANT: CPT | Performed by: EMERGENCY MEDICINE

## 2021-01-10 PROCEDURE — 96360 HYDRATION IV INFUSION INIT: CPT

## 2021-01-10 PROCEDURE — 99284 EMERGENCY DEPT VISIT MOD MDM: CPT

## 2021-01-10 PROCEDURE — 80053 COMPREHEN METABOLIC PANEL: CPT | Performed by: EMERGENCY MEDICINE

## 2021-01-10 PROCEDURE — 85025 COMPLETE CBC W/AUTO DIFF WBC: CPT | Performed by: EMERGENCY MEDICINE

## 2021-01-10 PROCEDURE — 10002803 HB RX 637: Performed by: EMERGENCY MEDICINE

## 2021-01-10 PROCEDURE — 10002807 HB RX 258: Performed by: EMERGENCY MEDICINE

## 2021-01-10 PROCEDURE — 99285 EMERGENCY DEPT VISIT HI MDM: CPT | Performed by: EMERGENCY MEDICINE

## 2021-01-10 PROCEDURE — 93005 ELECTROCARDIOGRAM TRACING: CPT | Performed by: EMERGENCY MEDICINE

## 2021-01-10 PROCEDURE — 93010 ELECTROCARDIOGRAM REPORT: CPT | Performed by: INTERNAL MEDICINE

## 2021-01-10 PROCEDURE — 83690 ASSAY OF LIPASE: CPT | Performed by: EMERGENCY MEDICINE

## 2021-01-10 RX ORDER — PREDNISONE 20 MG/1
40 TABLET ORAL ONCE
Status: DISCONTINUED | OUTPATIENT
Start: 2021-01-10 | End: 2021-01-11 | Stop reason: HOSPADM

## 2021-01-10 RX ORDER — MECLIZINE HCL 12.5 MG/1
25 TABLET ORAL ONCE
Status: COMPLETED | OUTPATIENT
Start: 2021-01-10 | End: 2021-01-10

## 2021-01-10 RX ORDER — MECLIZINE HYDROCHLORIDE 25 MG/1
25 TABLET ORAL 3 TIMES DAILY PRN
Qty: 15 TABLET | Refills: 0 | Status: SHIPPED | OUTPATIENT
Start: 2021-01-10 | End: 2021-02-04 | Stop reason: ALTCHOICE

## 2021-01-10 RX ADMIN — SODIUM CHLORIDE 1000 ML: 9 INJECTION, SOLUTION INTRAVENOUS at 20:57

## 2021-01-10 RX ADMIN — MECLIZINE HYDROCHLORIDE 25 MG: 12.5 TABLET, FILM COATED ORAL at 20:57

## 2021-01-10 SDOH — HEALTH STABILITY: MENTAL HEALTH: HOW OFTEN DO YOU HAVE 6 OR MORE DRINKS ON ONE OCCASION?: NEVER

## 2021-01-10 SDOH — HEALTH STABILITY: MENTAL HEALTH: HOW MANY STANDARD DRINKS CONTAINING ALCOHOL DO YOU HAVE ON A TYPICAL DAY?: 1 OR 2

## 2021-01-10 SDOH — HEALTH STABILITY: MENTAL HEALTH: HOW OFTEN DO YOU HAVE A DRINK CONTAINING ALCOHOL?: NEVER

## 2021-01-10 ASSESSMENT — PAIN SCALES - GENERAL: PAINLEVEL_OUTOF10: 0

## 2021-01-12 ENCOUNTER — TELEPHONE (OUTPATIENT)
Dept: INTERNAL MEDICINE | Age: 66
End: 2021-01-12

## 2021-01-13 ENCOUNTER — OFFICE VISIT (OUTPATIENT)
Dept: INTERNAL MEDICINE | Age: 66
End: 2021-01-13

## 2021-01-13 ENCOUNTER — TELEPHONE (OUTPATIENT)
Dept: INTERNAL MEDICINE | Age: 66
End: 2021-01-13

## 2021-01-13 VITALS
OXYGEN SATURATION: 100 % | WEIGHT: 146.8 LBS | BODY MASS INDEX: 24.43 KG/M2 | HEART RATE: 78 BPM | TEMPERATURE: 98.8 F | DIASTOLIC BLOOD PRESSURE: 62 MMHG | SYSTOLIC BLOOD PRESSURE: 110 MMHG

## 2021-01-13 DIAGNOSIS — R29.6 FALLS FREQUENTLY: ICD-10-CM

## 2021-01-13 DIAGNOSIS — R25.1 TREMOR: ICD-10-CM

## 2021-01-13 DIAGNOSIS — M89.8X1 PAIN OF RIGHT CLAVICLE: ICD-10-CM

## 2021-01-13 DIAGNOSIS — Z86.73 HISTORY OF MULTIPLE STROKES: ICD-10-CM

## 2021-01-13 DIAGNOSIS — F17.200 TOBACCO USE DISORDER: ICD-10-CM

## 2021-01-13 DIAGNOSIS — Z12.4 SCREENING FOR MALIGNANT NEOPLASM OF CERVIX: ICD-10-CM

## 2021-01-13 DIAGNOSIS — R42 DIZZINESS: ICD-10-CM

## 2021-01-13 DIAGNOSIS — E13.9 LADA (LATENT AUTOIMMUNE DIABETES IN ADULTS), MANAGED AS TYPE 1 (CMD): Primary | ICD-10-CM

## 2021-01-13 LAB
ATRIAL RATE (BPM): 82
DIASTOLIC BLOOD PRESSURE: 56
P AXIS (DEGREES): 66
PR-INTERVAL (MSEC): 166
QRS-INTERVAL (MSEC): 72
QT-INTERVAL (MSEC): 348
QTC: 406
R AXIS (DEGREES): 46
REPORT TEXT: NORMAL
SYSTOLIC BLOOD PRESSURE: 117
T AXIS (DEGREES): 69
VENTRICULAR RATE EKG/MIN (BPM): 82

## 2021-01-13 PROCEDURE — 87624 HPV HI-RISK TYP POOLED RSLT: CPT | Performed by: CLINICAL MEDICAL LABORATORY

## 2021-01-13 PROCEDURE — 99214 OFFICE O/P EST MOD 30 MIN: CPT | Performed by: INTERNAL MEDICINE

## 2021-01-13 PROCEDURE — 88175 CYTOPATH C/V AUTO FLUID REDO: CPT | Performed by: CLINICAL MEDICAL LABORATORY

## 2021-01-13 RX ORDER — LANCING DEVICE
EACH MISCELLANEOUS
Qty: 400 EACH | Refills: 3 | Status: SHIPPED | OUTPATIENT
Start: 2021-01-13 | End: 2021-01-18 | Stop reason: SDUPTHER

## 2021-01-13 RX ORDER — LANCING DEVICE
EACH MISCELLANEOUS
Qty: 1 KIT | Refills: 0 | Status: SHIPPED | OUTPATIENT
Start: 2021-01-13 | End: 2021-01-18 | Stop reason: SDUPTHER

## 2021-01-13 RX ORDER — BUPROPION HYDROCHLORIDE 150 MG/1
300 TABLET, EXTENDED RELEASE ORAL DAILY
COMMUNITY
End: 2021-01-13 | Stop reason: SDUPTHER

## 2021-01-13 RX ORDER — BUPROPION HYDROCHLORIDE 150 MG/1
300 TABLET, EXTENDED RELEASE ORAL DAILY
Qty: 60 TABLET | Refills: 1 | Status: SHIPPED | OUTPATIENT
Start: 2021-01-13 | End: 2021-03-31 | Stop reason: SDUPTHER

## 2021-01-14 ENCOUNTER — OFFICE VISIT (OUTPATIENT)
Dept: OPHTHALMOLOGY | Age: 66
End: 2021-01-14
Attending: INTERNAL MEDICINE

## 2021-01-14 DIAGNOSIS — H52.203 ASTIGMATISM OF BOTH EYES WITH PRESBYOPIA: ICD-10-CM

## 2021-01-14 DIAGNOSIS — E11.3299 BACKGROUND DIABETIC RETINOPATHY (CMD): Primary | ICD-10-CM

## 2021-01-14 DIAGNOSIS — H52.4 ASTIGMATISM OF BOTH EYES WITH PRESBYOPIA: ICD-10-CM

## 2021-01-14 PROCEDURE — 92004 COMPRE OPH EXAM NEW PT 1/>: CPT | Performed by: OPTOMETRIST

## 2021-01-14 ASSESSMENT — REFRACTION_WEARINGRX
OS_SPHERE: +3.00
OD_SPHERE: +3.00

## 2021-01-14 ASSESSMENT — REFRACTION_MANIFEST
OD_ADD: +2.25
OD_SPHERE: -0.75
OD_AXIS: 180
OS_AXIS: 175
OS_ADD: +2.25
OS_CYLINDER: +0.50
OD_CYLINDER: +1.00
METHOD_AUTOREFRACTION: 1
OS_SPHERE: -0.50

## 2021-01-14 ASSESSMENT — VISUAL ACUITY
OD_CC: 20/30
OD_PH_SC+: -2
OS_PH_SC: NO IMPROVEMENT
OS_CC: 20/30
OD_SC: 20/40
OS_SC: 20/40
METHOD: SNELLEN - LINEAR
OD_PH_SC: 20/30

## 2021-01-14 ASSESSMENT — CUP TO DISC RATIO
OS_RATIO: 0.3
OD_RATIO: 0.35

## 2021-01-14 ASSESSMENT — SLIT LAMP EXAM - LIDS
COMMENTS: NORMAL
COMMENTS: NORMAL

## 2021-01-14 ASSESSMENT — EXTERNAL EXAM - RIGHT EYE: OD_EXAM: NORMAL

## 2021-01-14 ASSESSMENT — CONF VISUAL FIELD
OD_NORMAL: 1
OS_NORMAL: 1

## 2021-01-14 ASSESSMENT — EXTERNAL EXAM - LEFT EYE: OS_EXAM: NORMAL

## 2021-01-14 ASSESSMENT — TONOMETRY
OS_IOP_MMHG: 14
IOP_METHOD: APPLANATION
OD_IOP_MMHG: 14

## 2021-01-15 DIAGNOSIS — E13.9 LADA (LATENT AUTOIMMUNE DIABETES IN ADULTS), MANAGED AS TYPE 1 (CMD): ICD-10-CM

## 2021-01-18 ENCOUNTER — TELEPHONE (OUTPATIENT)
Dept: INTERNAL MEDICINE | Age: 66
End: 2021-01-18

## 2021-01-18 RX ORDER — LANCING DEVICE
EACH MISCELLANEOUS
Qty: 300 EACH | Refills: 3 | Status: SHIPPED | OUTPATIENT
Start: 2021-01-18 | End: 2021-08-13

## 2021-01-18 RX ORDER — LANCING DEVICE
EACH MISCELLANEOUS
Qty: 400 EACH | Refills: 3 | Status: SHIPPED | OUTPATIENT
Start: 2021-01-18 | End: 2021-01-18 | Stop reason: SDUPTHER

## 2021-01-18 RX ORDER — LANCING DEVICE
EACH MISCELLANEOUS
Qty: 1 KIT | Refills: 0 | Status: SHIPPED | OUTPATIENT
Start: 2021-01-18 | End: 2021-08-13

## 2021-01-21 ENCOUNTER — IMAGING SERVICES (OUTPATIENT)
Dept: GENERAL RADIOLOGY | Age: 66
End: 2021-01-21
Attending: INTERNAL MEDICINE

## 2021-01-21 DIAGNOSIS — M89.8X1 PAIN OF RIGHT CLAVICLE: ICD-10-CM

## 2021-01-21 PROCEDURE — 73000 X-RAY EXAM OF COLLAR BONE: CPT | Performed by: RADIOLOGY

## 2021-01-22 ENCOUNTER — TELEPHONE (OUTPATIENT)
Dept: INTERNAL MEDICINE | Age: 66
End: 2021-01-22

## 2021-01-22 LAB
CASE RPRT: NORMAL
CYTOLOGY CVX/VAG STUDY: NORMAL
HPV16+18+45 E6+E7MRNA CVX NAA+PROBE: NEGATIVE
Lab: NORMAL
PAP EDUCATIONAL NOTE: NORMAL
SPECIMEN ADEQUACY: NORMAL

## 2021-01-25 ENCOUNTER — TELEPHONE (OUTPATIENT)
Dept: INTERNAL MEDICINE | Age: 66
End: 2021-01-25

## 2021-01-26 ENCOUNTER — OFFICE VISIT (OUTPATIENT)
Dept: PODIATRY | Age: 66
End: 2021-01-26

## 2021-01-26 ENCOUNTER — TELEPHONE (OUTPATIENT)
Dept: INTERNAL MEDICINE | Age: 66
End: 2021-01-26

## 2021-01-26 DIAGNOSIS — M79.674 PAIN IN TOE OF RIGHT FOOT: ICD-10-CM

## 2021-01-26 DIAGNOSIS — L60.0 INGROWING NAIL: Primary | ICD-10-CM

## 2021-01-26 DIAGNOSIS — E11.42 DIABETIC POLYNEUROPATHY ASSOCIATED WITH TYPE 2 DIABETES MELLITUS (CMD): ICD-10-CM

## 2021-01-26 DIAGNOSIS — M89.8X1 PAIN OF RIGHT CLAVICLE: Primary | ICD-10-CM

## 2021-01-26 PROCEDURE — 11730 AVULSION NAIL PLATE SIMPLE 1: CPT | Performed by: PODIATRIST

## 2021-01-29 ENCOUNTER — IMAGING SERVICES (OUTPATIENT)
Dept: MAMMOGRAPHY | Age: 66
End: 2021-01-29
Attending: INTERNAL MEDICINE

## 2021-01-29 DIAGNOSIS — Z12.31 ENCOUNTER FOR SCREENING MAMMOGRAM FOR MALIGNANT NEOPLASM OF BREAST: ICD-10-CM

## 2021-01-29 PROCEDURE — 77063 BREAST TOMOSYNTHESIS BI: CPT | Performed by: RADIOLOGY

## 2021-01-29 PROCEDURE — 77067 SCR MAMMO BI INCL CAD: CPT | Performed by: RADIOLOGY

## 2021-02-04 ENCOUNTER — OFFICE VISIT (OUTPATIENT)
Dept: OBGYN | Age: 66
End: 2021-02-04
Attending: NURSE PRACTITIONER

## 2021-02-04 VITALS
WEIGHT: 150 LBS | BODY MASS INDEX: 24.99 KG/M2 | HEART RATE: 90 BPM | DIASTOLIC BLOOD PRESSURE: 66 MMHG | SYSTOLIC BLOOD PRESSURE: 124 MMHG | HEIGHT: 65 IN

## 2021-02-04 DIAGNOSIS — N39.0 RECURRENT UTI: ICD-10-CM

## 2021-02-04 DIAGNOSIS — N81.2 PELVIC RELAXATION DUE TO UTEROVAGINAL PROLAPSE, INCOMPLETE: ICD-10-CM

## 2021-02-04 DIAGNOSIS — N32.81 OAB (OVERACTIVE BLADDER): Primary | ICD-10-CM

## 2021-02-04 PROCEDURE — 99204 OFFICE O/P NEW MOD 45 MIN: CPT | Performed by: OBSTETRICS & GYNECOLOGY

## 2021-02-04 PROCEDURE — 87086 URINE CULTURE/COLONY COUNT: CPT | Performed by: CLINICAL MEDICAL LABORATORY

## 2021-02-04 SDOH — HEALTH STABILITY: MENTAL HEALTH: HOW OFTEN DO YOU HAVE 6 OR MORE DRINKS ON ONE OCCASION?: NEVER

## 2021-02-04 SDOH — HEALTH STABILITY: MENTAL HEALTH: HOW MANY STANDARD DRINKS CONTAINING ALCOHOL DO YOU HAVE ON A TYPICAL DAY?: 1 OR 2

## 2021-02-04 SDOH — HEALTH STABILITY: MENTAL HEALTH: HOW OFTEN DO YOU HAVE A DRINK CONTAINING ALCOHOL?: NEVER

## 2021-02-06 LAB — BACTERIA UR CULT: NORMAL

## 2021-02-10 ENCOUNTER — HOSPITAL ENCOUNTER (OUTPATIENT)
Dept: MRI IMAGING | Age: 66
Discharge: HOME | End: 2021-02-10
Attending: INTERNAL MEDICINE

## 2021-02-10 ENCOUNTER — OFFICE VISIT (OUTPATIENT)
Dept: ORTHOPEDICS | Age: 66
End: 2021-02-10
Attending: INTERNAL MEDICINE

## 2021-02-10 VITALS — HEIGHT: 65 IN | WEIGHT: 149.91 LBS | BODY MASS INDEX: 24.98 KG/M2

## 2021-02-10 DIAGNOSIS — R22.2 CHEST WALL MASS: Primary | ICD-10-CM

## 2021-02-10 DIAGNOSIS — R29.6 FALLS FREQUENTLY: ICD-10-CM

## 2021-02-10 DIAGNOSIS — R25.1 TREMOR: ICD-10-CM

## 2021-02-10 DIAGNOSIS — R42 DIZZINESS: ICD-10-CM

## 2021-02-10 DIAGNOSIS — Z86.73 HISTORY OF MULTIPLE STROKES: ICD-10-CM

## 2021-02-10 PROCEDURE — 70553 MRI BRAIN STEM W/O & W/DYE: CPT | Performed by: RADIOLOGY

## 2021-02-10 PROCEDURE — A9585 GADOBUTROL INJECTION: HCPCS | Performed by: INTERNAL MEDICINE

## 2021-02-10 PROCEDURE — 99203 OFFICE O/P NEW LOW 30 MIN: CPT | Performed by: ORTHOPAEDIC SURGERY

## 2021-02-10 PROCEDURE — 10002805 HB CONTRAST AGENT: Performed by: INTERNAL MEDICINE

## 2021-02-10 PROCEDURE — 70553 MRI BRAIN STEM W/O & W/DYE: CPT

## 2021-02-10 RX ORDER — GADOBUTROL 604.72 MG/ML
6.5 INJECTION INTRAVENOUS ONCE
Status: COMPLETED | OUTPATIENT
Start: 2021-02-10 | End: 2021-02-10

## 2021-02-10 RX ADMIN — GADOBUTROL 6.5 ML: 604.72 INJECTION INTRAVENOUS at 11:45

## 2021-02-12 ENCOUNTER — OFFICE VISIT (OUTPATIENT)
Dept: AUDIOLOGY | Age: 66
End: 2021-02-12
Attending: INTERNAL MEDICINE

## 2021-02-12 ENCOUNTER — TELEPHONE (OUTPATIENT)
Dept: AUDIOLOGY | Age: 66
End: 2021-02-12

## 2021-02-12 ENCOUNTER — OFFICE VISIT (OUTPATIENT)
Dept: AUDIOLOGY | Age: 66
End: 2021-02-12

## 2021-02-12 DIAGNOSIS — H90.3 BILATERAL SENSORINEURAL HEARING LOSS: Primary | ICD-10-CM

## 2021-02-12 PROCEDURE — 92557 COMPREHENSIVE HEARING TEST: CPT | Performed by: AUDIOLOGIST

## 2021-02-12 PROCEDURE — 92591 HEARING AID EXAM, BOTH EARS: CPT | Performed by: AUDIOLOGIST

## 2021-02-15 ENCOUNTER — TELEPHONE (OUTPATIENT)
Dept: ORTHOPEDICS | Age: 66
End: 2021-02-15

## 2021-02-16 ENCOUNTER — TELEPHONE (OUTPATIENT)
Dept: ORTHOPEDICS | Age: 66
End: 2021-02-16

## 2021-02-16 ENCOUNTER — HOSPITAL ENCOUNTER (OUTPATIENT)
Dept: ULTRASOUND IMAGING | Age: 66
Discharge: HOME | End: 2021-02-16
Attending: INTERNAL MEDICINE

## 2021-02-16 ENCOUNTER — TELEPHONE (OUTPATIENT)
Dept: INTERNAL MEDICINE | Age: 66
End: 2021-02-16

## 2021-02-16 ENCOUNTER — OFFICE VISIT (OUTPATIENT)
Dept: INTERNAL MEDICINE | Age: 66
End: 2021-02-16

## 2021-02-16 VITALS
SYSTOLIC BLOOD PRESSURE: 100 MMHG | DIASTOLIC BLOOD PRESSURE: 60 MMHG | TEMPERATURE: 97.1 F | BODY MASS INDEX: 27.34 KG/M2 | HEART RATE: 80 BPM | WEIGHT: 161.8 LBS | OXYGEN SATURATION: 99 %

## 2021-02-16 DIAGNOSIS — R90.89 ABNORMAL BRAIN MRI: ICD-10-CM

## 2021-02-16 DIAGNOSIS — M79.89 RIGHT LEG SWELLING: ICD-10-CM

## 2021-02-16 DIAGNOSIS — E08.42 DIABETIC POLYNEUROPATHY ASSOCIATED WITH DIABETES MELLITUS DUE TO UNDERLYING CONDITION (CMD): ICD-10-CM

## 2021-02-16 DIAGNOSIS — R22.2 CHEST WALL MASS: Primary | ICD-10-CM

## 2021-02-16 DIAGNOSIS — M79.89 RIGHT LEG SWELLING: Primary | ICD-10-CM

## 2021-02-16 DIAGNOSIS — F17.200 TOBACCO USE DISORDER: ICD-10-CM

## 2021-02-16 DIAGNOSIS — E13.9 LADA (LATENT AUTOIMMUNE DIABETES IN ADULTS), MANAGED AS TYPE 1 (CMD): ICD-10-CM

## 2021-02-16 PROCEDURE — 99214 OFFICE O/P EST MOD 30 MIN: CPT | Performed by: INTERNAL MEDICINE

## 2021-02-16 PROCEDURE — 93971 EXTREMITY STUDY: CPT | Performed by: RADIOLOGY

## 2021-02-16 PROCEDURE — 93971 EXTREMITY STUDY: CPT

## 2021-02-16 RX ORDER — PREGABALIN 225 MG/1
225 CAPSULE ORAL 2 TIMES DAILY
Qty: 60 CAPSULE | Refills: 0 | Status: CANCELLED | OUTPATIENT
Start: 2021-02-16

## 2021-02-16 RX ORDER — NITROGLYCERIN 0.4 MG/1
0.4 TABLET SUBLINGUAL EVERY 5 MIN PRN
Qty: 25 TABLET | Refills: 6 | Status: SHIPPED | OUTPATIENT
Start: 2021-02-16

## 2021-02-17 ENCOUNTER — HOSPITAL ENCOUNTER (OUTPATIENT)
Dept: CT IMAGING | Age: 66
End: 2021-02-17
Attending: ORTHOPAEDIC SURGERY

## 2021-02-17 ENCOUNTER — TELEPHONE (OUTPATIENT)
Dept: INTERNAL MEDICINE | Age: 66
End: 2021-02-17

## 2021-02-17 ENCOUNTER — APPOINTMENT (OUTPATIENT)
Dept: CT IMAGING | Age: 66
End: 2021-02-17
Attending: ORTHOPAEDIC SURGERY

## 2021-02-18 ENCOUNTER — HOSPITAL ENCOUNTER (EMERGENCY)
Age: 66
Discharge: HOME | End: 2021-02-18
Attending: EMERGENCY MEDICINE

## 2021-02-18 ENCOUNTER — HOSPITAL ENCOUNTER (OUTPATIENT)
Dept: CT IMAGING | Age: 66
Discharge: HOME | End: 2021-02-18
Attending: ORTHOPAEDIC SURGERY

## 2021-02-18 ENCOUNTER — TELEPHONE (OUTPATIENT)
Dept: EDUCATION SERVICES | Age: 66
End: 2021-02-18

## 2021-02-18 VITALS
DIASTOLIC BLOOD PRESSURE: 66 MMHG | WEIGHT: 164.68 LBS | RESPIRATION RATE: 16 BRPM | HEIGHT: 64 IN | TEMPERATURE: 97.4 F | HEART RATE: 85 BPM | SYSTOLIC BLOOD PRESSURE: 149 MMHG | OXYGEN SATURATION: 99 % | BODY MASS INDEX: 28.12 KG/M2

## 2021-02-18 DIAGNOSIS — R22.2 CHEST WALL MASS: ICD-10-CM

## 2021-02-18 DIAGNOSIS — H60.502 ACUTE OTITIS EXTERNA OF LEFT EAR, UNSPECIFIED TYPE: Primary | ICD-10-CM

## 2021-02-18 DIAGNOSIS — E13.9 LADA (LATENT AUTOIMMUNE DIABETES IN ADULTS), MANAGED AS TYPE 1 (CMD): Primary | ICD-10-CM

## 2021-02-18 LAB
CREAT SERPL-MCNC: 0.9 MG/DL (ref 0.51–0.95)
GFR SERPLBLD BASED ON 1.73 SQ M-ARVRAT: 67 ML/MIN/1.73M2

## 2021-02-18 PROCEDURE — 99283 EMERGENCY DEPT VISIT LOW MDM: CPT

## 2021-02-18 PROCEDURE — 82565 ASSAY OF CREATININE: CPT

## 2021-02-18 PROCEDURE — 99283 EMERGENCY DEPT VISIT LOW MDM: CPT | Performed by: EMERGENCY MEDICINE

## 2021-02-18 PROCEDURE — 10002805 HB CONTRAST AGENT: Performed by: ORTHOPAEDIC SURGERY

## 2021-02-18 PROCEDURE — 71260 CT THORAX DX C+: CPT

## 2021-02-18 PROCEDURE — 10002807 HB RX 258: Performed by: ORTHOPAEDIC SURGERY

## 2021-02-18 PROCEDURE — 71260 CT THORAX DX C+: CPT | Performed by: RADIOLOGY

## 2021-02-18 RX ORDER — NEOMYCIN SULFATE, POLYMYXIN B SULFATE AND HYDROCORTISONE 10; 3.5; 1 MG/ML; MG/ML; [USP'U]/ML
4 SUSPENSION/ DROPS AURICULAR (OTIC) 4 TIMES DAILY
Qty: 10 ML | Refills: 0 | Status: SHIPPED | OUTPATIENT
Start: 2021-02-18 | End: 2021-03-17 | Stop reason: ALTCHOICE

## 2021-02-18 RX ADMIN — SODIUM CHLORIDE 50 ML: 9 INJECTION, SOLUTION INTRAVENOUS at 12:00

## 2021-02-18 RX ADMIN — IOHEXOL 100 ML: 300 INJECTION, SOLUTION INTRAVENOUS at 12:00

## 2021-02-18 SDOH — HEALTH STABILITY: MENTAL HEALTH: HOW OFTEN DO YOU HAVE 6 OR MORE DRINKS ON ONE OCCASION?: NEVER

## 2021-02-18 SDOH — HEALTH STABILITY: MENTAL HEALTH: HOW MANY STANDARD DRINKS CONTAINING ALCOHOL DO YOU HAVE ON A TYPICAL DAY?: 1 OR 2

## 2021-02-18 SDOH — HEALTH STABILITY: MENTAL HEALTH: HOW OFTEN DO YOU HAVE A DRINK CONTAINING ALCOHOL?: NEVER

## 2021-02-18 ASSESSMENT — ENCOUNTER SYMPTOMS
SORE THROAT: 0
WHEEZING: 0
RHINORRHEA: 0
COUGH: 0
FEVER: 0
EYES NEGATIVE: 1

## 2021-02-18 ASSESSMENT — PAIN SCALES - GENERAL: PAINLEVEL_OUTOF10: 0

## 2021-02-18 ASSESSMENT — PAIN DESCRIPTION - PAIN TYPE: TYPE: ACUTE PAIN

## 2021-02-19 ENCOUNTER — TELEPHONE (OUTPATIENT)
Dept: FAMILY MEDICINE | Age: 66
End: 2021-02-19

## 2021-02-21 ENCOUNTER — WALK IN (OUTPATIENT)
Dept: URGENT CARE | Age: 66
End: 2021-02-21

## 2021-02-21 VITALS
SYSTOLIC BLOOD PRESSURE: 134 MMHG | DIASTOLIC BLOOD PRESSURE: 55 MMHG | OXYGEN SATURATION: 99 % | HEART RATE: 79 BPM | RESPIRATION RATE: 18 BRPM | TEMPERATURE: 97.4 F

## 2021-02-21 DIAGNOSIS — H92.02 LEFT EAR PAIN: Primary | ICD-10-CM

## 2021-02-21 PROCEDURE — 99213 OFFICE O/P EST LOW 20 MIN: CPT | Performed by: NURSE PRACTITIONER

## 2021-02-21 RX ORDER — CEFDINIR 300 MG/1
300 CAPSULE ORAL 2 TIMES DAILY
Qty: 20 CAPSULE | Refills: 0 | Status: SHIPPED | OUTPATIENT
Start: 2021-02-21 | End: 2021-03-03

## 2021-02-23 ENCOUNTER — TELEPHONE (OUTPATIENT)
Dept: ORTHOPEDICS | Age: 66
End: 2021-02-23

## 2021-02-28 ENCOUNTER — IMMUNIZATION (OUTPATIENT)
Dept: LAB | Age: 66
End: 2021-02-28

## 2021-02-28 DIAGNOSIS — Z23 NEED FOR VACCINATION: Primary | ICD-10-CM

## 2021-02-28 PROCEDURE — 91300 COVID 19 PFIZER-BIONTECH: CPT | Performed by: INTERNAL MEDICINE

## 2021-02-28 PROCEDURE — 0001A COVID 19 PFIZER-BIONTECH: CPT | Performed by: INTERNAL MEDICINE

## 2021-03-01 ENCOUNTER — TELEPHONE (OUTPATIENT)
Dept: INTERNAL MEDICINE | Age: 66
End: 2021-03-01

## 2021-03-02 ENCOUNTER — OFFICE VISIT (OUTPATIENT)
Dept: PODIATRY | Age: 66
End: 2021-03-02

## 2021-03-02 ENCOUNTER — OFFICE VISIT (OUTPATIENT)
Dept: INTERNAL MEDICINE | Age: 66
End: 2021-03-02

## 2021-03-02 VITALS
BODY MASS INDEX: 27.84 KG/M2 | HEART RATE: 78 BPM | WEIGHT: 162.2 LBS | TEMPERATURE: 98.3 F | OXYGEN SATURATION: 98 % | DIASTOLIC BLOOD PRESSURE: 60 MMHG | SYSTOLIC BLOOD PRESSURE: 102 MMHG

## 2021-03-02 DIAGNOSIS — E11.42 DIABETIC POLYNEUROPATHY ASSOCIATED WITH TYPE 2 DIABETES MELLITUS (CMD): ICD-10-CM

## 2021-03-02 DIAGNOSIS — L60.2 HYPERTROPHY OF NAIL: ICD-10-CM

## 2021-03-02 DIAGNOSIS — E13.9 LADA (LATENT AUTOIMMUNE DIABETES IN ADULTS), MANAGED AS TYPE 1 (CMD): ICD-10-CM

## 2021-03-02 DIAGNOSIS — R42 VERTIGO: ICD-10-CM

## 2021-03-02 DIAGNOSIS — I73.89 OTHER SPECIFIED PERIPHERAL VASCULAR DISEASES (CMD): Primary | ICD-10-CM

## 2021-03-02 DIAGNOSIS — H90.3 SENSORINEURAL HEARING LOSS (SNHL) OF BOTH EARS: ICD-10-CM

## 2021-03-02 DIAGNOSIS — M79.89 LEG SWELLING: Primary | ICD-10-CM

## 2021-03-02 DIAGNOSIS — E08.42 DIABETIC POLYNEUROPATHY ASSOCIATED WITH DIABETES MELLITUS DUE TO UNDERLYING CONDITION (CMD): ICD-10-CM

## 2021-03-02 PROCEDURE — 99213 OFFICE O/P EST LOW 20 MIN: CPT | Performed by: INTERNAL MEDICINE

## 2021-03-02 PROCEDURE — 11721 DEBRIDE NAIL 6 OR MORE: CPT | Performed by: PODIATRIST

## 2021-03-02 RX ORDER — FUROSEMIDE 20 MG/1
20 TABLET ORAL DAILY PRN
Qty: 30 TABLET | Refills: 1 | Status: SHIPPED | OUTPATIENT
Start: 2021-03-02 | End: 2021-05-26 | Stop reason: ALTCHOICE

## 2021-03-02 RX ORDER — PREGABALIN 225 MG/1
225 CAPSULE ORAL 2 TIMES DAILY
Qty: 60 CAPSULE | Refills: 0 | Status: SHIPPED | OUTPATIENT
Start: 2021-03-02 | End: 2021-03-31 | Stop reason: SDUPTHER

## 2021-03-03 ENCOUNTER — TELEPHONE (OUTPATIENT)
Dept: INTERNAL MEDICINE | Age: 66
End: 2021-03-03

## 2021-03-03 ENCOUNTER — OFFICE VISIT (OUTPATIENT)
Dept: NEUROLOGY | Age: 66
End: 2021-03-03
Attending: INTERNAL MEDICINE

## 2021-03-03 VITALS — SYSTOLIC BLOOD PRESSURE: 118 MMHG | OXYGEN SATURATION: 96 % | HEART RATE: 81 BPM | DIASTOLIC BLOOD PRESSURE: 68 MMHG

## 2021-03-03 DIAGNOSIS — E11.42 DIABETIC POLYNEUROPATHY ASSOCIATED WITH TYPE 2 DIABETES MELLITUS (CMD): ICD-10-CM

## 2021-03-03 DIAGNOSIS — E10.42 DIABETIC POLYNEUROPATHY ASSOCIATED WITH TYPE 1 DIABETES MELLITUS (CMD): ICD-10-CM

## 2021-03-03 DIAGNOSIS — R29.2 HYPERREFLEXIA: Primary | ICD-10-CM

## 2021-03-03 DIAGNOSIS — R29.6 FALLS FREQUENTLY: ICD-10-CM

## 2021-03-03 DIAGNOSIS — G93.40 ENCEPHALOPATHY, UNSPECIFIED: ICD-10-CM

## 2021-03-03 PROCEDURE — 99204 OFFICE O/P NEW MOD 45 MIN: CPT | Performed by: PSYCHIATRY & NEUROLOGY

## 2021-03-04 ENCOUNTER — APPOINTMENT (OUTPATIENT)
Dept: LAB | Age: 66
End: 2021-03-04

## 2021-03-04 ENCOUNTER — OFFICE VISIT (OUTPATIENT)
Dept: ENDOCRINOLOGY | Age: 66
End: 2021-03-04
Attending: INTERNAL MEDICINE

## 2021-03-04 VITALS
HEIGHT: 65 IN | HEART RATE: 80 BPM | SYSTOLIC BLOOD PRESSURE: 126 MMHG | BODY MASS INDEX: 26.76 KG/M2 | DIASTOLIC BLOOD PRESSURE: 58 MMHG | WEIGHT: 160.6 LBS

## 2021-03-04 DIAGNOSIS — E10.65 TYPE 1 DIABETES MELLITUS WITH HYPERGLYCEMIA (CMD): Primary | ICD-10-CM

## 2021-03-04 DIAGNOSIS — E13.9 LADA (LATENT AUTOIMMUNE DIABETES IN ADULTS), MANAGED AS TYPE 1 (CMD): ICD-10-CM

## 2021-03-04 DIAGNOSIS — M81.8 OTHER OSTEOPOROSIS WITHOUT CURRENT PATHOLOGICAL FRACTURE: ICD-10-CM

## 2021-03-04 DIAGNOSIS — Z87.81 HISTORY OF FRACTURE OF LEFT HIP: ICD-10-CM

## 2021-03-04 DIAGNOSIS — E10.649 HYPOGLYCEMIA UNAWARENESS IN TYPE 1 DIABETES MELLITUS (CMD): ICD-10-CM

## 2021-03-04 PROCEDURE — 99204 OFFICE O/P NEW MOD 45 MIN: CPT | Performed by: INTERNAL MEDICINE

## 2021-03-04 RX ORDER — GLUCAGON 3 MG/1
POWDER NASAL
Qty: 1 EACH | Refills: 3 | Status: SHIPPED | OUTPATIENT
Start: 2021-03-04 | End: 2021-03-25 | Stop reason: SDUPTHER

## 2021-03-04 RX ORDER — INSULIN GLARGINE 100 [IU]/ML
INJECTION, SOLUTION SUBCUTANEOUS
Qty: 15 ML | Refills: 5 | Status: SHIPPED | OUTPATIENT
Start: 2021-03-04 | End: 2021-04-22 | Stop reason: DRUGHIGH

## 2021-03-04 SDOH — HEALTH STABILITY: MENTAL HEALTH: HOW MANY STANDARD DRINKS CONTAINING ALCOHOL DO YOU HAVE ON A TYPICAL DAY?: 1 OR 2

## 2021-03-04 SDOH — HEALTH STABILITY: MENTAL HEALTH: HOW OFTEN DO YOU HAVE A DRINK CONTAINING ALCOHOL?: NEVER

## 2021-03-04 SDOH — HEALTH STABILITY: MENTAL HEALTH: HOW OFTEN DO YOU HAVE 6 OR MORE DRINKS ON ONE OCCASION?: NEVER

## 2021-03-08 PROBLEM — Z95.1 S/P CABG X 2: Status: ACTIVE | Noted: 2021-03-08

## 2021-03-10 ENCOUNTER — TELEPHONE (OUTPATIENT)
Dept: AUDIOLOGY | Age: 66
End: 2021-03-10

## 2021-03-10 ENCOUNTER — HOSPITAL ENCOUNTER (EMERGENCY)
Age: 66
Discharge: HOME | End: 2021-03-10
Attending: EMERGENCY MEDICINE

## 2021-03-10 ENCOUNTER — APPOINTMENT (OUTPATIENT)
Dept: GENERAL RADIOLOGY | Age: 66
End: 2021-03-10
Attending: EMERGENCY MEDICINE

## 2021-03-10 ENCOUNTER — NURSE TRIAGE (OUTPATIENT)
Dept: INTERNAL MEDICINE | Age: 66
End: 2021-03-10

## 2021-03-10 ENCOUNTER — APPOINTMENT (OUTPATIENT)
Dept: MRI IMAGING | Age: 66
End: 2021-03-10
Attending: PSYCHIATRY & NEUROLOGY

## 2021-03-10 ENCOUNTER — APPOINTMENT (OUTPATIENT)
Dept: ULTRASOUND IMAGING | Age: 66
End: 2021-03-10
Attending: EMERGENCY MEDICINE

## 2021-03-10 ENCOUNTER — WALK IN (OUTPATIENT)
Dept: URGENT CARE | Age: 66
End: 2021-03-10

## 2021-03-10 VITALS
SYSTOLIC BLOOD PRESSURE: 117 MMHG | BODY MASS INDEX: 26.7 KG/M2 | TEMPERATURE: 98.7 F | RESPIRATION RATE: 18 BRPM | HEART RATE: 76 BPM | WEIGHT: 158 LBS | OXYGEN SATURATION: 98 % | DIASTOLIC BLOOD PRESSURE: 58 MMHG

## 2021-03-10 VITALS
WEIGHT: 166.01 LBS | DIASTOLIC BLOOD PRESSURE: 51 MMHG | RESPIRATION RATE: 17 BRPM | TEMPERATURE: 97.9 F | BODY MASS INDEX: 28.05 KG/M2 | HEART RATE: 71 BPM | SYSTOLIC BLOOD PRESSURE: 103 MMHG | OXYGEN SATURATION: 98 %

## 2021-03-10 DIAGNOSIS — Z95.1 HISTORY OF CORONARY ARTERY BYPASS GRAFT: ICD-10-CM

## 2021-03-10 DIAGNOSIS — R10.13 EPIGASTRIC PAIN: Primary | ICD-10-CM

## 2021-03-10 DIAGNOSIS — K80.20 RIGHT UPPER QUADRANT ABDOMINAL PAIN WITH CALCULUS OF GALLBLADDER PRESENT ON ULTRASOUND: ICD-10-CM

## 2021-03-10 DIAGNOSIS — R10.11 RIGHT UPPER QUADRANT ABDOMINAL PAIN WITH CALCULUS OF GALLBLADDER PRESENT ON ULTRASOUND: ICD-10-CM

## 2021-03-10 DIAGNOSIS — R42 DIZZINESS: ICD-10-CM

## 2021-03-10 DIAGNOSIS — Z87.11 HISTORY OF PEPTIC ULCER DISEASE: ICD-10-CM

## 2021-03-10 DIAGNOSIS — R10.13 EPIGASTRIC ABDOMINAL PAIN: Primary | ICD-10-CM

## 2021-03-10 DIAGNOSIS — R11.0 NAUSEA: ICD-10-CM

## 2021-03-10 LAB
ALBUMIN SERPL-MCNC: 3.5 G/DL (ref 3.6–5.1)
ALBUMIN/GLOB SERPL: 1.1 {RATIO} (ref 1–2.4)
ALP SERPL-CCNC: 61 UNITS/L (ref 45–117)
ALT SERPL-CCNC: 22 UNITS/L
ANION GAP SERPL CALC-SCNC: 14 MMOL/L (ref 10–20)
APPEARANCE UR: CLEAR
AST SERPL-CCNC: 18 UNITS/L
BASOPHILS # BLD: 0.1 K/MCL (ref 0–0.3)
BASOPHILS NFR BLD: 1 %
BILIRUB SERPL-MCNC: 0.3 MG/DL (ref 0.2–1)
BILIRUB UR QL STRIP: NEGATIVE
BUN SERPL-MCNC: 35 MG/DL (ref 6–20)
BUN/CREAT SERPL: 30 (ref 7–25)
CALCIUM SERPL-MCNC: 9.1 MG/DL (ref 8.4–10.2)
CHLORIDE SERPL-SCNC: 101 MMOL/L (ref 98–107)
CO2 SERPL-SCNC: 26 MMOL/L (ref 21–32)
COLOR UR: YELLOW
CREAT SERPL-MCNC: 1.16 MG/DL (ref 0.51–0.95)
DEPRECATED RDW RBC: 41.8 FL (ref 39–50)
EOSINOPHIL # BLD: 0.3 K/MCL (ref 0–0.5)
EOSINOPHIL NFR BLD: 3 %
ERYTHROCYTE [DISTWIDTH] IN BLOOD: 12.2 % (ref 11–15)
FASTING DURATION TIME PATIENT: ABNORMAL H
GFR SERPLBLD BASED ON 1.73 SQ M-ARVRAT: 50 ML/MIN/1.73M2
GLOBULIN SER-MCNC: 3.3 G/DL (ref 2–4)
GLUCOSE SERPL-MCNC: 109 MG/DL (ref 65–99)
GLUCOSE UR STRIP-MCNC: NEGATIVE MG/DL
HCT VFR BLD CALC: 39 % (ref 36–46.5)
HGB BLD-MCNC: 12.7 G/DL (ref 12–15.5)
HGB UR QL STRIP: NEGATIVE
KETONES UR STRIP-MCNC: NEGATIVE MG/DL
LACTATE BLDV-SCNC: 0.6 MMOL/L (ref 0–2)
LEUKOCYTE ESTERASE UR QL STRIP: NEGATIVE
LIPASE SERPL-CCNC: 65 UNITS/L (ref 73–393)
LYMPHOCYTES # BLD: 4.5 K/MCL (ref 1–4)
LYMPHOCYTES NFR BLD: 44 %
MCH RBC QN AUTO: 30.3 PG (ref 26–34)
MCHC RBC AUTO-ENTMCNC: 32.6 G/DL (ref 32–36.5)
MCV RBC AUTO: 93.1 FL (ref 78–100)
MONOCYTES # BLD: 0.4 K/MCL (ref 0.3–0.9)
MONOCYTES NFR BLD: 4 %
NEUTROPHILS # BLD: 4.1 K/MCL (ref 1.8–7.7)
NEUTS SEG NFR BLD: 44 %
NITRITE UR QL STRIP: NEGATIVE
NRBC BLD MANUAL-RTO: 0 /100 WBC
PH UR STRIP: 5.5 [PH] (ref 5–7)
PLAT MORPH BLD: NORMAL
PLATELET # BLD AUTO: 212 K/MCL (ref 140–450)
POTASSIUM SERPL-SCNC: 4.8 MMOL/L (ref 3.4–5.1)
PROCALCITONIN SERPL IA-MCNC: <0.05 NG/ML
PROT SERPL-MCNC: 6.8 G/DL (ref 6.4–8.2)
PROT UR STRIP-MCNC: NEGATIVE MG/DL
RBC # BLD: 4.19 MIL/MCL (ref 4–5.2)
RBC MORPH BLD: NORMAL
SODIUM SERPL-SCNC: 136 MMOL/L (ref 135–145)
SP GR UR STRIP: 1.02 (ref 1–1.03)
TROPONIN I SERPL HS-MCNC: <0.02 NG/ML
UROBILINOGEN UR STRIP-MCNC: 0.2 MG/DL
VARIANT LYMPHS NFR BLD: 4 % (ref 0–5)
WBC # BLD: 9.3 K/MCL (ref 4.2–11)

## 2021-03-10 PROCEDURE — 76705 ECHO EXAM OF ABDOMEN: CPT

## 2021-03-10 PROCEDURE — 83690 ASSAY OF LIPASE: CPT | Performed by: EMERGENCY MEDICINE

## 2021-03-10 PROCEDURE — 85027 COMPLETE CBC AUTOMATED: CPT | Performed by: EMERGENCY MEDICINE

## 2021-03-10 PROCEDURE — 71045 X-RAY EXAM CHEST 1 VIEW: CPT

## 2021-03-10 PROCEDURE — 99213 OFFICE O/P EST LOW 20 MIN: CPT | Performed by: FAMILY MEDICINE

## 2021-03-10 PROCEDURE — 93000 ELECTROCARDIOGRAM COMPLETE: CPT | Performed by: FAMILY MEDICINE

## 2021-03-10 PROCEDURE — 71045 X-RAY EXAM CHEST 1 VIEW: CPT | Performed by: RADIOLOGY

## 2021-03-10 PROCEDURE — 81003 URINALYSIS AUTO W/O SCOPE: CPT | Performed by: EMERGENCY MEDICINE

## 2021-03-10 PROCEDURE — 99285 EMERGENCY DEPT VISIT HI MDM: CPT | Performed by: EMERGENCY MEDICINE

## 2021-03-10 PROCEDURE — 99284 EMERGENCY DEPT VISIT MOD MDM: CPT

## 2021-03-10 PROCEDURE — 84145 PROCALCITONIN (PCT): CPT | Performed by: EMERGENCY MEDICINE

## 2021-03-10 PROCEDURE — 93010 ELECTROCARDIOGRAM REPORT: CPT | Performed by: INTERNAL MEDICINE

## 2021-03-10 PROCEDURE — 36415 COLL VENOUS BLD VENIPUNCTURE: CPT

## 2021-03-10 PROCEDURE — 93005 ELECTROCARDIOGRAM TRACING: CPT | Performed by: EMERGENCY MEDICINE

## 2021-03-10 PROCEDURE — 84484 ASSAY OF TROPONIN QUANT: CPT | Performed by: EMERGENCY MEDICINE

## 2021-03-10 PROCEDURE — 80053 COMPREHEN METABOLIC PANEL: CPT | Performed by: EMERGENCY MEDICINE

## 2021-03-10 PROCEDURE — 76705 ECHO EXAM OF ABDOMEN: CPT | Performed by: RADIOLOGY

## 2021-03-10 PROCEDURE — 83605 ASSAY OF LACTIC ACID: CPT | Performed by: EMERGENCY MEDICINE

## 2021-03-10 RX ORDER — PANTOPRAZOLE SODIUM 40 MG/1
40 TABLET, DELAYED RELEASE ORAL DAILY
Qty: 30 TABLET | Refills: 0 | Status: SHIPPED | OUTPATIENT
Start: 2021-03-10 | End: 2021-03-17 | Stop reason: SDUPTHER

## 2021-03-10 RX ORDER — 0.9 % SODIUM CHLORIDE 0.9 %
2 VIAL (ML) INJECTION EVERY 12 HOURS SCHEDULED
Status: DISCONTINUED | OUTPATIENT
Start: 2021-03-10 | End: 2021-03-10 | Stop reason: HOSPADM

## 2021-03-10 SDOH — HEALTH STABILITY: MENTAL HEALTH: HOW MANY STANDARD DRINKS CONTAINING ALCOHOL DO YOU HAVE ON A TYPICAL DAY?: 1 OR 2

## 2021-03-10 SDOH — HEALTH STABILITY: MENTAL HEALTH: HOW OFTEN DO YOU HAVE A DRINK CONTAINING ALCOHOL?: NEVER

## 2021-03-10 SDOH — HEALTH STABILITY: MENTAL HEALTH: HOW OFTEN DO YOU HAVE 6 OR MORE DRINKS ON ONE OCCASION?: NEVER

## 2021-03-10 ASSESSMENT — ENCOUNTER SYMPTOMS
NAUSEA: 1
CHEST TIGHTNESS: 0
COUGH: 0
RHINORRHEA: 0
LIGHT-HEADEDNESS: 0
EYE PAIN: 0
HEADACHES: 0
ABDOMINAL PAIN: 1
SORE THROAT: 0
DIAPHORESIS: 0
BACK PAIN: 0
ABDOMINAL DISTENTION: 0
EYE REDNESS: 0
SHORTNESS OF BREATH: 0
VOMITING: 0
ANAL BLEEDING: 0
BLOOD IN STOOL: 0
FEVER: 0
SEIZURES: 0
FATIGUE: 0
DIARRHEA: 0
CONSTIPATION: 0
EYE ITCHING: 0
RECTAL PAIN: 0
NUMBNESS: 0

## 2021-03-10 ASSESSMENT — PAIN SCALES - GENERAL: PAINLEVEL_OUTOF10: 2

## 2021-03-10 ASSESSMENT — PAIN DESCRIPTION - PAIN TYPE: TYPE: ACUTE PAIN

## 2021-03-11 ENCOUNTER — TELEPHONE (OUTPATIENT)
Dept: EDUCATION SERVICES | Age: 66
End: 2021-03-11

## 2021-03-11 ENCOUNTER — OFFICE VISIT (OUTPATIENT)
Dept: EDUCATION SERVICES | Age: 66
End: 2021-03-11

## 2021-03-11 DIAGNOSIS — E10.649 HYPOGLYCEMIA UNAWARENESS IN TYPE 1 DIABETES MELLITUS (CMD): ICD-10-CM

## 2021-03-11 DIAGNOSIS — E10.65 TYPE 1 DIABETES MELLITUS WITH HYPERGLYCEMIA (CMD): Primary | ICD-10-CM

## 2021-03-11 LAB
ATRIAL RATE (BPM): 74
DIASTOLIC BLOOD PRESSURE: 58
P AXIS (DEGREES): 25
PR-INTERVAL (MSEC): 168
QRS-INTERVAL (MSEC): 68
QT-INTERVAL (MSEC): 370
QTC: 410
R AXIS (DEGREES): 57
REPORT TEXT: NORMAL
SYSTOLIC BLOOD PRESSURE: 113
T AXIS (DEGREES): 51
VENTRICULAR RATE EKG/MIN (BPM): 74

## 2021-03-11 PROCEDURE — G0108 DIAB MANAGE TRN  PER INDIV: HCPCS | Performed by: REGISTERED NURSE

## 2021-03-12 ENCOUNTER — TELEPHONE (OUTPATIENT)
Dept: INTERNAL MEDICINE | Age: 66
End: 2021-03-12

## 2021-03-12 DIAGNOSIS — E03.9 ACQUIRED HYPOTHYROIDISM: ICD-10-CM

## 2021-03-12 RX ORDER — LEVOTHYROXINE SODIUM 0.15 MG/1
150 TABLET ORAL DAILY
Qty: 90 TABLET | Refills: 0 | Status: SHIPPED | OUTPATIENT
Start: 2021-03-12 | End: 2021-03-31 | Stop reason: DRUGHIGH

## 2021-03-16 ENCOUNTER — HOSPITAL ENCOUNTER (OUTPATIENT)
Dept: MRI IMAGING | Age: 66
Discharge: HOME | End: 2021-03-16
Attending: PSYCHIATRY & NEUROLOGY

## 2021-03-16 ENCOUNTER — TELEPHONE (OUTPATIENT)
Dept: OTOLARYNGOLOGY | Age: 66
End: 2021-03-16

## 2021-03-16 ENCOUNTER — APPOINTMENT (OUTPATIENT)
Dept: INTERNAL MEDICINE | Age: 66
End: 2021-03-16

## 2021-03-16 ENCOUNTER — TELEPHONE (OUTPATIENT)
Dept: EDUCATION SERVICES | Age: 66
End: 2021-03-16

## 2021-03-16 DIAGNOSIS — R29.2 HYPERREFLEXIA: ICD-10-CM

## 2021-03-16 DIAGNOSIS — E10.42 DIABETIC POLYNEUROPATHY ASSOCIATED WITH TYPE 1 DIABETES MELLITUS (CMD): ICD-10-CM

## 2021-03-16 DIAGNOSIS — R29.6 FALLS FREQUENTLY: ICD-10-CM

## 2021-03-16 PROCEDURE — 72141 MRI NECK SPINE W/O DYE: CPT | Performed by: RADIOLOGY

## 2021-03-16 PROCEDURE — 72141 MRI NECK SPINE W/O DYE: CPT

## 2021-03-16 PROCEDURE — G1004 CDSM NDSC: HCPCS | Performed by: RADIOLOGY

## 2021-03-16 PROCEDURE — G1004 CDSM NDSC: HCPCS

## 2021-03-17 ENCOUNTER — TELEPHONE (OUTPATIENT)
Dept: OTOLARYNGOLOGY | Age: 66
End: 2021-03-17

## 2021-03-17 ENCOUNTER — APPOINTMENT (OUTPATIENT)
Dept: LAB | Age: 66
End: 2021-03-17

## 2021-03-17 ENCOUNTER — OFFICE VISIT (OUTPATIENT)
Dept: INTERNAL MEDICINE | Age: 66
End: 2021-03-17

## 2021-03-17 ENCOUNTER — OFFICE VISIT (OUTPATIENT)
Dept: AUDIOLOGY | Age: 66
End: 2021-03-17

## 2021-03-17 VITALS
DIASTOLIC BLOOD PRESSURE: 56 MMHG | WEIGHT: 161.6 LBS | SYSTOLIC BLOOD PRESSURE: 112 MMHG | HEART RATE: 73 BPM | OXYGEN SATURATION: 98 % | BODY MASS INDEX: 27.31 KG/M2 | TEMPERATURE: 99.6 F

## 2021-03-17 DIAGNOSIS — R29.6 RECURRENT FALLS: ICD-10-CM

## 2021-03-17 DIAGNOSIS — Z46.1 ENCOUNTER FOR FITTING OR ADJUSTMENT OF HEARING AID: Primary | ICD-10-CM

## 2021-03-17 DIAGNOSIS — K21.9 GASTROESOPHAGEAL REFLUX DISEASE, UNSPECIFIED WHETHER ESOPHAGITIS PRESENT: ICD-10-CM

## 2021-03-17 DIAGNOSIS — K31.84 DIABETIC GASTROPARESIS (CMD): ICD-10-CM

## 2021-03-17 DIAGNOSIS — I25.10 CORONARY ARTERY DISEASE INVOLVING NATIVE CORONARY ARTERY OF NATIVE HEART, ANGINA PRESENCE UNSPECIFIED: ICD-10-CM

## 2021-03-17 DIAGNOSIS — E11.43 DIABETIC GASTROPARESIS (CMD): ICD-10-CM

## 2021-03-17 DIAGNOSIS — R10.13 EPIGASTRIC PAIN: ICD-10-CM

## 2021-03-17 DIAGNOSIS — E10.42 DIABETIC POLYNEUROPATHY ASSOCIATED WITH TYPE 1 DIABETES MELLITUS (CMD): Primary | ICD-10-CM

## 2021-03-17 DIAGNOSIS — H90.3 BILATERAL SENSORINEURAL HEARING LOSS: ICD-10-CM

## 2021-03-17 PROCEDURE — V5261 HEARING AID, DIGIT, BIN, BTE: HCPCS | Performed by: AUDIOLOGIST

## 2021-03-17 PROCEDURE — 99214 OFFICE O/P EST MOD 30 MIN: CPT | Performed by: INTERNAL MEDICINE

## 2021-03-17 PROCEDURE — V5160 DISPENSING FEE BINAURAL: HCPCS | Performed by: AUDIOLOGIST

## 2021-03-17 RX ORDER — PANTOPRAZOLE SODIUM 40 MG/1
40 TABLET, DELAYED RELEASE ORAL 2 TIMES DAILY
Qty: 60 TABLET | Refills: 0 | Status: ON HOLD | OUTPATIENT
Start: 2021-03-17 | End: 2021-04-12 | Stop reason: HOSPADM

## 2021-03-17 ASSESSMENT — PATIENT HEALTH QUESTIONNAIRE - PHQ9
SUM OF ALL RESPONSES TO PHQ9 QUESTIONS 1 AND 2: 0
2. FEELING DOWN, DEPRESSED OR HOPELESS: NOT AT ALL
CLINICAL INTERPRETATION OF PHQ9 SCORE: NO FURTHER SCREENING NEEDED
CLINICAL INTERPRETATION OF PHQ2 SCORE: NO FURTHER SCREENING NEEDED
1. LITTLE INTEREST OR PLEASURE IN DOING THINGS: NOT AT ALL
SUM OF ALL RESPONSES TO PHQ9 QUESTIONS 1 AND 2: 0

## 2021-03-18 ENCOUNTER — OFFICE VISIT (OUTPATIENT)
Dept: CARDIOLOGY | Age: 66
End: 2021-03-18

## 2021-03-18 ENCOUNTER — TELEPHONE (OUTPATIENT)
Dept: EDUCATION SERVICES | Age: 66
End: 2021-03-18

## 2021-03-18 ENCOUNTER — TELEPHONE (OUTPATIENT)
Dept: ADMISSION | Age: 66
End: 2021-03-18

## 2021-03-18 ENCOUNTER — TELEPHONE (OUTPATIENT)
Dept: GASTROENTEROLOGY | Age: 66
End: 2021-03-18

## 2021-03-18 VITALS
WEIGHT: 155.8 LBS | BODY MASS INDEX: 25.96 KG/M2 | DIASTOLIC BLOOD PRESSURE: 59 MMHG | SYSTOLIC BLOOD PRESSURE: 100 MMHG | HEART RATE: 76 BPM | HEIGHT: 65 IN

## 2021-03-18 DIAGNOSIS — I10 ESSENTIAL HYPERTENSION: Primary | ICD-10-CM

## 2021-03-18 DIAGNOSIS — Z95.1 S/P CABG X 2: ICD-10-CM

## 2021-03-18 DIAGNOSIS — E78.2 MIXED HYPERLIPIDEMIA: ICD-10-CM

## 2021-03-18 DIAGNOSIS — I25.119 CORONARY ARTERY DISEASE INVOLVING NATIVE CORONARY ARTERY OF NATIVE HEART WITH ANGINA PECTORIS (CMD): ICD-10-CM

## 2021-03-18 PROCEDURE — 99204 OFFICE O/P NEW MOD 45 MIN: CPT | Performed by: INTERNAL MEDICINE

## 2021-03-18 RX ORDER — ASPIRIN 81 MG/1
81 TABLET ORAL DAILY
Status: SHIPPED | COMMUNITY
Start: 2021-03-18 | End: 2021-12-17

## 2021-03-18 RX ORDER — LISINOPRIL 20 MG/1
20 TABLET ORAL DAILY
Status: SHIPPED | COMMUNITY
Start: 2021-03-18 | End: 2021-04-28 | Stop reason: SDUPTHER

## 2021-03-19 ENCOUNTER — TELEPHONE (OUTPATIENT)
Dept: NEUROLOGY | Age: 66
End: 2021-03-19

## 2021-03-19 ENCOUNTER — TELEPHONE (OUTPATIENT)
Dept: GASTROENTEROLOGY | Age: 66
End: 2021-03-19

## 2021-03-19 ENCOUNTER — LAB SERVICES (OUTPATIENT)
Dept: LAB | Age: 66
End: 2021-03-19

## 2021-03-19 ENCOUNTER — TELEPHONE (OUTPATIENT)
Dept: INTERNAL MEDICINE | Age: 66
End: 2021-03-19

## 2021-03-19 ENCOUNTER — OFFICE VISIT (OUTPATIENT)
Dept: SURGERY | Age: 66
End: 2021-03-19
Attending: EMERGENCY MEDICINE

## 2021-03-19 VITALS
SYSTOLIC BLOOD PRESSURE: 104 MMHG | BODY MASS INDEX: 27.21 KG/M2 | OXYGEN SATURATION: 97 % | TEMPERATURE: 99.1 F | HEART RATE: 68 BPM | WEIGHT: 161 LBS | DIASTOLIC BLOOD PRESSURE: 60 MMHG

## 2021-03-19 DIAGNOSIS — R10.13 EPIGASTRIC PAIN: Primary | ICD-10-CM

## 2021-03-19 DIAGNOSIS — R10.13 EPIGASTRIC PAIN: ICD-10-CM

## 2021-03-19 LAB
BASOPHILS # BLD: 0 K/MCL (ref 0–0.3)
BASOPHILS NFR BLD: 0 %
DEPRECATED RDW RBC: 39.8 FL (ref 39–50)
EOSINOPHIL # BLD: 0.2 K/MCL (ref 0–0.5)
EOSINOPHIL NFR BLD: 2 %
ERYTHROCYTE [DISTWIDTH] IN BLOOD: 12 % (ref 11–15)
HCT VFR BLD CALC: 38.8 % (ref 36–46.5)
HGB BLD-MCNC: 12.8 G/DL (ref 12–15.5)
LYMPHOCYTES # BLD: 2.9 K/MCL (ref 1–4)
LYMPHOCYTES NFR BLD: 35 %
MCH RBC QN AUTO: 30.5 PG (ref 26–34)
MCHC RBC AUTO-ENTMCNC: 33 G/DL (ref 32–36.5)
MCV RBC AUTO: 92.6 FL (ref 78–100)
MONOCYTES # BLD: 0.4 K/MCL (ref 0.3–0.9)
MONOCYTES NFR BLD: 5 %
NEUTROPHILS # BLD: 4.9 K/MCL (ref 1.8–7.7)
NEUTROPHILS NFR BLD: 58 %
PLATELET # BLD AUTO: 177 K/MCL (ref 140–450)
RBC # BLD: 4.19 MIL/MCL (ref 4–5.2)
WBC # BLD: 8.4 K/MCL (ref 4.2–11)

## 2021-03-19 PROCEDURE — 36415 COLL VENOUS BLD VENIPUNCTURE: CPT | Performed by: INTERNAL MEDICINE

## 2021-03-19 PROCEDURE — 83690 ASSAY OF LIPASE: CPT | Performed by: CLINICAL MEDICAL LABORATORY

## 2021-03-19 PROCEDURE — 85025 COMPLETE CBC W/AUTO DIFF WBC: CPT | Performed by: INTERNAL MEDICINE

## 2021-03-19 PROCEDURE — 99203 OFFICE O/P NEW LOW 30 MIN: CPT | Performed by: SURGERY

## 2021-03-19 PROCEDURE — 80053 COMPREHEN METABOLIC PANEL: CPT | Performed by: CLINICAL MEDICAL LABORATORY

## 2021-03-20 LAB
ALBUMIN SERPL-MCNC: 3.6 G/DL (ref 3.6–5.1)
ALBUMIN/GLOB SERPL: 1.1 {RATIO} (ref 1–2.4)
ALP SERPL-CCNC: 64 UNITS/L (ref 45–117)
ALT SERPL-CCNC: 19 UNITS/L
ANION GAP SERPL CALC-SCNC: 9 MMOL/L (ref 10–20)
AST SERPL-CCNC: 11 UNITS/L
BILIRUB SERPL-MCNC: 0.4 MG/DL (ref 0.2–1)
BUN SERPL-MCNC: 29 MG/DL (ref 6–20)
BUN/CREAT SERPL: 26 (ref 7–25)
CALCIUM SERPL-MCNC: 9.4 MG/DL (ref 8.4–10.2)
CHLORIDE SERPL-SCNC: 101 MMOL/L (ref 98–107)
CO2 SERPL-SCNC: 29 MMOL/L (ref 21–32)
CREAT SERPL-MCNC: 1.13 MG/DL (ref 0.51–0.95)
FASTING DURATION TIME PATIENT: ABNORMAL H
GFR SERPLBLD BASED ON 1.73 SQ M-ARVRAT: 51 ML/MIN/1.73M2
GLOBULIN SER-MCNC: 3.4 G/DL (ref 2–4)
GLUCOSE SERPL-MCNC: 358 MG/DL (ref 65–99)
LIPASE SERPL-CCNC: 67 UNITS/L (ref 73–393)
POTASSIUM SERPL-SCNC: 4.5 MMOL/L (ref 3.4–5.1)
PROT SERPL-MCNC: 7 G/DL (ref 6.4–8.2)
SODIUM SERPL-SCNC: 134 MMOL/L (ref 135–145)

## 2021-03-22 ENCOUNTER — LAB SERVICES (OUTPATIENT)
Dept: LAB | Age: 66
End: 2021-03-22

## 2021-03-22 ENCOUNTER — IMAGING SERVICES (OUTPATIENT)
Dept: GENERAL RADIOLOGY | Age: 66
End: 2021-03-22
Attending: INTERNAL MEDICINE

## 2021-03-22 ENCOUNTER — OFFICE VISIT (OUTPATIENT)
Dept: OTOLARYNGOLOGY | Age: 66
End: 2021-03-22
Attending: INTERNAL MEDICINE

## 2021-03-22 VITALS
HEIGHT: 65 IN | BODY MASS INDEX: 26.49 KG/M2 | SYSTOLIC BLOOD PRESSURE: 108 MMHG | WEIGHT: 159 LBS | DIASTOLIC BLOOD PRESSURE: 64 MMHG

## 2021-03-22 DIAGNOSIS — E10.42 DIABETIC POLYNEUROPATHY ASSOCIATED WITH TYPE 1 DIABETES MELLITUS (CMD): ICD-10-CM

## 2021-03-22 DIAGNOSIS — I25.10 CAD, MULTIPLE VESSEL: ICD-10-CM

## 2021-03-22 DIAGNOSIS — R29.2 HYPERREFLEXIA: ICD-10-CM

## 2021-03-22 DIAGNOSIS — R26.89 IMBALANCE: ICD-10-CM

## 2021-03-22 DIAGNOSIS — M81.8 OTHER OSTEOPOROSIS WITHOUT CURRENT PATHOLOGICAL FRACTURE: ICD-10-CM

## 2021-03-22 DIAGNOSIS — G93.40 ENCEPHALOPATHY, UNSPECIFIED: ICD-10-CM

## 2021-03-22 DIAGNOSIS — R29.6 FALLS FREQUENTLY: ICD-10-CM

## 2021-03-22 DIAGNOSIS — E03.9 ACQUIRED HYPOTHYROIDISM: ICD-10-CM

## 2021-03-22 DIAGNOSIS — Z87.81 HISTORY OF FRACTURE OF LEFT HIP: ICD-10-CM

## 2021-03-22 DIAGNOSIS — R42 DIZZY: ICD-10-CM

## 2021-03-22 DIAGNOSIS — E10.65 TYPE 1 DIABETES MELLITUS WITH HYPERGLYCEMIA (CMD): ICD-10-CM

## 2021-03-22 DIAGNOSIS — H90.3 BILATERAL SENSORINEURAL HEARING LOSS: Primary | ICD-10-CM

## 2021-03-22 DIAGNOSIS — E11.42 DIABETIC POLYNEUROPATHY ASSOCIATED WITH TYPE 2 DIABETES MELLITUS (CMD): ICD-10-CM

## 2021-03-22 LAB
25(OH)D3+25(OH)D2 SERPL-MCNC: 41.7 NG/ML (ref 30–100)
ALBUMIN SERPL-MCNC: 3.6 G/DL (ref 3.6–5.1)
C PEPTIDE SERPL-MCNC: <0.1 NG/ML (ref 0.8–3.9)
CA-I ADJ PH7.4 SERPL-SCNC: 1.26 MMOL/L (ref 1.15–1.29)
CA-I SERPL ISE-SCNC: 1.29 MMOL/L (ref 1.15–1.29)
CALCIUM SERPL-MCNC: 9.2 MG/DL (ref 8.4–10.2)
CREAT SERPL-MCNC: 1.18 MG/DL (ref 0.51–0.95)
ERYTHROCYTE [SEDIMENTATION RATE] IN BLOOD BY WESTERGREN METHOD: 16 MM/HR (ref 0–20)
FASTING DURATION TIME PATIENT: 3 HOURS
FASTING DURATION TIME PATIENT: 3 HOURS
FOLATE SERPL-MCNC: 10.6 NG/ML
GFR SERPLBLD BASED ON 1.73 SQ M-ARVRAT: 49 ML/MIN/1.73M2
GLUCOSE SERPL-MCNC: 151 MG/DL (ref 65–99)
PHOSPHATE SERPL-MCNC: 3.5 MG/DL (ref 2.4–4.7)
PTH-INTACT SERPL-MCNC: 82 PG/ML (ref 19–88)
TSH SERPL-ACNC: 0.01 MCUNITS/ML (ref 0.35–5)
VIT B12 SERPL-MCNC: 420 PG/ML (ref 211–911)

## 2021-03-22 PROCEDURE — 82040 ASSAY OF SERUM ALBUMIN: CPT | Performed by: CLINICAL MEDICAL LABORATORY

## 2021-03-22 PROCEDURE — 82565 ASSAY OF CREATININE: CPT | Performed by: CLINICAL MEDICAL LABORATORY

## 2021-03-22 PROCEDURE — 86618 LYME DISEASE ANTIBODY: CPT | Performed by: CLINICAL MEDICAL LABORATORY

## 2021-03-22 PROCEDURE — 83970 ASSAY OF PARATHORMONE: CPT | Performed by: CLINICAL MEDICAL LABORATORY

## 2021-03-22 PROCEDURE — 86780 TREPONEMA PALLIDUM: CPT | Performed by: CLINICAL MEDICAL LABORATORY

## 2021-03-22 PROCEDURE — 82175 ASSAY OF ARSENIC: CPT | Performed by: CLINICAL MEDICAL LABORATORY

## 2021-03-22 PROCEDURE — 82523 COLLAGEN CROSSLINKS: CPT | Performed by: CLINICAL MEDICAL LABORATORY

## 2021-03-22 PROCEDURE — 83516 IMMUNOASSAY NONANTIBODY: CPT | Performed by: CLINICAL MEDICAL LABORATORY

## 2021-03-22 PROCEDURE — 82947 ASSAY GLUCOSE BLOOD QUANT: CPT | Performed by: CLINICAL MEDICAL LABORATORY

## 2021-03-22 PROCEDURE — 82310 ASSAY OF CALCIUM: CPT | Performed by: CLINICAL MEDICAL LABORATORY

## 2021-03-22 PROCEDURE — 85652 RBC SED RATE AUTOMATED: CPT | Performed by: INTERNAL MEDICINE

## 2021-03-22 PROCEDURE — 86334 IMMUNOFIX E-PHORESIS SERUM: CPT | Performed by: CLINICAL MEDICAL LABORATORY

## 2021-03-22 PROCEDURE — 82607 VITAMIN B-12: CPT | Performed by: CLINICAL MEDICAL LABORATORY

## 2021-03-22 PROCEDURE — 84155 ASSAY OF PROTEIN SERUM: CPT | Performed by: CLINICAL MEDICAL LABORATORY

## 2021-03-22 PROCEDURE — 84443 ASSAY THYROID STIM HORMONE: CPT | Performed by: CLINICAL MEDICAL LABORATORY

## 2021-03-22 PROCEDURE — 99204 OFFICE O/P NEW MOD 45 MIN: CPT | Performed by: OTOLARYNGOLOGY

## 2021-03-22 PROCEDURE — 84681 ASSAY OF C-PEPTIDE: CPT | Performed by: CLINICAL MEDICAL LABORATORY

## 2021-03-22 PROCEDURE — 82330 ASSAY OF CALCIUM: CPT | Performed by: CLINICAL MEDICAL LABORATORY

## 2021-03-22 PROCEDURE — 84481 FREE ASSAY (FT-3): CPT | Performed by: CLINICAL MEDICAL LABORATORY

## 2021-03-22 PROCEDURE — 83825 ASSAY OF MERCURY: CPT | Performed by: CLINICAL MEDICAL LABORATORY

## 2021-03-22 PROCEDURE — 83655 ASSAY OF LEAD: CPT | Performed by: CLINICAL MEDICAL LABORATORY

## 2021-03-22 PROCEDURE — 84100 ASSAY OF PHOSPHORUS: CPT | Performed by: CLINICAL MEDICAL LABORATORY

## 2021-03-22 PROCEDURE — 82306 VITAMIN D 25 HYDROXY: CPT | Performed by: CLINICAL MEDICAL LABORATORY

## 2021-03-22 PROCEDURE — 36415 COLL VENOUS BLD VENIPUNCTURE: CPT | Performed by: INTERNAL MEDICINE

## 2021-03-22 PROCEDURE — 82784 ASSAY IGA/IGD/IGG/IGM EACH: CPT | Performed by: CLINICAL MEDICAL LABORATORY

## 2021-03-22 PROCEDURE — 77080 DXA BONE DENSITY AXIAL: CPT | Performed by: RADIOLOGY

## 2021-03-22 PROCEDURE — 86038 ANTINUCLEAR ANTIBODIES: CPT | Performed by: CLINICAL MEDICAL LABORATORY

## 2021-03-22 PROCEDURE — 83520 IMMUNOASSAY QUANT NOS NONAB: CPT | Performed by: CLINICAL MEDICAL LABORATORY

## 2021-03-22 PROCEDURE — 86341 ISLET CELL ANTIBODY: CPT | Performed by: CLINICAL MEDICAL LABORATORY

## 2021-03-22 PROCEDURE — 84165 PROTEIN E-PHORESIS SERUM: CPT | Performed by: CLINICAL MEDICAL LABORATORY

## 2021-03-22 PROCEDURE — 82300 ASSAY OF CADMIUM: CPT | Performed by: CLINICAL MEDICAL LABORATORY

## 2021-03-22 PROCEDURE — 77081 DXA BONE DENSITY APPENDICULR: CPT | Performed by: RADIOLOGY

## 2021-03-22 PROCEDURE — 84207 ASSAY OF VITAMIN B-6: CPT | Performed by: CLINICAL MEDICAL LABORATORY

## 2021-03-22 PROCEDURE — 82746 ASSAY OF FOLIC ACID SERUM: CPT | Performed by: CLINICAL MEDICAL LABORATORY

## 2021-03-22 PROCEDURE — 84439 ASSAY OF FREE THYROXINE: CPT | Performed by: CLINICAL MEDICAL LABORATORY

## 2021-03-22 PROCEDURE — 84425 ASSAY OF VITAMIN B-1: CPT | Performed by: CLINICAL MEDICAL LABORATORY

## 2021-03-22 RX ORDER — SIMVASTATIN 80 MG
80 TABLET ORAL DAILY
Qty: 90 TABLET | Refills: 0 | Status: SHIPPED | OUTPATIENT
Start: 2021-03-22 | End: 2021-03-31 | Stop reason: ALTCHOICE

## 2021-03-22 RX ORDER — LEVOTHYROXINE SODIUM 0.15 MG/1
150 TABLET ORAL DAILY
Qty: 90 TABLET | Refills: 0 | Status: CANCELLED | OUTPATIENT
Start: 2021-03-22

## 2021-03-23 ENCOUNTER — OFFICE VISIT (OUTPATIENT)
Dept: GASTROENTEROLOGY | Age: 66
End: 2021-03-23

## 2021-03-23 VITALS
WEIGHT: 160 LBS | DIASTOLIC BLOOD PRESSURE: 55 MMHG | SYSTOLIC BLOOD PRESSURE: 99 MMHG | OXYGEN SATURATION: 99 % | BODY MASS INDEX: 27.04 KG/M2 | HEART RATE: 75 BPM

## 2021-03-23 DIAGNOSIS — R10.13 EPIGASTRIC PAIN: Primary | ICD-10-CM

## 2021-03-23 LAB
ALBUMIN SERPL ELPH-MCNC: 3.9 G/DL (ref 3.5–4.9)
ALPHA 1: 0.4 G/DL (ref 0.2–0.4)
ALPHA2 GLOB SERPL ELPH-MCNC: 0.9 G/DL (ref 0.5–0.9)
ANA SER QL IA: NEGATIVE
ARSENIC BLD-MCNC: 1.9 MCG/DL
B BURGDOR IGG+IGM SER QL IA: NEGATIVE
B-GLOBULIN SERPL ELPH-MCNC: 0.7 G/DL (ref 0.7–1.2)
CADMIUM BLD-MCNC: 0.2 MCG/DL
GAMMA GLOB SERPL ELPH-MCNC: 0.6 G/DL (ref 0.7–1.7)
GLOBULIN SER-MCNC: 2.6 G/DL (ref 2.1–4.2)
IGA SERPL-MCNC: 133 MG/DL (ref 82–453)
IGG SERPL-MCNC: 629 MG/DL (ref 751–1560)
IGG SERPL-MCNC: 629 MG/DL (ref 751–1560)
IGM SERPL-MCNC: 56 MG/DL (ref 46–304)
IGM SERPL-MCNC: 56 MG/DL (ref 46–304)
KAPPA LC FREE SER NEPH-MCNC: 1.88 MG/DL (ref 0.33–1.94)
KAPPA LC FREE SER NEPH-MCNC: 1.88 MG/DL (ref 0.33–1.94)
KAPPA LC/LAMBDA SER: 0.99 {RATIO} (ref 0.26–1.65)
KAPPA LC/LAMBDA SER: 0.99 {RATIO} (ref 0.26–1.65)
LAMBDA LC FREE SER NEPH-MCNC: 1.9 MG/DL (ref 0.57–2.63)
LAMBDA LC FREE SER NEPH-MCNC: 1.9 MG/DL (ref 0.57–2.63)
LEAD BLDV-MCNC: <2 MCG/DL
MERCURY BLD-MCNC: <1 MCG/DL
PATH INTERP BLD-IMP: ABNORMAL
PATH INTERP SPEC-IMP: ABNORMAL
PROT SERPL-MCNC: 6.5 G/DL (ref 6.4–8.2)
T PALLIDUM IGG SER QL IA: NONREACTIVE
T3FREE SERPL-MCNC: 2.5 PG/ML (ref 2.2–4)
T4 FREE SERPL-MCNC: 1.4 NG/DL (ref 0.8–1.5)

## 2021-03-23 PROCEDURE — 99214 OFFICE O/P EST MOD 30 MIN: CPT | Performed by: NURSE PRACTITIONER

## 2021-03-24 LAB
COLLAGEN CTX SERPL-MCNC: 263 PG/ML
IGA SERPL-MCNC: 133 MG/DL (ref 82–453)
PYRIDOXAL PHOS SERPL-SCNC: 28.6 NMOL/L (ref 20–125)
TTG IGA SER IA-ACNC: 5 UNITS

## 2021-03-25 ENCOUNTER — NURSE ONLY (OUTPATIENT)
Dept: SURGERY | Age: 66
End: 2021-03-25

## 2021-03-25 ENCOUNTER — TELEPHONE (OUTPATIENT)
Dept: SURGERY | Age: 66
End: 2021-03-25

## 2021-03-25 ENCOUNTER — OFFICE VISIT (OUTPATIENT)
Dept: ENDOCRINOLOGY | Age: 66
End: 2021-03-25

## 2021-03-25 ENCOUNTER — APPOINTMENT (OUTPATIENT)
Dept: REHABILITATION | Age: 66
End: 2021-03-25
Attending: INTERNAL MEDICINE

## 2021-03-25 ENCOUNTER — TELEPHONE (OUTPATIENT)
Dept: ENDOCRINOLOGY | Age: 66
End: 2021-03-25

## 2021-03-25 VITALS
SYSTOLIC BLOOD PRESSURE: 120 MMHG | HEART RATE: 80 BPM | WEIGHT: 164 LBS | DIASTOLIC BLOOD PRESSURE: 58 MMHG | BODY MASS INDEX: 28 KG/M2 | HEIGHT: 64 IN

## 2021-03-25 DIAGNOSIS — R10.13 EPIGASTRIC PAIN: Primary | ICD-10-CM

## 2021-03-25 DIAGNOSIS — E10.649 HYPOGLYCEMIA UNAWARENESS IN TYPE 1 DIABETES MELLITUS (CMD): ICD-10-CM

## 2021-03-25 DIAGNOSIS — Z87.81 HISTORY OF FRACTURE OF LEFT HIP: ICD-10-CM

## 2021-03-25 DIAGNOSIS — E10.65 TYPE 1 DIABETES MELLITUS WITH HYPERGLYCEMIA (CMD): ICD-10-CM

## 2021-03-25 DIAGNOSIS — M81.8 OTHER OSTEOPOROSIS WITHOUT CURRENT PATHOLOGICAL FRACTURE: Primary | ICD-10-CM

## 2021-03-25 DIAGNOSIS — Z91.89 AT HIGH RISK FOR FRACTURE: ICD-10-CM

## 2021-03-25 LAB
GAD65 AB SER IA-ACNC: 29.9 IU/ML (ref 0–5)
VIT B1 PYROPHOSHATE BLD-SCNC: 202 NMOL/L (ref 70–180)

## 2021-03-25 PROCEDURE — 99214 OFFICE O/P EST MOD 30 MIN: CPT | Performed by: INTERNAL MEDICINE

## 2021-03-25 RX ORDER — GLUCAGON 3 MG/1
POWDER NASAL
Qty: 1 EACH | Refills: 3 | Status: SHIPPED | OUTPATIENT
Start: 2021-03-25

## 2021-03-25 SDOH — HEALTH STABILITY: MENTAL HEALTH: HOW OFTEN DO YOU HAVE 6 OR MORE DRINKS ON ONE OCCASION?: NEVER

## 2021-03-25 SDOH — HEALTH STABILITY: MENTAL HEALTH: HOW MANY STANDARD DRINKS CONTAINING ALCOHOL DO YOU HAVE ON A TYPICAL DAY?: 1 OR 2

## 2021-03-25 SDOH — HEALTH STABILITY: MENTAL HEALTH: HOW OFTEN DO YOU HAVE A DRINK CONTAINING ALCOHOL?: NEVER

## 2021-03-27 ENCOUNTER — IMMUNIZATION (OUTPATIENT)
Dept: LAB | Age: 66
End: 2021-03-27
Attending: PREVENTIVE MEDICINE

## 2021-03-27 DIAGNOSIS — Z23 NEED FOR VACCINATION: Primary | ICD-10-CM

## 2021-03-27 PROCEDURE — 0002A COVID 19 PFIZER-BIONTECH: CPT | Performed by: INTERNAL MEDICINE

## 2021-03-27 PROCEDURE — 91300 COVID 19 PFIZER-BIONTECH: CPT | Performed by: INTERNAL MEDICINE

## 2021-03-30 ENCOUNTER — OFFICE VISIT (OUTPATIENT)
Dept: SURGERY | Age: 66
End: 2021-03-30

## 2021-03-30 ENCOUNTER — HOSPITAL ENCOUNTER (OUTPATIENT)
Dept: REHABILITATION | Age: 66
Discharge: STILL ADMITTED OR ADMINISTRATIVE | End: 2021-03-30
Attending: INTERNAL MEDICINE

## 2021-03-30 VITALS
TEMPERATURE: 98.4 F | HEART RATE: 76 BPM | WEIGHT: 162.48 LBS | BODY MASS INDEX: 27.89 KG/M2 | OXYGEN SATURATION: 98 % | RESPIRATION RATE: 16 BRPM

## 2021-03-30 DIAGNOSIS — R10.13 EPIGASTRIC PAIN: Primary | ICD-10-CM

## 2021-03-30 PROCEDURE — 99212 OFFICE O/P EST SF 10 MIN: CPT | Performed by: SURGERY

## 2021-03-30 PROCEDURE — 97162 PT EVAL MOD COMPLEX 30 MIN: CPT | Performed by: PHYSICAL THERAPIST

## 2021-03-30 PROCEDURE — 97112 NEUROMUSCULAR REEDUCATION: CPT | Performed by: PHYSICAL THERAPIST

## 2021-03-30 PROCEDURE — 10004173 HB COUNTER-THERAPY VISIT PT: Performed by: PHYSICAL THERAPIST

## 2021-03-30 RX ORDER — 0.9 % SODIUM CHLORIDE 0.9 %
2 VIAL (ML) INJECTION EVERY 12 HOURS SCHEDULED
Status: CANCELLED | OUTPATIENT
Start: 2021-03-30

## 2021-03-31 ENCOUNTER — OFFICE VISIT (OUTPATIENT)
Dept: INTERNAL MEDICINE | Age: 66
End: 2021-03-31

## 2021-03-31 ENCOUNTER — OFFICE VISIT (OUTPATIENT)
Dept: AUDIOLOGY | Age: 66
End: 2021-03-31

## 2021-03-31 ENCOUNTER — IMAGING SERVICES (OUTPATIENT)
Dept: GENERAL RADIOLOGY | Age: 66
End: 2021-03-31
Attending: INTERNAL MEDICINE

## 2021-03-31 ENCOUNTER — PREP FOR CASE (OUTPATIENT)
Dept: SURGERY | Age: 66
End: 2021-03-31

## 2021-03-31 ENCOUNTER — TELEPHONE (OUTPATIENT)
Dept: INTERNAL MEDICINE | Age: 66
End: 2021-03-31

## 2021-03-31 ENCOUNTER — LAB SERVICES (OUTPATIENT)
Dept: LAB | Age: 66
End: 2021-03-31

## 2021-03-31 VITALS
BODY MASS INDEX: 27.45 KG/M2 | WEIGHT: 160.8 LBS | OXYGEN SATURATION: 96 % | HEART RATE: 82 BPM | DIASTOLIC BLOOD PRESSURE: 60 MMHG | TEMPERATURE: 97.9 F | SYSTOLIC BLOOD PRESSURE: 110 MMHG | HEIGHT: 64 IN

## 2021-03-31 DIAGNOSIS — R05.9 COUGH: ICD-10-CM

## 2021-03-31 DIAGNOSIS — F17.200 TOBACCO USE DISORDER: ICD-10-CM

## 2021-03-31 DIAGNOSIS — E78.2 MIXED HYPERLIPIDEMIA: ICD-10-CM

## 2021-03-31 DIAGNOSIS — Z01.818 PREOPERATIVE EXAMINATION: Primary | ICD-10-CM

## 2021-03-31 DIAGNOSIS — K80.20 SYMPTOMATIC CHOLELITHIASIS: ICD-10-CM

## 2021-03-31 DIAGNOSIS — E08.42 DIABETIC POLYNEUROPATHY ASSOCIATED WITH DIABETES MELLITUS DUE TO UNDERLYING CONDITION (CMD): ICD-10-CM

## 2021-03-31 DIAGNOSIS — E03.9 ACQUIRED HYPOTHYROIDISM: ICD-10-CM

## 2021-03-31 DIAGNOSIS — I10 ESSENTIAL HYPERTENSION: ICD-10-CM

## 2021-03-31 DIAGNOSIS — Z01.818 PREOPERATIVE EXAMINATION: ICD-10-CM

## 2021-03-31 DIAGNOSIS — R60.0 BILATERAL LOWER EXTREMITY EDEMA: ICD-10-CM

## 2021-03-31 DIAGNOSIS — Z86.73 HISTORY OF TIA (TRANSIENT ISCHEMIC ATTACK) AND STROKE: ICD-10-CM

## 2021-03-31 DIAGNOSIS — I25.10 CORONARY ARTERY DISEASE INVOLVING NATIVE CORONARY ARTERY OF NATIVE HEART, ANGINA PRESENCE UNSPECIFIED: ICD-10-CM

## 2021-03-31 DIAGNOSIS — R10.13 EPIGASTRIC PAIN: Primary | ICD-10-CM

## 2021-03-31 DIAGNOSIS — Z11.52 ENCOUNTER FOR PREPROCEDURE SCREENING LABORATORY TESTING FOR COVID-19: ICD-10-CM

## 2021-03-31 DIAGNOSIS — Z01.812 ENCOUNTER FOR PREPROCEDURE SCREENING LABORATORY TESTING FOR COVID-19: ICD-10-CM

## 2021-03-31 DIAGNOSIS — N18.31 STAGE 3A CHRONIC KIDNEY DISEASE (CMD): ICD-10-CM

## 2021-03-31 DIAGNOSIS — E10.65 TYPE 1 DIABETES MELLITUS WITH HYPERGLYCEMIA (CMD): ICD-10-CM

## 2021-03-31 DIAGNOSIS — M54.6 ACUTE RIGHT-SIDED THORACIC BACK PAIN: ICD-10-CM

## 2021-03-31 DIAGNOSIS — Z46.1 ENCOUNTER FOR FITTING OR ADJUSTMENT OF HEARING AID: Primary | ICD-10-CM

## 2021-03-31 DIAGNOSIS — G47.33 OSA (OBSTRUCTIVE SLEEP APNEA): ICD-10-CM

## 2021-03-31 PROCEDURE — 99215 OFFICE O/P EST HI 40 MIN: CPT | Performed by: INTERNAL MEDICINE

## 2021-03-31 PROCEDURE — 92593 HEARING AID SERVICE LEVEL 2 BINAURAL: CPT | Performed by: AUDIOLOGIST

## 2021-03-31 PROCEDURE — 83036 HEMOGLOBIN GLYCOSYLATED A1C: CPT | Performed by: CLINICAL MEDICAL LABORATORY

## 2021-03-31 PROCEDURE — 36415 COLL VENOUS BLD VENIPUNCTURE: CPT | Performed by: INTERNAL MEDICINE

## 2021-03-31 PROCEDURE — 71046 X-RAY EXAM CHEST 2 VIEWS: CPT | Performed by: RADIOLOGY

## 2021-03-31 RX ORDER — PREGABALIN 225 MG/1
225 CAPSULE ORAL 2 TIMES DAILY
Qty: 60 CAPSULE | Refills: 0 | Status: SHIPPED | OUTPATIENT
Start: 2021-03-31 | End: 2021-05-05

## 2021-03-31 RX ORDER — ATORVASTATIN CALCIUM 40 MG/1
40 TABLET, FILM COATED ORAL DAILY
Qty: 90 TABLET | Refills: 3 | Status: SHIPPED | OUTPATIENT
Start: 2021-03-31 | End: 2021-07-15 | Stop reason: ALTCHOICE

## 2021-03-31 RX ORDER — BUPROPION HYDROCHLORIDE 150 MG/1
TABLET, EXTENDED RELEASE ORAL
Status: SHIPPED | COMMUNITY
Start: 2021-03-31 | End: 2021-04-28

## 2021-03-31 RX ORDER — TIZANIDINE 2 MG/1
2 TABLET ORAL EVERY 8 HOURS PRN
Qty: 30 TABLET | Refills: 0 | Status: SHIPPED | OUTPATIENT
Start: 2021-03-31 | End: 2021-05-26 | Stop reason: ALTCHOICE

## 2021-03-31 RX ORDER — LEVOTHYROXINE SODIUM 0.12 MG/1
125 TABLET ORAL DAILY
Qty: 30 TABLET | Refills: 1 | Status: SHIPPED | OUTPATIENT
Start: 2021-03-31 | End: 2021-05-12 | Stop reason: DRUGHIGH

## 2021-04-01 ENCOUNTER — APPOINTMENT (OUTPATIENT)
Dept: REHABILITATION | Age: 66
End: 2021-04-01
Attending: INTERNAL MEDICINE

## 2021-04-01 ENCOUNTER — HOSPITAL ENCOUNTER (OUTPATIENT)
Dept: MRI IMAGING | Age: 66
Discharge: HOME | End: 2021-04-01
Attending: PSYCHIATRY & NEUROLOGY

## 2021-04-01 DIAGNOSIS — E11.42 DIABETIC POLYNEUROPATHY ASSOCIATED WITH TYPE 2 DIABETES MELLITUS (CMD): ICD-10-CM

## 2021-04-01 DIAGNOSIS — R29.6 FALLS FREQUENTLY: ICD-10-CM

## 2021-04-01 DIAGNOSIS — R29.2 HYPERREFLEXIA: ICD-10-CM

## 2021-04-01 DIAGNOSIS — E10.42 DIABETIC POLYNEUROPATHY ASSOCIATED WITH TYPE 1 DIABETES MELLITUS (CMD): ICD-10-CM

## 2021-04-01 LAB — HBA1C MFR BLD: 7.8 % (ref 4.5–5.6)

## 2021-04-01 PROCEDURE — G1004 CDSM NDSC: HCPCS | Performed by: RADIOLOGY

## 2021-04-01 PROCEDURE — 72146 MRI CHEST SPINE W/O DYE: CPT | Performed by: RADIOLOGY

## 2021-04-01 PROCEDURE — 72146 MRI CHEST SPINE W/O DYE: CPT

## 2021-04-01 PROCEDURE — G1004 CDSM NDSC: HCPCS

## 2021-04-02 ENCOUNTER — TELEPHONE (OUTPATIENT)
Dept: INTERNAL MEDICINE | Age: 66
End: 2021-04-02

## 2021-04-02 ASSESSMENT — ENCOUNTER SYMPTOMS
WHEEZING: 0
BLOOD IN STOOL: 0
EYE REDNESS: 0
STRIDOR: 0
ROS GI COMMENTS: HEARTBURN/INDIGESTION
HEADACHES: 1
WEAKNESS: 0
NERVOUS/ANXIOUS: 0
SHORTNESS OF BREATH: 0
COUGH: 0
SORE THROAT: 0
TROUBLE SWALLOWING: 0
BACK PAIN: 0
SINUS PRESSURE: 0
ADENOPATHY: 0
FEVER: 0
UNEXPECTED WEIGHT CHANGE: 1
ANAL BLEEDING: 0
BRUISES/BLEEDS EASILY: 0
SEIZURES: 0
DIZZINESS: 1
DIARRHEA: 0
VOICE CHANGE: 0
LIGHT-HEADEDNESS: 0
EYE DISCHARGE: 0
ROS SKIN COMMENTS: DENIES ANY NEW OR CHANGING SKIN LESIONS
ABDOMINAL DISTENTION: 0
VOMITING: 0
COLOR CHANGE: 0
SLEEP DISTURBANCE: 0
ABDOMINAL PAIN: 1
DIAPHORESIS: 0
NAUSEA: 0
NUMBNESS: 0
CHILLS: 0
CONSTIPATION: 1

## 2021-04-05 ENCOUNTER — ANESTHESIA EVENT (OUTPATIENT)
Dept: SURGERY | Age: 66
End: 2021-04-05

## 2021-04-05 ENCOUNTER — OFFICE VISIT (OUTPATIENT)
Dept: CARDIOLOGY | Age: 66
End: 2021-04-05
Attending: INTERNAL MEDICINE

## 2021-04-05 ENCOUNTER — OFFICE VISIT (OUTPATIENT)
Dept: CARDIOLOGY | Age: 66
End: 2021-04-05

## 2021-04-05 ENCOUNTER — APPOINTMENT (OUTPATIENT)
Dept: REHABILITATION | Age: 66
End: 2021-04-05
Attending: INTERNAL MEDICINE

## 2021-04-05 ENCOUNTER — TELEPHONE (OUTPATIENT)
Dept: OTHER | Age: 66
End: 2021-04-05

## 2021-04-05 ENCOUNTER — TELEPHONE (OUTPATIENT)
Dept: NEUROLOGY | Age: 66
End: 2021-04-05

## 2021-04-05 VITALS
DIASTOLIC BLOOD PRESSURE: 67 MMHG | HEART RATE: 77 BPM | HEIGHT: 64 IN | SYSTOLIC BLOOD PRESSURE: 113 MMHG | BODY MASS INDEX: 27.35 KG/M2 | WEIGHT: 160.2 LBS

## 2021-04-05 DIAGNOSIS — Z01.818 PRE-OP EXAMINATION: Primary | ICD-10-CM

## 2021-04-05 DIAGNOSIS — Z01.818 PRE-OP EXAMINATION: ICD-10-CM

## 2021-04-05 DIAGNOSIS — I25.10 CORONARY ARTERY DISEASE INVOLVING NATIVE CORONARY ARTERY OF NATIVE HEART, ANGINA PRESENCE UNSPECIFIED: ICD-10-CM

## 2021-04-05 DIAGNOSIS — Z95.1 S/P CABG X 2: ICD-10-CM

## 2021-04-05 DIAGNOSIS — I10 ESSENTIAL HYPERTENSION: Primary | ICD-10-CM

## 2021-04-05 PROCEDURE — 93000 ELECTROCARDIOGRAM COMPLETE: CPT | Performed by: INTERNAL MEDICINE

## 2021-04-05 PROCEDURE — 99213 OFFICE O/P EST LOW 20 MIN: CPT | Performed by: INTERNAL MEDICINE

## 2021-04-06 ENCOUNTER — OFFICE VISIT (OUTPATIENT)
Dept: CHIROPRACTIC MEDICINE | Age: 66
End: 2021-04-06

## 2021-04-06 VITALS
TEMPERATURE: 97.2 F | OXYGEN SATURATION: 97 % | HEART RATE: 72 BPM | HEIGHT: 64 IN | SYSTOLIC BLOOD PRESSURE: 177 MMHG | WEIGHT: 139.77 LBS | RESPIRATION RATE: 16 BRPM | BODY MASS INDEX: 23.86 KG/M2 | DIASTOLIC BLOOD PRESSURE: 79 MMHG

## 2021-04-06 DIAGNOSIS — M54.6 PAIN IN THORACIC SPINE: ICD-10-CM

## 2021-04-06 DIAGNOSIS — M54.2 CERVICALGIA: ICD-10-CM

## 2021-04-06 DIAGNOSIS — M99.01 CERVICAL SOMATIC DYSFUNCTION: ICD-10-CM

## 2021-04-06 DIAGNOSIS — M99.03 SOMATIC DYSFUNCTION OF LUMBAR REGION: ICD-10-CM

## 2021-04-06 DIAGNOSIS — M54.50 ACUTE RIGHT-SIDED LOW BACK PAIN WITHOUT SCIATICA: ICD-10-CM

## 2021-04-06 DIAGNOSIS — M99.02 THORACIC REGION SOMATIC DYSFUNCTION: Primary | ICD-10-CM

## 2021-04-06 PROCEDURE — 98941 CHIROPRACT MANJ 3-4 REGIONS: CPT | Performed by: CHIROPRACTOR

## 2021-04-06 PROCEDURE — 99202 OFFICE O/P NEW SF 15 MIN: CPT | Performed by: CHIROPRACTOR

## 2021-04-06 ASSESSMENT — PAIN SCALES - GENERAL
PAINLEVEL_OUTOF10: 10
PAINLEVEL_OUTOF10: 10
PAINLEVEL_OUTOF10: 3
PAINLEVEL_OUTOF10: 10
PAINLEVEL_OUTOF10: 10
PAINLEVEL_OUTOF10: 7
PAINLEVEL_OUTOF10: 10

## 2021-04-07 ENCOUNTER — TELEPHONE (OUTPATIENT)
Dept: ENDOCRINOLOGY | Age: 66
End: 2021-04-07

## 2021-04-07 ENCOUNTER — HOSPITAL ENCOUNTER (OUTPATIENT)
Dept: REHABILITATION | Age: 66
Discharge: STILL ADMITTED OR ADMINISTRATIVE | End: 2021-04-07
Attending: INTERNAL MEDICINE

## 2021-04-07 PROCEDURE — 97140 MANUAL THERAPY 1/> REGIONS: CPT | Performed by: PHYSICAL THERAPIST

## 2021-04-07 PROCEDURE — 10004173 HB COUNTER-THERAPY VISIT PT: Performed by: PHYSICAL THERAPIST

## 2021-04-07 PROCEDURE — 97110 THERAPEUTIC EXERCISES: CPT | Performed by: PHYSICAL THERAPIST

## 2021-04-07 PROCEDURE — 97112 NEUROMUSCULAR REEDUCATION: CPT | Performed by: PHYSICAL THERAPIST

## 2021-04-07 ASSESSMENT — ENCOUNTER SYMPTOMS: PAIN SEVERITY NOW: 7

## 2021-04-07 ASSESSMENT — ACTIVITIES OF DAILY LIVING (ADL)
NEEDS_ASSIST: NO
ADL_BEFORE_ADMISSION: INDEPENDENT
HISTORY OF FALLING IN THE LAST YEAR (PRIOR TO ADMISSION): YES
ADL_SCORE: 12
CHRONIC_PAIN_PRESENT: YES, CHRONIC
DESCRIBE HOW PAIN IMPACTS YOUR LIFE: PHYSICAL ACTIVITY;MOBILITY;RELAXATION/REST/SLEEP
RECENT_DECLINE_ADL: NO
SENSORY_SUPPORT_DEVICES: EYEGLASSES;DENTURES
MOBILITY_ASSIST_DEVICES: FOUR WHEEL WALKER
ADL_SHORT_OF_BREATH: NO

## 2021-04-09 ENCOUNTER — LAB SERVICES (OUTPATIENT)
Dept: LAB | Age: 66
End: 2021-04-09

## 2021-04-09 ENCOUNTER — OFFICE VISIT (OUTPATIENT)
Dept: CHIROPRACTIC MEDICINE | Age: 66
End: 2021-04-09

## 2021-04-09 DIAGNOSIS — M99.02 THORACIC REGION SOMATIC DYSFUNCTION: Primary | ICD-10-CM

## 2021-04-09 DIAGNOSIS — M54.2 CERVICALGIA: ICD-10-CM

## 2021-04-09 DIAGNOSIS — M54.50 ACUTE RIGHT-SIDED LOW BACK PAIN WITHOUT SCIATICA: ICD-10-CM

## 2021-04-09 DIAGNOSIS — R10.13 EPIGASTRIC PAIN: ICD-10-CM

## 2021-04-09 DIAGNOSIS — M99.01 CERVICAL SOMATIC DYSFUNCTION: ICD-10-CM

## 2021-04-09 DIAGNOSIS — M99.03 SOMATIC DYSFUNCTION OF LUMBAR REGION: ICD-10-CM

## 2021-04-09 DIAGNOSIS — M54.6 PAIN IN THORACIC SPINE: ICD-10-CM

## 2021-04-09 PROCEDURE — U0003 INFECTIOUS AGENT DETECTION BY NUCLEIC ACID (DNA OR RNA); SEVERE ACUTE RESPIRATORY SYNDROME CORONAVIRUS 2 (SARS-COV-2) (CORONAVIRUS DISEASE [COVID-19]), AMPLIFIED PROBE TECHNIQUE, MAKING USE OF HIGH THROUGHPUT TECHNOLOGIES AS DESCRIBED BY CMS-2020-01-R: HCPCS | Performed by: CLINICAL MEDICAL LABORATORY

## 2021-04-09 PROCEDURE — U0005 INFEC AGEN DETEC AMPLI PROBE: HCPCS | Performed by: CLINICAL MEDICAL LABORATORY

## 2021-04-09 PROCEDURE — 98941 CHIROPRACT MANJ 3-4 REGIONS: CPT | Performed by: CHIROPRACTOR

## 2021-04-09 ASSESSMENT — PAIN SCALES - GENERAL
PAINLEVEL_OUTOF10: 6
PAINLEVEL_OUTOF10: 10
PAINLEVEL_OUTOF10: 10
PAINLEVEL_OUTOF10: 2
PAINLEVEL_OUTOF10: 10

## 2021-04-10 LAB
SARS-COV-2 RNA RESP QL NAA+PROBE: NOT DETECTED
SERVICE CMNT-IMP: NORMAL
SERVICE CMNT-IMP: NORMAL

## 2021-04-12 ENCOUNTER — HOSPITAL ENCOUNTER (OUTPATIENT)
Age: 66
Discharge: HOME | End: 2021-04-12
Attending: SURGERY | Admitting: SURGERY

## 2021-04-12 ENCOUNTER — ANESTHESIA (OUTPATIENT)
Dept: SURGERY | Age: 66
End: 2021-04-12

## 2021-04-12 VITALS
HEART RATE: 78 BPM | WEIGHT: 160.94 LBS | BODY MASS INDEX: 27.48 KG/M2 | TEMPERATURE: 97.8 F | OXYGEN SATURATION: 96 % | RESPIRATION RATE: 16 BRPM | SYSTOLIC BLOOD PRESSURE: 126 MMHG | HEIGHT: 64 IN | DIASTOLIC BLOOD PRESSURE: 58 MMHG

## 2021-04-12 DIAGNOSIS — R10.13 EPIGASTRIC PAIN: ICD-10-CM

## 2021-04-12 LAB
GLUCOSE BLDC GLUCOMTR-MCNC: 218 MG/DL (ref 70–99)
GLUCOSE BLDC GLUCOMTR-MCNC: 239 MG/DL (ref 70–99)
GLUCOSE BLDC GLUCOMTR-MCNC: 382 MG/DL (ref 70–99)

## 2021-04-12 PROCEDURE — 47562 LAPAROSCOPIC CHOLECYSTECTOMY: CPT | Performed by: NURSE ANESTHETIST, CERTIFIED REGISTERED

## 2021-04-12 PROCEDURE — 10002800 HB RX 250 W HCPCS: Performed by: NURSE ANESTHETIST, CERTIFIED REGISTERED

## 2021-04-12 PROCEDURE — 96372 THER/PROPH/DIAG INJ SC/IM: CPT | Performed by: NURSE ANESTHETIST, CERTIFIED REGISTERED

## 2021-04-12 PROCEDURE — 10006023 HB SUPPLY 272: Performed by: SURGERY

## 2021-04-12 PROCEDURE — 47562 LAPAROSCOPIC CHOLECYSTECTOMY: CPT | Performed by: SURGERY

## 2021-04-12 PROCEDURE — 10002800 HB RX 250 W HCPCS: Performed by: SURGERY

## 2021-04-12 PROCEDURE — 10002358 HB ANESTH GENERAL 1ST 1/2 HR: Performed by: SURGERY

## 2021-04-12 PROCEDURE — 10006391 HB COMPLEX PROCEDURE: Performed by: SURGERY

## 2021-04-12 PROCEDURE — 10002359 HB ANESTH GENERAL ADD'L 1/2 HR: Performed by: SURGERY

## 2021-04-12 PROCEDURE — 10002767 HB EXTENDED RECOVERY PER HOUR: Performed by: SURGERY

## 2021-04-12 PROCEDURE — 88304 TISSUE EXAM BY PATHOLOGIST: CPT | Performed by: SURGERY

## 2021-04-12 PROCEDURE — 10002803 HB RX 637: Performed by: NURSE ANESTHETIST, CERTIFIED REGISTERED

## 2021-04-12 PROCEDURE — 10004316 HB COUNTER-PREP

## 2021-04-12 PROCEDURE — 10002807 HB RX 258: Performed by: NURSE ANESTHETIST, CERTIFIED REGISTERED

## 2021-04-12 PROCEDURE — 10002801 HB RX 250 W/O HCPCS: Performed by: NURSE ANESTHETIST, CERTIFIED REGISTERED

## 2021-04-12 PROCEDURE — 47562 LAPAROSCOPIC CHOLECYSTECTOMY: CPT | Performed by: PHYSICIAN ASSISTANT

## 2021-04-12 PROCEDURE — 10002117 HB ADDITIONAL SURGERY TIME/30 MIN: Performed by: SURGERY

## 2021-04-12 PROCEDURE — 10002801 HB RX 250 W/O HCPCS: Performed by: SURGERY

## 2021-04-12 PROCEDURE — 10004348 HB COUNTER-EVAL PRE-OP

## 2021-04-12 PROCEDURE — 10004451 HB PACU RECOVERY 1ST 30 MINUTES: Performed by: SURGERY

## 2021-04-12 PROCEDURE — 82962 GLUCOSE BLOOD TEST: CPT

## 2021-04-12 PROCEDURE — 10004452 HB PACU ADDL 30 MINUTES: Performed by: SURGERY

## 2021-04-12 RX ORDER — SODIUM CHLORIDE, SODIUM LACTATE, POTASSIUM CHLORIDE, CALCIUM CHLORIDE 600; 310; 30; 20 MG/100ML; MG/100ML; MG/100ML; MG/100ML
INJECTION, SOLUTION INTRAVENOUS CONTINUOUS
Status: DISCONTINUED | OUTPATIENT
Start: 2021-04-12 | End: 2021-04-12 | Stop reason: HOSPADM

## 2021-04-12 RX ORDER — 0.9 % SODIUM CHLORIDE 0.9 %
2 VIAL (ML) INJECTION EVERY 12 HOURS SCHEDULED
Status: DISCONTINUED | OUTPATIENT
Start: 2021-04-12 | End: 2021-04-12 | Stop reason: HOSPADM

## 2021-04-12 RX ORDER — MAGNESIUM HYDROXIDE 1200 MG/15ML
LIQUID ORAL PRN
Status: DISCONTINUED | OUTPATIENT
Start: 2021-04-12 | End: 2021-04-12 | Stop reason: HOSPADM

## 2021-04-12 RX ORDER — DEXAMETHASONE SODIUM PHOSPHATE 4 MG/ML
4 INJECTION, SOLUTION INTRA-ARTICULAR; INTRALESIONAL; INTRAMUSCULAR; INTRAVENOUS; SOFT TISSUE ONCE
Status: COMPLETED | OUTPATIENT
Start: 2021-04-12 | End: 2021-04-12

## 2021-04-12 RX ORDER — ACETAMINOPHEN 650 MG/1
650 SUPPOSITORY RECTAL EVERY 4 HOURS PRN
Status: DISCONTINUED | OUTPATIENT
Start: 2021-04-12 | End: 2021-04-12 | Stop reason: HOSPADM

## 2021-04-12 RX ORDER — NALOXONE HCL 0.4 MG/ML
0.2 VIAL (ML) INJECTION EVERY 5 MIN PRN
Status: DISCONTINUED | OUTPATIENT
Start: 2021-04-12 | End: 2021-04-12 | Stop reason: HOSPADM

## 2021-04-12 RX ORDER — PROPOFOL 10 MG/ML
INJECTION, EMULSION INTRAVENOUS PRN
Status: DISCONTINUED | OUTPATIENT
Start: 2021-04-12 | End: 2021-04-12

## 2021-04-12 RX ORDER — ONDANSETRON 2 MG/ML
4 INJECTION INTRAMUSCULAR; INTRAVENOUS 2 TIMES DAILY PRN
Status: DISCONTINUED | OUTPATIENT
Start: 2021-04-12 | End: 2021-04-12 | Stop reason: HOSPADM

## 2021-04-12 RX ORDER — HUMAN INSULIN 100 [IU]/ML
INJECTION, SOLUTION SUBCUTANEOUS
Status: COMPLETED | OUTPATIENT
Start: 2021-04-12 | End: 2021-04-12

## 2021-04-12 RX ORDER — HYDROCODONE BITARTRATE AND ACETAMINOPHEN 5; 325 MG/1; MG/1
1 TABLET ORAL EVERY 4 HOURS PRN
Status: DISCONTINUED | OUTPATIENT
Start: 2021-04-12 | End: 2021-04-12 | Stop reason: HOSPADM

## 2021-04-12 RX ORDER — HYDROCODONE BITARTRATE AND ACETAMINOPHEN 5; 325 MG/1; MG/1
1-2 TABLET ORAL EVERY 6 HOURS PRN
Qty: 15 TABLET | Refills: 0 | Status: SHIPPED | OUTPATIENT
Start: 2021-04-12 | End: 2021-04-28 | Stop reason: ALTCHOICE

## 2021-04-12 RX ORDER — ROCURONIUM BROMIDE 10 MG/ML
INJECTION, SOLUTION INTRAVENOUS PRN
Status: DISCONTINUED | OUTPATIENT
Start: 2021-04-12 | End: 2021-04-12

## 2021-04-12 RX ORDER — PROCHLORPERAZINE EDISYLATE 5 MG/ML
10 INJECTION INTRAMUSCULAR; INTRAVENOUS EVERY 4 HOURS PRN
Status: DISCONTINUED | OUTPATIENT
Start: 2021-04-12 | End: 2021-04-12 | Stop reason: HOSPADM

## 2021-04-12 RX ORDER — LIDOCAINE HYDROCHLORIDE 10 MG/ML
INJECTION, SOLUTION INFILTRATION; PERINEURAL PRN
Status: DISCONTINUED | OUTPATIENT
Start: 2021-04-12 | End: 2021-04-12

## 2021-04-12 RX ORDER — ONDANSETRON 4 MG/1
4 TABLET, ORALLY DISINTEGRATING ORAL
Status: COMPLETED | OUTPATIENT
Start: 2021-04-12 | End: 2021-04-12

## 2021-04-12 RX ORDER — ACETAMINOPHEN 325 MG/1
650 TABLET ORAL EVERY 4 HOURS PRN
Status: DISCONTINUED | OUTPATIENT
Start: 2021-04-12 | End: 2021-04-12 | Stop reason: HOSPADM

## 2021-04-12 RX ADMIN — ROCURONIUM BROMIDE 10 MG: 50 INJECTION, SOLUTION INTRAVENOUS at 10:41

## 2021-04-12 RX ADMIN — SODIUM CHLORIDE, POTASSIUM CHLORIDE, SODIUM LACTATE AND CALCIUM CHLORIDE: 600; 310; 30; 20 INJECTION, SOLUTION INTRAVENOUS at 09:33

## 2021-04-12 RX ADMIN — ONDANSETRON 4 MG: 4 TABLET, ORALLY DISINTEGRATING ORAL at 09:39

## 2021-04-12 RX ADMIN — ROCURONIUM BROMIDE 50 MG: 50 INJECTION, SOLUTION INTRAVENOUS at 10:42

## 2021-04-12 RX ADMIN — FENTANYL CITRATE 50 MCG: 50 INJECTION, SOLUTION INTRAMUSCULAR; INTRAVENOUS at 10:39

## 2021-04-12 RX ADMIN — INSULIN HUMAN 10 UNITS: 100 INJECTION, SOLUTION PARENTERAL at 09:50

## 2021-04-12 RX ADMIN — DEXAMETHASONE SODIUM PHOSPHATE 4 MG: 4 INJECTION INTRA-ARTICULAR; INTRALESIONAL; INTRAMUSCULAR; INTRAVENOUS; SOFT TISSUE at 12:38

## 2021-04-12 RX ADMIN — CEFAZOLIN SODIUM 2000 MG: 300 INJECTION, POWDER, LYOPHILIZED, FOR SOLUTION INTRAVENOUS at 10:46

## 2021-04-12 RX ADMIN — SUGAMMADEX 50 MG: 100 INJECTION, SOLUTION INTRAVENOUS at 11:50

## 2021-04-12 RX ADMIN — PROPOFOL 140 MG: 10 INJECTION, EMULSION INTRAVENOUS at 10:41

## 2021-04-12 RX ADMIN — PROCHLORPERAZINE EDISYLATE 10 MG: 5 INJECTION INTRAMUSCULAR; INTRAVENOUS at 12:17

## 2021-04-12 RX ADMIN — FENTANYL CITRATE 50 MCG: 50 INJECTION, SOLUTION INTRAMUSCULAR; INTRAVENOUS at 11:00

## 2021-04-12 RX ADMIN — SUGAMMADEX 50 MG: 100 INJECTION, SOLUTION INTRAVENOUS at 11:51

## 2021-04-12 RX ADMIN — ONDANSETRON 4 MG: 2 INJECTION INTRAMUSCULAR; INTRAVENOUS at 12:06

## 2021-04-12 RX ADMIN — SODIUM CHLORIDE, POTASSIUM CHLORIDE, SODIUM LACTATE AND CALCIUM CHLORIDE: 600; 310; 30; 20 INJECTION, SOLUTION INTRAVENOUS at 12:30

## 2021-04-12 RX ADMIN — LIDOCAINE HYDROCHLORIDE 100 MG: 10 INJECTION, SOLUTION INFILTRATION; PERINEURAL at 10:40

## 2021-04-12 RX ADMIN — HYDROMORPHONE HYDROCHLORIDE 0.4 MG: 1 INJECTION, SOLUTION INTRAMUSCULAR; INTRAVENOUS; SUBCUTANEOUS at 12:05

## 2021-04-12 RX ADMIN — SUGAMMADEX 40 MG: 100 INJECTION, SOLUTION INTRAVENOUS at 11:52

## 2021-04-12 ASSESSMENT — PAIN SCALES - GENERAL
PAINLEVEL_OUTOF10: 6
PAINLEVEL_OUTOF10: 4
PAINLEVEL_OUTOF10: 3
PAINLEVEL_OUTOF10: 7
PAINLEVEL_OUTOF10: 7
PAINLEVEL_OUTOF10: 5
PAINLEVEL_OUTOF10: 7
PAINLEVEL_OUTOF10: 7
PAINLEVEL_OUTOF10: 2

## 2021-04-12 ASSESSMENT — LIFESTYLE VARIABLES: SMOKING_STATUS: CURRENT

## 2021-04-12 ASSESSMENT — ENCOUNTER SYMPTOMS
SEIZURES: 0
HEADACHES: 1

## 2021-04-13 ENCOUNTER — APPOINTMENT (OUTPATIENT)
Dept: SLEEP MEDICINE | Age: 66
End: 2021-04-13
Attending: INTERNAL MEDICINE

## 2021-04-14 ENCOUNTER — NURSE TRIAGE (OUTPATIENT)
Dept: INTERNAL MEDICINE | Age: 66
End: 2021-04-14

## 2021-04-14 ENCOUNTER — APPOINTMENT (OUTPATIENT)
Dept: REHABILITATION | Age: 66
End: 2021-04-14
Attending: INTERNAL MEDICINE

## 2021-04-14 ENCOUNTER — TELEPHONE (OUTPATIENT)
Dept: SURGERY | Age: 66
End: 2021-04-14

## 2021-04-14 LAB
ASR DISCLAIMER: NORMAL
CASE RPRT: NORMAL
CLINICAL INFO: NORMAL
PATH REPORT.FINAL DX SPEC: NORMAL
PATH REPORT.GROSS SPEC: NORMAL
PATH REPORT.MICROSCOPIC SPEC OTHER STN: NORMAL

## 2021-04-15 ENCOUNTER — TELEPHONE (OUTPATIENT)
Dept: INTERNAL MEDICINE | Age: 66
End: 2021-04-15

## 2021-04-15 ENCOUNTER — APPOINTMENT (OUTPATIENT)
Dept: LAB | Age: 66
End: 2021-04-15

## 2021-04-15 ENCOUNTER — TELEPHONE (OUTPATIENT)
Dept: NEUROLOGY | Age: 66
End: 2021-04-15

## 2021-04-15 ENCOUNTER — APPOINTMENT (OUTPATIENT)
Dept: OBGYN | Age: 66
End: 2021-04-15

## 2021-04-15 DIAGNOSIS — R29.6 FALLS FREQUENTLY: Primary | ICD-10-CM

## 2021-04-16 ENCOUNTER — APPOINTMENT (OUTPATIENT)
Dept: SLEEP MEDICINE | Age: 66
End: 2021-04-16
Attending: INTERNAL MEDICINE

## 2021-04-20 RX ORDER — POLYETHYLENE GLYCOL 3350 17 G
2 POWDER IN PACKET (EA) ORAL PRN
COMMUNITY
Start: 2021-06-01 | End: 2021-06-12

## 2021-04-20 RX ORDER — NICOTINE 21-14-7MG
KIT TRANSDERMAL
COMMUNITY
Start: 2021-05-04 | End: 2021-06-12

## 2021-04-21 ENCOUNTER — APPOINTMENT (OUTPATIENT)
Dept: REHABILITATION | Age: 66
End: 2021-04-21
Attending: INTERNAL MEDICINE

## 2021-04-22 ENCOUNTER — OFFICE VISIT (OUTPATIENT)
Dept: ENDOCRINOLOGY | Age: 66
End: 2021-04-22

## 2021-04-22 ENCOUNTER — LAB SERVICES (OUTPATIENT)
Dept: LAB | Age: 66
End: 2021-04-22

## 2021-04-22 VITALS
HEIGHT: 64 IN | BODY MASS INDEX: 27.83 KG/M2 | DIASTOLIC BLOOD PRESSURE: 64 MMHG | SYSTOLIC BLOOD PRESSURE: 120 MMHG | WEIGHT: 163 LBS | HEART RATE: 70 BPM

## 2021-04-22 DIAGNOSIS — E13.9 LADA (LATENT AUTOIMMUNE DIABETES IN ADULTS), MANAGED AS TYPE 1 (CMD): ICD-10-CM

## 2021-04-22 DIAGNOSIS — M81.8 OTHER OSTEOPOROSIS WITHOUT CURRENT PATHOLOGICAL FRACTURE: ICD-10-CM

## 2021-04-22 DIAGNOSIS — M81.8 OTHER OSTEOPOROSIS WITHOUT CURRENT PATHOLOGICAL FRACTURE: Primary | ICD-10-CM

## 2021-04-22 DIAGNOSIS — Z87.81 HISTORY OF FRACTURE OF LEFT HIP: ICD-10-CM

## 2021-04-22 DIAGNOSIS — Z91.89 AT HIGH RISK FOR FRACTURE: ICD-10-CM

## 2021-04-22 DIAGNOSIS — E10.649 HYPOGLYCEMIA UNAWARENESS IN TYPE 1 DIABETES MELLITUS (CMD): ICD-10-CM

## 2021-04-22 DIAGNOSIS — E10.65 TYPE 1 DIABETES MELLITUS WITH HYPERGLYCEMIA (CMD): ICD-10-CM

## 2021-04-22 PROCEDURE — 36415 COLL VENOUS BLD VENIPUNCTURE: CPT | Performed by: INTERNAL MEDICINE

## 2021-04-22 PROCEDURE — 84155 ASSAY OF PROTEIN SERUM: CPT | Performed by: CLINICAL MEDICAL LABORATORY

## 2021-04-22 PROCEDURE — 99214 OFFICE O/P EST MOD 30 MIN: CPT | Performed by: INTERNAL MEDICINE

## 2021-04-22 PROCEDURE — 84165 PROTEIN E-PHORESIS SERUM: CPT | Performed by: CLINICAL MEDICAL LABORATORY

## 2021-04-22 PROCEDURE — 83520 IMMUNOASSAY QUANT NOS NONAB: CPT | Performed by: CLINICAL MEDICAL LABORATORY

## 2021-04-22 PROCEDURE — 86334 IMMUNOFIX E-PHORESIS SERUM: CPT | Performed by: CLINICAL MEDICAL LABORATORY

## 2021-04-22 RX ORDER — INSULIN LISPRO 100 [IU]/ML
INJECTION, SOLUTION INTRAVENOUS; SUBCUTANEOUS
Qty: 45 ML | Refills: 3 | Status: SHIPPED | OUTPATIENT
Start: 2021-04-22 | End: 2021-08-26 | Stop reason: SDUPTHER

## 2021-04-22 RX ORDER — INSULIN GLARGINE 100 [IU]/ML
24 INJECTION, SOLUTION SUBCUTANEOUS NIGHTLY
Qty: 45 ML | Refills: 3 | Status: SHIPPED | OUTPATIENT
Start: 2021-04-22 | End: 2021-08-26 | Stop reason: ALTCHOICE

## 2021-04-23 ENCOUNTER — TELEPHONE (OUTPATIENT)
Dept: INTERNAL MEDICINE | Age: 66
End: 2021-04-23

## 2021-04-24 ENCOUNTER — LAB SERVICES (OUTPATIENT)
Dept: LAB | Age: 66
End: 2021-04-24

## 2021-04-24 DIAGNOSIS — M81.8 OTHER OSTEOPOROSIS WITHOUT CURRENT PATHOLOGICAL FRACTURE: ICD-10-CM

## 2021-04-24 LAB
COLLECT DURATION TIME UR: 24 HRS
COLLECT DURATION TIME UR: 24 HRS
CREAT 24H UR-MCNC: 35.8 MG/DL
CREAT 24H UR-MRATE: 0.75 G/24 HR (ref 0.67–1.59)
SODIUM 24H UR-SRATE: 111 MMOL/24 HR (ref 40–220)
SODIUM UR-SCNC: 53 MMOL/L
SPECIMEN VOL UR: 2100 ML
SPECIMEN VOL UR: 2100 ML

## 2021-04-24 PROCEDURE — 84166 PROTEIN E-PHORESIS/URINE/CSF: CPT | Performed by: CLINICAL MEDICAL LABORATORY

## 2021-04-24 PROCEDURE — 82340 ASSAY OF CALCIUM IN URINE: CPT | Performed by: CLINICAL MEDICAL LABORATORY

## 2021-04-24 PROCEDURE — 82570 ASSAY OF URINE CREATININE: CPT | Performed by: CLINICAL MEDICAL LABORATORY

## 2021-04-24 PROCEDURE — 84156 ASSAY OF PROTEIN URINE: CPT | Performed by: CLINICAL MEDICAL LABORATORY

## 2021-04-24 PROCEDURE — 84300 ASSAY OF URINE SODIUM: CPT | Performed by: CLINICAL MEDICAL LABORATORY

## 2021-04-25 LAB
CALCIUM 24H UR-MRATE: 55 MG/24 HR (ref 50–300)
CALCIUM UR-MCNC: 2.6 MG/DL
COLLECT DURATION TIME UR: 24 HRS
SPECIMEN VOL UR: 2100 ML

## 2021-04-26 ENCOUNTER — TELEPHONE (OUTPATIENT)
Dept: ENDOCRINOLOGY | Age: 66
End: 2021-04-26

## 2021-04-26 DIAGNOSIS — M81.8 OTHER OSTEOPOROSIS WITHOUT CURRENT PATHOLOGICAL FRACTURE: ICD-10-CM

## 2021-04-26 DIAGNOSIS — R89.9 ABNORMAL LABORATORY TEST RESULT: Primary | ICD-10-CM

## 2021-04-26 LAB
ALBUMIN SERPL ELPH-MCNC: 3.6 G/DL (ref 3.5–4.9)
ALPHA 1: 0.4 G/DL (ref 0.2–0.4)
ALPHA2 GLOB SERPL ELPH-MCNC: 1.1 G/DL (ref 0.5–0.9)
B-GLOBULIN SERPL ELPH-MCNC: 0.8 G/DL (ref 0.7–1.2)
COLLECT DURATION TIME UR: 24 HRS
GAMMA GLOB SERPL ELPH-MCNC: 0.6 G/DL (ref 0.7–1.7)
GLOBULIN SER-MCNC: 2.9 G/DL (ref 2.1–4.2)
IGA SERPL-MCNC: 131 MG/DL (ref 82–453)
IGG SERPL-MCNC: 668 MG/DL (ref 751–1560)
IGM SERPL-MCNC: 62 MG/DL (ref 46–304)
KAPPA LC FREE SER NEPH-MCNC: 1.97 MG/DL (ref 0.33–1.94)
KAPPA LC FREE SER NEPH-MCNC: 1.99 MG/DL (ref 0.33–1.94)
KAPPA LC/LAMBDA SER: 1.16 {RATIO} (ref 0.26–1.65)
KAPPA LC/LAMBDA SER: 1.18 {RATIO} (ref 0.26–1.65)
LAMBDA LC FREE SER NEPH-MCNC: 1.69 MG/DL (ref 0.57–2.63)
LAMBDA LC FREE SER NEPH-MCNC: 1.7 MG/DL (ref 0.57–2.63)
PATH INTERP BLD-IMP: ABNORMAL
PATH INTERP SPEC-IMP: ABNORMAL
PATH INTERP SPEC-IMP: NORMAL
PROT 24H UR-MRATE: NORMAL G/(24.H)
PROT SERPL-MCNC: 6.5 G/DL (ref 6.4–8.2)
PROT UR-MCNC: <6 MG/DL
SPECIMEN VOL UR: 2100 ML

## 2021-04-27 ENCOUNTER — HOSPITAL ENCOUNTER (OUTPATIENT)
Dept: REHABILITATION | Age: 66
Discharge: STILL ADMITTED OR ADMINISTRATIVE | End: 2021-04-27
Attending: INTERNAL MEDICINE

## 2021-04-27 PROCEDURE — 97112 NEUROMUSCULAR REEDUCATION: CPT | Performed by: PHYSICAL THERAPIST

## 2021-04-27 PROCEDURE — 10004173 HB COUNTER-THERAPY VISIT PT: Performed by: PHYSICAL THERAPIST

## 2021-04-27 PROCEDURE — 97110 THERAPEUTIC EXERCISES: CPT | Performed by: PHYSICAL THERAPIST

## 2021-04-27 ASSESSMENT — GAIT ASSESSMENTS
TUG TURNS: UNSTABLE ON TURNS
TUG TIME: 60-69 YEARS MALE AND FEMALE 8 SECONDS
TUG TIME: 13
TUG TIME: NO ASSISTIVE DEVICE
TUG TIME: GAIT BELT USED

## 2021-04-28 ENCOUNTER — OFFICE VISIT (OUTPATIENT)
Dept: CHIROPRACTIC MEDICINE | Age: 66
End: 2021-04-28

## 2021-04-28 ENCOUNTER — OFFICE VISIT (OUTPATIENT)
Dept: INTERNAL MEDICINE | Age: 66
End: 2021-04-28

## 2021-04-28 VITALS
SYSTOLIC BLOOD PRESSURE: 132 MMHG | WEIGHT: 167.6 LBS | DIASTOLIC BLOOD PRESSURE: 60 MMHG | BODY MASS INDEX: 29.22 KG/M2 | TEMPERATURE: 98.3 F | HEART RATE: 86 BPM | OXYGEN SATURATION: 97 %

## 2021-04-28 DIAGNOSIS — M99.02 THORACIC REGION SOMATIC DYSFUNCTION: Primary | ICD-10-CM

## 2021-04-28 DIAGNOSIS — M54.50 ACUTE RIGHT-SIDED LOW BACK PAIN WITHOUT SCIATICA: ICD-10-CM

## 2021-04-28 DIAGNOSIS — E08.42 DIABETIC POLYNEUROPATHY ASSOCIATED WITH DIABETES MELLITUS DUE TO UNDERLYING CONDITION (CMD): ICD-10-CM

## 2021-04-28 DIAGNOSIS — M54.2 CERVICALGIA: ICD-10-CM

## 2021-04-28 DIAGNOSIS — I10 ESSENTIAL HYPERTENSION: ICD-10-CM

## 2021-04-28 DIAGNOSIS — F17.200 TOBACCO USE DISORDER: ICD-10-CM

## 2021-04-28 DIAGNOSIS — M54.6 CHRONIC RIGHT-SIDED THORACIC BACK PAIN: Primary | ICD-10-CM

## 2021-04-28 DIAGNOSIS — M99.01 CERVICAL SOMATIC DYSFUNCTION: ICD-10-CM

## 2021-04-28 DIAGNOSIS — M99.03 SOMATIC DYSFUNCTION OF LUMBAR REGION: ICD-10-CM

## 2021-04-28 DIAGNOSIS — G89.29 CHRONIC RIGHT-SIDED THORACIC BACK PAIN: Primary | ICD-10-CM

## 2021-04-28 DIAGNOSIS — M54.6 PAIN IN THORACIC SPINE: ICD-10-CM

## 2021-04-28 DIAGNOSIS — R25.1 TREMOR: ICD-10-CM

## 2021-04-28 DIAGNOSIS — E03.9 ACQUIRED HYPOTHYROIDISM: ICD-10-CM

## 2021-04-28 PROCEDURE — 99214 OFFICE O/P EST MOD 30 MIN: CPT | Performed by: INTERNAL MEDICINE

## 2021-04-28 PROCEDURE — 98941 CHIROPRACT MANJ 3-4 REGIONS: CPT | Performed by: CHIROPRACTOR

## 2021-04-28 RX ORDER — BACLOFEN 5 MG/1
5 TABLET ORAL 2 TIMES DAILY PRN
Qty: 30 TABLET | Refills: 0 | Status: SHIPPED | OUTPATIENT
Start: 2021-04-28 | End: 2021-06-12

## 2021-04-28 RX ORDER — LISINOPRIL 20 MG/1
20 TABLET ORAL DAILY
Qty: 90 TABLET | Refills: 1 | Status: SHIPPED | OUTPATIENT
Start: 2021-04-28 | End: 2021-08-12 | Stop reason: DRUGHIGH

## 2021-04-28 ASSESSMENT — PAIN SCALES - GENERAL
PAINLEVEL_OUTOF10: 10
PAINLEVEL_OUTOF10: 10
PAINLEVEL_OUTOF10: 2
PAINLEVEL_OUTOF10: 6
PAINLEVEL_OUTOF10: 10

## 2021-04-29 ENCOUNTER — TELEPHONE (OUTPATIENT)
Dept: PAIN MANAGEMENT | Age: 66
End: 2021-04-29

## 2021-04-29 ENCOUNTER — OFFICE VISIT (OUTPATIENT)
Dept: EDUCATION SERVICES | Age: 66
End: 2021-04-29

## 2021-04-29 ENCOUNTER — TELEPHONE (OUTPATIENT)
Dept: INTERNAL MEDICINE | Age: 66
End: 2021-04-29

## 2021-04-29 DIAGNOSIS — E10.65 TYPE 1 DIABETES MELLITUS WITH HYPERGLYCEMIA (CMD): Primary | ICD-10-CM

## 2021-04-29 PROCEDURE — G0108 DIAB MANAGE TRN  PER INDIV: HCPCS | Performed by: REGISTERED NURSE

## 2021-04-30 ENCOUNTER — OFFICE VISIT (OUTPATIENT)
Dept: SURGERY | Age: 66
End: 2021-04-30

## 2021-04-30 VITALS
HEART RATE: 81 BPM | SYSTOLIC BLOOD PRESSURE: 122 MMHG | WEIGHT: 164.24 LBS | OXYGEN SATURATION: 97 % | BODY MASS INDEX: 28.64 KG/M2 | DIASTOLIC BLOOD PRESSURE: 68 MMHG | TEMPERATURE: 97.7 F

## 2021-04-30 DIAGNOSIS — R10.13 EPIGASTRIC PAIN: Primary | ICD-10-CM

## 2021-04-30 PROCEDURE — 99024 POSTOP FOLLOW-UP VISIT: CPT | Performed by: SURGERY

## 2021-05-04 ENCOUNTER — TELEPHONE (OUTPATIENT)
Dept: INTERNAL MEDICINE | Age: 66
End: 2021-05-04

## 2021-05-04 ENCOUNTER — TELEPHONE (OUTPATIENT)
Dept: AUDIOLOGY | Age: 66
End: 2021-05-04

## 2021-05-04 ENCOUNTER — HOSPITAL ENCOUNTER (OUTPATIENT)
Dept: REHABILITATION | Age: 66
Discharge: STILL ADMITTED OR ADMINISTRATIVE | End: 2021-05-04
Attending: INTERNAL MEDICINE

## 2021-05-04 ENCOUNTER — LAB SERVICES (OUTPATIENT)
Dept: LAB | Age: 66
End: 2021-05-04

## 2021-05-04 DIAGNOSIS — E08.42 DIABETIC POLYNEUROPATHY ASSOCIATED WITH DIABETES MELLITUS DUE TO UNDERLYING CONDITION (CMD): ICD-10-CM

## 2021-05-04 DIAGNOSIS — E03.9 ACQUIRED HYPOTHYROIDISM: ICD-10-CM

## 2021-05-04 LAB — TSH SERPL-ACNC: 0.05 MCUNITS/ML (ref 0.35–5)

## 2021-05-04 PROCEDURE — 10004173 HB COUNTER-THERAPY VISIT PT: Performed by: PHYSICAL THERAPIST

## 2021-05-04 PROCEDURE — 36415 COLL VENOUS BLD VENIPUNCTURE: CPT | Performed by: INTERNAL MEDICINE

## 2021-05-04 PROCEDURE — 84443 ASSAY THYROID STIM HORMONE: CPT | Performed by: CLINICAL MEDICAL LABORATORY

## 2021-05-04 PROCEDURE — 97140 MANUAL THERAPY 1/> REGIONS: CPT | Performed by: PHYSICAL THERAPIST

## 2021-05-04 PROCEDURE — 97112 NEUROMUSCULAR REEDUCATION: CPT | Performed by: PHYSICAL THERAPIST

## 2021-05-05 ENCOUNTER — TELEPHONE (OUTPATIENT)
Dept: INTERNAL MEDICINE | Age: 66
End: 2021-05-05

## 2021-05-05 ENCOUNTER — LAB SERVICES (OUTPATIENT)
Dept: LAB | Age: 66
End: 2021-05-05

## 2021-05-05 DIAGNOSIS — Z01.812 ENCOUNTER FOR PREPROCEDURE SCREENING LABORATORY TESTING FOR COVID-19: ICD-10-CM

## 2021-05-05 DIAGNOSIS — Z11.52 ENCOUNTER FOR PREPROCEDURE SCREENING LABORATORY TESTING FOR COVID-19: ICD-10-CM

## 2021-05-05 PROCEDURE — U0005 INFEC AGEN DETEC AMPLI PROBE: HCPCS | Performed by: CLINICAL MEDICAL LABORATORY

## 2021-05-05 PROCEDURE — U0003 INFECTIOUS AGENT DETECTION BY NUCLEIC ACID (DNA OR RNA); SEVERE ACUTE RESPIRATORY SYNDROME CORONAVIRUS 2 (SARS-COV-2) (CORONAVIRUS DISEASE [COVID-19]), AMPLIFIED PROBE TECHNIQUE, MAKING USE OF HIGH THROUGHPUT TECHNOLOGIES AS DESCRIBED BY CMS-2020-01-R: HCPCS | Performed by: CLINICAL MEDICAL LABORATORY

## 2021-05-05 RX ORDER — PREGABALIN 225 MG/1
CAPSULE ORAL
Qty: 60 CAPSULE | Refills: 0 | Status: SHIPPED | OUTPATIENT
Start: 2021-05-05 | End: 2021-06-11 | Stop reason: SDUPTHER

## 2021-05-06 ENCOUNTER — TELEPHONE (OUTPATIENT)
Dept: EDUCATION SERVICES | Age: 66
End: 2021-05-06

## 2021-05-06 LAB
SARS-COV-2 RNA RESP QL NAA+PROBE: NOT DETECTED
SERVICE CMNT-IMP: NORMAL
SERVICE CMNT-IMP: NORMAL

## 2021-05-07 ENCOUNTER — APPOINTMENT (OUTPATIENT)
Dept: REHABILITATION | Age: 66
End: 2021-05-07
Attending: INTERNAL MEDICINE

## 2021-05-07 ENCOUNTER — HOSPITAL ENCOUNTER (OUTPATIENT)
Dept: SLEEP MEDICINE | Age: 66
Discharge: STILL ADMITTED OR ADMINISTRATIVE | End: 2021-05-07
Attending: INTERNAL MEDICINE

## 2021-05-07 DIAGNOSIS — G47.33 OSA (OBSTRUCTIVE SLEEP APNEA): ICD-10-CM

## 2021-05-07 PROCEDURE — 95810 POLYSOM 6/> YRS 4/> PARAM: CPT

## 2021-05-07 PROCEDURE — 95810 POLYSOM 6/> YRS 4/> PARAM: CPT | Performed by: HOSPITALIST

## 2021-05-07 ASSESSMENT — ACTIVITIES OF DAILY LIVING (ADL)
ADL_SCORE: 12
ADL_SHORT_OF_BREATH: NO
ADL_BEFORE_ADMISSION: INDEPENDENT

## 2021-05-08 VITALS — HEIGHT: 64 IN | BODY MASS INDEX: 27.31 KG/M2 | WEIGHT: 160 LBS

## 2021-05-11 ENCOUNTER — TELEPHONE (OUTPATIENT)
Dept: EDUCATION SERVICES | Age: 66
End: 2021-05-11

## 2021-05-11 ENCOUNTER — APPOINTMENT (OUTPATIENT)
Dept: REHABILITATION | Age: 66
End: 2021-05-11
Attending: INTERNAL MEDICINE

## 2021-05-12 ENCOUNTER — TELEPHONE (OUTPATIENT)
Dept: INTERNAL MEDICINE | Age: 66
End: 2021-05-12

## 2021-05-12 DIAGNOSIS — E03.9 ACQUIRED HYPOTHYROIDISM: Primary | ICD-10-CM

## 2021-05-12 DIAGNOSIS — G47.30 SLEEP DISORDER BREATHING: ICD-10-CM

## 2021-05-12 RX ORDER — LEVOTHYROXINE SODIUM 0.1 MG/1
100 TABLET ORAL DAILY
Qty: 30 TABLET | Refills: 1 | Status: SHIPPED | OUTPATIENT
Start: 2021-05-12 | End: 2021-07-16

## 2021-05-14 ENCOUNTER — TELEPHONE (OUTPATIENT)
Dept: INTERNAL MEDICINE | Age: 66
End: 2021-05-14

## 2021-05-14 ENCOUNTER — APPOINTMENT (OUTPATIENT)
Dept: REHABILITATION | Age: 66
End: 2021-05-14
Attending: INTERNAL MEDICINE

## 2021-05-17 ENCOUNTER — APPOINTMENT (OUTPATIENT)
Dept: REHABILITATION | Age: 66
End: 2021-05-17
Attending: INTERNAL MEDICINE

## 2021-05-18 ENCOUNTER — OFFICE VISIT (OUTPATIENT)
Dept: HEMATOLOGY/ONCOLOGY | Age: 66
End: 2021-05-18
Attending: INTERNAL MEDICINE

## 2021-05-18 ENCOUNTER — TELEPHONE (OUTPATIENT)
Dept: ENDOCRINOLOGY | Age: 66
End: 2021-05-18

## 2021-05-18 DIAGNOSIS — M81.8 OTHER OSTEOPOROSIS WITHOUT CURRENT PATHOLOGICAL FRACTURE: Primary | ICD-10-CM

## 2021-05-18 DIAGNOSIS — M81.8 OTHER OSTEOPOROSIS WITHOUT CURRENT PATHOLOGICAL FRACTURE: ICD-10-CM

## 2021-05-18 DIAGNOSIS — R89.9 ABNORMAL LABORATORY TEST RESULT: Primary | ICD-10-CM

## 2021-05-18 PROCEDURE — 99211 OFF/OP EST MAY X REQ PHY/QHP: CPT

## 2021-05-18 PROCEDURE — 99205 OFFICE O/P NEW HI 60 MIN: CPT | Performed by: INTERNAL MEDICINE

## 2021-05-18 ASSESSMENT — PAIN SCALES - GENERAL: PAINLEVEL: 7-8

## 2021-05-19 VITALS
SYSTOLIC BLOOD PRESSURE: 115 MMHG | BODY MASS INDEX: 27.74 KG/M2 | DIASTOLIC BLOOD PRESSURE: 56 MMHG | WEIGHT: 162.48 LBS | TEMPERATURE: 98.4 F | OXYGEN SATURATION: 98 % | RESPIRATION RATE: 18 BRPM | HEIGHT: 64 IN | HEART RATE: 73 BPM

## 2021-05-20 ENCOUNTER — TELEPHONE (OUTPATIENT)
Dept: NEUROLOGY | Age: 66
End: 2021-05-20

## 2021-05-21 ENCOUNTER — TELEPHONE (OUTPATIENT)
Dept: AUDIOLOGY | Age: 66
End: 2021-05-21

## 2021-05-21 ENCOUNTER — TELEPHONE (OUTPATIENT)
Dept: ENDOCRINOLOGY | Age: 66
End: 2021-05-21

## 2021-05-21 ENCOUNTER — APPOINTMENT (OUTPATIENT)
Dept: REHABILITATION | Age: 66
End: 2021-05-21
Attending: INTERNAL MEDICINE

## 2021-05-24 ENCOUNTER — APPOINTMENT (OUTPATIENT)
Dept: REHABILITATION | Age: 66
End: 2021-05-24
Attending: INTERNAL MEDICINE

## 2021-05-24 RX ORDER — TERIPARATIDE 250 UG/ML
20 INJECTION, SOLUTION SUBCUTANEOUS NIGHTLY
Qty: 7.5 ML | Refills: 3 | Status: SHIPPED | OUTPATIENT
Start: 2021-05-24 | End: 2022-05-03 | Stop reason: SDUPTHER

## 2021-05-25 ENCOUNTER — TELEPHONE (OUTPATIENT)
Dept: ENDOCRINOLOGY | Age: 66
End: 2021-05-25

## 2021-05-25 DIAGNOSIS — M81.8 OTHER OSTEOPOROSIS WITHOUT CURRENT PATHOLOGICAL FRACTURE: Primary | ICD-10-CM

## 2021-05-25 RX ORDER — PEN NEEDLE, DIABETIC 30 GX3/16"
NEEDLE, DISPOSABLE MISCELLANEOUS
Qty: 100 EACH | Refills: 3 | Status: SHIPPED | OUTPATIENT
Start: 2021-05-25 | End: 2022-06-06 | Stop reason: SDUPTHER

## 2021-05-26 ENCOUNTER — TELEPHONE (OUTPATIENT)
Dept: PHARMACY | Age: 66
End: 2021-05-26

## 2021-05-28 ENCOUNTER — APPOINTMENT (OUTPATIENT)
Dept: REHABILITATION | Age: 66
End: 2021-05-28
Attending: INTERNAL MEDICINE

## 2021-06-02 ENCOUNTER — NURSE TRIAGE (OUTPATIENT)
Dept: INTERNAL MEDICINE | Age: 66
End: 2021-06-02

## 2021-06-04 ENCOUNTER — APPOINTMENT (OUTPATIENT)
Dept: INTERNAL MEDICINE | Age: 66
End: 2021-06-04

## 2021-06-08 ENCOUNTER — OFFICE VISIT (OUTPATIENT)
Dept: SURGERY | Age: 66
End: 2021-06-08

## 2021-06-08 VITALS
DIASTOLIC BLOOD PRESSURE: 52 MMHG | WEIGHT: 166 LBS | HEIGHT: 64 IN | RESPIRATION RATE: 16 BRPM | HEART RATE: 80 BPM | TEMPERATURE: 97.7 F | OXYGEN SATURATION: 97 % | SYSTOLIC BLOOD PRESSURE: 122 MMHG | BODY MASS INDEX: 28.34 KG/M2

## 2021-06-08 DIAGNOSIS — R10.13 EPIGASTRIC PAIN: Primary | ICD-10-CM

## 2021-06-08 PROCEDURE — 99024 POSTOP FOLLOW-UP VISIT: CPT | Performed by: SURGERY

## 2021-06-11 ENCOUNTER — OFFICE VISIT (OUTPATIENT)
Dept: PAIN MANAGEMENT | Age: 66
End: 2021-06-11

## 2021-06-11 ENCOUNTER — OFFICE VISIT (OUTPATIENT)
Dept: INTERNAL MEDICINE | Age: 66
End: 2021-06-11

## 2021-06-11 ENCOUNTER — IMAGING SERVICES (OUTPATIENT)
Dept: GENERAL RADIOLOGY | Age: 66
End: 2021-06-11
Attending: PAIN MEDICINE

## 2021-06-11 VITALS
TEMPERATURE: 98.9 F | SYSTOLIC BLOOD PRESSURE: 108 MMHG | WEIGHT: 163.14 LBS | DIASTOLIC BLOOD PRESSURE: 60 MMHG | BODY MASS INDEX: 28 KG/M2 | OXYGEN SATURATION: 96 % | HEART RATE: 78 BPM

## 2021-06-11 DIAGNOSIS — I25.10 CORONARY ARTERY DISEASE INVOLVING NATIVE CORONARY ARTERY OF NATIVE HEART, UNSPECIFIED WHETHER ANGINA PRESENT: ICD-10-CM

## 2021-06-11 DIAGNOSIS — R07.89 ATYPICAL CHEST PAIN: Primary | ICD-10-CM

## 2021-06-11 DIAGNOSIS — M79.18 MYOFASCIAL PAIN: Primary | ICD-10-CM

## 2021-06-11 DIAGNOSIS — M54.9 MID BACK PAIN: ICD-10-CM

## 2021-06-11 PROCEDURE — 93000 ELECTROCARDIOGRAM COMPLETE: CPT | Performed by: INTERNAL MEDICINE

## 2021-06-11 PROCEDURE — 99214 OFFICE O/P EST MOD 30 MIN: CPT | Performed by: INTERNAL MEDICINE

## 2021-06-11 PROCEDURE — 72070 X-RAY EXAM THORAC SPINE 2VWS: CPT | Performed by: RADIOLOGY

## 2021-06-11 PROCEDURE — 99204 OFFICE O/P NEW MOD 45 MIN: CPT | Performed by: PAIN MEDICINE

## 2021-06-11 RX ORDER — TRAZODONE HYDROCHLORIDE 100 MG/1
100 TABLET ORAL AT BEDTIME
Qty: 90 TABLET | Refills: 1 | Status: SHIPPED | OUTPATIENT
Start: 2021-06-11 | End: 2021-12-17

## 2021-06-11 RX ORDER — PREGABALIN 225 MG/1
225 CAPSULE ORAL 2 TIMES DAILY
Qty: 60 CAPSULE | Refills: 0 | Status: SHIPPED | OUTPATIENT
Start: 2021-06-11 | End: 2021-07-05 | Stop reason: SDUPTHER

## 2021-06-11 RX ORDER — PROCHLORPERAZINE 25 MG/1
1 SUPPOSITORY RECTAL PRN
COMMUNITY

## 2021-06-11 RX ORDER — LEVETIRACETAM 250 MG/1
TABLET ORAL
Qty: 60 TABLET | Refills: 2 | Status: SHIPPED | OUTPATIENT
Start: 2021-06-11 | End: 2021-07-14 | Stop reason: DRUGHIGH

## 2021-06-15 ENCOUNTER — OFFICE VISIT (OUTPATIENT)
Dept: PODIATRY | Age: 66
End: 2021-06-15

## 2021-06-15 ENCOUNTER — TELEPHONE (OUTPATIENT)
Dept: PAIN MANAGEMENT | Age: 66
End: 2021-06-15

## 2021-06-15 ENCOUNTER — TELEPHONE (OUTPATIENT)
Dept: PHARMACY | Age: 66
End: 2021-06-15

## 2021-06-15 DIAGNOSIS — L60.2 HYPERTROPHY OF NAIL: ICD-10-CM

## 2021-06-15 DIAGNOSIS — I73.89 OTHER SPECIFIED PERIPHERAL VASCULAR DISEASES (CMD): Primary | ICD-10-CM

## 2021-06-15 DIAGNOSIS — E11.42 DIABETIC POLYNEUROPATHY ASSOCIATED WITH TYPE 2 DIABETES MELLITUS (CMD): ICD-10-CM

## 2021-06-15 PROCEDURE — 11721 DEBRIDE NAIL 6 OR MORE: CPT | Performed by: PODIATRIST

## 2021-06-24 ENCOUNTER — HOSPITAL ENCOUNTER (OUTPATIENT)
Dept: REHABILITATION | Age: 66
Discharge: STILL ADMITTED OR ADMINISTRATIVE | End: 2021-06-24
Attending: PAIN MEDICINE

## 2021-06-24 PROCEDURE — 97161 PT EVAL LOW COMPLEX 20 MIN: CPT | Performed by: PHYSICAL THERAPIST

## 2021-06-24 PROCEDURE — 10004173 HB COUNTER-THERAPY VISIT PT: Performed by: PHYSICAL THERAPIST

## 2021-06-24 PROCEDURE — 97110 THERAPEUTIC EXERCISES: CPT | Performed by: PHYSICAL THERAPIST

## 2021-06-24 ASSESSMENT — ENCOUNTER SYMPTOMS
PAIN SCALE AT LOWEST: 3
SUBJECTIVE PAIN PROGRESSION: NO CHANGE
QUALITY: ACHE
PAIN SCALE AT HIGHEST: 10
PAIN SEVERITY NOW: 6
QUALITY: SHARP
ALLEVIATING FACTORS: AVOIDING MOVEMENT IN INVOLVED AREA

## 2021-06-28 ENCOUNTER — HOSPITAL ENCOUNTER (OUTPATIENT)
Age: 66
Setting detail: OBSERVATION
Discharge: HOME | End: 2021-06-30
Attending: EMERGENCY MEDICINE | Admitting: INTERNAL MEDICINE

## 2021-06-28 ENCOUNTER — TELEPHONE (OUTPATIENT)
Dept: INTERNAL MEDICINE | Age: 66
End: 2021-06-28

## 2021-06-28 ENCOUNTER — APPOINTMENT (OUTPATIENT)
Dept: GENERAL RADIOLOGY | Age: 66
End: 2021-06-28
Attending: EMERGENCY MEDICINE

## 2021-06-28 DIAGNOSIS — Z95.1 S/P CABG X 2: ICD-10-CM

## 2021-06-28 DIAGNOSIS — R07.9 CHEST PAIN, UNSPECIFIED TYPE: ICD-10-CM

## 2021-06-28 DIAGNOSIS — I25.9 CHEST PAIN DUE TO MYOCARDIAL ISCHEMIA, UNSPECIFIED ISCHEMIC CHEST PAIN TYPE: Primary | ICD-10-CM

## 2021-06-28 LAB
ALBUMIN SERPL-MCNC: 3.1 G/DL (ref 3.6–5.1)
ALBUMIN/GLOB SERPL: 0.9 {RATIO} (ref 1–2.4)
ALP SERPL-CCNC: 70 UNITS/L (ref 45–117)
ALT SERPL-CCNC: 27 UNITS/L
ANION GAP SERPL CALC-SCNC: 13 MMOL/L (ref 10–20)
APTT PPP: 25 SEC (ref 22–30)
AST SERPL-CCNC: 21 UNITS/L
BASOPHILS # BLD: 0 K/MCL (ref 0–0.3)
BASOPHILS NFR BLD: 0 %
BILIRUB SERPL-MCNC: 0.5 MG/DL (ref 0.2–1)
BUN SERPL-MCNC: 22 MG/DL (ref 6–20)
BUN/CREAT SERPL: 23 (ref 7–25)
CALCIUM SERPL-MCNC: 9 MG/DL (ref 8.4–10.2)
CHLORIDE SERPL-SCNC: 101 MMOL/L (ref 98–107)
CO2 SERPL-SCNC: 28 MMOL/L (ref 21–32)
CREAT SERPL-MCNC: 0.94 MG/DL (ref 0.51–0.95)
DEPRECATED RDW RBC: 41.8 FL (ref 39–50)
EOSINOPHIL # BLD: 0.2 K/MCL (ref 0–0.5)
EOSINOPHIL NFR BLD: 2 %
ERYTHROCYTE [DISTWIDTH] IN BLOOD: 12.8 % (ref 11–15)
FASTING DURATION TIME PATIENT: ABNORMAL H
GFR SERPLBLD BASED ON 1.73 SQ M-ARVRAT: 64 ML/MIN/1.73M2
GLOBULIN SER-MCNC: 3.3 G/DL (ref 2–4)
GLUCOSE SERPL-MCNC: 218 MG/DL (ref 65–99)
HCT VFR BLD CALC: 39.2 % (ref 36–46.5)
HGB BLD-MCNC: 12.9 G/DL (ref 12–15.5)
IMM GRANULOCYTES # BLD AUTO: 0 K/MCL (ref 0–0.2)
IMM GRANULOCYTES # BLD: 0 %
INR PPP: 1
LYMPHOCYTES # BLD: 3.2 K/MCL (ref 1–4)
LYMPHOCYTES NFR BLD: 35 %
MCH RBC QN AUTO: 29.3 PG (ref 26–34)
MCHC RBC AUTO-ENTMCNC: 32.9 G/DL (ref 32–36.5)
MCV RBC AUTO: 88.9 FL (ref 78–100)
MONOCYTES # BLD: 0.7 K/MCL (ref 0.3–0.9)
MONOCYTES NFR BLD: 7 %
NEUTROPHILS # BLD: 5 K/MCL (ref 1.8–7.7)
NEUTROPHILS NFR BLD: 56 %
NRBC BLD MANUAL-RTO: 0 /100 WBC
NT-PROBNP SERPL-MCNC: 93 PG/ML
PLATELET # BLD AUTO: 198 K/MCL (ref 140–450)
POTASSIUM SERPL-SCNC: 4.6 MMOL/L (ref 3.4–5.1)
PROT SERPL-MCNC: 6.4 G/DL (ref 6.4–8.2)
PROTHROMBIN TIME: 10.1 SEC (ref 9.7–11.8)
RBC # BLD: 4.41 MIL/MCL (ref 4–5.2)
SODIUM SERPL-SCNC: 137 MMOL/L (ref 135–145)
TROPONIN I SERPL HS-MCNC: <0.02 NG/ML
WBC # BLD: 9.1 K/MCL (ref 4.2–11)

## 2021-06-28 PROCEDURE — 93010 ELECTROCARDIOGRAM REPORT: CPT | Performed by: INTERNAL MEDICINE

## 2021-06-28 PROCEDURE — G0378 HOSPITAL OBSERVATION PER HR: HCPCS

## 2021-06-28 PROCEDURE — 36415 COLL VENOUS BLD VENIPUNCTURE: CPT

## 2021-06-28 PROCEDURE — 85025 COMPLETE CBC W/AUTO DIFF WBC: CPT | Performed by: EMERGENCY MEDICINE

## 2021-06-28 PROCEDURE — 85610 PROTHROMBIN TIME: CPT | Performed by: EMERGENCY MEDICINE

## 2021-06-28 PROCEDURE — 83880 ASSAY OF NATRIURETIC PEPTIDE: CPT | Performed by: EMERGENCY MEDICINE

## 2021-06-28 PROCEDURE — 84484 ASSAY OF TROPONIN QUANT: CPT | Performed by: EMERGENCY MEDICINE

## 2021-06-28 PROCEDURE — 80053 COMPREHEN METABOLIC PANEL: CPT | Performed by: EMERGENCY MEDICINE

## 2021-06-28 PROCEDURE — 71045 X-RAY EXAM CHEST 1 VIEW: CPT

## 2021-06-28 PROCEDURE — 71045 X-RAY EXAM CHEST 1 VIEW: CPT | Performed by: RADIOLOGY

## 2021-06-28 PROCEDURE — 36415 COLL VENOUS BLD VENIPUNCTURE: CPT | Performed by: EMERGENCY MEDICINE

## 2021-06-28 PROCEDURE — 96365 THER/PROPH/DIAG IV INF INIT: CPT

## 2021-06-28 PROCEDURE — 93005 ELECTROCARDIOGRAM TRACING: CPT | Performed by: EMERGENCY MEDICINE

## 2021-06-28 PROCEDURE — 10002803 HB RX 637: Performed by: EMERGENCY MEDICINE

## 2021-06-28 PROCEDURE — 96376 TX/PRO/DX INJ SAME DRUG ADON: CPT

## 2021-06-28 PROCEDURE — 10002807 HB RX 258: Performed by: EMERGENCY MEDICINE

## 2021-06-28 PROCEDURE — 99291 CRITICAL CARE FIRST HOUR: CPT | Performed by: EMERGENCY MEDICINE

## 2021-06-28 PROCEDURE — 99220 INITIAL OBSERVATION CARE,LEVL III: CPT | Performed by: INTERNAL MEDICINE

## 2021-06-28 PROCEDURE — 99285 EMERGENCY DEPT VISIT HI MDM: CPT

## 2021-06-28 PROCEDURE — 10002800 HB RX 250 W HCPCS: Performed by: EMERGENCY MEDICINE

## 2021-06-28 PROCEDURE — 96375 TX/PRO/DX INJ NEW DRUG ADDON: CPT

## 2021-06-28 PROCEDURE — 85730 THROMBOPLASTIN TIME PARTIAL: CPT | Performed by: EMERGENCY MEDICINE

## 2021-06-28 RX ORDER — NITROGLYCERIN 20 MG/100ML
0-200 INJECTION INTRAVENOUS CONTINUOUS
Status: DISCONTINUED | OUTPATIENT
Start: 2021-06-28 | End: 2021-06-29

## 2021-06-28 RX ORDER — HEPARIN SODIUM 1000 [USP'U]/ML
4000 INJECTION, SOLUTION INTRAVENOUS; SUBCUTANEOUS ONCE
Status: COMPLETED | OUTPATIENT
Start: 2021-06-28 | End: 2021-06-28

## 2021-06-28 RX ORDER — HEPARIN SODIUM 1000 [USP'U]/ML
4000 INJECTION, SOLUTION INTRAVENOUS; SUBCUTANEOUS PRN
Status: DISCONTINUED | OUTPATIENT
Start: 2021-06-28 | End: 2021-06-29

## 2021-06-28 RX ORDER — HEPARIN SODIUM 1000 [USP'U]/ML
2000 INJECTION, SOLUTION INTRAVENOUS; SUBCUTANEOUS PRN
Status: DISCONTINUED | OUTPATIENT
Start: 2021-06-28 | End: 2021-06-29

## 2021-06-28 RX ORDER — NITROGLYCERIN 0.4 MG/1
0.4 TABLET SUBLINGUAL ONCE
Status: COMPLETED | OUTPATIENT
Start: 2021-06-28 | End: 2021-06-28

## 2021-06-28 RX ORDER — HEPARIN SODIUM 10000 [USP'U]/100ML
0-30 INJECTION, SOLUTION INTRAVENOUS CONTINUOUS
Status: DISCONTINUED | OUTPATIENT
Start: 2021-06-28 | End: 2021-06-29

## 2021-06-28 RX ADMIN — MORPHINE SULFATE 2 MG: 2 INJECTION, SOLUTION INTRAMUSCULAR; INTRAVENOUS at 23:02

## 2021-06-28 RX ADMIN — SODIUM CHLORIDE 1000 ML: 9 INJECTION, SOLUTION INTRAVENOUS at 22:31

## 2021-06-28 RX ADMIN — NITROGLYCERIN 0.4 MG: 0.4 TABLET SUBLINGUAL at 21:54

## 2021-06-28 RX ADMIN — HEPARIN SODIUM 4000 UNITS: 1000 INJECTION INTRAVENOUS; SUBCUTANEOUS at 23:03

## 2021-06-28 RX ADMIN — HEPARIN SODIUM AND DEXTROSE 12 UNITS/KG/HR: 10000; 5 INJECTION INTRAVENOUS at 23:05

## 2021-06-28 ASSESSMENT — COGNITIVE AND FUNCTIONAL STATUS - GENERAL
DO YOU HAVE SERIOUS DIFFICULTY WALKING OR CLIMBING STAIRS: NO
BECAUSE OF A PHYSICAL, MENTAL, OR EMOTIONAL CONDITION, DO YOU HAVE SERIOUS DIFFICULTY CONCENTRATING, REMEMBERING OR MAKING DECISIONS: NO
ARE YOU DEAF OR DO YOU HAVE SERIOUS DIFFICULTY  HEARING: NO
ARE YOU BLIND OR DO YOU HAVE SERIOUS DIFFICULTY SEEING, EVEN WHEN WEARING GLASSES: NO
DO YOU HAVE DIFFICULTY DRESSING OR BATHING: NO
BECAUSE OF A PHYSICAL, MENTAL, OR EMOTIONAL CONDITION, DO YOU HAVE DIFFICULTY DOING ERRANDS ALONE: YES

## 2021-06-28 ASSESSMENT — LIFESTYLE VARIABLES
ALCOHOL_USE_STATUS: NO OR LOW RISK WITH VALIDATED TOOL
HOW OFTEN DO YOU HAVE A DRINK CONTAINING ALCOHOL: NEVER
HOW MANY STANDARD DRINKS CONTAINING ALCOHOL DO YOU HAVE ON A TYPICAL DAY: 0,1 OR 2
HOW OFTEN DO YOU HAVE 6 OR MORE DRINKS ON ONE OCCASION: NEVER
AUDIT-C TOTAL SCORE: 0

## 2021-06-28 ASSESSMENT — ACTIVITIES OF DAILY LIVING (ADL)
CHRONIC_PAIN_PRESENT: YES, CHRONIC
DRESSING YOURSELF: DEPENDENT
TOILETING: DEPENDENT
ADL_BEFORE_ADMISSION: NEEDS/REQUIRES ASSISTANCE
TRANSFERRING: NEEDS ASSISTANCE
BATHING: NEEDS ASSISTANCE
FEEDING YOURSELF: DEPENDENT
MOBILITY_ASSIST_DEVICES: FOUR WHEEL WALKER
CONTINENCE: DEPENDENT
RECENT_DECLINE_ADL: NO
ADL_SCORE: 2

## 2021-06-28 ASSESSMENT — ENCOUNTER SYMPTOMS
NAUSEA: 0
ACTIVITY CHANGE: 0
VOMITING: 0
APPETITE CHANGE: 0
ABDOMINAL PAIN: 0
STRIDOR: 0
DIARRHEA: 0
ALLERGIC/IMMUNOLOGIC NEGATIVE: 1
CONSTIPATION: 0
SHORTNESS OF BREATH: 0
PSYCHIATRIC NEGATIVE: 1
HEMATOLOGIC/LYMPHATIC NEGATIVE: 1
WEAKNESS: 0
FEVER: 0
ENDOCRINE NEGATIVE: 1
WHEEZING: 0

## 2021-06-28 ASSESSMENT — PAIN DESCRIPTION - PAIN TYPE
TYPE: CHEST PAIN
TYPE: CHEST PAIN

## 2021-06-28 ASSESSMENT — PAIN SCALES - GENERAL
PAINLEVEL_OUTOF10: 2
PAINLEVEL_OUTOF10: 3
PAINLEVEL_OUTOF10: 8
PAINLEVEL_OUTOF10: 0

## 2021-06-29 ENCOUNTER — APPOINTMENT (OUTPATIENT)
Dept: INTERNAL MEDICINE | Age: 66
End: 2021-06-29

## 2021-06-29 ENCOUNTER — CLINICAL ABSTRACT (OUTPATIENT)
Dept: PHARMACY | Age: 66
End: 2021-06-29

## 2021-06-29 ENCOUNTER — APPOINTMENT (OUTPATIENT)
Dept: CARDIOLOGY | Age: 66
End: 2021-06-29
Attending: NURSE PRACTITIONER

## 2021-06-29 ENCOUNTER — APPOINTMENT (OUTPATIENT)
Dept: REHABILITATION | Age: 66
End: 2021-06-29
Attending: PAIN MEDICINE

## 2021-06-29 LAB
APTT PPP: 113 SEC (ref 22–30)
APTT PPP: 54 SEC (ref 22–30)
ASCENDING AORTA (AAD): 3.6 CM
ATRIAL RATE (BPM): 71
ATRIAL RATE (BPM): 81
AV MEAN GRADIENT (AVMG): 4 MMHG
AV PEAK GRADIENT (AVPG): 6.7 MMHG
AV PEAK VELOCITY (AVPV): 1.3 M/S
DEPRECATED RDW RBC: 42.5 FL (ref 39–50)
DIASTOLIC BLOOD PRESSURE: 56
DIASTOLIC BLOOD PRESSURE: 62
DOP CALC LVOT PEAK VEL (LVOTPV): 0.9 M/S
E WAVE DECELARATION TIME (MDT): 121 MS
ERYTHROCYTE [DISTWIDTH] IN BLOOD: 12.9 % (ref 11–15)
EST RIGHT VENT SYSTOLIC PRESSURE BY TRICUSPID REGURGITATION JET (RVSP): 25.7 MMHG
GLUCOSE BLDC GLUCOMTR-MCNC: 186 MG/DL (ref 70–99)
GLUCOSE BLDC GLUCOMTR-MCNC: 226 MG/DL (ref 70–99)
GLUCOSE BLDC GLUCOMTR-MCNC: 377 MG/DL (ref 70–99)
GLUCOSE BLDC GLUCOMTR-MCNC: 76 MG/DL (ref 70–99)
GLUCOSE BLDC GLUCOMTR-MCNC: 96 MG/DL (ref 70–99)
HCT VFR BLD CALC: 38.6 % (ref 36–46.5)
HGB BLD-MCNC: 12.5 G/DL (ref 12–15.5)
INTERVENTRICULAR SEPTUM IN END DIASTOLE (IVSD): 1 CM
LEFT INTERNAL DIMENSION IN SYSTOLE (LVSD): 2.8 CM
LEFT VENTRICULAR INTERNAL DIMENSION IN DIASTOLE (LVDD): 4.2 CM
LEFT VENTRICULAR POSTERIOR WALL IN END DIASTOLE (LVPW): 1 CM
LV EF: 60 %
MCH RBC QN AUTO: 29.4 PG (ref 26–34)
MCHC RBC AUTO-ENTMCNC: 32.4 G/DL (ref 32–36.5)
MCV RBC AUTO: 90.8 FL (ref 78–100)
MV E TISSUE VEL MED (MESV): 6.9 CM/S
MV E WAVE VEL/E TISSUE VEL MED(MSR): 10.4
MV PEAK A VELOCITY (MVPAV): 1 M/S
MV PEAK E VELOCITY (MVPEV): 0.7 M/S
NRBC BLD MANUAL-RTO: 0 /100 WBC
P AXIS (DEGREES): 65
P AXIS (DEGREES): 72
PLATELET # BLD AUTO: 184 K/MCL (ref 140–450)
PR-INTERVAL (MSEC): 180
PR-INTERVAL (MSEC): 232
PV PEAK VELOCITY (PVPV): 0.7 M/S
QRS-INTERVAL (MSEC): 66
QRS-INTERVAL (MSEC): 72
QT-INTERVAL (MSEC): 368
QT-INTERVAL (MSEC): 372
QTC: 404
QTC: 427
R AXIS (DEGREES): 46
R AXIS (DEGREES): 58
RAINBOW EXTRA TUBES HOLD SPECIMEN: NORMAL
RBC # BLD: 4.25 MIL/MCL (ref 4–5.2)
REPORT TEXT: NORMAL
REPORT TEXT: NORMAL
SYSTOLIC BLOOD PRESSURE: 112
SYSTOLIC BLOOD PRESSURE: 133
T AXIS (DEGREES): 53
T AXIS (DEGREES): 68
TRICUSPID VALVE PEAK REGURGITATION VELOCITY (TRPV): 2.4 M/S
TROPONIN I SERPL HS-MCNC: <0.02 NG/ML
TROPONIN I SERPL HS-MCNC: <0.02 NG/ML
TV ESTIMATED RIGHT ARTERIAL PRESSURE (RAP): 3 MMHG
VENTRICULAR RATE EKG/MIN (BPM): 71
VENTRICULAR RATE EKG/MIN (BPM): 81
WBC # BLD: 8.3 K/MCL (ref 4.2–11)

## 2021-06-29 PROCEDURE — 10002800 HB RX 250 W HCPCS: Performed by: INTERNAL MEDICINE

## 2021-06-29 PROCEDURE — 85027 COMPLETE CBC AUTOMATED: CPT | Performed by: INTERNAL MEDICINE

## 2021-06-29 PROCEDURE — 93306 TTE W/DOPPLER COMPLETE: CPT | Performed by: INTERNAL MEDICINE

## 2021-06-29 PROCEDURE — 96368 THER/DIAG CONCURRENT INF: CPT

## 2021-06-29 PROCEDURE — 10002803 HB RX 637: Performed by: INTERNAL MEDICINE

## 2021-06-29 PROCEDURE — 99204 OFFICE O/P NEW MOD 45 MIN: CPT | Performed by: INTERNAL MEDICINE

## 2021-06-29 PROCEDURE — 85730 THROMBOPLASTIN TIME PARTIAL: CPT | Performed by: INTERNAL MEDICINE

## 2021-06-29 PROCEDURE — 36415 COLL VENOUS BLD VENIPUNCTURE: CPT | Performed by: INTERNAL MEDICINE

## 2021-06-29 PROCEDURE — 10002803 HB RX 637: Performed by: NURSE PRACTITIONER

## 2021-06-29 PROCEDURE — 82962 GLUCOSE BLOOD TEST: CPT

## 2021-06-29 PROCEDURE — 96366 THER/PROPH/DIAG IV INF ADDON: CPT

## 2021-06-29 PROCEDURE — 93010 ELECTROCARDIOGRAM REPORT: CPT | Performed by: INTERNAL MEDICINE

## 2021-06-29 PROCEDURE — 96376 TX/PRO/DX INJ SAME DRUG ADON: CPT

## 2021-06-29 PROCEDURE — 93306 TTE W/DOPPLER COMPLETE: CPT

## 2021-06-29 PROCEDURE — 10004651 HB RX, NO CHARGE ITEM: Performed by: INTERNAL MEDICINE

## 2021-06-29 PROCEDURE — 84484 ASSAY OF TROPONIN QUANT: CPT | Performed by: INTERNAL MEDICINE

## 2021-06-29 PROCEDURE — 93005 ELECTROCARDIOGRAM TRACING: CPT | Performed by: INTERNAL MEDICINE

## 2021-06-29 PROCEDURE — 99225 SUBSEQUENT OBSERVATION CARE LEVEL II: CPT | Performed by: INTERNAL MEDICINE

## 2021-06-29 PROCEDURE — G0378 HOSPITAL OBSERVATION PER HR: HCPCS

## 2021-06-29 PROCEDURE — 96372 THER/PROPH/DIAG INJ SC/IM: CPT | Performed by: INTERNAL MEDICINE

## 2021-06-29 PROCEDURE — 10002807 HB RX 258: Performed by: INTERNAL MEDICINE

## 2021-06-29 RX ORDER — LEVETIRACETAM 500 MG/1
250 TABLET ORAL EVERY 12 HOURS SCHEDULED
Status: DISCONTINUED | OUTPATIENT
Start: 2021-06-29 | End: 2021-06-30 | Stop reason: HOSPADM

## 2021-06-29 RX ORDER — SENNOSIDES A AND B 8.6 MG/1
3 TABLET, FILM COATED ORAL NIGHTLY
Status: DISCONTINUED | OUTPATIENT
Start: 2021-06-29 | End: 2021-06-30 | Stop reason: HOSPADM

## 2021-06-29 RX ORDER — INSULIN GLARGINE 100 [IU]/ML
24 INJECTION, SOLUTION SUBCUTANEOUS NIGHTLY
Status: DISCONTINUED | OUTPATIENT
Start: 2021-06-29 | End: 2021-06-29

## 2021-06-29 RX ORDER — 0.9 % SODIUM CHLORIDE 0.9 %
2 VIAL (ML) INJECTION EVERY 12 HOURS SCHEDULED
Status: DISCONTINUED | OUTPATIENT
Start: 2021-06-29 | End: 2021-06-30 | Stop reason: HOSPADM

## 2021-06-29 RX ORDER — LEVOTHYROXINE SODIUM 0.1 MG/1
100 TABLET ORAL DAILY
Status: DISCONTINUED | OUTPATIENT
Start: 2021-06-29 | End: 2021-06-30 | Stop reason: HOSPADM

## 2021-06-29 RX ORDER — TRAZODONE HYDROCHLORIDE 100 MG/1
100 TABLET ORAL AT BEDTIME
Status: DISCONTINUED | OUTPATIENT
Start: 2021-06-29 | End: 2021-06-30 | Stop reason: HOSPADM

## 2021-06-29 RX ORDER — NICOTINE 21 MG/24HR
1 PATCH, TRANSDERMAL 24 HOURS TRANSDERMAL DAILY
Status: DISCONTINUED | OUTPATIENT
Start: 2021-06-29 | End: 2021-06-30 | Stop reason: HOSPADM

## 2021-06-29 RX ORDER — POLYETHYLENE GLYCOL 3350 17 G/17G
17 POWDER, FOR SOLUTION ORAL DAILY
Status: DISCONTINUED | OUTPATIENT
Start: 2021-06-29 | End: 2021-06-30 | Stop reason: HOSPADM

## 2021-06-29 RX ORDER — PREGABALIN 75 MG/1
225 CAPSULE ORAL 2 TIMES DAILY
Status: DISCONTINUED | OUTPATIENT
Start: 2021-06-29 | End: 2021-06-30 | Stop reason: HOSPADM

## 2021-06-29 RX ORDER — DULOXETIN HYDROCHLORIDE 30 MG/1
60 CAPSULE, DELAYED RELEASE ORAL 2 TIMES DAILY
Status: DISCONTINUED | OUTPATIENT
Start: 2021-06-29 | End: 2021-06-30 | Stop reason: HOSPADM

## 2021-06-29 RX ORDER — KETAMINE HCL 100 %
1-2 POWDER (GRAM) MISCELLANEOUS EVERY 6 HOURS
Status: DISCONTINUED | OUTPATIENT
Start: 2021-06-29 | End: 2021-06-30 | Stop reason: HOSPADM

## 2021-06-29 RX ORDER — INSULIN GLARGINE 100 [IU]/ML
24 INJECTION, SOLUTION SUBCUTANEOUS DAILY
Status: DISCONTINUED | OUTPATIENT
Start: 2021-06-29 | End: 2021-06-30 | Stop reason: HOSPADM

## 2021-06-29 RX ORDER — ATORVASTATIN CALCIUM 40 MG/1
40 TABLET, FILM COATED ORAL AT BEDTIME
Status: DISCONTINUED | OUTPATIENT
Start: 2021-06-29 | End: 2021-06-30 | Stop reason: HOSPADM

## 2021-06-29 RX ORDER — LANOLIN ALCOHOL/MO/W.PET/CERES
9 CREAM (GRAM) TOPICAL NIGHTLY
Status: DISCONTINUED | OUTPATIENT
Start: 2021-06-29 | End: 2021-06-30 | Stop reason: HOSPADM

## 2021-06-29 RX ORDER — LISINOPRIL 20 MG/1
20 TABLET ORAL DAILY
Status: DISCONTINUED | OUTPATIENT
Start: 2021-06-29 | End: 2021-06-30 | Stop reason: HOSPADM

## 2021-06-29 RX ORDER — INSULIN LISPRO 100 [IU]/ML
8 INJECTION, SOLUTION INTRAVENOUS; SUBCUTANEOUS
Status: DISCONTINUED | OUTPATIENT
Start: 2021-06-29 | End: 2021-06-30 | Stop reason: HOSPADM

## 2021-06-29 RX ADMIN — INSULIN GLARGINE 24 UNITS: 100 INJECTION, SOLUTION SUBCUTANEOUS at 09:22

## 2021-06-29 RX ADMIN — TRAZODONE HYDROCHLORIDE 100 MG: 100 TABLET ORAL at 21:33

## 2021-06-29 RX ADMIN — Medication 9 MG: at 01:39

## 2021-06-29 RX ADMIN — INSULIN LISPRO 8 UNITS: 100 INJECTION, SOLUTION INTRAVENOUS; SUBCUTANEOUS at 11:50

## 2021-06-29 RX ADMIN — SENNOSIDES 25.8 MG: 8.6 TABLET, FILM COATED ORAL at 21:32

## 2021-06-29 RX ADMIN — INSULIN LISPRO 8 UNITS: 100 INJECTION, SOLUTION INTRAVENOUS; SUBCUTANEOUS at 17:24

## 2021-06-29 RX ADMIN — SODIUM CHLORIDE, PRESERVATIVE FREE 2 ML: 5 INJECTION INTRAVENOUS at 21:35

## 2021-06-29 RX ADMIN — NITROGLYCERIN 1 INCH: 20 OINTMENT TOPICAL at 10:06

## 2021-06-29 RX ADMIN — INSULIN LISPRO 8 UNITS: 100 INJECTION, SOLUTION INTRAVENOUS; SUBCUTANEOUS at 08:31

## 2021-06-29 RX ADMIN — HEPARIN SODIUM 4000 UNITS: 1000 INJECTION, SOLUTION INTRAVENOUS; SUBCUTANEOUS at 05:47

## 2021-06-29 RX ADMIN — NITROGLYCERIN 1 INCH: 20 OINTMENT TOPICAL at 23:56

## 2021-06-29 RX ADMIN — DESMOPRESSIN ACETATE 40 MG: 0.2 TABLET ORAL at 21:33

## 2021-06-29 RX ADMIN — DULOXETINE HYDROCHLORIDE 60 MG: 30 CAPSULE, DELAYED RELEASE ORAL at 08:29

## 2021-06-29 RX ADMIN — ASPIRIN 81 MG: 81 TABLET, COATED ORAL at 08:30

## 2021-06-29 RX ADMIN — POLYETHYLENE GLYCOL 3350 17 G: 17 POWDER, FOR SOLUTION ORAL at 08:29

## 2021-06-29 RX ADMIN — LEVETIRACETAM 250 MG: 500 TABLET, FILM COATED ORAL at 21:33

## 2021-06-29 RX ADMIN — Medication 9 MG: at 21:34

## 2021-06-29 RX ADMIN — NICOTINE 1 PATCH: 21 PATCH, EXTENDED RELEASE TRANSDERMAL at 01:38

## 2021-06-29 RX ADMIN — DULOXETINE HYDROCHLORIDE 60 MG: 30 CAPSULE, DELAYED RELEASE ORAL at 21:34

## 2021-06-29 RX ADMIN — LISINOPRIL 20 MG: 20 TABLET ORAL at 08:30

## 2021-06-29 RX ADMIN — LEVETIRACETAM 250 MG: 500 TABLET, FILM COATED ORAL at 08:31

## 2021-06-29 RX ADMIN — PREGABALIN 225 MG: 75 CAPSULE ORAL at 21:31

## 2021-06-29 RX ADMIN — LEVOTHYROXINE SODIUM 100 MCG: 0.1 TABLET ORAL at 08:30

## 2021-06-29 RX ADMIN — NITROGLYCERIN 1 INCH: 20 OINTMENT TOPICAL at 17:24

## 2021-06-29 RX ADMIN — SODIUM CHLORIDE, PRESERVATIVE FREE 2 ML: 5 INJECTION INTRAVENOUS at 08:34

## 2021-06-29 RX ADMIN — NITROGLYCERIN 10 MCG/MIN: 20 INJECTION INTRAVENOUS at 00:19

## 2021-06-29 RX ADMIN — PREGABALIN 225 MG: 75 CAPSULE ORAL at 08:30

## 2021-06-29 ASSESSMENT — PAIN SCALES - GENERAL
PAINLEVEL_OUTOF10: 0
PAINLEVEL_OUTOF10: 0
PAINLEVEL_OUTOF10: 5
PAINLEVEL_OUTOF10: 0
PAINLEVEL_OUTOF10: 4
PAINLEVEL_OUTOF10: 4

## 2021-06-30 ENCOUNTER — APPOINTMENT (OUTPATIENT)
Dept: NUCLEAR MEDICINE | Age: 66
End: 2021-06-30
Attending: INTERNAL MEDICINE

## 2021-06-30 ENCOUNTER — CLINICAL ABSTRACT (OUTPATIENT)
Dept: PHARMACY | Age: 66
End: 2021-06-30

## 2021-06-30 ENCOUNTER — TELEPHONE (OUTPATIENT)
Dept: CARDIOLOGY | Age: 66
End: 2021-06-30

## 2021-06-30 VITALS
BODY MASS INDEX: 29.32 KG/M2 | HEIGHT: 64 IN | DIASTOLIC BLOOD PRESSURE: 67 MMHG | WEIGHT: 171.74 LBS | RESPIRATION RATE: 16 BRPM | TEMPERATURE: 98 F | HEART RATE: 76 BPM | OXYGEN SATURATION: 99 % | SYSTOLIC BLOOD PRESSURE: 148 MMHG

## 2021-06-30 LAB
DEPRECATED RDW RBC: 42.7 FL (ref 39–50)
ERYTHROCYTE [DISTWIDTH] IN BLOOD: 13 % (ref 11–15)
GLUCOSE BLDC GLUCOMTR-MCNC: 272 MG/DL (ref 70–99)
GLUCOSE BLDC GLUCOMTR-MCNC: 281 MG/DL (ref 70–99)
GLUCOSE BLDC GLUCOMTR-MCNC: 471 MG/DL (ref 70–99)
GLUCOSE BLDC GLUCOMTR-MCNC: 570 MG/DL (ref 70–99)
HCT VFR BLD CALC: 40.2 % (ref 36–46.5)
HGB BLD-MCNC: 13.3 G/DL (ref 12–15.5)
MCH RBC QN AUTO: 29.8 PG (ref 26–34)
MCHC RBC AUTO-ENTMCNC: 33.1 G/DL (ref 32–36.5)
MCV RBC AUTO: 90.1 FL (ref 78–100)
NRBC BLD MANUAL-RTO: 0 /100 WBC
PLATELET # BLD AUTO: 178 K/MCL (ref 140–450)
RAINBOW EXTRA TUBES HOLD SPECIMEN: NORMAL
RBC # BLD: 4.46 MIL/MCL (ref 4–5.2)
REPORT TEXT: NORMAL
WBC # BLD: 8.7 K/MCL (ref 4.2–11)

## 2021-06-30 PROCEDURE — 85027 COMPLETE CBC AUTOMATED: CPT | Performed by: INTERNAL MEDICINE

## 2021-06-30 PROCEDURE — 93017 CV STRESS TEST TRACING ONLY: CPT | Performed by: INTERNAL MEDICINE

## 2021-06-30 PROCEDURE — 96375 TX/PRO/DX INJ NEW DRUG ADDON: CPT

## 2021-06-30 PROCEDURE — 78452 HT MUSCLE IMAGE SPECT MULT: CPT | Performed by: INTERNAL MEDICINE

## 2021-06-30 PROCEDURE — 36415 COLL VENOUS BLD VENIPUNCTURE: CPT | Performed by: INTERNAL MEDICINE

## 2021-06-30 PROCEDURE — 78452 HT MUSCLE IMAGE SPECT MULT: CPT

## 2021-06-30 PROCEDURE — 93018 CV STRESS TEST I&R ONLY: CPT | Performed by: NURSE PRACTITIONER

## 2021-06-30 PROCEDURE — 96372 THER/PROPH/DIAG INJ SC/IM: CPT | Performed by: INTERNAL MEDICINE

## 2021-06-30 PROCEDURE — 10002800 HB RX 250 W HCPCS: Performed by: INTERNAL MEDICINE

## 2021-06-30 PROCEDURE — 10002803 HB RX 637: Performed by: INTERNAL MEDICINE

## 2021-06-30 PROCEDURE — 93016 CV STRESS TEST SUPVJ ONLY: CPT | Performed by: NURSE PRACTITIONER

## 2021-06-30 PROCEDURE — 99214 OFFICE O/P EST MOD 30 MIN: CPT | Performed by: NURSE PRACTITIONER

## 2021-06-30 PROCEDURE — 82962 GLUCOSE BLOOD TEST: CPT

## 2021-06-30 PROCEDURE — 10002803 HB RX 637: Performed by: NURSE PRACTITIONER

## 2021-06-30 PROCEDURE — G1004 CDSM NDSC: HCPCS

## 2021-06-30 PROCEDURE — 10002800 HB RX 250 W HCPCS: Performed by: NURSE PRACTITIONER

## 2021-06-30 PROCEDURE — 10004651 HB RX, NO CHARGE ITEM: Performed by: INTERNAL MEDICINE

## 2021-06-30 PROCEDURE — 99217 OBSERVATION CARE DISCHARGE: CPT | Performed by: INTERNAL MEDICINE

## 2021-06-30 PROCEDURE — G0378 HOSPITAL OBSERVATION PER HR: HCPCS

## 2021-06-30 PROCEDURE — 10002807 HB RX 258: Performed by: NURSE PRACTITIONER

## 2021-06-30 RX ORDER — ACETAMINOPHEN 500 MG
1000 TABLET ORAL ONCE
Status: COMPLETED | OUTPATIENT
Start: 2021-06-30 | End: 2021-06-30

## 2021-06-30 RX ORDER — REGADENOSON 0.08 MG/ML
0.4 INJECTION, SOLUTION INTRAVENOUS ONCE
Status: COMPLETED | OUTPATIENT
Start: 2021-06-30 | End: 2021-06-30

## 2021-06-30 RX ADMIN — NICOTINE 1 PATCH: 21 PATCH, EXTENDED RELEASE TRANSDERMAL at 07:49

## 2021-06-30 RX ADMIN — REGADENOSON 0.4 MG: 0.08 INJECTION, SOLUTION INTRAVENOUS at 09:20

## 2021-06-30 RX ADMIN — SODIUM CHLORIDE 5 UNITS: 9 INJECTION, SOLUTION INTRAVENOUS at 14:23

## 2021-06-30 RX ADMIN — INSULIN GLARGINE 24 UNITS: 100 INJECTION, SOLUTION SUBCUTANEOUS at 11:30

## 2021-06-30 RX ADMIN — ASPIRIN 81 MG: 81 TABLET, COATED ORAL at 11:33

## 2021-06-30 RX ADMIN — SODIUM CHLORIDE, PRESERVATIVE FREE 2 ML: 5 INJECTION INTRAVENOUS at 11:37

## 2021-06-30 RX ADMIN — PREGABALIN 225 MG: 75 CAPSULE ORAL at 11:32

## 2021-06-30 RX ADMIN — LEVETIRACETAM 250 MG: 500 TABLET, FILM COATED ORAL at 11:33

## 2021-06-30 RX ADMIN — ACETAMINOPHEN 1000 MG: 500 TABLET ORAL at 07:48

## 2021-06-30 RX ADMIN — INSULIN LISPRO 8 UNITS: 100 INJECTION, SOLUTION INTRAVENOUS; SUBCUTANEOUS at 11:30

## 2021-06-30 RX ADMIN — LEVOTHYROXINE SODIUM 100 MCG: 0.1 TABLET ORAL at 11:33

## 2021-06-30 RX ADMIN — DULOXETINE HYDROCHLORIDE 60 MG: 30 CAPSULE, DELAYED RELEASE ORAL at 11:33

## 2021-06-30 RX ADMIN — NITROGLYCERIN 1 INCH: 20 OINTMENT TOPICAL at 11:30

## 2021-06-30 RX ADMIN — LISINOPRIL 20 MG: 20 TABLET ORAL at 11:33

## 2021-06-30 RX ADMIN — Medication 1 PUMP: at 11:36

## 2021-06-30 ASSESSMENT — PAIN SCALES - GENERAL
PAINLEVEL_OUTOF10: 7
PAINLEVEL_OUTOF10: 3
PAINLEVEL_OUTOF10: 7
PAINLEVEL_OUTOF10: 5
PAINLEVEL_OUTOF10: 3

## 2021-07-01 ENCOUNTER — TELEPHONE (OUTPATIENT)
Dept: INTERNAL MEDICINE | Age: 66
End: 2021-07-01

## 2021-07-02 ENCOUNTER — APPOINTMENT (OUTPATIENT)
Dept: REHABILITATION | Age: 66
End: 2021-07-02
Attending: PAIN MEDICINE

## 2021-07-05 ENCOUNTER — OFFICE VISIT (OUTPATIENT)
Dept: INTERNAL MEDICINE | Age: 66
End: 2021-07-05

## 2021-07-05 VITALS
OXYGEN SATURATION: 97 % | HEART RATE: 82 BPM | DIASTOLIC BLOOD PRESSURE: 60 MMHG | SYSTOLIC BLOOD PRESSURE: 116 MMHG | BODY MASS INDEX: 28.53 KG/M2 | WEIGHT: 166.23 LBS | TEMPERATURE: 98.2 F

## 2021-07-05 DIAGNOSIS — F17.200 TOBACCO USE DISORDER: ICD-10-CM

## 2021-07-05 DIAGNOSIS — I25.118 CORONARY ARTERY DISEASE OF NATIVE ARTERY OF NATIVE HEART WITH STABLE ANGINA PECTORIS (CMD): Primary | ICD-10-CM

## 2021-07-05 DIAGNOSIS — I10 ESSENTIAL HYPERTENSION: ICD-10-CM

## 2021-07-05 DIAGNOSIS — R25.1 TREMOR: ICD-10-CM

## 2021-07-05 DIAGNOSIS — E08.42 DIABETIC POLYNEUROPATHY ASSOCIATED WITH DIABETES MELLITUS DUE TO UNDERLYING CONDITION (CMD): ICD-10-CM

## 2021-07-05 DIAGNOSIS — R42 DIZZINESS: ICD-10-CM

## 2021-07-05 DIAGNOSIS — E13.9 LADA (LATENT AUTOIMMUNE DIABETES IN ADULTS), MANAGED AS TYPE 1 (CMD): ICD-10-CM

## 2021-07-05 PROCEDURE — 99495 TRANSJ CARE MGMT MOD F2F 14D: CPT | Performed by: INTERNAL MEDICINE

## 2021-07-05 RX ORDER — PREGABALIN 225 MG/1
225 CAPSULE ORAL 2 TIMES DAILY
Qty: 60 CAPSULE | Refills: 0 | Status: SHIPPED | OUTPATIENT
Start: 2021-07-05 | End: 2021-08-23

## 2021-07-06 ENCOUNTER — HOSPITAL ENCOUNTER (OUTPATIENT)
Dept: REHABILITATION | Age: 66
Discharge: STILL ADMITTED OR ADMINISTRATIVE | End: 2021-07-06
Attending: PAIN MEDICINE

## 2021-07-06 PROCEDURE — 10004173 HB COUNTER-THERAPY VISIT PT: Performed by: PHYSICAL THERAPIST

## 2021-07-06 PROCEDURE — 97032 APPL MODALITY 1+ESTIM EA 15: CPT | Performed by: PHYSICAL THERAPIST

## 2021-07-07 ENCOUNTER — TELEPHONE (OUTPATIENT)
Dept: INTERNAL MEDICINE | Age: 66
End: 2021-07-07

## 2021-07-07 ENCOUNTER — TELEPHONE (OUTPATIENT)
Dept: AUDIOLOGY | Age: 66
End: 2021-07-07

## 2021-07-07 ENCOUNTER — CLINICAL ABSTRACT (OUTPATIENT)
Dept: PHARMACY | Age: 66
End: 2021-07-07

## 2021-07-07 DIAGNOSIS — R45.86 MOOD DISTURBANCE: Primary | ICD-10-CM

## 2021-07-08 ENCOUNTER — TELEPHONE (OUTPATIENT)
Dept: PAIN MANAGEMENT | Age: 66
End: 2021-07-08

## 2021-07-08 ENCOUNTER — HOSPITAL ENCOUNTER (OUTPATIENT)
Dept: REHABILITATION | Age: 66
Discharge: STILL ADMITTED OR ADMINISTRATIVE | End: 2021-07-08
Attending: PAIN MEDICINE

## 2021-07-08 PROCEDURE — 97032 APPL MODALITY 1+ESTIM EA 15: CPT | Performed by: PHYSICAL THERAPIST

## 2021-07-08 PROCEDURE — 10004173 HB COUNTER-THERAPY VISIT PT: Performed by: PHYSICAL THERAPIST

## 2021-07-09 ENCOUNTER — HOSPITAL ENCOUNTER (EMERGENCY)
Age: 66
Discharge: HOME | End: 2021-07-09
Attending: EMERGENCY MEDICINE

## 2021-07-09 ENCOUNTER — APPOINTMENT (OUTPATIENT)
Dept: CT IMAGING | Age: 66
End: 2021-07-09
Attending: EMERGENCY MEDICINE

## 2021-07-09 ENCOUNTER — APPOINTMENT (OUTPATIENT)
Dept: MRI IMAGING | Age: 66
End: 2021-07-09
Attending: EMERGENCY MEDICINE

## 2021-07-09 ENCOUNTER — TELEPHONE (OUTPATIENT)
Dept: PHARMACY | Age: 66
End: 2021-07-09

## 2021-07-09 VITALS
SYSTOLIC BLOOD PRESSURE: 136 MMHG | RESPIRATION RATE: 19 BRPM | HEIGHT: 63 IN | WEIGHT: 168.65 LBS | DIASTOLIC BLOOD PRESSURE: 63 MMHG | HEART RATE: 63 BPM | TEMPERATURE: 97 F | OXYGEN SATURATION: 95 % | BODY MASS INDEX: 29.88 KG/M2

## 2021-07-09 DIAGNOSIS — N39.0 URINARY TRACT INFECTION WITHOUT HEMATURIA, SITE UNSPECIFIED: Primary | ICD-10-CM

## 2021-07-09 DIAGNOSIS — G45.9 TIA (TRANSIENT ISCHEMIC ATTACK): ICD-10-CM

## 2021-07-09 LAB
ALBUMIN SERPL-MCNC: 2.9 G/DL (ref 3.6–5.1)
ALBUMIN/GLOB SERPL: 0.9 {RATIO} (ref 1–2.4)
ALP SERPL-CCNC: 74 UNITS/L (ref 45–117)
ALT SERPL-CCNC: 28 UNITS/L
ANION GAP SERPL CALC-SCNC: 13 MMOL/L (ref 10–20)
APPEARANCE UR: CLEAR
APTT PPP: 25 SEC (ref 22–30)
AST SERPL-CCNC: 19 UNITS/L
BACTERIA #/AREA URNS HPF: ABNORMAL /HPF
BASOPHILS # BLD: 0 K/MCL (ref 0–0.3)
BASOPHILS NFR BLD: 0 %
BILIRUB SERPL-MCNC: 0.5 MG/DL (ref 0.2–1)
BILIRUB UR QL STRIP: NEGATIVE
BUN SERPL-MCNC: 18 MG/DL (ref 6–20)
BUN/CREAT SERPL: 19 (ref 7–25)
CALCIUM SERPL-MCNC: 8.7 MG/DL (ref 8.4–10.2)
CHLORIDE SERPL-SCNC: 100 MMOL/L (ref 98–107)
CO2 SERPL-SCNC: 27 MMOL/L (ref 21–32)
COLOR UR: YELLOW
CREAT SERPL-MCNC: 0.95 MG/DL (ref 0.51–0.95)
DEPRECATED RDW RBC: 42.5 FL (ref 39–50)
EOSINOPHIL # BLD: 0.1 K/MCL (ref 0–0.5)
EOSINOPHIL NFR BLD: 1 %
ERYTHROCYTE [DISTWIDTH] IN BLOOD: 12.8 % (ref 11–15)
ETHANOL SERPL-MCNC: NORMAL MG/DL
FASTING DURATION TIME PATIENT: ABNORMAL H
GFR SERPLBLD BASED ON 1.73 SQ M-ARVRAT: 63 ML/MIN/1.73M2
GLOBULIN SER-MCNC: 3.2 G/DL (ref 2–4)
GLUCOSE BLDC GLUCOMTR-MCNC: 338 MG/DL (ref 70–99)
GLUCOSE SERPL-MCNC: 395 MG/DL (ref 65–99)
GLUCOSE UR STRIP-MCNC: >1000 MG/DL
HBA1C MFR BLD: 9.8 % (ref 4.5–5.6)
HCT VFR BLD CALC: 37.4 % (ref 36–46.5)
HGB BLD-MCNC: 12.1 G/DL (ref 12–15.5)
HGB UR QL STRIP: ABNORMAL
HYALINE CASTS #/AREA URNS LPF: ABNORMAL /LPF
IMM GRANULOCYTES # BLD AUTO: 0 K/MCL (ref 0–0.2)
IMM GRANULOCYTES # BLD: 0 %
INR PPP: 1
KETONES UR STRIP-MCNC: NEGATIVE MG/DL
LEUKOCYTE ESTERASE UR QL STRIP: ABNORMAL
LYMPHOCYTES # BLD: 3 K/MCL (ref 1–4)
LYMPHOCYTES NFR BLD: 31 %
MCH RBC QN AUTO: 28.9 PG (ref 26–34)
MCHC RBC AUTO-ENTMCNC: 32.4 G/DL (ref 32–36.5)
MCV RBC AUTO: 89.5 FL (ref 78–100)
MONOCYTES # BLD: 0.5 K/MCL (ref 0.3–0.9)
MONOCYTES NFR BLD: 6 %
NEUTROPHILS # BLD: 5.8 K/MCL (ref 1.8–7.7)
NEUTROPHILS NFR BLD: 62 %
NITRITE UR QL STRIP: NEGATIVE
NRBC BLD MANUAL-RTO: 0 /100 WBC
PH UR STRIP: 7 [PH] (ref 5–7)
PLATELET # BLD AUTO: 166 K/MCL (ref 140–450)
POTASSIUM SERPL-SCNC: 4.8 MMOL/L (ref 3.4–5.1)
PROT SERPL-MCNC: 6.1 G/DL (ref 6.4–8.2)
PROT UR STRIP-MCNC: NEGATIVE MG/DL
PROTHROMBIN TIME: 10.1 SEC (ref 9.7–11.8)
RBC # BLD: 4.18 MIL/MCL (ref 4–5.2)
RBC #/AREA URNS HPF: ABNORMAL /HPF
SODIUM SERPL-SCNC: 135 MMOL/L (ref 135–145)
SP GR UR STRIP: 1.01 (ref 1–1.03)
SQUAMOUS #/AREA URNS HPF: ABNORMAL /HPF
TROPONIN I SERPL HS-MCNC: <0.02 NG/ML
UROBILINOGEN UR STRIP-MCNC: 0.2 MG/DL
WBC # BLD: 9.5 K/MCL (ref 4.2–11)
WBC #/AREA URNS HPF: ABNORMAL /HPF

## 2021-07-09 PROCEDURE — 70450 CT HEAD/BRAIN W/O DYE: CPT | Performed by: RADIOLOGY

## 2021-07-09 PROCEDURE — 99285 EMERGENCY DEPT VISIT HI MDM: CPT

## 2021-07-09 PROCEDURE — 80053 COMPREHEN METABOLIC PANEL: CPT | Performed by: EMERGENCY MEDICINE

## 2021-07-09 PROCEDURE — 85025 COMPLETE CBC W/AUTO DIFF WBC: CPT | Performed by: EMERGENCY MEDICINE

## 2021-07-09 PROCEDURE — 70551 MRI BRAIN STEM W/O DYE: CPT | Performed by: RADIOLOGY

## 2021-07-09 PROCEDURE — 82962 GLUCOSE BLOOD TEST: CPT

## 2021-07-09 PROCEDURE — 82077 ASSAY SPEC XCP UR&BREATH IA: CPT | Performed by: EMERGENCY MEDICINE

## 2021-07-09 PROCEDURE — 10002803 HB RX 637: Performed by: EMERGENCY MEDICINE

## 2021-07-09 PROCEDURE — 70551 MRI BRAIN STEM W/O DYE: CPT

## 2021-07-09 PROCEDURE — 10004651 HB RX, NO CHARGE ITEM: Performed by: EMERGENCY MEDICINE

## 2021-07-09 PROCEDURE — 93005 ELECTROCARDIOGRAM TRACING: CPT | Performed by: EMERGENCY MEDICINE

## 2021-07-09 PROCEDURE — 85730 THROMBOPLASTIN TIME PARTIAL: CPT | Performed by: EMERGENCY MEDICINE

## 2021-07-09 PROCEDURE — 70544 MR ANGIOGRAPHY HEAD W/O DYE: CPT

## 2021-07-09 PROCEDURE — 70450 CT HEAD/BRAIN W/O DYE: CPT

## 2021-07-09 PROCEDURE — 81001 URINALYSIS AUTO W/SCOPE: CPT | Performed by: EMERGENCY MEDICINE

## 2021-07-09 PROCEDURE — 70544 MR ANGIOGRAPHY HEAD W/O DYE: CPT | Performed by: RADIOLOGY

## 2021-07-09 PROCEDURE — 99285 EMERGENCY DEPT VISIT HI MDM: CPT | Performed by: EMERGENCY MEDICINE

## 2021-07-09 PROCEDURE — 10002807 HB RX 258: Performed by: EMERGENCY MEDICINE

## 2021-07-09 PROCEDURE — 93010 ELECTROCARDIOGRAM REPORT: CPT | Performed by: INTERNAL MEDICINE

## 2021-07-09 PROCEDURE — 85610 PROTHROMBIN TIME: CPT | Performed by: EMERGENCY MEDICINE

## 2021-07-09 PROCEDURE — 36415 COLL VENOUS BLD VENIPUNCTURE: CPT

## 2021-07-09 PROCEDURE — 10002800 HB RX 250 W HCPCS: Performed by: EMERGENCY MEDICINE

## 2021-07-09 PROCEDURE — 96374 THER/PROPH/DIAG INJ IV PUSH: CPT

## 2021-07-09 PROCEDURE — 87086 URINE CULTURE/COLONY COUNT: CPT | Performed by: EMERGENCY MEDICINE

## 2021-07-09 PROCEDURE — 83036 HEMOGLOBIN GLYCOSYLATED A1C: CPT | Performed by: EMERGENCY MEDICINE

## 2021-07-09 PROCEDURE — 84484 ASSAY OF TROPONIN QUANT: CPT | Performed by: EMERGENCY MEDICINE

## 2021-07-09 RX ORDER — ASPIRIN 81 MG/1
324 TABLET, CHEWABLE ORAL ONCE
Status: COMPLETED | OUTPATIENT
Start: 2021-07-09 | End: 2021-07-09

## 2021-07-09 RX ORDER — 0.9 % SODIUM CHLORIDE 0.9 %
2 VIAL (ML) INJECTION EVERY 12 HOURS SCHEDULED
Status: DISCONTINUED | OUTPATIENT
Start: 2021-07-09 | End: 2021-07-09 | Stop reason: HOSPADM

## 2021-07-09 RX ORDER — CEFAZOLIN SODIUM/WATER 1 G/10 ML
1000 SYRINGE (ML) INTRAVENOUS ONCE
Status: COMPLETED | OUTPATIENT
Start: 2021-07-09 | End: 2021-07-09

## 2021-07-09 RX ORDER — CEPHALEXIN 500 MG/1
500 CAPSULE ORAL 2 TIMES DAILY
Qty: 20 CAPSULE | Refills: 0 | Status: SHIPPED | OUTPATIENT
Start: 2021-07-09 | End: 2021-07-15 | Stop reason: ALTCHOICE

## 2021-07-09 RX ORDER — CLOPIDOGREL BISULFATE 75 MG/1
75 TABLET ORAL DAILY
Qty: 30 TABLET | Refills: 3 | Status: SHIPPED | OUTPATIENT
Start: 2021-07-09 | End: 2021-08-12 | Stop reason: SDUPTHER

## 2021-07-09 RX ORDER — CLOPIDOGREL 300 MG/1
300 TABLET, FILM COATED ORAL ONCE
Status: COMPLETED | OUTPATIENT
Start: 2021-07-09 | End: 2021-07-09

## 2021-07-09 RX ORDER — CEPHALEXIN 500 MG/1
500 CAPSULE ORAL 2 TIMES DAILY
Qty: 20 CAPSULE | Refills: 0 | Status: SHIPPED | OUTPATIENT
Start: 2021-07-09 | End: 2021-07-09 | Stop reason: SDUPTHER

## 2021-07-09 RX ORDER — CLOPIDOGREL BISULFATE 75 MG/1
75 TABLET ORAL DAILY
Qty: 30 TABLET | Refills: 3 | Status: SHIPPED | OUTPATIENT
Start: 2021-07-09 | End: 2021-07-09 | Stop reason: SDUPTHER

## 2021-07-09 RX ADMIN — CEFTRIAXONE SODIUM 1000 MG: 10 INJECTION, POWDER, FOR SOLUTION INTRAVENOUS at 16:22

## 2021-07-09 RX ADMIN — ASPIRIN 81 MG CHEWABLE TABLET 324 MG: 81 TABLET CHEWABLE at 14:32

## 2021-07-09 RX ADMIN — CLOPIDOGREL BISULFATE 300 MG: 300 TABLET, FILM COATED ORAL at 17:12

## 2021-07-09 ASSESSMENT — ENCOUNTER SYMPTOMS
WEAKNESS: 0
WHEEZING: 0
SHORTNESS OF BREATH: 0
DIZZINESS: 0
SEIZURES: 0
APPETITE CHANGE: 0
ABDOMINAL PAIN: 0
SPEECH DIFFICULTY: 1
LIGHT-HEADEDNESS: 0
NAUSEA: 0
STRIDOR: 0
HEADACHES: 0
CONFUSION: 1
FEVER: 0
VOMITING: 0
ACTIVITY CHANGE: 0
FACIAL ASYMMETRY: 0
TREMORS: 0
CONSTIPATION: 0
HEMATOLOGIC/LYMPHATIC NEGATIVE: 1
NUMBNESS: 0
DIARRHEA: 0
ENDOCRINE NEGATIVE: 1
ALLERGIC/IMMUNOLOGIC NEGATIVE: 1

## 2021-07-09 ASSESSMENT — PAIN DESCRIPTION - PAIN TYPE
TYPE: ACUTE PAIN
TYPE: ACUTE PAIN

## 2021-07-09 ASSESSMENT — PAIN SCALES - GENERAL
PAINLEVEL_OUTOF10: 8
PAINLEVEL_OUTOF10: 2

## 2021-07-10 LAB — BACTERIA UR CULT: NORMAL

## 2021-07-11 LAB
ATRIAL RATE (BPM): 72
DIASTOLIC BLOOD PRESSURE: 58
P AXIS (DEGREES): 50
PR-INTERVAL (MSEC): 200
QRS-INTERVAL (MSEC): 64
QT-INTERVAL (MSEC): 398
QTC: 435
R AXIS (DEGREES): 27
REPORT TEXT: NORMAL
SYSTOLIC BLOOD PRESSURE: 124
T AXIS (DEGREES): 49
VENTRICULAR RATE EKG/MIN (BPM): 72

## 2021-07-12 ENCOUNTER — CLINICAL ABSTRACT (OUTPATIENT)
Dept: PHARMACY | Age: 66
End: 2021-07-12

## 2021-07-12 ENCOUNTER — WALK IN (OUTPATIENT)
Dept: URGENT CARE | Age: 66
End: 2021-07-12

## 2021-07-12 ENCOUNTER — TELEPHONE (OUTPATIENT)
Dept: AUDIOLOGY | Age: 66
End: 2021-07-12

## 2021-07-12 VITALS
SYSTOLIC BLOOD PRESSURE: 109 MMHG | TEMPERATURE: 98.4 F | RESPIRATION RATE: 16 BRPM | OXYGEN SATURATION: 99 % | DIASTOLIC BLOOD PRESSURE: 55 MMHG | HEART RATE: 78 BPM

## 2021-07-12 DIAGNOSIS — N89.8 VAGINAL ITCHING: Primary | ICD-10-CM

## 2021-07-12 DIAGNOSIS — M79.2 NERVE PAIN: ICD-10-CM

## 2021-07-12 PROCEDURE — 99214 OFFICE O/P EST MOD 30 MIN: CPT | Performed by: PHYSICIAN ASSISTANT

## 2021-07-12 RX ORDER — FLUCONAZOLE 150 MG/1
TABLET ORAL
Qty: 2 TABLET | Refills: 0 | Status: SHIPPED | OUTPATIENT
Start: 2021-07-12 | End: 2021-07-15 | Stop reason: ALTCHOICE

## 2021-07-13 ENCOUNTER — PREP FOR CASE (OUTPATIENT)
Dept: PAIN MANAGEMENT | Age: 66
End: 2021-07-13

## 2021-07-13 DIAGNOSIS — M79.18 MYOFASCIAL PAIN SYNDROME OF THORACIC SPINE: Primary | ICD-10-CM

## 2021-07-14 ENCOUNTER — HOSPITAL ENCOUNTER (OUTPATIENT)
Age: 66
End: 2021-07-14
Attending: PAIN MEDICINE | Admitting: PAIN MEDICINE

## 2021-07-14 RX ORDER — LEVETIRACETAM 500 MG/1
500 TABLET ORAL 2 TIMES DAILY
Qty: 60 TABLET | Refills: 2 | Status: SHIPPED | OUTPATIENT
Start: 2021-07-14 | End: 2021-08-20 | Stop reason: DRUGHIGH

## 2021-07-15 ENCOUNTER — TELEPHONE (OUTPATIENT)
Dept: URGENT CARE | Age: 66
End: 2021-07-15

## 2021-07-15 ENCOUNTER — IMAGING SERVICES (OUTPATIENT)
Dept: GENERAL RADIOLOGY | Age: 66
End: 2021-07-15
Attending: STUDENT IN AN ORGANIZED HEALTH CARE EDUCATION/TRAINING PROGRAM

## 2021-07-15 ENCOUNTER — WALK IN (OUTPATIENT)
Dept: URGENT CARE | Age: 66
End: 2021-07-15

## 2021-07-15 VITALS
RESPIRATION RATE: 20 BRPM | HEART RATE: 71 BPM | TEMPERATURE: 97.6 F | SYSTOLIC BLOOD PRESSURE: 98 MMHG | OXYGEN SATURATION: 99 % | DIASTOLIC BLOOD PRESSURE: 51 MMHG

## 2021-07-15 DIAGNOSIS — M79.675 PAIN OF TOE OF LEFT FOOT: ICD-10-CM

## 2021-07-15 DIAGNOSIS — S90.122A CONTUSION OF LESSER TOE OF LEFT FOOT WITHOUT DAMAGE TO NAIL, INITIAL ENCOUNTER: Primary | ICD-10-CM

## 2021-07-15 PROCEDURE — 73630 X-RAY EXAM OF FOOT: CPT | Performed by: RADIOLOGY

## 2021-07-15 PROCEDURE — 99213 OFFICE O/P EST LOW 20 MIN: CPT | Performed by: STUDENT IN AN ORGANIZED HEALTH CARE EDUCATION/TRAINING PROGRAM

## 2021-07-15 RX ORDER — SIMVASTATIN 40 MG
40 TABLET ORAL NIGHTLY
Status: ON HOLD | COMMUNITY
End: 2021-07-20

## 2021-07-16 DIAGNOSIS — E03.9 ACQUIRED HYPOTHYROIDISM: ICD-10-CM

## 2021-07-16 RX ORDER — LEVOTHYROXINE SODIUM 0.1 MG/1
TABLET ORAL
Qty: 30 TABLET | Refills: 0 | Status: SHIPPED | OUTPATIENT
Start: 2021-07-16 | End: 2021-07-27 | Stop reason: DRUGHIGH

## 2021-07-20 ENCOUNTER — HOSPITAL ENCOUNTER (OUTPATIENT)
Age: 66
Setting detail: OBSERVATION
LOS: 1 days | Discharge: HOME | End: 2021-07-21
Attending: STUDENT IN AN ORGANIZED HEALTH CARE EDUCATION/TRAINING PROGRAM | Admitting: HOSPITALIST

## 2021-07-20 ENCOUNTER — APPOINTMENT (OUTPATIENT)
Dept: GENERAL RADIOLOGY | Age: 66
End: 2021-07-20
Attending: EMERGENCY MEDICINE

## 2021-07-20 ENCOUNTER — TELEPHONE (OUTPATIENT)
Dept: PAIN MANAGEMENT | Age: 66
End: 2021-07-20

## 2021-07-20 ENCOUNTER — HOSPITAL ENCOUNTER (EMERGENCY)
Age: 66
Discharge: ANOTHER HOSPITAL | End: 2021-07-20
Attending: EMERGENCY MEDICINE

## 2021-07-20 VITALS
RESPIRATION RATE: 16 BRPM | BODY MASS INDEX: 29.77 KG/M2 | HEART RATE: 62 BPM | TEMPERATURE: 96.5 F | HEIGHT: 64 IN | SYSTOLIC BLOOD PRESSURE: 150 MMHG | WEIGHT: 174.38 LBS | OXYGEN SATURATION: 95 % | DIASTOLIC BLOOD PRESSURE: 65 MMHG

## 2021-07-20 DIAGNOSIS — R94.39 ABNORMAL STRESS TEST: ICD-10-CM

## 2021-07-20 DIAGNOSIS — R07.9 CHEST PAIN, UNSPECIFIED TYPE: ICD-10-CM

## 2021-07-20 DIAGNOSIS — R07.9 CHEST PAIN AT REST: Primary | ICD-10-CM

## 2021-07-20 DIAGNOSIS — Z95.1 S/P CABG X 2: ICD-10-CM

## 2021-07-20 LAB
ALBUMIN SERPL-MCNC: 3.2 G/DL (ref 3.6–5.1)
ALBUMIN/GLOB SERPL: 1.1 {RATIO} (ref 1–2.4)
ALP SERPL-CCNC: 70 UNITS/L (ref 45–117)
ALT SERPL-CCNC: 33 UNITS/L
ANION GAP SERPL CALC-SCNC: 12 MMOL/L (ref 10–20)
APPEARANCE UR: CLEAR
APTT PPP: 25 SEC (ref 22–30)
APTT PPP: 52 SEC (ref 22–30)
AST SERPL-CCNC: 29 UNITS/L
ATRIAL RATE (BPM): 67
BASOPHILS # BLD: 0 K/MCL (ref 0–0.3)
BASOPHILS NFR BLD: 0 %
BILIRUB SERPL-MCNC: 0.5 MG/DL (ref 0.2–1)
BILIRUB UR QL STRIP: NEGATIVE
BUN SERPL-MCNC: 15 MG/DL (ref 6–20)
BUN/CREAT SERPL: 17 (ref 7–25)
CALCIUM SERPL-MCNC: 9 MG/DL (ref 8.4–10.2)
CHLORIDE SERPL-SCNC: 100 MMOL/L (ref 98–107)
CO2 SERPL-SCNC: 27 MMOL/L (ref 21–32)
COLOR UR: YELLOW
CREAT SERPL-MCNC: 0.88 MG/DL (ref 0.51–0.95)
D DIMER PPP FEU-MCNC: 0.2 MG/L (FEU)
DEPRECATED RDW RBC: 40.9 FL (ref 39–50)
DIASTOLIC BLOOD PRESSURE: 58
EOSINOPHIL # BLD: 0 K/MCL (ref 0–0.5)
EOSINOPHIL NFR BLD: 0 %
ERYTHROCYTE [DISTWIDTH] IN BLOOD: 12.7 % (ref 11–15)
FASTING DURATION TIME PATIENT: ABNORMAL H
GFR SERPLBLD BASED ON 1.73 SQ M-ARVRAT: 69 ML/MIN/1.73M2
GLOBULIN SER-MCNC: 3 G/DL (ref 2–4)
GLUCOSE BLDC GLUCOMTR-MCNC: 315 MG/DL (ref 70–99)
GLUCOSE SERPL-MCNC: 128 MG/DL (ref 65–99)
GLUCOSE UR STRIP-MCNC: 250 MG/DL
HCT VFR BLD CALC: 38.1 % (ref 36–46.5)
HGB BLD-MCNC: 12.8 G/DL (ref 12–15.5)
HGB UR QL STRIP: NEGATIVE
HYPOCHROMIA BLD QL SMEAR: ABNORMAL
INR PPP: 0.9
KETONES UR STRIP-MCNC: NEGATIVE MG/DL
LEUKOCYTE ESTERASE UR QL STRIP: NEGATIVE
LG PLATELETS BLD QL SMEAR: PRESENT
LYMPHOCYTES # BLD: 4.9 K/MCL (ref 1–4)
LYMPHOCYTES NFR BLD: 40 %
MCH RBC QN AUTO: 29.4 PG (ref 26–34)
MCHC RBC AUTO-ENTMCNC: 33.6 G/DL (ref 32–36.5)
MCV RBC AUTO: 87.4 FL (ref 78–100)
MICROCYTES BLD QL SMEAR: ABNORMAL
MONOCYTES # BLD: 0.6 K/MCL (ref 0.3–0.9)
MONOCYTES NFR BLD: 7 %
NEUTROPHILS # BLD: 3.5 K/MCL (ref 1.8–7.7)
NEUTS SEG NFR BLD: 39 %
NITRITE UR QL STRIP: NEGATIVE
NRBC BLD MANUAL-RTO: 0 /100 WBC
NT-PROBNP SERPL-MCNC: 169 PG/ML
P AXIS (DEGREES): 75
PH UR STRIP: 8 [PH] (ref 5–7)
PLATELET # BLD AUTO: 186 K/MCL (ref 140–450)
POTASSIUM SERPL-SCNC: 4.4 MMOL/L (ref 3.4–5.1)
PR-INTERVAL (MSEC): 182
PROT SERPL-MCNC: 6.2 G/DL (ref 6.4–8.2)
PROT UR STRIP-MCNC: NEGATIVE MG/DL
PROTHROMBIN TIME: 9.8 SEC (ref 9.7–11.8)
QRS-INTERVAL (MSEC): 66
QT-INTERVAL (MSEC): 392
QTC: 414
R AXIS (DEGREES): 43
RAINBOW EXTRA TUBES HOLD SPECIMEN: NORMAL
RBC # BLD: 4.36 MIL/MCL (ref 4–5.2)
REPORT TEXT: NORMAL
SODIUM SERPL-SCNC: 135 MMOL/L (ref 135–145)
SP GR UR STRIP: 1.01 (ref 1–1.03)
SYSTOLIC BLOOD PRESSURE: 120
T AXIS (DEGREES): 50
TOXIC GRANULES BLD QL SMEAR: PRESENT
TROPONIN I SERPL HS-MCNC: <0.02 NG/ML
TROPONIN I SERPL HS-MCNC: <0.02 NG/ML
UROBILINOGEN UR STRIP-MCNC: 0.2 MG/DL
VARIANT LYMPHS NFR BLD: 14 % (ref 0–5)
VENTRICULAR RATE EKG/MIN (BPM): 67
WBC # BLD: 9.1 K/MCL (ref 4.2–11)

## 2021-07-20 PROCEDURE — 85379 FIBRIN DEGRADATION QUANT: CPT | Performed by: EMERGENCY MEDICINE

## 2021-07-20 PROCEDURE — 99291 CRITICAL CARE FIRST HOUR: CPT | Performed by: EMERGENCY MEDICINE

## 2021-07-20 PROCEDURE — 10002800 HB RX 250 W HCPCS: Performed by: HOSPITALIST

## 2021-07-20 PROCEDURE — 93010 ELECTROCARDIOGRAM REPORT: CPT | Performed by: INTERNAL MEDICINE

## 2021-07-20 PROCEDURE — 93005 ELECTROCARDIOGRAM TRACING: CPT | Performed by: HOSPITALIST

## 2021-07-20 PROCEDURE — 84484 ASSAY OF TROPONIN QUANT: CPT | Performed by: EMERGENCY MEDICINE

## 2021-07-20 PROCEDURE — 85610 PROTHROMBIN TIME: CPT | Performed by: EMERGENCY MEDICINE

## 2021-07-20 PROCEDURE — 99223 1ST HOSP IP/OBS HIGH 75: CPT | Performed by: HOSPITALIST

## 2021-07-20 PROCEDURE — 10002803 HB RX 637: Performed by: EMERGENCY MEDICINE

## 2021-07-20 PROCEDURE — 36415 COLL VENOUS BLD VENIPUNCTURE: CPT | Performed by: EMERGENCY MEDICINE

## 2021-07-20 PROCEDURE — 71045 X-RAY EXAM CHEST 1 VIEW: CPT | Performed by: RADIOLOGY

## 2021-07-20 PROCEDURE — 10000002 HB ROOM CHARGE MED SURG

## 2021-07-20 PROCEDURE — 10003445 HB TELEMETRY PER DAY

## 2021-07-20 PROCEDURE — 96365 THER/PROPH/DIAG IV INF INIT: CPT

## 2021-07-20 PROCEDURE — 85027 COMPLETE CBC AUTOMATED: CPT | Performed by: EMERGENCY MEDICINE

## 2021-07-20 PROCEDURE — 83880 ASSAY OF NATRIURETIC PEPTIDE: CPT | Performed by: EMERGENCY MEDICINE

## 2021-07-20 PROCEDURE — 80053 COMPREHEN METABOLIC PANEL: CPT | Performed by: EMERGENCY MEDICINE

## 2021-07-20 PROCEDURE — 71045 X-RAY EXAM CHEST 1 VIEW: CPT

## 2021-07-20 PROCEDURE — 84484 ASSAY OF TROPONIN QUANT: CPT | Performed by: HOSPITALIST

## 2021-07-20 PROCEDURE — 85730 THROMBOPLASTIN TIME PARTIAL: CPT | Performed by: EMERGENCY MEDICINE

## 2021-07-20 PROCEDURE — 82962 GLUCOSE BLOOD TEST: CPT

## 2021-07-20 PROCEDURE — 10002800 HB RX 250 W HCPCS: Performed by: EMERGENCY MEDICINE

## 2021-07-20 PROCEDURE — 93005 ELECTROCARDIOGRAM TRACING: CPT | Performed by: EMERGENCY MEDICINE

## 2021-07-20 PROCEDURE — 99285 EMERGENCY DEPT VISIT HI MDM: CPT

## 2021-07-20 PROCEDURE — 36415 COLL VENOUS BLD VENIPUNCTURE: CPT

## 2021-07-20 PROCEDURE — 96376 TX/PRO/DX INJ SAME DRUG ADON: CPT

## 2021-07-20 PROCEDURE — 81003 URINALYSIS AUTO W/O SCOPE: CPT | Performed by: EMERGENCY MEDICINE

## 2021-07-20 PROCEDURE — 85730 THROMBOPLASTIN TIME PARTIAL: CPT | Performed by: STUDENT IN AN ORGANIZED HEALTH CARE EDUCATION/TRAINING PROGRAM

## 2021-07-20 RX ORDER — HEPARIN SODIUM 1000 [USP'U]/ML
2000 INJECTION, SOLUTION INTRAVENOUS; SUBCUTANEOUS PRN
Status: DISCONTINUED | OUTPATIENT
Start: 2021-07-20 | End: 2021-07-20 | Stop reason: HOSPADM

## 2021-07-20 RX ORDER — CLOPIDOGREL BISULFATE 75 MG/1
75 TABLET ORAL DAILY
Status: DISCONTINUED | OUTPATIENT
Start: 2021-07-21 | End: 2021-07-21 | Stop reason: CLARIF

## 2021-07-20 RX ORDER — DEXTROSE MONOHYDRATE 25 G/50ML
12.5 INJECTION, SOLUTION INTRAVENOUS PRN
Status: DISCONTINUED | OUTPATIENT
Start: 2021-07-20 | End: 2021-07-21 | Stop reason: HOSPADM

## 2021-07-20 RX ORDER — DEXTROSE MONOHYDRATE 25 G/50ML
25 INJECTION, SOLUTION INTRAVENOUS PRN
Status: DISCONTINUED | OUTPATIENT
Start: 2021-07-20 | End: 2021-07-21 | Stop reason: HOSPADM

## 2021-07-20 RX ORDER — HEPARIN SODIUM 1000 [USP'U]/ML
4000 INJECTION, SOLUTION INTRAVENOUS; SUBCUTANEOUS PRN
Status: DISCONTINUED | OUTPATIENT
Start: 2021-07-20 | End: 2021-07-20 | Stop reason: HOSPADM

## 2021-07-20 RX ORDER — INSULIN LISPRO 100 [IU]/ML
2-12 INJECTION, SOLUTION INTRAVENOUS; SUBCUTANEOUS
Status: DISCONTINUED | OUTPATIENT
Start: 2021-07-20 | End: 2021-07-21 | Stop reason: HOSPADM

## 2021-07-20 RX ORDER — HEPARIN SODIUM 10000 [USP'U]/100ML
0-30 INJECTION, SOLUTION INTRAVENOUS CONTINUOUS
Status: DISCONTINUED | OUTPATIENT
Start: 2021-07-20 | End: 2021-07-21

## 2021-07-20 RX ORDER — NICOTINE POLACRILEX 4 MG
30 LOZENGE BUCCAL PRN
Status: DISCONTINUED | OUTPATIENT
Start: 2021-07-20 | End: 2021-07-21 | Stop reason: HOSPADM

## 2021-07-20 RX ORDER — ACETAMINOPHEN 325 MG/1
650 TABLET ORAL EVERY 4 HOURS PRN
Status: DISCONTINUED | OUTPATIENT
Start: 2021-07-20 | End: 2021-07-21 | Stop reason: HOSPADM

## 2021-07-20 RX ORDER — BISACODYL 10 MG
10 SUPPOSITORY, RECTAL RECTAL DAILY PRN
Status: DISCONTINUED | OUTPATIENT
Start: 2021-07-20 | End: 2021-07-21 | Stop reason: HOSPADM

## 2021-07-20 RX ORDER — NICOTINE 21 MG/24HR
1 PATCH, TRANSDERMAL 24 HOURS TRANSDERMAL DAILY
Status: DISCONTINUED | OUTPATIENT
Start: 2021-07-21 | End: 2021-07-21 | Stop reason: HOSPADM

## 2021-07-20 RX ORDER — HEPARIN SODIUM 1000 [USP'U]/ML
4000 INJECTION, SOLUTION INTRAVENOUS; SUBCUTANEOUS ONCE
Status: COMPLETED | OUTPATIENT
Start: 2021-07-20 | End: 2021-07-20

## 2021-07-20 RX ORDER — AMOXICILLIN 250 MG
2 CAPSULE ORAL DAILY PRN
Status: DISCONTINUED | OUTPATIENT
Start: 2021-07-20 | End: 2021-07-21 | Stop reason: HOSPADM

## 2021-07-20 RX ORDER — DULOXETIN HYDROCHLORIDE 60 MG/1
60 CAPSULE, DELAYED RELEASE ORAL 2 TIMES DAILY
Status: DISCONTINUED | OUTPATIENT
Start: 2021-07-21 | End: 2021-07-21 | Stop reason: HOSPADM

## 2021-07-20 RX ORDER — LANOLIN ALCOHOL/MO/W.PET/CERES
6 CREAM (GRAM) TOPICAL NIGHTLY
Status: DISCONTINUED | OUTPATIENT
Start: 2021-07-21 | End: 2021-07-21 | Stop reason: HOSPADM

## 2021-07-20 RX ORDER — NITROGLYCERIN 0.4 MG/1
0.4 TABLET SUBLINGUAL EVERY 5 MIN PRN
Status: DISCONTINUED | OUTPATIENT
Start: 2021-07-20 | End: 2021-07-21 | Stop reason: HOSPADM

## 2021-07-20 RX ORDER — LEVOTHYROXINE SODIUM 0.1 MG/1
100 TABLET ORAL
Status: DISCONTINUED | OUTPATIENT
Start: 2021-07-21 | End: 2021-07-21 | Stop reason: HOSPADM

## 2021-07-20 RX ORDER — POLYETHYLENE GLYCOL 3350 17 G/17G
17 POWDER, FOR SOLUTION ORAL DAILY PRN
Status: DISCONTINUED | OUTPATIENT
Start: 2021-07-20 | End: 2021-07-21 | Stop reason: HOSPADM

## 2021-07-20 RX ORDER — MAGNESIUM HYDROXIDE/ALUMINUM HYDROXICE/SIMETHICONE 120; 1200; 1200 MG/30ML; MG/30ML; MG/30ML
30 SUSPENSION ORAL EVERY 4 HOURS PRN
Status: DISCONTINUED | OUTPATIENT
Start: 2021-07-20 | End: 2021-07-21 | Stop reason: HOSPADM

## 2021-07-20 RX ORDER — HEPARIN SODIUM 1000 [USP'U]/ML
4000 INJECTION, SOLUTION INTRAVENOUS; SUBCUTANEOUS ONCE
Status: DISCONTINUED | OUTPATIENT
Start: 2021-07-20 | End: 2021-07-20 | Stop reason: SDUPTHER

## 2021-07-20 RX ORDER — PREGABALIN 75 MG/1
225 CAPSULE ORAL 2 TIMES DAILY
Refills: 0 | Status: DISCONTINUED | OUTPATIENT
Start: 2021-07-21 | End: 2021-07-21 | Stop reason: HOSPADM

## 2021-07-20 RX ORDER — ATORVASTATIN CALCIUM 40 MG/1
40 TABLET, FILM COATED ORAL AT BEDTIME
COMMUNITY
End: 2021-07-27 | Stop reason: DRUGHIGH

## 2021-07-20 RX ORDER — HEPARIN SODIUM 1000 [USP'U]/ML
2000 INJECTION, SOLUTION INTRAVENOUS; SUBCUTANEOUS PRN
Status: DISCONTINUED | OUTPATIENT
Start: 2021-07-20 | End: 2021-07-21

## 2021-07-20 RX ORDER — 0.9 % SODIUM CHLORIDE 0.9 %
2 VIAL (ML) INJECTION EVERY 12 HOURS SCHEDULED
Status: DISCONTINUED | OUTPATIENT
Start: 2021-07-20 | End: 2021-07-21 | Stop reason: HOSPADM

## 2021-07-20 RX ORDER — HEPARIN SODIUM 1000 [USP'U]/ML
4000 INJECTION, SOLUTION INTRAVENOUS; SUBCUTANEOUS PRN
Status: DISCONTINUED | OUTPATIENT
Start: 2021-07-20 | End: 2021-07-21

## 2021-07-20 RX ORDER — NICOTINE POLACRILEX 4 MG
15 LOZENGE BUCCAL PRN
Status: DISCONTINUED | OUTPATIENT
Start: 2021-07-20 | End: 2021-07-21 | Stop reason: HOSPADM

## 2021-07-20 RX ORDER — LISINOPRIL 20 MG/1
20 TABLET ORAL DAILY
Status: DISCONTINUED | OUTPATIENT
Start: 2021-07-21 | End: 2021-07-21 | Stop reason: HOSPADM

## 2021-07-20 RX ORDER — HEPARIN SODIUM 10000 [USP'U]/100ML
0-30 INJECTION, SOLUTION INTRAVENOUS CONTINUOUS
Status: DISCONTINUED | OUTPATIENT
Start: 2021-07-20 | End: 2021-07-20 | Stop reason: HOSPADM

## 2021-07-20 RX ORDER — INSULIN GLARGINE 100 [IU]/ML
20 INJECTION, SOLUTION SUBCUTANEOUS NIGHTLY
Status: DISCONTINUED | OUTPATIENT
Start: 2021-07-21 | End: 2021-07-21 | Stop reason: HOSPADM

## 2021-07-20 RX ORDER — LEVETIRACETAM 500 MG/1
500 TABLET ORAL 2 TIMES DAILY
Status: DISCONTINUED | OUTPATIENT
Start: 2021-07-21 | End: 2021-07-21 | Stop reason: HOSPADM

## 2021-07-20 RX ORDER — TRAZODONE HYDROCHLORIDE 100 MG/1
100 TABLET ORAL AT BEDTIME
Status: DISCONTINUED | OUTPATIENT
Start: 2021-07-21 | End: 2021-07-21 | Stop reason: HOSPADM

## 2021-07-20 RX ORDER — PRAVASTATIN SODIUM 20 MG
20 TABLET ORAL NIGHTLY
Status: DISCONTINUED | OUTPATIENT
Start: 2021-07-20 | End: 2021-07-21

## 2021-07-20 RX ADMIN — NITROGLYCERIN 0.5 INCH: 20 OINTMENT TOPICAL at 14:38

## 2021-07-20 RX ADMIN — HEPARIN SODIUM 12 UNITS/KG/HR: 10000 INJECTION, SOLUTION INTRAVENOUS at 15:46

## 2021-07-20 RX ADMIN — HEPARIN SODIUM 12 UNITS/KG/HR: 10000 INJECTION, SOLUTION INTRAVENOUS at 19:45

## 2021-07-20 RX ADMIN — HEPARIN SODIUM 4000 UNITS: 1000 INJECTION INTRAVENOUS; SUBCUTANEOUS at 15:46

## 2021-07-20 ASSESSMENT — LIFESTYLE VARIABLES
ALCOHOL_USE_STATUS: NO OR LOW RISK WITH VALIDATED TOOL
HOW OFTEN DO YOU HAVE 6 OR MORE DRINKS ON ONE OCCASION: NEVER
HOW MANY STANDARD DRINKS CONTAINING ALCOHOL DO YOU HAVE ON A TYPICAL DAY: 0,1 OR 2
AUDIT-C TOTAL SCORE: 0
HOW OFTEN DO YOU HAVE A DRINK CONTAINING ALCOHOL: NEVER

## 2021-07-20 ASSESSMENT — ENCOUNTER SYMPTOMS
NAUSEA: 1
CONFUSION: 0
COLOR CHANGE: 0
STRIDOR: 0
DIARRHEA: 0
HEADACHES: 0
SORE THROAT: 0
ABDOMINAL DISTENTION: 0
SHORTNESS OF BREATH: 1
BRUISES/BLEEDS EASILY: 1
WHEEZING: 0
FEVER: 0
DIAPHORESIS: 1
BLOOD IN STOOL: 0
ABDOMINAL PAIN: 0
VOMITING: 0
CHILLS: 0
SPEECH DIFFICULTY: 0
COUGH: 0
ACTIVITY CHANGE: 1

## 2021-07-20 ASSESSMENT — ACTIVITIES OF DAILY LIVING (ADL)
ADL_SHORT_OF_BREATH: NO
CHRONIC_PAIN_PRESENT: YES, CHRONIC
DESCRIBE HOW PAIN IMPACTS YOUR LIFE: MOBILITY;PHYSICAL ACTIVITY
RECENT_DECLINE_ADL: NO
ADL_BEFORE_ADMISSION: INDEPENDENT
ADL_SCORE: 12

## 2021-07-20 ASSESSMENT — COGNITIVE AND FUNCTIONAL STATUS - GENERAL
DO YOU HAVE SERIOUS DIFFICULTY WALKING OR CLIMBING STAIRS: NO
DO YOU HAVE DIFFICULTY DRESSING OR BATHING: NO
ARE YOU DEAF OR DO YOU HAVE SERIOUS DIFFICULTY  HEARING: NO
ARE YOU BLIND OR DO YOU HAVE SERIOUS DIFFICULTY SEEING, EVEN WHEN WEARING GLASSES: NO
BECAUSE OF A PHYSICAL, MENTAL, OR EMOTIONAL CONDITION, DO YOU HAVE DIFFICULTY DOING ERRANDS ALONE: NO
BECAUSE OF A PHYSICAL, MENTAL, OR EMOTIONAL CONDITION, DO YOU HAVE SERIOUS DIFFICULTY CONCENTRATING, REMEMBERING OR MAKING DECISIONS: NO

## 2021-07-20 ASSESSMENT — PATIENT HEALTH QUESTIONNAIRE - PHQ9
IS PATIENT ABLE TO COMPLETE PHQ2 OR PHQ9: YES
SUM OF ALL RESPONSES TO PHQ9 QUESTIONS 1 AND 2: 0
SUM OF ALL RESPONSES TO PHQ9 QUESTIONS 1 AND 2: 0
1. LITTLE INTEREST OR PLEASURE IN DOING THINGS: NOT AT ALL
CLINICAL INTERPRETATION OF PHQ9 SCORE: NO FURTHER SCREENING NEEDED
2. FEELING DOWN, DEPRESSED OR HOPELESS: NOT AT ALL
CLINICAL INTERPRETATION OF PHQ2 SCORE: NO FURTHER SCREENING NEEDED

## 2021-07-20 ASSESSMENT — PAIN DESCRIPTION - PAIN TYPE: TYPE: CHEST PAIN

## 2021-07-20 ASSESSMENT — PAIN SCALES - GENERAL
PAINLEVEL_OUTOF10: 0
PAINLEVEL_OUTOF10: 8

## 2021-07-20 ASSESSMENT — COLUMBIA-SUICIDE SEVERITY RATING SCALE - C-SSRS
6. HAVE YOU EVER DONE ANYTHING, STARTED TO DO ANYTHING, OR PREPARED TO DO ANYTHING TO END YOUR LIFE?: NO
IS THE PATIENT ABLE TO COMPLETE C-SSRS: YES
2. HAVE YOU ACTUALLY HAD ANY THOUGHTS OF KILLING YOURSELF?: NO
1. WITHIN THE PAST MONTH, HAVE YOU WISHED YOU WERE DEAD OR WISHED YOU COULD GO TO SLEEP AND NOT WAKE UP?: NO

## 2021-07-21 VITALS
HEIGHT: 64 IN | HEART RATE: 65 BPM | RESPIRATION RATE: 16 BRPM | DIASTOLIC BLOOD PRESSURE: 65 MMHG | SYSTOLIC BLOOD PRESSURE: 149 MMHG | TEMPERATURE: 97.9 F | WEIGHT: 169.31 LBS | BODY MASS INDEX: 28.91 KG/M2 | OXYGEN SATURATION: 94 %

## 2021-07-21 LAB
APTT PPP: 29 SEC (ref 22–30)
ATRIAL RATE (BPM): 60
ATRIAL RATE (BPM): 61
ATRIAL RATE (BPM): 66
DEPRECATED RDW RBC: 41 FL (ref 39–50)
ERYTHROCYTE [DISTWIDTH] IN BLOOD: 12.8 % (ref 11–15)
GLUCOSE BLDC GLUCOMTR-MCNC: 212 MG/DL (ref 70–99)
GLUCOSE BLDC GLUCOMTR-MCNC: 219 MG/DL (ref 70–99)
GLUCOSE BLDC GLUCOMTR-MCNC: 425 MG/DL (ref 70–99)
GLUCOSE BLDC GLUCOMTR-MCNC: 88 MG/DL (ref 70–99)
HCT VFR BLD CALC: 37.8 % (ref 36–46.5)
HGB BLD-MCNC: 12.9 G/DL (ref 12–15.5)
MCH RBC QN AUTO: 30 PG (ref 26–34)
MCHC RBC AUTO-ENTMCNC: 34.1 G/DL (ref 32–36.5)
MCV RBC AUTO: 87.9 FL (ref 78–100)
NRBC BLD MANUAL-RTO: 0 /100 WBC
P AXIS (DEGREES): 60
P AXIS (DEGREES): 64
P AXIS (DEGREES): 66
PLATELET # BLD AUTO: 167 K/MCL (ref 140–450)
PR-INTERVAL (MSEC): 204
PR-INTERVAL (MSEC): 208
PR-INTERVAL (MSEC): 214
QRS-INTERVAL (MSEC): 66
QRS-INTERVAL (MSEC): 66
QRS-INTERVAL (MSEC): 70
QT-INTERVAL (MSEC): 412
QT-INTERVAL (MSEC): 432
QT-INTERVAL (MSEC): 444
QTC: 432
QTC: 435
QTC: 444
R AXIS (DEGREES): 26
R AXIS (DEGREES): 29
R AXIS (DEGREES): 32
RAINBOW EXTRA TUBES HOLD SPECIMEN: NORMAL
RBC # BLD: 4.3 MIL/MCL (ref 4–5.2)
REPORT TEXT: NORMAL
T AXIS (DEGREES): 46
T AXIS (DEGREES): 51
T AXIS (DEGREES): 64
TROPONIN I SERPL HS-MCNC: <0.02 NG/ML
VENTRICULAR RATE EKG/MIN (BPM): 60
VENTRICULAR RATE EKG/MIN (BPM): 61
VENTRICULAR RATE EKG/MIN (BPM): 66
WBC # BLD: 10.3 K/MCL (ref 4.2–11)

## 2021-07-21 PROCEDURE — 10004651 HB RX, NO CHARGE ITEM: Performed by: HOSPITALIST

## 2021-07-21 PROCEDURE — 10002803 HB RX 637: Performed by: HOSPITALIST

## 2021-07-21 PROCEDURE — 10004370 HB CARDIAC CATH: Performed by: INTERNAL MEDICINE

## 2021-07-21 PROCEDURE — 10002800 HB RX 250 W HCPCS: Performed by: INTERNAL MEDICINE

## 2021-07-21 PROCEDURE — 10004351 HB COUNTER-CATH LAB CASE: Performed by: INTERNAL MEDICINE

## 2021-07-21 PROCEDURE — 10002807 HB RX 258: Performed by: NURSE PRACTITIONER

## 2021-07-21 PROCEDURE — 97161 PT EVAL LOW COMPLEX 20 MIN: CPT | Performed by: PHYSICAL THERAPIST

## 2021-07-21 PROCEDURE — G0378 HOSPITAL OBSERVATION PER HR: HCPCS

## 2021-07-21 PROCEDURE — C1769 GUIDE WIRE: HCPCS | Performed by: INTERNAL MEDICINE

## 2021-07-21 PROCEDURE — 82962 GLUCOSE BLOOD TEST: CPT

## 2021-07-21 PROCEDURE — 93005 ELECTROCARDIOGRAM TRACING: CPT | Performed by: HOSPITALIST

## 2021-07-21 PROCEDURE — 93010 ELECTROCARDIOGRAM REPORT: CPT | Performed by: INTERNAL MEDICINE

## 2021-07-21 PROCEDURE — 97530 THERAPEUTIC ACTIVITIES: CPT | Performed by: PHYSICAL THERAPIST

## 2021-07-21 PROCEDURE — 36415 COLL VENOUS BLD VENIPUNCTURE: CPT | Performed by: HOSPITALIST

## 2021-07-21 PROCEDURE — 10002800 HB RX 250 W HCPCS: Performed by: HOSPITALIST

## 2021-07-21 PROCEDURE — 10004173 HB COUNTER-THERAPY VISIT PT: Performed by: PHYSICAL THERAPIST

## 2021-07-21 PROCEDURE — 97116 GAIT TRAINING THERAPY: CPT | Performed by: PHYSICAL THERAPIST

## 2021-07-21 PROCEDURE — 99217 OBSERVATION CARE DISCHARGE: CPT | Performed by: STUDENT IN AN ORGANIZED HEALTH CARE EDUCATION/TRAINING PROGRAM

## 2021-07-21 PROCEDURE — 97535 SELF CARE MNGMENT TRAINING: CPT

## 2021-07-21 PROCEDURE — 85027 COMPLETE CBC AUTOMATED: CPT | Performed by: HOSPITALIST

## 2021-07-21 PROCEDURE — 10006023 HB SUPPLY 272: Performed by: INTERNAL MEDICINE

## 2021-07-21 PROCEDURE — 10002805 HB CONTRAST AGENT: Performed by: INTERNAL MEDICINE

## 2021-07-21 PROCEDURE — C1894 INTRO/SHEATH, NON-LASER: HCPCS | Performed by: INTERNAL MEDICINE

## 2021-07-21 PROCEDURE — 97165 OT EVAL LOW COMPLEX 30 MIN: CPT

## 2021-07-21 PROCEDURE — 99152 MOD SED SAME PHYS/QHP 5/>YRS: CPT | Performed by: INTERNAL MEDICINE

## 2021-07-21 PROCEDURE — 10002807 HB RX 258: Performed by: INTERNAL MEDICINE

## 2021-07-21 PROCEDURE — 97530 THERAPEUTIC ACTIVITIES: CPT

## 2021-07-21 PROCEDURE — 93459 L HRT ART/GRFT ANGIO: CPT | Performed by: INTERNAL MEDICINE

## 2021-07-21 PROCEDURE — 85730 THROMBOPLASTIN TIME PARTIAL: CPT | Performed by: HOSPITALIST

## 2021-07-21 PROCEDURE — 10004172 HB COUNTER-THERAPY VISIT OT

## 2021-07-21 RX ORDER — NITROGLYCERIN 0.4 MG/1
0.4 TABLET SUBLINGUAL EVERY 5 MIN PRN
Status: DISCONTINUED | OUTPATIENT
Start: 2021-07-21 | End: 2021-07-21 | Stop reason: HOSPADM

## 2021-07-21 RX ORDER — HEPARIN SODIUM 200 [USP'U]/100ML
INJECTION, SOLUTION INTRAVENOUS PRN
Status: DISCONTINUED | OUTPATIENT
Start: 2021-07-21 | End: 2021-07-21 | Stop reason: HOSPADM

## 2021-07-21 RX ORDER — SODIUM CHLORIDE 9 MG/ML
INJECTION, SOLUTION INTRAVENOUS
Status: COMPLETED | OUTPATIENT
Start: 2021-07-21 | End: 2021-07-21

## 2021-07-21 RX ORDER — MIDAZOLAM HYDROCHLORIDE 1 MG/ML
INJECTION, SOLUTION INTRAMUSCULAR; INTRAVENOUS PRN
Status: DISCONTINUED | OUTPATIENT
Start: 2021-07-21 | End: 2021-07-21 | Stop reason: HOSPADM

## 2021-07-21 RX ORDER — MIDAZOLAM HYDROCHLORIDE 1 MG/ML
1 INJECTION, SOLUTION INTRAMUSCULAR; INTRAVENOUS PRN
Status: DISCONTINUED | OUTPATIENT
Start: 2021-07-21 | End: 2021-07-21 | Stop reason: HOSPADM

## 2021-07-21 RX ORDER — SODIUM CHLORIDE 9 MG/ML
INJECTION, SOLUTION INTRAVENOUS CONTINUOUS
Status: DISCONTINUED | OUTPATIENT
Start: 2021-07-21 | End: 2021-07-21 | Stop reason: HOSPADM

## 2021-07-21 RX ORDER — CLONIDINE HYDROCHLORIDE 0.1 MG/1
0.1 TABLET ORAL EVERY 4 HOURS PRN
Status: DISCONTINUED | OUTPATIENT
Start: 2021-07-21 | End: 2021-07-21 | Stop reason: HOSPADM

## 2021-07-21 RX ORDER — INSULIN LISPRO 100 [IU]/ML
20 INJECTION, SOLUTION INTRAVENOUS; SUBCUTANEOUS ONCE
Status: DISCONTINUED | OUTPATIENT
Start: 2021-07-21 | End: 2021-07-21 | Stop reason: HOSPADM

## 2021-07-21 RX ORDER — HYDRALAZINE HYDROCHLORIDE 20 MG/ML
10 INJECTION INTRAMUSCULAR; INTRAVENOUS EVERY 4 HOURS PRN
Status: DISCONTINUED | OUTPATIENT
Start: 2021-07-21 | End: 2021-07-21 | Stop reason: HOSPADM

## 2021-07-21 RX ORDER — LIDOCAINE HYDROCHLORIDE 10 MG/ML
1 INJECTION, SOLUTION INFILTRATION; PERINEURAL PRN
Status: DISCONTINUED | OUTPATIENT
Start: 2021-07-21 | End: 2021-07-21 | Stop reason: HOSPADM

## 2021-07-21 RX ORDER — ATORVASTATIN CALCIUM 40 MG/1
40 TABLET, FILM COATED ORAL NIGHTLY
Status: DISCONTINUED | OUTPATIENT
Start: 2021-07-21 | End: 2021-07-21 | Stop reason: HOSPADM

## 2021-07-21 RX ADMIN — SODIUM CHLORIDE: 9 INJECTION, SOLUTION INTRAVENOUS at 07:26

## 2021-07-21 RX ADMIN — DULOXETINE HYDROCHLORIDE 60 MG: 60 CAPSULE, DELAYED RELEASE ORAL at 09:12

## 2021-07-21 RX ADMIN — PREGABALIN 225 MG: 75 CAPSULE ORAL at 09:12

## 2021-07-21 RX ADMIN — CLOPIDOGREL BISULFATE 75 MG: 75 TABLET, FILM COATED ORAL at 06:56

## 2021-07-21 RX ADMIN — INSULIN LISPRO 8 UNITS: 100 INJECTION, SOLUTION INTRAVENOUS; SUBCUTANEOUS at 01:30

## 2021-07-21 RX ADMIN — ASPIRIN 81 MG: 81 TABLET, DELAYED RELEASE ORAL at 06:56

## 2021-07-21 RX ADMIN — INSULIN GLARGINE 20 UNITS: 100 INJECTION, SOLUTION SUBCUTANEOUS at 01:17

## 2021-07-21 RX ADMIN — DESMOPRESSIN ACETATE 40 MG: 0.2 TABLET ORAL at 01:17

## 2021-07-21 RX ADMIN — NICOTINE 1 PATCH: 21 PATCH, EXTENDED RELEASE TRANSDERMAL at 09:12

## 2021-07-21 RX ADMIN — PREGABALIN 225 MG: 75 CAPSULE ORAL at 01:17

## 2021-07-21 RX ADMIN — LEVETIRACETAM 500 MG: 500 TABLET ORAL at 01:17

## 2021-07-21 RX ADMIN — LEVETIRACETAM 500 MG: 500 TABLET ORAL at 09:12

## 2021-07-21 RX ADMIN — LEVOTHYROXINE SODIUM 100 MCG: 0.1 TABLET ORAL at 05:55

## 2021-07-21 RX ADMIN — SODIUM CHLORIDE: 9 INJECTION, SOLUTION INTRAVENOUS at 09:15

## 2021-07-21 RX ADMIN — LISINOPRIL 20 MG: 20 TABLET ORAL at 09:12

## 2021-07-21 ASSESSMENT — ACTIVITIES OF DAILY LIVING (ADL)
PRIOR_ADL_TOILETING: MODIFIED INDEPENDENT
PRIOR_ADL: MINIMAL ASSIST (MIN)
GROOMING: MODIFIED INDEPENDENT
HOME_MANAGEMENT_TIME_ENTRY: 13
EATING: MODIFIED INDEPENDENT
PRIOR_ADL_BATHING: MODERATE ASSIST (MOD)

## 2021-07-21 ASSESSMENT — COGNITIVE AND FUNCTIONAL STATUS - GENERAL
HELP NEEDED FOR BATHING: A LITTLE
DAILY_ACTIVITY_RAW_SCORE: 19
HELP NEEDED DRESSING REGULAR LOWER BODY CLOTHING: A LITTLE
HELP NEEDED FOR TOILETING: A LITTLE
DAILY_ACTIVITY_CONVERTED_SCORE: 40.22
BASIC_MOBILITY_CONVERTED_SCORE: 43.99
HELP NEEDED FOR PERSONAL GROOMING: A LITTLE
HELP NEEDED DRESSING REGULAR UPPER BODY CLOTHING: A LITTLE
BASIC_MOBILITY_RAW_SCORE: 20

## 2021-07-21 ASSESSMENT — PAIN SCALES - GENERAL
PAINLEVEL_OUTOF10: 0
PAINLEVEL_OUTOF10: 0
PAINLEVEL_OUTOF10: 6
PAINLEVEL_OUTOF10: 0

## 2021-07-21 ASSESSMENT — NEW YORK HEART ASSOCIATION (NYHA) CLASSIFICATION: NYHA FUNCTIONAL CLASS: NOT CLASS IV

## 2021-07-21 ASSESSMENT — ENCOUNTER SYMPTOMS: PAIN SEVERITY NOW: 0

## 2021-07-22 ENCOUNTER — LAB SERVICES (OUTPATIENT)
Dept: LAB | Age: 66
End: 2021-07-22

## 2021-07-22 ENCOUNTER — TELEPHONE (OUTPATIENT)
Dept: CARDIOLOGY | Age: 66
End: 2021-07-22

## 2021-07-22 DIAGNOSIS — E03.9 ACQUIRED HYPOTHYROIDISM: ICD-10-CM

## 2021-07-22 LAB — TSH SERPL-ACNC: 32.55 MCUNITS/ML (ref 0.35–5)

## 2021-07-22 PROCEDURE — 36415 COLL VENOUS BLD VENIPUNCTURE: CPT | Performed by: INTERNAL MEDICINE

## 2021-07-22 PROCEDURE — 84443 ASSAY THYROID STIM HORMONE: CPT | Performed by: CLINICAL MEDICAL LABORATORY

## 2021-07-23 ENCOUNTER — NURSE TRIAGE (OUTPATIENT)
Dept: TELEHEALTH | Age: 66
End: 2021-07-23

## 2021-07-24 ENCOUNTER — LAB SERVICES (OUTPATIENT)
Dept: LAB | Age: 66
End: 2021-07-24

## 2021-07-24 DIAGNOSIS — E78.2 MIXED HYPERLIPIDEMIA: ICD-10-CM

## 2021-07-24 LAB
CHOLEST SERPL-MCNC: 191 MG/DL
CHOLEST/HDLC SERPL: 3.2 {RATIO}
FASTING DURATION TIME PATIENT: 12 HOURS
HDLC SERPL-MCNC: 60 MG/DL
LDLC SERPL CALC-MCNC: 99 MG/DL
NONHDLC SERPL-MCNC: 131 MG/DL
TRIGL SERPL-MCNC: 161 MG/DL

## 2021-07-24 PROCEDURE — 80061 LIPID PANEL: CPT | Performed by: CLINICAL MEDICAL LABORATORY

## 2021-07-24 PROCEDURE — 36415 COLL VENOUS BLD VENIPUNCTURE: CPT | Performed by: INTERNAL MEDICINE

## 2021-07-26 ENCOUNTER — OFFICE VISIT (OUTPATIENT)
Dept: PULMONOLOGY | Age: 66
End: 2021-07-26
Attending: INTERNAL MEDICINE

## 2021-07-26 VITALS
DIASTOLIC BLOOD PRESSURE: 66 MMHG | SYSTOLIC BLOOD PRESSURE: 108 MMHG | RESPIRATION RATE: 12 BRPM | OXYGEN SATURATION: 97 % | TEMPERATURE: 98 F | WEIGHT: 169.8 LBS | BODY MASS INDEX: 29.15 KG/M2 | HEART RATE: 67 BPM

## 2021-07-26 DIAGNOSIS — I10 ESSENTIAL HYPERTENSION: ICD-10-CM

## 2021-07-26 DIAGNOSIS — R06.83 SNORING: ICD-10-CM

## 2021-07-26 DIAGNOSIS — G47.10 HYPERSOMNIA: ICD-10-CM

## 2021-07-26 DIAGNOSIS — G47.33 OBSTRUCTIVE SLEEP APNEA SYNDROME: Primary | ICD-10-CM

## 2021-07-26 DIAGNOSIS — I25.10 CORONARY ARTERY DISEASE INVOLVING NATIVE CORONARY ARTERY OF NATIVE HEART, UNSPECIFIED WHETHER ANGINA PRESENT: ICD-10-CM

## 2021-07-26 PROCEDURE — 99215 OFFICE O/P EST HI 40 MIN: CPT | Performed by: NURSE PRACTITIONER

## 2021-07-26 ASSESSMENT — SLEEP AND FATIGUE QUESTIONNAIRES
HOW LIKELY ARE YOU TO NOD OFF OR FALL ASLEEP WHILE WATCHING TV: 2
HOW LIKELY ARE YOU TO NOD OFF OR FALL ASLEEP WHILE LYING DOWN TO REST IN THE AFTERNOON WHEN CIRCUMSTANCES PERMIT: 3
HOW LIKELY ARE YOU TO NOD OFF OR FALL ASLEEP WHILE SITTING AND READING: 2
ESS TOTAL SCORE: 19
HOW LIKELY ARE YOU TO NOD OFF OR FALL ASLEEP WHEN YOU ARE A PASSENGER IN A CAR FOR AN HOUR WITHOUT A BREAK: 3
HOW LIKELY ARE YOU TO NOD OFF OR FALL ASLEEP IN A CAR, WHILE STOPPED FOR A FEW MINUTES IN TRAFFIC: 2
HOW LIKELY ARE YOU TO NOD OFF OR FALL ASLEEP WHILE SITTING QUIETLY AFTER LUNCH WITHOUT ALCOHOL: 3
HOW LIKELY ARE YOU TO NOD OFF OR FALL ASLEEP WHILE SITTING AND TALKING TO SOMEONE: 2
HOW LIKELY ARE YOU TO NOD OFF OR FALL ASLEEP WHILE SITTING INACTIVE IN A PUBLIC PLACE: 2

## 2021-07-27 ENCOUNTER — OFFICE VISIT (OUTPATIENT)
Dept: INTERNAL MEDICINE | Age: 66
End: 2021-07-27

## 2021-07-27 VITALS
WEIGHT: 168.43 LBS | SYSTOLIC BLOOD PRESSURE: 120 MMHG | HEART RATE: 72 BPM | DIASTOLIC BLOOD PRESSURE: 62 MMHG | BODY MASS INDEX: 28.91 KG/M2 | OXYGEN SATURATION: 98 %

## 2021-07-27 DIAGNOSIS — I25.118 CORONARY ARTERY DISEASE OF NATIVE ARTERY OF NATIVE HEART WITH STABLE ANGINA PECTORIS (CMD): Primary | ICD-10-CM

## 2021-07-27 DIAGNOSIS — R42 DIZZINESS: ICD-10-CM

## 2021-07-27 DIAGNOSIS — E03.9 ACQUIRED HYPOTHYROIDISM: ICD-10-CM

## 2021-07-27 DIAGNOSIS — G47.33 OSA (OBSTRUCTIVE SLEEP APNEA): ICD-10-CM

## 2021-07-27 DIAGNOSIS — E78.2 MIXED HYPERLIPIDEMIA: ICD-10-CM

## 2021-07-27 DIAGNOSIS — H00.012 HORDEOLUM EXTERNUM OF RIGHT LOWER EYELID: ICD-10-CM

## 2021-07-27 DIAGNOSIS — R25.1 TREMOR: ICD-10-CM

## 2021-07-27 DIAGNOSIS — R30.0 DYSURIA: ICD-10-CM

## 2021-07-27 LAB
APPEARANCE, POC: ABNORMAL
BILIRUBIN, POC: NEGATIVE
COLOR, POC: ABNORMAL
GLUCOSE UR-MCNC: NEGATIVE MG/DL
KETONES, POC: NEGATIVE MG/DL
NITRITE, POC: NEGATIVE
OCCULT BLOOD, POC: NEGATIVE
PH UR: 7.5 [PH] (ref 5–7)
PROT UR-MCNC: ABNORMAL MG/DL
SP GR UR: 1.01 (ref 1–1.03)
UROBILINOGEN UR-MCNC: 0.2 MG/DL (ref 0–1)
WBC (LEUKOCYTE) ESTERASE, POC: ABNORMAL

## 2021-07-27 PROCEDURE — 81002 URINALYSIS NONAUTO W/O SCOPE: CPT | Performed by: INTERNAL MEDICINE

## 2021-07-27 PROCEDURE — 99214 OFFICE O/P EST MOD 30 MIN: CPT | Performed by: INTERNAL MEDICINE

## 2021-07-27 PROCEDURE — 87086 URINE CULTURE/COLONY COUNT: CPT | Performed by: CLINICAL MEDICAL LABORATORY

## 2021-07-27 RX ORDER — FLUCONAZOLE 150 MG/1
150 TABLET ORAL ONCE
Qty: 1 TABLET | Refills: 0 | Status: SHIPPED | OUTPATIENT
Start: 2021-07-27 | End: 2021-07-27

## 2021-07-27 RX ORDER — NITROFURANTOIN 25; 75 MG/1; MG/1
100 CAPSULE ORAL 2 TIMES DAILY
Qty: 10 CAPSULE | Refills: 0 | Status: SHIPPED | OUTPATIENT
Start: 2021-07-27 | End: 2021-08-13 | Stop reason: ALTCHOICE

## 2021-07-27 RX ORDER — LEVOTHYROXINE SODIUM 0.12 MG/1
125 TABLET ORAL DAILY
Qty: 90 TABLET | Refills: 0 | Status: SHIPPED | OUTPATIENT
Start: 2021-07-27 | End: 2021-08-26 | Stop reason: SDUPTHER

## 2021-07-27 RX ORDER — ATORVASTATIN CALCIUM 80 MG/1
80 TABLET, FILM COATED ORAL DAILY
Qty: 90 TABLET | Refills: 1 | Status: SHIPPED | OUTPATIENT
Start: 2021-07-27 | End: 2022-01-07

## 2021-07-28 ENCOUNTER — TELEPHONE (OUTPATIENT)
Dept: INTERNAL MEDICINE | Age: 66
End: 2021-07-28

## 2021-07-28 LAB — BACTERIA UR CULT: NORMAL

## 2021-07-30 ENCOUNTER — TELEPHONE (OUTPATIENT)
Dept: INTERNAL MEDICINE | Age: 66
End: 2021-07-30

## 2021-08-03 ENCOUNTER — TELEPHONE (OUTPATIENT)
Dept: INTERNAL MEDICINE | Age: 66
End: 2021-08-03

## 2021-08-04 ENCOUNTER — HOSPITAL ENCOUNTER (EMERGENCY)
Age: 66
Discharge: HOME | End: 2021-08-04
Attending: EMERGENCY MEDICINE

## 2021-08-04 ENCOUNTER — TELEPHONE (OUTPATIENT)
Dept: AUDIOLOGY | Age: 66
End: 2021-08-04

## 2021-08-04 ENCOUNTER — NURSE TRIAGE (OUTPATIENT)
Dept: INTERNAL MEDICINE | Age: 66
End: 2021-08-04

## 2021-08-04 ENCOUNTER — CLINICAL ABSTRACT (OUTPATIENT)
Dept: PHARMACY | Age: 66
End: 2021-08-04

## 2021-08-04 VITALS
RESPIRATION RATE: 23 BRPM | WEIGHT: 167.99 LBS | OXYGEN SATURATION: 99 % | HEART RATE: 64 BPM | TEMPERATURE: 98.6 F | BODY MASS INDEX: 28.68 KG/M2 | HEIGHT: 64 IN | DIASTOLIC BLOOD PRESSURE: 78 MMHG | SYSTOLIC BLOOD PRESSURE: 113 MMHG

## 2021-08-04 DIAGNOSIS — R42 DIZZINESS: Primary | ICD-10-CM

## 2021-08-04 DIAGNOSIS — R79.89 ELEVATED TSH: ICD-10-CM

## 2021-08-04 LAB
ALBUMIN SERPL-MCNC: 3.2 G/DL (ref 3.6–5.1)
ALBUMIN/GLOB SERPL: 0.9 {RATIO} (ref 1–2.4)
ALP SERPL-CCNC: 77 UNITS/L (ref 45–117)
ALT SERPL-CCNC: 38 UNITS/L
ANION GAP SERPL CALC-SCNC: 14 MMOL/L (ref 10–20)
AST SERPL-CCNC: 25 UNITS/L
BASOPHILS # BLD: 0.1 K/MCL (ref 0–0.3)
BASOPHILS NFR BLD: 1 %
BILIRUB SERPL-MCNC: 0.5 MG/DL (ref 0.2–1)
BUN SERPL-MCNC: 19 MG/DL (ref 6–20)
BUN/CREAT SERPL: 21 (ref 7–25)
CALCIUM SERPL-MCNC: 9.4 MG/DL (ref 8.4–10.2)
CHLORIDE SERPL-SCNC: 104 MMOL/L (ref 98–107)
CO2 SERPL-SCNC: 26 MMOL/L (ref 21–32)
CREAT SERPL-MCNC: 0.92 MG/DL (ref 0.51–0.95)
DEPRECATED RDW RBC: 41.4 FL (ref 39–50)
EOSINOPHIL # BLD: 0.2 K/MCL (ref 0–0.5)
EOSINOPHIL NFR BLD: 3 %
ERYTHROCYTE [DISTWIDTH] IN BLOOD: 12.7 % (ref 11–15)
FASTING DURATION TIME PATIENT: ABNORMAL H
GFR SERPLBLD BASED ON 1.73 SQ M-ARVRAT: 66 ML/MIN/1.73M2
GLOBULIN SER-MCNC: 3.5 G/DL (ref 2–4)
GLUCOSE SERPL-MCNC: 146 MG/DL (ref 65–99)
HCT VFR BLD CALC: 40 % (ref 36–46.5)
HGB BLD-MCNC: 13.3 G/DL (ref 12–15.5)
IMM GRANULOCYTES # BLD AUTO: 0 K/MCL (ref 0–0.2)
IMM GRANULOCYTES # BLD: 0 %
LYMPHOCYTES # BLD: 3.7 K/MCL (ref 1–4)
LYMPHOCYTES NFR BLD: 45 %
MCH RBC QN AUTO: 29.6 PG (ref 26–34)
MCHC RBC AUTO-ENTMCNC: 33.3 G/DL (ref 32–36.5)
MCV RBC AUTO: 89.1 FL (ref 78–100)
MONOCYTES # BLD: 0.5 K/MCL (ref 0.3–0.9)
MONOCYTES NFR BLD: 6 %
NEUTROPHILS # BLD: 3.6 K/MCL (ref 1.8–7.7)
NEUTROPHILS NFR BLD: 45 %
NRBC BLD MANUAL-RTO: 0 /100 WBC
PLATELET # BLD AUTO: 185 K/MCL (ref 140–450)
POTASSIUM SERPL-SCNC: 4.2 MMOL/L (ref 3.4–5.1)
PROT SERPL-MCNC: 6.7 G/DL (ref 6.4–8.2)
RAINBOW EXTRA TUBES HOLD SPECIMEN: NORMAL
RBC # BLD: 4.49 MIL/MCL (ref 4–5.2)
SODIUM SERPL-SCNC: 140 MMOL/L (ref 135–145)
T4 FREE SERPL-MCNC: 0.9 NG/DL (ref 0.8–1.5)
TROPONIN I SERPL HS-MCNC: <0.02 NG/ML
TSH SERPL-ACNC: 9.34 MCUNITS/ML (ref 0.35–5)
WBC # BLD: 8 K/MCL (ref 4.2–11)

## 2021-08-04 PROCEDURE — 84439 ASSAY OF FREE THYROXINE: CPT | Performed by: EMERGENCY MEDICINE

## 2021-08-04 PROCEDURE — 99284 EMERGENCY DEPT VISIT MOD MDM: CPT | Performed by: EMERGENCY MEDICINE

## 2021-08-04 PROCEDURE — 99284 EMERGENCY DEPT VISIT MOD MDM: CPT

## 2021-08-04 PROCEDURE — 93005 ELECTROCARDIOGRAM TRACING: CPT | Performed by: EMERGENCY MEDICINE

## 2021-08-04 PROCEDURE — 80053 COMPREHEN METABOLIC PANEL: CPT | Performed by: EMERGENCY MEDICINE

## 2021-08-04 PROCEDURE — 85025 COMPLETE CBC W/AUTO DIFF WBC: CPT | Performed by: EMERGENCY MEDICINE

## 2021-08-04 PROCEDURE — 93010 ELECTROCARDIOGRAM REPORT: CPT | Performed by: INTERNAL MEDICINE

## 2021-08-04 PROCEDURE — 84484 ASSAY OF TROPONIN QUANT: CPT | Performed by: EMERGENCY MEDICINE

## 2021-08-04 PROCEDURE — 84443 ASSAY THYROID STIM HORMONE: CPT | Performed by: EMERGENCY MEDICINE

## 2021-08-04 ASSESSMENT — ENCOUNTER SYMPTOMS
FEVER: 0
APPETITE CHANGE: 0
TREMORS: 0
SHORTNESS OF BREATH: 0
SPEECH DIFFICULTY: 0
LIGHT-HEADEDNESS: 1
HEADACHES: 0
DIZZINESS: 1
FACIAL ASYMMETRY: 0
ACTIVITY CHANGE: 0
ABDOMINAL PAIN: 0
EYES NEGATIVE: 1
VOMITING: 0
NAUSEA: 0
NUMBNESS: 0
WEAKNESS: 0
SEIZURES: 0

## 2021-08-04 ASSESSMENT — PAIN SCALES - GENERAL: PAINLEVEL_OUTOF10: 0

## 2021-08-05 ENCOUNTER — TELEPHONE (OUTPATIENT)
Dept: INTERNAL MEDICINE | Age: 66
End: 2021-08-05

## 2021-08-05 LAB
ATRIAL RATE (BPM): 68
DIASTOLIC BLOOD PRESSURE: 60
P AXIS (DEGREES): 36
PR-INTERVAL (MSEC): 182
QRS-INTERVAL (MSEC): 60
QT-INTERVAL (MSEC): 386
QTC: 410
R AXIS (DEGREES): 46
REPORT TEXT: NORMAL
SYSTOLIC BLOOD PRESSURE: 138
T AXIS (DEGREES): 88
VENTRICULAR RATE EKG/MIN (BPM): 68

## 2021-08-06 ENCOUNTER — OFFICE VISIT (OUTPATIENT)
Dept: AUDIOLOGY | Age: 66
End: 2021-08-06

## 2021-08-06 DIAGNOSIS — Z46.1 ENCOUNTER FOR FITTING OR ADJUSTMENT OF HEARING AID: Primary | ICD-10-CM

## 2021-08-06 PROCEDURE — 92593 HEARING AID SERVICE LEVEL 3 BINAURAL: CPT | Performed by: AUDIOLOGIST

## 2021-08-09 ENCOUNTER — CLINICAL ABSTRACT (OUTPATIENT)
Dept: PHARMACY | Age: 66
End: 2021-08-09

## 2021-08-10 ENCOUNTER — TELEPHONE (OUTPATIENT)
Dept: HEMATOLOGY/ONCOLOGY | Age: 66
End: 2021-08-10

## 2021-08-10 ENCOUNTER — TELEPHONE (OUTPATIENT)
Dept: PHARMACY | Age: 66
End: 2021-08-10

## 2021-08-12 ENCOUNTER — TELEPHONE (OUTPATIENT)
Dept: PHARMACY | Age: 66
End: 2021-08-12

## 2021-08-12 ENCOUNTER — OFFICE VISIT (OUTPATIENT)
Dept: CARDIOLOGY | Age: 66
End: 2021-08-12

## 2021-08-12 VITALS
WEIGHT: 166.2 LBS | SYSTOLIC BLOOD PRESSURE: 103 MMHG | DIASTOLIC BLOOD PRESSURE: 63 MMHG | BODY MASS INDEX: 28.53 KG/M2 | HEART RATE: 71 BPM

## 2021-08-12 DIAGNOSIS — Z72.0 TOBACCO ABUSE: ICD-10-CM

## 2021-08-12 DIAGNOSIS — I10 ESSENTIAL HYPERTENSION: ICD-10-CM

## 2021-08-12 DIAGNOSIS — R42 DIZZINESS: ICD-10-CM

## 2021-08-12 DIAGNOSIS — Z95.1 HX OF CABG: ICD-10-CM

## 2021-08-12 DIAGNOSIS — I25.10 CORONARY ARTERY DISEASE INVOLVING NATIVE CORONARY ARTERY OF NATIVE HEART WITHOUT ANGINA PECTORIS: Primary | ICD-10-CM

## 2021-08-12 DIAGNOSIS — E78.5 DYSLIPIDEMIA: ICD-10-CM

## 2021-08-12 PROCEDURE — 99214 OFFICE O/P EST MOD 30 MIN: CPT | Performed by: NURSE PRACTITIONER

## 2021-08-12 RX ORDER — LISINOPRIL 5 MG/1
5 TABLET ORAL DAILY
Qty: 30 TABLET | Refills: 11 | Status: SHIPPED | OUTPATIENT
Start: 2021-08-12 | End: 2021-08-13 | Stop reason: ALTCHOICE

## 2021-08-12 RX ORDER — CLOPIDOGREL BISULFATE 75 MG/1
75 TABLET ORAL DAILY
Qty: 30 TABLET | Refills: 11 | Status: SHIPPED | OUTPATIENT
Start: 2021-08-12 | End: 2021-12-29 | Stop reason: SDUPTHER

## 2021-08-13 ENCOUNTER — OFFICE VISIT (OUTPATIENT)
Dept: INTERNAL MEDICINE | Age: 66
End: 2021-08-13

## 2021-08-13 VITALS
HEART RATE: 70 BPM | BODY MASS INDEX: 28.73 KG/M2 | TEMPERATURE: 98.8 F | WEIGHT: 167.4 LBS | SYSTOLIC BLOOD PRESSURE: 90 MMHG | OXYGEN SATURATION: 99 % | DIASTOLIC BLOOD PRESSURE: 60 MMHG

## 2021-08-13 DIAGNOSIS — I95.2 HYPOTENSION DUE TO DRUGS: ICD-10-CM

## 2021-08-13 DIAGNOSIS — E13.9 LADA (LATENT AUTOIMMUNE DIABETES IN ADULTS), MANAGED AS TYPE 1 (CMD): ICD-10-CM

## 2021-08-13 DIAGNOSIS — E03.9 ACQUIRED HYPOTHYROIDISM: ICD-10-CM

## 2021-08-13 DIAGNOSIS — R42 DIZZINESS: Primary | ICD-10-CM

## 2021-08-13 DIAGNOSIS — R25.1 TREMOR: ICD-10-CM

## 2021-08-13 DIAGNOSIS — E08.42 DIABETIC POLYNEUROPATHY ASSOCIATED WITH DIABETES MELLITUS DUE TO UNDERLYING CONDITION (CMD): ICD-10-CM

## 2021-08-13 DIAGNOSIS — H10.13 ALLERGIC CONJUNCTIVITIS OF BOTH EYES: ICD-10-CM

## 2021-08-13 PROCEDURE — 99214 OFFICE O/P EST MOD 30 MIN: CPT | Performed by: INTERNAL MEDICINE

## 2021-08-13 RX ORDER — KETOTIFEN FUMARATE 0.25 MG/ML
1 SOLUTION/ DROPS OPHTHALMIC 2 TIMES DAILY PRN
Qty: 5 ML | Refills: 1 | Status: SHIPPED | OUTPATIENT
Start: 2021-08-13 | End: 2022-02-28 | Stop reason: ALTCHOICE

## 2021-08-13 RX ORDER — MECLIZINE HCL 12.5 MG/1
TABLET ORAL
Qty: 30 TABLET | Refills: 1 | Status: SHIPPED | OUTPATIENT
Start: 2021-08-13 | End: 2022-02-28 | Stop reason: SDUPTHER

## 2021-08-16 ENCOUNTER — TELEPHONE (OUTPATIENT)
Dept: ENDOCRINOLOGY | Age: 66
End: 2021-08-16

## 2021-08-16 DIAGNOSIS — E03.9 ACQUIRED HYPOTHYROIDISM: Primary | ICD-10-CM

## 2021-08-16 DIAGNOSIS — M81.8 OTHER OSTEOPOROSIS WITHOUT CURRENT PATHOLOGICAL FRACTURE: ICD-10-CM

## 2021-08-16 DIAGNOSIS — E13.9 LADA (LATENT AUTOIMMUNE DIABETES IN ADULTS), MANAGED AS TYPE 1 (CMD): ICD-10-CM

## 2021-08-16 DIAGNOSIS — E10.65 TYPE 1 DIABETES MELLITUS WITH HYPERGLYCEMIA (CMD): ICD-10-CM

## 2021-08-17 ASSESSMENT — ENCOUNTER SYMPTOMS
DIARRHEA: 0
WEAKNESS: 0
BRUISES/BLEEDS EASILY: 0
WEIGHT GAIN: 0
ORTHOPNEA: 0
COUGH: 0
HEMOPTYSIS: 0
FEVER: 0
DIZZINESS: 1
WHEEZING: 0
VOMITING: 0
SYNCOPE: 0
BLOATING: 0
SLEEP DISTURBANCES DUE TO BREATHING: 0
NAUSEA: 0
LIGHT-HEADEDNESS: 0
CHILLS: 0
SHORTNESS OF BREATH: 0
ABDOMINAL PAIN: 0
NEAR-SYNCOPE: 0

## 2021-08-18 ENCOUNTER — APPOINTMENT (OUTPATIENT)
Dept: OPHTHALMOLOGY | Age: 66
End: 2021-08-18

## 2021-08-19 ENCOUNTER — TELEPHONE (OUTPATIENT)
Dept: INTERNAL MEDICINE | Age: 66
End: 2021-08-19

## 2021-08-19 ENCOUNTER — LAB SERVICES (OUTPATIENT)
Dept: LAB | Age: 66
End: 2021-08-19

## 2021-08-19 DIAGNOSIS — R89.9 ABNORMAL LABORATORY TEST RESULT: ICD-10-CM

## 2021-08-19 DIAGNOSIS — E13.9 LADA (LATENT AUTOIMMUNE DIABETES IN ADULTS), MANAGED AS TYPE 1 (CMD): ICD-10-CM

## 2021-08-19 DIAGNOSIS — M81.8 OTHER OSTEOPOROSIS WITHOUT CURRENT PATHOLOGICAL FRACTURE: ICD-10-CM

## 2021-08-19 DIAGNOSIS — E03.9 ACQUIRED HYPOTHYROIDISM: ICD-10-CM

## 2021-08-19 DIAGNOSIS — E10.65 TYPE 1 DIABETES MELLITUS WITH HYPERGLYCEMIA (CMD): ICD-10-CM

## 2021-08-19 LAB
25(OH)D3+25(OH)D2 SERPL-MCNC: 37.6 NG/ML (ref 30–100)
ALBUMIN SERPL-MCNC: 3.5 G/DL (ref 3.6–5.1)
ALBUMIN/GLOB SERPL: 1.2 {RATIO} (ref 1–2.4)
ALP SERPL-CCNC: 80 UNITS/L (ref 45–117)
ALT SERPL-CCNC: 33 UNITS/L
ANION GAP SERPL CALC-SCNC: 11 MMOL/L (ref 10–20)
AST SERPL-CCNC: 21 UNITS/L
BASOPHILS # BLD: 0 K/MCL (ref 0–0.3)
BASOPHILS NFR BLD: 0 %
BILIRUB SERPL-MCNC: 0.6 MG/DL (ref 0.2–1)
BUN SERPL-MCNC: 12 MG/DL (ref 6–20)
BUN/CREAT SERPL: 12 (ref 7–25)
CALCIUM SERPL-MCNC: 9 MG/DL (ref 8.4–10.2)
CHLORIDE SERPL-SCNC: 110 MMOL/L (ref 98–107)
CO2 SERPL-SCNC: 21 MMOL/L (ref 21–32)
CREAT SERPL-MCNC: 1 MG/DL (ref 0.51–0.95)
DEPRECATED RDW RBC: 41.9 FL (ref 39–50)
EOSINOPHIL # BLD: 0.2 K/MCL (ref 0–0.5)
EOSINOPHIL NFR BLD: 2 %
ERYTHROCYTE [DISTWIDTH] IN BLOOD: 13 % (ref 11–15)
FASTING DURATION TIME PATIENT: 12 HOURS
GFR SERPLBLD BASED ON 1.73 SQ M-ARVRAT: 59 ML/MIN/1.73M2
GLOBULIN SER-MCNC: 3 G/DL (ref 2–4)
GLUCOSE SERPL-MCNC: 252 MG/DL (ref 65–99)
HBA1C MFR BLD: 10 % (ref 4.5–5.6)
HCT VFR BLD CALC: 38.7 % (ref 36–46.5)
HGB BLD-MCNC: 13 G/DL (ref 12–15.5)
LYMPHOCYTES # BLD: 3.8 K/MCL (ref 1–4)
LYMPHOCYTES NFR BLD: 38 %
MCH RBC QN AUTO: 29.9 PG (ref 26–34)
MCHC RBC AUTO-ENTMCNC: 33.6 G/DL (ref 32–36.5)
MCV RBC AUTO: 89 FL (ref 78–100)
MONOCYTES # BLD: 0.7 K/MCL (ref 0.3–0.9)
MONOCYTES NFR BLD: 7 %
NEUTROPHILS # BLD: 5.3 K/MCL (ref 1.8–7.7)
NEUTROPHILS NFR BLD: 53 %
PLATELET # BLD AUTO: 186 K/MCL (ref 140–450)
POTASSIUM SERPL-SCNC: 4.2 MMOL/L (ref 3.4–5.1)
PROT SERPL-MCNC: 6.5 G/DL (ref 6.4–8.2)
RBC # BLD: 4.35 MIL/MCL (ref 4–5.2)
SODIUM SERPL-SCNC: 138 MMOL/L (ref 135–145)
T4 FREE SERPL-MCNC: 1 NG/DL (ref 0.8–1.5)
TSH SERPL-ACNC: 6.53 MCUNITS/ML (ref 0.35–5)
WBC # BLD: 10.1 K/MCL (ref 4.2–11)

## 2021-08-19 PROCEDURE — 83516 IMMUNOASSAY NONANTIBODY: CPT | Performed by: CLINICAL MEDICAL LABORATORY

## 2021-08-19 PROCEDURE — 84165 PROTEIN E-PHORESIS SERUM: CPT | Performed by: CLINICAL MEDICAL LABORATORY

## 2021-08-19 PROCEDURE — 84443 ASSAY THYROID STIM HORMONE: CPT | Performed by: CLINICAL MEDICAL LABORATORY

## 2021-08-19 PROCEDURE — 85025 COMPLETE CBC W/AUTO DIFF WBC: CPT | Performed by: INTERNAL MEDICINE

## 2021-08-19 PROCEDURE — 84155 ASSAY OF PROTEIN SERUM: CPT | Performed by: CLINICAL MEDICAL LABORATORY

## 2021-08-19 PROCEDURE — 82306 VITAMIN D 25 HYDROXY: CPT | Performed by: CLINICAL MEDICAL LABORATORY

## 2021-08-19 PROCEDURE — 83036 HEMOGLOBIN GLYCOSYLATED A1C: CPT | Performed by: CLINICAL MEDICAL LABORATORY

## 2021-08-19 PROCEDURE — 80053 COMPREHEN METABOLIC PANEL: CPT | Performed by: CLINICAL MEDICAL LABORATORY

## 2021-08-19 PROCEDURE — 82570 ASSAY OF URINE CREATININE: CPT | Performed by: CLINICAL MEDICAL LABORATORY

## 2021-08-19 PROCEDURE — 83520 IMMUNOASSAY QUANT NOS NONAB: CPT | Performed by: CLINICAL MEDICAL LABORATORY

## 2021-08-19 PROCEDURE — 84439 ASSAY OF FREE THYROXINE: CPT | Performed by: CLINICAL MEDICAL LABORATORY

## 2021-08-19 PROCEDURE — 86334 IMMUNOFIX E-PHORESIS SERUM: CPT | Performed by: CLINICAL MEDICAL LABORATORY

## 2021-08-19 PROCEDURE — 82784 ASSAY IGA/IGD/IGG/IGM EACH: CPT | Performed by: CLINICAL MEDICAL LABORATORY

## 2021-08-19 PROCEDURE — 36415 COLL VENOUS BLD VENIPUNCTURE: CPT | Performed by: INTERNAL MEDICINE

## 2021-08-19 PROCEDURE — 82043 UR ALBUMIN QUANTITATIVE: CPT | Performed by: CLINICAL MEDICAL LABORATORY

## 2021-08-20 ENCOUNTER — APPOINTMENT (OUTPATIENT)
Dept: AUDIOLOGY | Age: 66
End: 2021-08-20

## 2021-08-20 ENCOUNTER — OFFICE VISIT (OUTPATIENT)
Dept: PAIN MANAGEMENT | Age: 66
End: 2021-08-20

## 2021-08-20 ENCOUNTER — NURSE ONLY (OUTPATIENT)
Dept: INTERNAL MEDICINE | Age: 66
End: 2021-08-20

## 2021-08-20 VITALS — DIASTOLIC BLOOD PRESSURE: 64 MMHG | SYSTOLIC BLOOD PRESSURE: 108 MMHG | HEART RATE: 69 BPM | OXYGEN SATURATION: 98 %

## 2021-08-20 DIAGNOSIS — Z23 NEED FOR SHINGLES VACCINE: Primary | ICD-10-CM

## 2021-08-20 DIAGNOSIS — M79.2 NEUROPATHIC PAIN: Primary | ICD-10-CM

## 2021-08-20 LAB
ALBUMIN SERPL ELPH-MCNC: 3.8 G/DL (ref 3.5–4.9)
ALPHA 1: 0.4 G/DL (ref 0.2–0.4)
ALPHA2 GLOB SERPL ELPH-MCNC: 0.9 G/DL (ref 0.5–0.9)
B-GLOBULIN SERPL ELPH-MCNC: 0.7 G/DL (ref 0.7–1.2)
CREAT UR-MCNC: 100 MG/DL
GAMMA GLOB SERPL ELPH-MCNC: 0.7 G/DL (ref 0.7–1.7)
GLOBULIN SER-MCNC: 2.7 G/DL (ref 2.1–4.2)
IGA SERPL-MCNC: 153 MG/DL (ref 82–453)
IGA SERPL-MCNC: 158 MG/DL (ref 82–453)
IGG SERPL-MCNC: 648 MG/DL (ref 751–1560)
IGM SERPL-MCNC: 64 MG/DL (ref 46–304)
KAPPA LC FREE SER NEPH-MCNC: 2.51 MG/DL (ref 0.33–1.94)
KAPPA LC/LAMBDA SER: 1.27 {RATIO} (ref 0.26–1.65)
LAMBDA LC FREE SER NEPH-MCNC: 1.97 MG/DL (ref 0.57–2.63)
MICROALBUMIN UR-MCNC: 0.96 MG/DL
MICROALBUMIN/CREAT UR: 9.6 MG/G
PATH INTERP BLD-IMP: ABNORMAL
PATH INTERP SPEC-IMP: ABNORMAL
PROT SERPL-MCNC: 6.5 G/DL (ref 6.4–8.2)
TTG IGA SER IA-ACNC: 6 UNITS

## 2021-08-20 PROCEDURE — 90471 IMMUNIZATION ADMIN: CPT | Performed by: INTERNAL MEDICINE

## 2021-08-20 PROCEDURE — 90750 HZV VACC RECOMBINANT IM: CPT | Performed by: INTERNAL MEDICINE

## 2021-08-20 PROCEDURE — 99214 OFFICE O/P EST MOD 30 MIN: CPT | Performed by: PAIN MEDICINE

## 2021-08-20 RX ORDER — LISINOPRIL 5 MG/1
TABLET ORAL
Qty: 1 TABLET | Refills: 0 | Status: SHIPPED
Start: 2021-08-20 | End: 2021-11-30 | Stop reason: ALTCHOICE

## 2021-08-20 RX ORDER — LEVETIRACETAM 750 MG/1
TABLET ORAL
Qty: 60 TABLET | Refills: 2 | Status: SHIPPED | OUTPATIENT
Start: 2021-08-20 | End: 2021-11-24

## 2021-08-23 RX ORDER — TOPIRAMATE 25 MG/1
TABLET ORAL
COMMUNITY
Start: 2021-08-03 | End: 2021-12-14 | Stop reason: ALTCHOICE

## 2021-08-23 RX ORDER — TOPIRAMATE 25 MG/1
TABLET ORAL
COMMUNITY
Start: 2021-08-02 | End: 2021-11-14 | Stop reason: SDUPTHER

## 2021-08-25 ENCOUNTER — TELEPHONE (OUTPATIENT)
Dept: PAIN MANAGEMENT | Age: 66
End: 2021-08-25

## 2021-08-25 RX ORDER — PREGABALIN 225 MG/1
CAPSULE ORAL
Qty: 60 CAPSULE | Refills: 0 | Status: SHIPPED | OUTPATIENT
Start: 2021-08-25 | End: 2021-09-21

## 2021-08-26 ENCOUNTER — OFFICE VISIT (OUTPATIENT)
Dept: ENDOCRINOLOGY | Age: 66
End: 2021-08-26

## 2021-08-26 VITALS
HEART RATE: 74 BPM | DIASTOLIC BLOOD PRESSURE: 60 MMHG | BODY MASS INDEX: 28.2 KG/M2 | SYSTOLIC BLOOD PRESSURE: 120 MMHG | WEIGHT: 165.2 LBS | HEIGHT: 64 IN | RESPIRATION RATE: 18 BRPM

## 2021-08-26 DIAGNOSIS — M81.8 OTHER OSTEOPOROSIS WITHOUT CURRENT PATHOLOGICAL FRACTURE: ICD-10-CM

## 2021-08-26 DIAGNOSIS — E03.9 ACQUIRED HYPOTHYROIDISM: ICD-10-CM

## 2021-08-26 DIAGNOSIS — E13.9 LADA (LATENT AUTOIMMUNE DIABETES IN ADULTS), MANAGED AS TYPE 1 (CMD): ICD-10-CM

## 2021-08-26 DIAGNOSIS — E11.3299 BACKGROUND DIABETIC RETINOPATHY (CMD): ICD-10-CM

## 2021-08-26 DIAGNOSIS — E10.65 TYPE 1 DIABETES MELLITUS WITH HYPERGLYCEMIA (CMD): Primary | ICD-10-CM

## 2021-08-26 DIAGNOSIS — E10.21 TYPE 1 DIABETES MELLITUS WITH NEPHROPATHY (CMD): ICD-10-CM

## 2021-08-26 DIAGNOSIS — E10.649 HYPOGLYCEMIA UNAWARENESS IN TYPE 1 DIABETES MELLITUS (CMD): ICD-10-CM

## 2021-08-26 DIAGNOSIS — E10.42 TYPE 1 DIABETES MELLITUS WITH DIABETIC POLYNEUROPATHY (CMD): ICD-10-CM

## 2021-08-26 PROCEDURE — 95251 CONT GLUC MNTR ANALYSIS I&R: CPT | Performed by: INTERNAL MEDICINE

## 2021-08-26 PROCEDURE — 99215 OFFICE O/P EST HI 40 MIN: CPT | Performed by: INTERNAL MEDICINE

## 2021-08-26 RX ORDER — INSULIN DEGLUDEC INJECTION 100 U/ML
INJECTION, SOLUTION SUBCUTANEOUS
Qty: 30 ML | Refills: 3 | Status: SHIPPED | OUTPATIENT
Start: 2021-08-26 | End: 2022-02-10 | Stop reason: SDUPTHER

## 2021-08-26 RX ORDER — BLOOD-GLUCOSE METER
1 EACH MISCELLANEOUS DAILY PRN
Qty: 1 KIT | Refills: 0 | Status: SHIPPED | OUTPATIENT
Start: 2021-08-26

## 2021-08-26 RX ORDER — LEVOTHYROXINE SODIUM 0.12 MG/1
125 TABLET ORAL DAILY
Qty: 90 TABLET | Refills: 2 | Status: SHIPPED | OUTPATIENT
Start: 2021-08-26 | End: 2022-01-07

## 2021-08-26 RX ORDER — INSULIN LISPRO 100 [IU]/ML
INJECTION, SOLUTION INTRAVENOUS; SUBCUTANEOUS
Qty: 45 ML | Refills: 3 | Status: SHIPPED | OUTPATIENT
Start: 2021-08-26 | End: 2022-09-23 | Stop reason: ALTCHOICE

## 2021-09-05 ENCOUNTER — HOSPITAL ENCOUNTER (EMERGENCY)
Age: 66
Discharge: HOME | End: 2021-09-05
Attending: EMERGENCY MEDICINE

## 2021-09-05 VITALS
HEART RATE: 66 BPM | OXYGEN SATURATION: 99 % | DIASTOLIC BLOOD PRESSURE: 57 MMHG | SYSTOLIC BLOOD PRESSURE: 115 MMHG | RESPIRATION RATE: 19 BRPM | TEMPERATURE: 99 F

## 2021-09-05 DIAGNOSIS — R53.83 FATIGUE, UNSPECIFIED TYPE: Primary | ICD-10-CM

## 2021-09-05 DIAGNOSIS — R53.1 WEAKNESS: ICD-10-CM

## 2021-09-05 LAB
ANION GAP SERPL CALC-SCNC: 17 MMOL/L (ref 10–20)
APPEARANCE UR: CLEAR
BACTERIA #/AREA URNS HPF: ABNORMAL /HPF
BASOPHILS # BLD: 0.1 K/MCL (ref 0–0.3)
BASOPHILS NFR BLD: 1 %
BILIRUB UR QL STRIP: NEGATIVE
BUN SERPL-MCNC: 20 MG/DL (ref 6–20)
BUN/CREAT SERPL: 22 (ref 7–25)
CALCIUM SERPL-MCNC: 9.2 MG/DL (ref 8.4–10.2)
CHLORIDE SERPL-SCNC: 104 MMOL/L (ref 98–107)
CO2 SERPL-SCNC: 25 MMOL/L (ref 21–32)
COLOR UR: YELLOW
CREAT SERPL-MCNC: 0.89 MG/DL (ref 0.51–0.95)
DEPRECATED RDW RBC: 42 FL (ref 39–50)
EOSINOPHIL # BLD: 0.2 K/MCL (ref 0–0.5)
EOSINOPHIL NFR BLD: 2 %
ERYTHROCYTE [DISTWIDTH] IN BLOOD: 12.8 % (ref 11–15)
FASTING DURATION TIME PATIENT: ABNORMAL H
GFR SERPLBLD BASED ON 1.73 SQ M-ARVRAT: 68 ML/MIN
GLUCOSE SERPL-MCNC: 228 MG/DL (ref 65–99)
GLUCOSE UR STRIP-MCNC: 250 MG/DL
HCT VFR BLD CALC: 39 % (ref 36–46.5)
HGB BLD-MCNC: 12.9 G/DL (ref 12–15.5)
HGB UR QL STRIP: NEGATIVE
HYALINE CASTS #/AREA URNS LPF: ABNORMAL /LPF
IMM GRANULOCYTES # BLD AUTO: 0 K/MCL (ref 0–0.2)
IMM GRANULOCYTES # BLD: 0 %
KETONES UR STRIP-MCNC: NEGATIVE MG/DL
LEUKOCYTE ESTERASE UR QL STRIP: NEGATIVE
LYMPHOCYTES # BLD: 3.2 K/MCL (ref 1–4)
LYMPHOCYTES NFR BLD: 39 %
MCH RBC QN AUTO: 29.7 PG (ref 26–34)
MCHC RBC AUTO-ENTMCNC: 33.1 G/DL (ref 32–36.5)
MCV RBC AUTO: 89.9 FL (ref 78–100)
MONOCYTES # BLD: 0.5 K/MCL (ref 0.3–0.9)
MONOCYTES NFR BLD: 6 %
NEUTROPHILS # BLD: 4.2 K/MCL (ref 1.8–7.7)
NEUTROPHILS NFR BLD: 52 %
NITRITE UR QL STRIP: NEGATIVE
NRBC BLD MANUAL-RTO: 0 /100 WBC
PH UR STRIP: 7 [PH] (ref 5–7)
PLATELET # BLD AUTO: 193 K/MCL (ref 140–450)
POTASSIUM SERPL-SCNC: 3.8 MMOL/L (ref 3.4–5.1)
PROT UR STRIP-MCNC: NEGATIVE MG/DL
RBC # BLD: 4.34 MIL/MCL (ref 4–5.2)
RBC #/AREA URNS HPF: ABNORMAL /HPF
SODIUM SERPL-SCNC: 142 MMOL/L (ref 135–145)
SP GR UR STRIP: 1.01 (ref 1–1.03)
SQUAMOUS #/AREA URNS HPF: ABNORMAL /HPF
TROPONIN I SERPL HS-MCNC: <0.02 NG/ML
TSH SERPL-ACNC: 0.61 MCUNITS/ML (ref 0.35–5)
UROBILINOGEN UR STRIP-MCNC: 0.2 MG/DL
WBC # BLD: 8 K/MCL (ref 4.2–11)
WBC #/AREA URNS HPF: ABNORMAL /HPF

## 2021-09-05 PROCEDURE — 81001 URINALYSIS AUTO W/SCOPE: CPT | Performed by: EMERGENCY MEDICINE

## 2021-09-05 PROCEDURE — 93010 ELECTROCARDIOGRAM REPORT: CPT | Performed by: INTERNAL MEDICINE

## 2021-09-05 PROCEDURE — 93005 ELECTROCARDIOGRAM TRACING: CPT | Performed by: EMERGENCY MEDICINE

## 2021-09-05 PROCEDURE — 36415 COLL VENOUS BLD VENIPUNCTURE: CPT

## 2021-09-05 PROCEDURE — 84484 ASSAY OF TROPONIN QUANT: CPT | Performed by: EMERGENCY MEDICINE

## 2021-09-05 PROCEDURE — 84443 ASSAY THYROID STIM HORMONE: CPT | Performed by: EMERGENCY MEDICINE

## 2021-09-05 PROCEDURE — 80048 BASIC METABOLIC PNL TOTAL CA: CPT | Performed by: EMERGENCY MEDICINE

## 2021-09-05 PROCEDURE — 85025 COMPLETE CBC W/AUTO DIFF WBC: CPT | Performed by: EMERGENCY MEDICINE

## 2021-09-05 PROCEDURE — 99285 EMERGENCY DEPT VISIT HI MDM: CPT

## 2021-09-05 PROCEDURE — 99283 EMERGENCY DEPT VISIT LOW MDM: CPT | Performed by: EMERGENCY MEDICINE

## 2021-09-06 LAB
ATRIAL RATE (BPM): 67
DIASTOLIC BLOOD PRESSURE: 59
P AXIS (DEGREES): 61
PR-INTERVAL (MSEC): 194
QRS-INTERVAL (MSEC): 62
QT-INTERVAL (MSEC): 388
QTC: 410
R AXIS (DEGREES): 48
RAINBOW EXTRA TUBES HOLD SPECIMEN: NORMAL
REPORT TEXT: NORMAL
SYSTOLIC BLOOD PRESSURE: 123
T AXIS (DEGREES): 49
VENTRICULAR RATE EKG/MIN (BPM): 67

## 2021-09-06 ASSESSMENT — ENCOUNTER SYMPTOMS
FATIGUE: 1
CONFUSION: 0
FEVER: 0
DIARRHEA: 0
LIGHT-HEADEDNESS: 1
NAUSEA: 0
DIZZINESS: 0
CHILLS: 0
VOMITING: 0
BLOOD IN STOOL: 0
ABDOMINAL PAIN: 0
WEAKNESS: 0
SHORTNESS OF BREATH: 0
COUGH: 0
SORE THROAT: 0

## 2021-09-08 ENCOUNTER — CLINICAL ABSTRACT (OUTPATIENT)
Dept: PHARMACY | Age: 66
End: 2021-09-08

## 2021-09-09 ENCOUNTER — TELEPHONE (OUTPATIENT)
Dept: PULMONOLOGY | Age: 66
End: 2021-09-09

## 2021-09-09 ENCOUNTER — TELEPHONE (OUTPATIENT)
Dept: PHARMACY | Age: 66
End: 2021-09-09

## 2021-09-10 ENCOUNTER — TELEPHONE (OUTPATIENT)
Dept: INTERNAL MEDICINE | Age: 66
End: 2021-09-10

## 2021-09-13 ENCOUNTER — TELEPHONE (OUTPATIENT)
Dept: PULMONOLOGY | Age: 66
End: 2021-09-13

## 2021-09-13 ENCOUNTER — TELEPHONE (OUTPATIENT)
Dept: SCHEDULING | Age: 66
End: 2021-09-13

## 2021-09-17 ENCOUNTER — NURSE TRIAGE (OUTPATIENT)
Dept: TELEHEALTH | Age: 66
End: 2021-09-17

## 2021-09-17 ENCOUNTER — WALK IN (OUTPATIENT)
Dept: URGENT CARE | Age: 66
End: 2021-09-17

## 2021-09-17 VITALS
TEMPERATURE: 98 F | SYSTOLIC BLOOD PRESSURE: 125 MMHG | OXYGEN SATURATION: 97 % | DIASTOLIC BLOOD PRESSURE: 60 MMHG | HEART RATE: 66 BPM | BODY MASS INDEX: 28.17 KG/M2 | HEIGHT: 64 IN | WEIGHT: 165 LBS | RESPIRATION RATE: 16 BRPM

## 2021-09-17 DIAGNOSIS — H57.89 EYE DRAINAGE: Primary | ICD-10-CM

## 2021-09-17 DIAGNOSIS — L01.00 IMPETIGO: ICD-10-CM

## 2021-09-17 DIAGNOSIS — H10.31 ACUTE BACTERIAL CONJUNCTIVITIS OF RIGHT EYE: ICD-10-CM

## 2021-09-17 DIAGNOSIS — Z20.822 PERSON UNDER INVESTIGATION FOR COVID-19: ICD-10-CM

## 2021-09-17 DIAGNOSIS — J34.89 RHINORRHEA: ICD-10-CM

## 2021-09-17 PROCEDURE — U0003 INFECTIOUS AGENT DETECTION BY NUCLEIC ACID (DNA OR RNA); SEVERE ACUTE RESPIRATORY SYNDROME CORONAVIRUS 2 (SARS-COV-2) (CORONAVIRUS DISEASE [COVID-19]), AMPLIFIED PROBE TECHNIQUE, MAKING USE OF HIGH THROUGHPUT TECHNOLOGIES AS DESCRIBED BY CMS-2020-01-R: HCPCS | Performed by: CLINICAL MEDICAL LABORATORY

## 2021-09-17 PROCEDURE — 99215 OFFICE O/P EST HI 40 MIN: CPT | Performed by: NURSE PRACTITIONER

## 2021-09-17 PROCEDURE — U0005 INFEC AGEN DETEC AMPLI PROBE: HCPCS | Performed by: CLINICAL MEDICAL LABORATORY

## 2021-09-17 RX ORDER — ALBUTEROL SULFATE 90 UG/1
2 AEROSOL, METERED RESPIRATORY (INHALATION) EVERY 4 HOURS PRN
Qty: 1 EACH | Refills: 0 | Status: SHIPPED | OUTPATIENT
Start: 2021-09-17 | End: 2021-11-14 | Stop reason: SDUPTHER

## 2021-09-17 RX ORDER — GENTAMICIN SULFATE 3 MG/ML
1 SOLUTION/ DROPS OPHTHALMIC EVERY 4 HOURS
Qty: 5 ML | Refills: 0 | Status: SHIPPED | OUTPATIENT
Start: 2021-09-17 | End: 2021-11-03 | Stop reason: ALTCHOICE

## 2021-09-18 LAB
SARS-COV-2 RNA RESP QL NAA+PROBE: NOT DETECTED
SERVICE CMNT-IMP: NORMAL
SERVICE CMNT-IMP: NORMAL

## 2021-09-19 ENCOUNTER — TELEPHONE (OUTPATIENT)
Dept: URGENT CARE | Age: 66
End: 2021-09-19

## 2021-09-21 RX ORDER — PREGABALIN 225 MG/1
CAPSULE ORAL
Qty: 60 CAPSULE | Refills: 0 | Status: SHIPPED | OUTPATIENT
Start: 2021-09-21 | End: 2021-10-25

## 2021-09-24 ENCOUNTER — TELEPHONE (OUTPATIENT)
Dept: INTERNAL MEDICINE | Age: 66
End: 2021-09-24

## 2021-09-24 ENCOUNTER — WALK IN (OUTPATIENT)
Dept: URGENT CARE | Age: 66
End: 2021-09-24

## 2021-09-24 VITALS
DIASTOLIC BLOOD PRESSURE: 55 MMHG | OXYGEN SATURATION: 98 % | RESPIRATION RATE: 16 BRPM | HEIGHT: 64 IN | SYSTOLIC BLOOD PRESSURE: 117 MMHG | WEIGHT: 164.9 LBS | HEART RATE: 65 BPM | TEMPERATURE: 97.7 F | BODY MASS INDEX: 28.15 KG/M2

## 2021-09-24 DIAGNOSIS — K08.89 PAIN, DENTAL: Primary | ICD-10-CM

## 2021-09-24 DIAGNOSIS — K08.89 TOOTH PAIN: ICD-10-CM

## 2021-09-24 PROCEDURE — 99213 OFFICE O/P EST LOW 20 MIN: CPT | Performed by: FAMILY MEDICINE

## 2021-09-24 RX ORDER — AMOXICILLIN 875 MG/1
875 TABLET, COATED ORAL 2 TIMES DAILY
Qty: 14 TABLET | Refills: 0 | Status: SHIPPED | OUTPATIENT
Start: 2021-09-24 | End: 2021-10-01

## 2021-10-07 ENCOUNTER — APPOINTMENT (OUTPATIENT)
Dept: EDUCATION SERVICES | Age: 66
End: 2021-10-07

## 2021-10-11 ENCOUNTER — CLINICAL ABSTRACT (OUTPATIENT)
Dept: PHARMACY | Age: 66
End: 2021-10-11

## 2021-10-12 ENCOUNTER — TELEPHONE (OUTPATIENT)
Dept: PHARMACY | Age: 66
End: 2021-10-12

## 2021-10-14 ENCOUNTER — TELEPHONE (OUTPATIENT)
Dept: ENDOCRINOLOGY | Age: 66
End: 2021-10-14

## 2021-10-14 DIAGNOSIS — E03.9 ACQUIRED HYPOTHYROIDISM: Primary | ICD-10-CM

## 2021-10-16 ENCOUNTER — LAB SERVICES (OUTPATIENT)
Dept: LAB | Age: 66
End: 2021-10-16

## 2021-10-16 DIAGNOSIS — E03.9 ACQUIRED HYPOTHYROIDISM: ICD-10-CM

## 2021-10-16 LAB — TSH SERPL-ACNC: 1.17 MCUNITS/ML (ref 0.35–5)

## 2021-10-16 PROCEDURE — 84443 ASSAY THYROID STIM HORMONE: CPT | Performed by: CLINICAL MEDICAL LABORATORY

## 2021-10-16 PROCEDURE — 36415 COLL VENOUS BLD VENIPUNCTURE: CPT | Performed by: INTERNAL MEDICINE

## 2021-10-17 ENCOUNTER — NURSE TRIAGE (OUTPATIENT)
Dept: TELEHEALTH | Age: 66
End: 2021-10-17

## 2021-10-18 ENCOUNTER — TELEPHONE (OUTPATIENT)
Dept: INTERNAL MEDICINE | Age: 66
End: 2021-10-18

## 2021-10-18 ENCOUNTER — TELEPHONE (OUTPATIENT)
Dept: ENDOCRINOLOGY | Age: 66
End: 2021-10-18

## 2021-10-21 ENCOUNTER — LAB SERVICES (OUTPATIENT)
Dept: LAB | Age: 66
End: 2021-10-21

## 2021-10-21 DIAGNOSIS — I25.118 CORONARY ARTERY DISEASE OF NATIVE ARTERY OF NATIVE HEART WITH STABLE ANGINA PECTORIS (CMD): ICD-10-CM

## 2021-10-21 LAB
ALBUMIN SERPL-MCNC: 3.3 G/DL (ref 3.6–5.1)
ALBUMIN/GLOB SERPL: 1.1 {RATIO} (ref 1–2.4)
ALP SERPL-CCNC: 83 UNITS/L (ref 45–117)
ALT SERPL-CCNC: 43 UNITS/L
ANION GAP SERPL CALC-SCNC: 8 MMOL/L (ref 10–20)
AST SERPL-CCNC: 33 UNITS/L
BILIRUB SERPL-MCNC: 0.3 MG/DL (ref 0.2–1)
BUN SERPL-MCNC: 18 MG/DL (ref 6–20)
BUN/CREAT SERPL: 17 (ref 7–25)
CALCIUM SERPL-MCNC: 9.3 MG/DL (ref 8.4–10.2)
CHLORIDE SERPL-SCNC: 112 MMOL/L (ref 98–107)
CHOLEST SERPL-MCNC: 152 MG/DL
CHOLEST/HDLC SERPL: 2.6 {RATIO}
CO2 SERPL-SCNC: 26 MMOL/L (ref 21–32)
CREAT SERPL-MCNC: 1.06 MG/DL (ref 0.51–0.95)
FASTING DURATION TIME PATIENT: 12 HOURS (ref 0–999)
FASTING DURATION TIME PATIENT: 12 HOURS (ref 0–999)
GFR SERPLBLD BASED ON 1.73 SQ M-ARVRAT: 55 ML/MIN
GLOBULIN SER-MCNC: 2.9 G/DL (ref 2–4)
GLUCOSE SERPL-MCNC: 303 MG/DL (ref 70–99)
HDLC SERPL-MCNC: 58 MG/DL
LDLC SERPL CALC-MCNC: 70 MG/DL
NONHDLC SERPL-MCNC: 94 MG/DL
POTASSIUM SERPL-SCNC: 4.5 MMOL/L (ref 3.4–5.1)
PROT SERPL-MCNC: 6.2 G/DL (ref 6.4–8.2)
SODIUM SERPL-SCNC: 141 MMOL/L (ref 135–145)
TRIGL SERPL-MCNC: 121 MG/DL

## 2021-10-21 PROCEDURE — 80053 COMPREHEN METABOLIC PANEL: CPT | Performed by: CLINICAL MEDICAL LABORATORY

## 2021-10-21 PROCEDURE — 82728 ASSAY OF FERRITIN: CPT | Performed by: CLINICAL MEDICAL LABORATORY

## 2021-10-21 PROCEDURE — 36415 COLL VENOUS BLD VENIPUNCTURE: CPT | Performed by: INTERNAL MEDICINE

## 2021-10-21 PROCEDURE — 83550 IRON BINDING TEST: CPT | Performed by: CLINICAL MEDICAL LABORATORY

## 2021-10-21 PROCEDURE — 83540 ASSAY OF IRON: CPT | Performed by: CLINICAL MEDICAL LABORATORY

## 2021-10-21 PROCEDURE — 80061 LIPID PANEL: CPT | Performed by: CLINICAL MEDICAL LABORATORY

## 2021-10-25 RX ORDER — PREGABALIN 225 MG/1
CAPSULE ORAL
Qty: 60 CAPSULE | Refills: 0 | Status: SHIPPED | OUTPATIENT
Start: 2021-10-25 | End: 2021-11-23

## 2021-10-27 ENCOUNTER — OFFICE VISIT (OUTPATIENT)
Dept: INTERNAL MEDICINE | Age: 66
End: 2021-10-27

## 2021-10-27 ENCOUNTER — HOSPITAL ENCOUNTER (OUTPATIENT)
Dept: ULTRASOUND IMAGING | Age: 66
Discharge: HOME | End: 2021-10-27
Attending: INTERNAL MEDICINE

## 2021-10-27 VITALS
HEART RATE: 80 BPM | WEIGHT: 174.6 LBS | BODY MASS INDEX: 29.97 KG/M2 | OXYGEN SATURATION: 100 % | DIASTOLIC BLOOD PRESSURE: 70 MMHG | TEMPERATURE: 98.5 F | SYSTOLIC BLOOD PRESSURE: 120 MMHG

## 2021-10-27 DIAGNOSIS — N64.4 PAIN OF LEFT BREAST: ICD-10-CM

## 2021-10-27 DIAGNOSIS — R42 DIZZINESS: ICD-10-CM

## 2021-10-27 DIAGNOSIS — M79.89 RIGHT LEG SWELLING: Primary | ICD-10-CM

## 2021-10-27 DIAGNOSIS — M79.89 RIGHT LEG SWELLING: ICD-10-CM

## 2021-10-27 DIAGNOSIS — E61.1 IRON DEFICIENCY: ICD-10-CM

## 2021-10-27 DIAGNOSIS — Z23 NEED FOR VACCINATION: ICD-10-CM

## 2021-10-27 LAB
FERRITIN SERPL-MCNC: 38 NG/ML (ref 8–252)
IRON SATN MFR SERPL: 15 % (ref 15–45)
IRON SERPL-MCNC: 51 MCG/DL (ref 50–170)
TIBC SERPL-MCNC: 342 MCG/DL (ref 250–450)

## 2021-10-27 PROCEDURE — 93971 EXTREMITY STUDY: CPT | Performed by: RADIOLOGY

## 2021-10-27 PROCEDURE — 99214 OFFICE O/P EST MOD 30 MIN: CPT | Performed by: INTERNAL MEDICINE

## 2021-10-27 PROCEDURE — 90750 HZV VACC RECOMBINANT IM: CPT | Performed by: INTERNAL MEDICINE

## 2021-10-27 PROCEDURE — 90471 IMMUNIZATION ADMIN: CPT | Performed by: INTERNAL MEDICINE

## 2021-10-27 PROCEDURE — 90662 IIV NO PRSV INCREASED AG IM: CPT | Performed by: INTERNAL MEDICINE

## 2021-10-27 PROCEDURE — 93971 EXTREMITY STUDY: CPT

## 2021-10-27 PROCEDURE — G0008 ADMIN INFLUENZA VIRUS VAC: HCPCS | Performed by: INTERNAL MEDICINE

## 2021-11-01 ENCOUNTER — TELEPHONE (OUTPATIENT)
Dept: INTERNAL MEDICINE | Age: 66
End: 2021-11-01

## 2021-11-01 DIAGNOSIS — M79.89 RIGHT LEG SWELLING: Primary | ICD-10-CM

## 2021-11-01 RX ORDER — FUROSEMIDE 20 MG/1
20 TABLET ORAL DAILY
Qty: 3 TABLET | Refills: 0 | Status: SHIPPED | OUTPATIENT
Start: 2021-11-01 | End: 2022-02-28

## 2021-11-02 ENCOUNTER — TELEPHONE (OUTPATIENT)
Dept: PULMONOLOGY | Age: 66
End: 2021-11-02

## 2021-11-04 ENCOUNTER — APPOINTMENT (OUTPATIENT)
Dept: EDUCATION SERVICES | Age: 66
End: 2021-11-04

## 2021-11-08 ENCOUNTER — IMAGING SERVICES (OUTPATIENT)
Dept: GENERAL RADIOLOGY | Age: 66
End: 2021-11-08
Attending: NURSE PRACTITIONER

## 2021-11-08 ENCOUNTER — WALK IN (OUTPATIENT)
Dept: URGENT CARE | Age: 66
End: 2021-11-08

## 2021-11-08 VITALS
DIASTOLIC BLOOD PRESSURE: 53 MMHG | SYSTOLIC BLOOD PRESSURE: 112 MMHG | HEART RATE: 89 BPM | RESPIRATION RATE: 16 BRPM | OXYGEN SATURATION: 99 % | TEMPERATURE: 97.8 F

## 2021-11-08 DIAGNOSIS — M79.672 PAIN OF LEFT HEEL: Primary | ICD-10-CM

## 2021-11-08 DIAGNOSIS — M79.672 PAIN OF LEFT HEEL: ICD-10-CM

## 2021-11-08 PROCEDURE — 73650 X-RAY EXAM OF HEEL: CPT | Performed by: RADIOLOGY

## 2021-11-08 PROCEDURE — 99214 OFFICE O/P EST MOD 30 MIN: CPT | Performed by: NURSE PRACTITIONER

## 2021-11-08 RX ORDER — DOXYCYCLINE 100 MG/1
100 CAPSULE ORAL 2 TIMES DAILY
Qty: 14 CAPSULE | Refills: 0 | Status: SHIPPED | OUTPATIENT
Start: 2021-11-08 | End: 2021-11-15

## 2021-11-09 ENCOUNTER — OFFICE VISIT (OUTPATIENT)
Dept: PULMONOLOGY | Age: 66
End: 2021-11-09

## 2021-11-09 ENCOUNTER — OFFICE VISIT (OUTPATIENT)
Dept: PODIATRY | Age: 66
End: 2021-11-09

## 2021-11-09 ENCOUNTER — TELEPHONE (OUTPATIENT)
Dept: URGENT CARE | Age: 66
End: 2021-11-09

## 2021-11-09 VITALS
HEART RATE: 73 BPM | WEIGHT: 174.2 LBS | DIASTOLIC BLOOD PRESSURE: 62 MMHG | TEMPERATURE: 98 F | RESPIRATION RATE: 16 BRPM | OXYGEN SATURATION: 98 % | BODY MASS INDEX: 29.9 KG/M2 | SYSTOLIC BLOOD PRESSURE: 110 MMHG

## 2021-11-09 DIAGNOSIS — L60.2 HYPERTROPHY OF NAIL: ICD-10-CM

## 2021-11-09 DIAGNOSIS — I73.89 OTHER SPECIFIED PERIPHERAL VASCULAR DISEASES (CMD): Primary | ICD-10-CM

## 2021-11-09 DIAGNOSIS — R06.83 SNORING: ICD-10-CM

## 2021-11-09 DIAGNOSIS — G47.10 HYPERSOMNIA: ICD-10-CM

## 2021-11-09 DIAGNOSIS — E11.42 DIABETIC POLYNEUROPATHY ASSOCIATED WITH TYPE 2 DIABETES MELLITUS (CMD): ICD-10-CM

## 2021-11-09 DIAGNOSIS — G47.33 OBSTRUCTIVE SLEEP APNEA ON CPAP: Primary | ICD-10-CM

## 2021-11-09 PROCEDURE — 99214 OFFICE O/P EST MOD 30 MIN: CPT | Performed by: NURSE PRACTITIONER

## 2021-11-09 PROCEDURE — 11721 DEBRIDE NAIL 6 OR MORE: CPT | Performed by: PODIATRIST

## 2021-11-09 ASSESSMENT — SLEEP AND FATIGUE QUESTIONNAIRES
HOW LIKELY ARE YOU TO NOD OFF OR FALL ASLEEP IN A CAR, WHILE STOPPED FOR A FEW MINUTES IN TRAFFIC: 0
HOW LIKELY ARE YOU TO NOD OFF OR FALL ASLEEP WHILE SITTING AND READING: 0
HOW LIKELY ARE YOU TO NOD OFF OR FALL ASLEEP WHILE LYING DOWN TO REST IN THE AFTERNOON WHEN CIRCUMSTANCES PERMIT: 3
ESS TOTAL SCORE: 6
HOW LIKELY ARE YOU TO NOD OFF OR FALL ASLEEP WHILE SITTING INACTIVE IN A PUBLIC PLACE: 0
HOW LIKELY ARE YOU TO NOD OFF OR FALL ASLEEP WHEN YOU ARE A PASSENGER IN A CAR FOR AN HOUR WITHOUT A BREAK: 1
HOW LIKELY ARE YOU TO NOD OFF OR FALL ASLEEP WHILE SITTING QUIETLY AFTER LUNCH WITHOUT ALCOHOL: 2
HOW LIKELY ARE YOU TO NOD OFF OR FALL ASLEEP WHILE SITTING AND TALKING TO SOMEONE: 0
HOW LIKELY ARE YOU TO NOD OFF OR FALL ASLEEP WHILE WATCHING TV: 0

## 2021-11-10 ENCOUNTER — TELEPHONE (OUTPATIENT)
Dept: PHARMACY | Age: 66
End: 2021-11-10

## 2021-11-14 ENCOUNTER — IMAGING SERVICES (OUTPATIENT)
Dept: GENERAL RADIOLOGY | Age: 66
End: 2021-11-14
Attending: NURSE PRACTITIONER

## 2021-11-14 ENCOUNTER — WALK IN (OUTPATIENT)
Dept: URGENT CARE | Age: 66
End: 2021-11-14

## 2021-11-14 VITALS
RESPIRATION RATE: 18 BRPM | OXYGEN SATURATION: 98 % | SYSTOLIC BLOOD PRESSURE: 112 MMHG | DIASTOLIC BLOOD PRESSURE: 58 MMHG | HEART RATE: 68 BPM | TEMPERATURE: 96.4 F

## 2021-11-14 DIAGNOSIS — Z20.822 PERSON UNDER INVESTIGATION FOR COVID-19: ICD-10-CM

## 2021-11-14 DIAGNOSIS — H92.03 OTALGIA OF BOTH EARS: ICD-10-CM

## 2021-11-14 DIAGNOSIS — R53.83 FATIGUE, UNSPECIFIED TYPE: ICD-10-CM

## 2021-11-14 DIAGNOSIS — J06.9 ACUTE URI: Primary | ICD-10-CM

## 2021-11-14 DIAGNOSIS — J34.89 RHINORRHEA: ICD-10-CM

## 2021-11-14 PROCEDURE — 93000 ELECTROCARDIOGRAM COMPLETE: CPT | Performed by: NURSE PRACTITIONER

## 2021-11-14 PROCEDURE — 71046 X-RAY EXAM CHEST 2 VIEWS: CPT | Performed by: RADIOLOGY

## 2021-11-14 PROCEDURE — U0005 INFEC AGEN DETEC AMPLI PROBE: HCPCS | Performed by: CLINICAL MEDICAL LABORATORY

## 2021-11-14 PROCEDURE — 99214 OFFICE O/P EST MOD 30 MIN: CPT | Performed by: NURSE PRACTITIONER

## 2021-11-14 PROCEDURE — U0003 INFECTIOUS AGENT DETECTION BY NUCLEIC ACID (DNA OR RNA); SEVERE ACUTE RESPIRATORY SYNDROME CORONAVIRUS 2 (SARS-COV-2) (CORONAVIRUS DISEASE [COVID-19]), AMPLIFIED PROBE TECHNIQUE, MAKING USE OF HIGH THROUGHPUT TECHNOLOGIES AS DESCRIBED BY CMS-2020-01-R: HCPCS | Performed by: CLINICAL MEDICAL LABORATORY

## 2021-11-14 RX ORDER — ALBUTEROL SULFATE 90 UG/1
2 AEROSOL, METERED RESPIRATORY (INHALATION) EVERY 4 HOURS PRN
Qty: 1 EACH | Refills: 0 | Status: SHIPPED | OUTPATIENT
Start: 2021-11-14 | End: 2021-12-29 | Stop reason: SDUPTHER

## 2021-11-15 ENCOUNTER — TELEPHONE (OUTPATIENT)
Dept: URGENT CARE | Age: 66
End: 2021-11-15

## 2021-11-15 LAB
SARS-COV-2 RNA RESP QL NAA+PROBE: NOT DETECTED
SERVICE CMNT-IMP: NORMAL
SERVICE CMNT-IMP: NORMAL

## 2021-11-16 ENCOUNTER — TELEPHONE (OUTPATIENT)
Dept: URGENT CARE | Age: 66
End: 2021-11-16

## 2021-11-20 ENCOUNTER — NURSE TRIAGE (OUTPATIENT)
Dept: TELEHEALTH | Age: 66
End: 2021-11-20

## 2021-11-21 ENCOUNTER — WALK IN (OUTPATIENT)
Dept: URGENT CARE | Age: 66
End: 2021-11-21

## 2021-11-21 VITALS
HEIGHT: 64 IN | RESPIRATION RATE: 18 BRPM | SYSTOLIC BLOOD PRESSURE: 113 MMHG | WEIGHT: 174.16 LBS | TEMPERATURE: 96.8 F | DIASTOLIC BLOOD PRESSURE: 58 MMHG | BODY MASS INDEX: 29.73 KG/M2 | HEART RATE: 67 BPM | OXYGEN SATURATION: 100 %

## 2021-11-21 DIAGNOSIS — J20.9 ACUTE BRONCHITIS, UNSPECIFIED ORGANISM: Primary | ICD-10-CM

## 2021-11-21 PROCEDURE — 99213 OFFICE O/P EST LOW 20 MIN: CPT | Performed by: FAMILY MEDICINE

## 2021-11-23 RX ORDER — PREGABALIN 225 MG/1
CAPSULE ORAL
Qty: 60 CAPSULE | Refills: 2 | Status: SHIPPED | OUTPATIENT
Start: 2021-11-23 | End: 2021-11-30 | Stop reason: SDUPTHER

## 2021-11-24 RX ORDER — LEVETIRACETAM 750 MG/1
750 TABLET ORAL 2 TIMES DAILY
Qty: 60 TABLET | Refills: 2 | Status: SHIPPED | OUTPATIENT
Start: 2021-11-24 | End: 2021-12-29 | Stop reason: SDUPTHER

## 2021-11-29 ENCOUNTER — TELEPHONE (OUTPATIENT)
Dept: INTERNAL MEDICINE | Age: 66
End: 2021-11-29

## 2021-11-30 ENCOUNTER — TELEPHONE (OUTPATIENT)
Dept: PAIN MANAGEMENT | Age: 66
End: 2021-11-30

## 2021-11-30 ENCOUNTER — TELEPHONE (OUTPATIENT)
Dept: ENDOCRINOLOGY | Age: 66
End: 2021-11-30

## 2021-11-30 ENCOUNTER — TELEPHONE (OUTPATIENT)
Dept: INTERNAL MEDICINE | Age: 66
End: 2021-11-30

## 2021-12-01 RX ORDER — PREGABALIN 225 MG/1
225 CAPSULE ORAL 2 TIMES DAILY
Qty: 60 CAPSULE | Refills: 2 | Status: SHIPPED | OUTPATIENT
Start: 2021-12-01 | End: 2021-12-01 | Stop reason: SDUPTHER

## 2021-12-01 RX ORDER — PREGABALIN 225 MG/1
225 CAPSULE ORAL 2 TIMES DAILY
Qty: 60 CAPSULE | Refills: 2 | Status: SHIPPED | OUTPATIENT
Start: 2021-12-01

## 2021-12-06 ENCOUNTER — CLINICAL ABSTRACT (OUTPATIENT)
Dept: PHARMACY | Age: 66
End: 2021-12-06

## 2021-12-08 ENCOUNTER — TELEPHONE (OUTPATIENT)
Dept: PHARMACY | Age: 66
End: 2021-12-08

## 2021-12-10 ENCOUNTER — TELEPHONE (OUTPATIENT)
Dept: INTERNAL MEDICINE | Age: 66
End: 2021-12-10

## 2021-12-13 ENCOUNTER — TELEPHONE (OUTPATIENT)
Dept: INTERNAL MEDICINE | Age: 66
End: 2021-12-13

## 2021-12-14 ENCOUNTER — TELEPHONE (OUTPATIENT)
Dept: PHARMACY | Age: 66
End: 2021-12-14

## 2021-12-17 RX ORDER — LEVETIRACETAM 750 MG/1
TABLET ORAL
Qty: 28 TABLET | OUTPATIENT
Start: 2021-12-17

## 2021-12-17 RX ORDER — MULTIVITAMIN/IRON/FOLIC ACID 18MG-0.4MG
TABLET ORAL
Qty: 31 TABLET | Refills: 11 | Status: SHIPPED | OUTPATIENT
Start: 2021-12-17

## 2021-12-17 RX ORDER — CLOPIDOGREL BISULFATE 75 MG/1
75 TABLET ORAL DAILY
Qty: 14 TABLET | OUTPATIENT
Start: 2021-12-17 | End: 2022-12-17

## 2021-12-17 RX ORDER — TRAZODONE HYDROCHLORIDE 100 MG/1
TABLET ORAL
Qty: 31 TABLET | Refills: 5 | Status: SHIPPED | OUTPATIENT
Start: 2021-12-17

## 2021-12-17 RX ORDER — SENNOSIDES A AND B 8.6 MG/1
1 TABLET, FILM COATED ORAL EVERY OTHER DAY
Qty: 16 TABLET | Refills: 11 | Status: SHIPPED | OUTPATIENT
Start: 2021-12-17

## 2021-12-17 RX ORDER — ASPIRIN 81 MG/1
TABLET, COATED ORAL
Qty: 31 TABLET | Refills: 11 | Status: SHIPPED | OUTPATIENT
Start: 2021-12-17

## 2021-12-21 ENCOUNTER — TELEPHONE (OUTPATIENT)
Dept: ENDOCRINOLOGY | Age: 66
End: 2021-12-21

## 2021-12-29 ENCOUNTER — OFFICE VISIT (OUTPATIENT)
Dept: INTERNAL MEDICINE | Age: 66
End: 2021-12-29

## 2021-12-29 ENCOUNTER — TELEPHONE (OUTPATIENT)
Dept: CARDIOLOGY | Age: 66
End: 2021-12-29

## 2021-12-29 VITALS
RESPIRATION RATE: 20 BRPM | DIASTOLIC BLOOD PRESSURE: 60 MMHG | HEIGHT: 64 IN | OXYGEN SATURATION: 98 % | WEIGHT: 174.8 LBS | HEART RATE: 76 BPM | SYSTOLIC BLOOD PRESSURE: 118 MMHG | TEMPERATURE: 98.5 F | BODY MASS INDEX: 29.84 KG/M2

## 2021-12-29 DIAGNOSIS — Z51.81 MEDICATION MONITORING ENCOUNTER: ICD-10-CM

## 2021-12-29 DIAGNOSIS — I10 ESSENTIAL HYPERTENSION: ICD-10-CM

## 2021-12-29 DIAGNOSIS — E08.42 DIABETIC POLYNEUROPATHY ASSOCIATED WITH DIABETES MELLITUS DUE TO UNDERLYING CONDITION (CMD): ICD-10-CM

## 2021-12-29 DIAGNOSIS — Z00.00 MEDICARE ANNUAL WELLNESS VISIT, SUBSEQUENT: Primary | ICD-10-CM

## 2021-12-29 DIAGNOSIS — M79.641 RIGHT HAND PAIN: ICD-10-CM

## 2021-12-29 DIAGNOSIS — M81.8 OTHER OSTEOPOROSIS WITHOUT CURRENT PATHOLOGICAL FRACTURE: ICD-10-CM

## 2021-12-29 DIAGNOSIS — D47.2 MGUS (MONOCLONAL GAMMOPATHY OF UNKNOWN SIGNIFICANCE): ICD-10-CM

## 2021-12-29 PROCEDURE — G0439 PPPS, SUBSEQ VISIT: HCPCS | Performed by: INTERNAL MEDICINE

## 2021-12-29 PROCEDURE — 99214 OFFICE O/P EST MOD 30 MIN: CPT | Performed by: INTERNAL MEDICINE

## 2021-12-29 RX ORDER — CLOPIDOGREL BISULFATE 75 MG/1
75 TABLET ORAL DAILY
Qty: 30 TABLET | Refills: 11 | Status: SHIPPED | OUTPATIENT
Start: 2021-12-29 | End: 2022-01-12 | Stop reason: SDUPTHER

## 2021-12-29 RX ORDER — ALBUTEROL SULFATE 90 UG/1
2 AEROSOL, METERED RESPIRATORY (INHALATION) EVERY 4 HOURS PRN
Qty: 1 EACH | Refills: 3 | Status: SHIPPED | OUTPATIENT
Start: 2021-12-29 | End: 2021-12-29 | Stop reason: SDUPTHER

## 2021-12-29 RX ORDER — LEVETIRACETAM 750 MG/1
750 TABLET ORAL 2 TIMES DAILY
Qty: 180 TABLET | Refills: 3 | Status: SHIPPED | OUTPATIENT
Start: 2021-12-29

## 2021-12-29 RX ORDER — LEVETIRACETAM 750 MG/1
750 TABLET ORAL 2 TIMES DAILY
Qty: 180 TABLET | Refills: 3 | Status: SHIPPED | OUTPATIENT
Start: 2021-12-29 | End: 2021-12-29 | Stop reason: SDUPTHER

## 2021-12-29 RX ORDER — LEVETIRACETAM 750 MG/1
TABLET ORAL
Qty: 34 TABLET | OUTPATIENT
Start: 2021-12-29

## 2021-12-29 RX ORDER — DULOXETIN HYDROCHLORIDE 60 MG/1
60 CAPSULE, DELAYED RELEASE ORAL 2 TIMES DAILY
Qty: 180 CAPSULE | Refills: 3 | Status: SHIPPED | OUTPATIENT
Start: 2021-12-29 | End: 2021-12-29 | Stop reason: SDUPTHER

## 2021-12-29 RX ORDER — ALBUTEROL SULFATE 90 UG/1
2 AEROSOL, METERED RESPIRATORY (INHALATION) EVERY 4 HOURS PRN
Qty: 1 EACH | Refills: 3 | Status: SHIPPED | OUTPATIENT
Start: 2021-12-29

## 2021-12-29 RX ORDER — DULOXETIN HYDROCHLORIDE 60 MG/1
60 CAPSULE, DELAYED RELEASE ORAL 2 TIMES DAILY
Qty: 180 CAPSULE | Refills: 3 | Status: SHIPPED | OUTPATIENT
Start: 2021-12-29

## 2021-12-29 ASSESSMENT — PATIENT HEALTH QUESTIONNAIRE - PHQ9
SUM OF ALL RESPONSES TO PHQ9 QUESTIONS 1 AND 2: 0
CLINICAL INTERPRETATION OF PHQ2 SCORE: NO FURTHER SCREENING NEEDED
2. FEELING DOWN, DEPRESSED OR HOPELESS: NOT AT ALL
SUM OF ALL RESPONSES TO PHQ9 QUESTIONS 1 AND 2: 0
1. LITTLE INTEREST OR PLEASURE IN DOING THINGS: NOT AT ALL

## 2021-12-30 ENCOUNTER — LAB SERVICES (OUTPATIENT)
Dept: LAB | Age: 66
End: 2021-12-30

## 2021-12-30 DIAGNOSIS — M81.8 OTHER OSTEOPOROSIS WITHOUT CURRENT PATHOLOGICAL FRACTURE: ICD-10-CM

## 2021-12-30 DIAGNOSIS — E08.42 DIABETIC POLYNEUROPATHY ASSOCIATED WITH DIABETES MELLITUS DUE TO UNDERLYING CONDITION (CMD): ICD-10-CM

## 2021-12-30 DIAGNOSIS — Z51.81 MEDICATION MONITORING ENCOUNTER: ICD-10-CM

## 2021-12-30 LAB
ALBUMIN SERPL-MCNC: 3.4 G/DL (ref 3.6–5.1)
ALBUMIN/GLOB SERPL: 0.9 {RATIO} (ref 1–2.4)
ALP SERPL-CCNC: 85 UNITS/L (ref 45–117)
ALT SERPL-CCNC: 26 UNITS/L
ANION GAP SERPL CALC-SCNC: 7 MMOL/L (ref 10–20)
AST SERPL-CCNC: 15 UNITS/L
BASOPHILS # BLD: 0 K/MCL (ref 0–0.3)
BASOPHILS NFR BLD: 0 %
BILIRUB SERPL-MCNC: 0.5 MG/DL (ref 0.2–1)
BUN SERPL-MCNC: 16 MG/DL (ref 6–20)
BUN/CREAT SERPL: 16 (ref 7–25)
CALCIUM SERPL-MCNC: 9.6 MG/DL (ref 8.4–10.2)
CHLORIDE SERPL-SCNC: 106 MMOL/L (ref 98–107)
CO2 SERPL-SCNC: 27 MMOL/L (ref 21–32)
CREAT SERPL-MCNC: 1 MG/DL (ref 0.51–0.95)
DEPRECATED RDW RBC: 43 FL (ref 39–50)
EOSINOPHIL # BLD: 0.2 K/MCL (ref 0–0.5)
EOSINOPHIL NFR BLD: 2 %
ERYTHROCYTE [DISTWIDTH] IN BLOOD: 13.1 % (ref 11–15)
FASTING DURATION TIME PATIENT: 0 HOURS (ref 0–999)
GFR SERPLBLD BASED ON 1.73 SQ M-ARVRAT: 59 ML/MIN
GLOBULIN SER-MCNC: 3.8 G/DL (ref 2–4)
GLUCOSE SERPL-MCNC: 435 MG/DL (ref 70–99)
HCT VFR BLD CALC: 42.5 % (ref 36–46.5)
HGB BLD-MCNC: 13.8 G/DL (ref 12–15.5)
LEVETIRACETAM SERPL-MCNC: 24.1 MCG/ML (ref 6–46)
LYMPHOCYTES # BLD: 3.4 K/MCL (ref 1–4)
LYMPHOCYTES NFR BLD: 43 %
MCH RBC QN AUTO: 29.5 PG (ref 26–34)
MCHC RBC AUTO-ENTMCNC: 32.5 G/DL (ref 32–36.5)
MCV RBC AUTO: 90.8 FL (ref 78–100)
MONOCYTES # BLD: 0.5 K/MCL (ref 0.3–0.9)
MONOCYTES NFR BLD: 7 %
NEUTROPHILS # BLD: 3.9 K/MCL (ref 1.8–7.7)
NEUTROPHILS NFR BLD: 48 %
PLATELET # BLD AUTO: 196 K/MCL (ref 140–450)
POTASSIUM SERPL-SCNC: 4.1 MMOL/L (ref 3.4–5.1)
PROT SERPL-MCNC: 7.2 G/DL (ref 6.4–8.2)
RBC # BLD: 4.68 MIL/MCL (ref 4–5.2)
SODIUM SERPL-SCNC: 136 MMOL/L (ref 135–145)
WBC # BLD: 8 K/MCL (ref 4.2–11)

## 2021-12-30 PROCEDURE — 80053 COMPREHEN METABOLIC PANEL: CPT | Performed by: CLINICAL MEDICAL LABORATORY

## 2021-12-30 PROCEDURE — 84165 PROTEIN E-PHORESIS SERUM: CPT | Performed by: CLINICAL MEDICAL LABORATORY

## 2021-12-30 PROCEDURE — 36415 COLL VENOUS BLD VENIPUNCTURE: CPT | Performed by: INTERNAL MEDICINE

## 2021-12-30 PROCEDURE — 83520 IMMUNOASSAY QUANT NOS NONAB: CPT | Performed by: CLINICAL MEDICAL LABORATORY

## 2021-12-30 PROCEDURE — 85025 COMPLETE CBC W/AUTO DIFF WBC: CPT | Performed by: INTERNAL MEDICINE

## 2021-12-30 PROCEDURE — 82784 ASSAY IGA/IGD/IGG/IGM EACH: CPT | Performed by: CLINICAL MEDICAL LABORATORY

## 2021-12-30 PROCEDURE — 80177 DRUG SCRN QUAN LEVETIRACETAM: CPT | Performed by: CLINICAL MEDICAL LABORATORY

## 2021-12-30 PROCEDURE — 86334 IMMUNOFIX E-PHORESIS SERUM: CPT | Performed by: CLINICAL MEDICAL LABORATORY

## 2021-12-30 RX ORDER — CLOPIDOGREL BISULFATE 75 MG/1
75 TABLET ORAL DAILY
Qty: 30 TABLET | Refills: 11 | OUTPATIENT
Start: 2021-12-30 | End: 2022-12-30

## 2021-12-31 ENCOUNTER — TELEPHONE (OUTPATIENT)
Dept: INTERNAL MEDICINE | Age: 66
End: 2021-12-31

## 2022-01-01 ENCOUNTER — NURSE TRIAGE (OUTPATIENT)
Dept: TELEHEALTH | Age: 67
End: 2022-01-01

## 2022-01-03 LAB
ALBUMIN SERPL ELPH-MCNC: 3.9 G/DL (ref 3.5–4.9)
ALPHA 1: 0.4 G/DL (ref 0.2–0.4)
ALPHA2 GLOB SERPL ELPH-MCNC: 1 G/DL (ref 0.5–0.9)
B-GLOBULIN SERPL ELPH-MCNC: 0.8 G/DL (ref 0.7–1.2)
GAMMA GLOB SERPL ELPH-MCNC: 0.8 G/DL (ref 0.7–1.7)
GLOBULIN SER-MCNC: 3 G/DL (ref 2.1–4.2)
IGA SERPL-MCNC: 142 MG/DL (ref 82–453)
IGG SERPL-MCNC: 704 MG/DL (ref 751–1560)
IGM SERPL-MCNC: 72 MG/DL (ref 46–304)
KAPPA LC FREE SER NEPH-MCNC: 2.28 MG/DL (ref 0.33–1.94)
KAPPA LC/LAMBDA SER: 1.25 {RATIO} (ref 0.26–1.65)
LAMBDA LC FREE SER NEPH-MCNC: 1.82 MG/DL (ref 0.57–2.63)
PATH INTERP BLD-IMP: ABNORMAL
PATH INTERP SPEC-IMP: ABNORMAL
PROT SERPL-MCNC: 6.9 G/DL (ref 6.4–8.2)

## 2022-01-07 ENCOUNTER — TELEPHONE (OUTPATIENT)
Dept: INTERNAL MEDICINE | Age: 67
End: 2022-01-07

## 2022-01-07 DIAGNOSIS — E03.9 ACQUIRED HYPOTHYROIDISM: ICD-10-CM

## 2022-01-07 DIAGNOSIS — I25.118 CORONARY ARTERY DISEASE OF NATIVE ARTERY OF NATIVE HEART WITH STABLE ANGINA PECTORIS (CMD): ICD-10-CM

## 2022-01-07 RX ORDER — LEVOTHYROXINE SODIUM 0.12 MG/1
TABLET ORAL
Qty: 31 TABLET | Refills: 5 | Status: SHIPPED | OUTPATIENT
Start: 2022-01-07

## 2022-01-07 RX ORDER — ATORVASTATIN CALCIUM 80 MG/1
80 TABLET, FILM COATED ORAL DAILY
Qty: 31 TABLET | Refills: 5 | Status: SHIPPED | OUTPATIENT
Start: 2022-01-07

## 2022-01-09 ENCOUNTER — WALK IN (OUTPATIENT)
Dept: URGENT CARE | Age: 67
End: 2022-01-09

## 2022-01-09 VITALS
OXYGEN SATURATION: 99 % | HEART RATE: 66 BPM | TEMPERATURE: 98 F | WEIGHT: 174.82 LBS | SYSTOLIC BLOOD PRESSURE: 145 MMHG | DIASTOLIC BLOOD PRESSURE: 61 MMHG | BODY MASS INDEX: 30.01 KG/M2 | RESPIRATION RATE: 16 BRPM

## 2022-01-09 DIAGNOSIS — H57.89 EYE IRRITATION: Primary | ICD-10-CM

## 2022-01-09 DIAGNOSIS — H00.012 HORDEOLUM EXTERNUM OF RIGHT LOWER EYELID: ICD-10-CM

## 2022-01-09 DIAGNOSIS — B30.9 ACUTE VIRAL CONJUNCTIVITIS OF RIGHT EYE: ICD-10-CM

## 2022-01-09 PROCEDURE — 99214 OFFICE O/P EST MOD 30 MIN: CPT | Performed by: NURSE PRACTITIONER

## 2022-01-09 RX ORDER — GENTAMICIN SULFATE 3 MG/ML
1 SOLUTION/ DROPS OPHTHALMIC EVERY 4 HOURS
Qty: 5 ML | Refills: 0 | Status: SHIPPED | OUTPATIENT
Start: 2022-01-09 | End: 2022-01-16

## 2022-01-10 ENCOUNTER — CLINICAL ABSTRACT (OUTPATIENT)
Dept: PHARMACY | Age: 67
End: 2022-01-10

## 2022-01-10 DIAGNOSIS — R89.9 ABNORMAL LABORATORY TEST RESULT: Primary | ICD-10-CM

## 2022-01-11 ENCOUNTER — TELEPHONE (OUTPATIENT)
Dept: PHARMACY | Age: 67
End: 2022-01-11

## 2022-01-12 RX ORDER — CLOPIDOGREL BISULFATE 75 MG/1
75 TABLET ORAL DAILY
Qty: 30 TABLET | Refills: 11 | Status: SHIPPED | OUTPATIENT
Start: 2022-01-12 | End: 2023-01-12

## 2022-01-19 ENCOUNTER — LAB SERVICES (OUTPATIENT)
Dept: LAB | Age: 67
End: 2022-01-19

## 2022-01-19 ENCOUNTER — OFFICE VISIT (OUTPATIENT)
Dept: INTERNAL MEDICINE | Age: 67
End: 2022-01-19

## 2022-01-19 ENCOUNTER — OFFICE VISIT (OUTPATIENT)
Dept: OPHTHALMOLOGY | Age: 67
End: 2022-01-19
Attending: INTERNAL MEDICINE

## 2022-01-19 ENCOUNTER — IMAGING SERVICES (OUTPATIENT)
Dept: GENERAL RADIOLOGY | Age: 67
End: 2022-01-19
Attending: INTERNAL MEDICINE

## 2022-01-19 VITALS
BODY MASS INDEX: 29.97 KG/M2 | SYSTOLIC BLOOD PRESSURE: 126 MMHG | HEART RATE: 67 BPM | TEMPERATURE: 98.6 F | WEIGHT: 174.6 LBS | DIASTOLIC BLOOD PRESSURE: 64 MMHG | OXYGEN SATURATION: 96 %

## 2022-01-19 DIAGNOSIS — Z96.1 PSEUDOPHAKIA OF BOTH EYES: ICD-10-CM

## 2022-01-19 DIAGNOSIS — R10.9 ACUTE LEFT FLANK PAIN: ICD-10-CM

## 2022-01-19 DIAGNOSIS — E11.3299 BACKGROUND DIABETIC RETINOPATHY (CMD): Primary | ICD-10-CM

## 2022-01-19 DIAGNOSIS — R10.9 ACUTE RIGHT FLANK PAIN: Primary | ICD-10-CM

## 2022-01-19 LAB
APPEARANCE UR: CLEAR
BASOPHILS # BLD: 0 K/MCL (ref 0–0.3)
BASOPHILS NFR BLD: 0 %
BILIRUB UR QL STRIP: NEGATIVE
COLOR UR: YELLOW
DEPRECATED RDW RBC: 42.5 FL (ref 39–50)
EOSINOPHIL # BLD: 0.1 K/MCL (ref 0–0.5)
EOSINOPHIL NFR BLD: 2 %
ERYTHROCYTE [DISTWIDTH] IN BLOOD: 12.9 % (ref 11–15)
GLUCOSE UR STRIP-MCNC: 100 MG/DL
HCT VFR BLD CALC: 39.9 % (ref 36–46.5)
HGB BLD-MCNC: 12.9 G/DL (ref 12–15.5)
HGB UR QL STRIP: NEGATIVE
KETONES UR STRIP-MCNC: NEGATIVE MG/DL
LEUKOCYTE ESTERASE UR QL STRIP: NEGATIVE
LYMPHOCYTES # BLD: 3.1 K/MCL (ref 1–4)
LYMPHOCYTES NFR BLD: 45 %
MCH RBC QN AUTO: 29.5 PG (ref 26–34)
MCHC RBC AUTO-ENTMCNC: 32.3 G/DL (ref 32–36.5)
MCV RBC AUTO: 91.1 FL (ref 78–100)
MONOCYTES # BLD: 0.6 K/MCL (ref 0.3–0.9)
MONOCYTES NFR BLD: 9 %
NEUTROPHILS # BLD: 3 K/MCL (ref 1.8–7.7)
NEUTROPHILS NFR BLD: 44 %
NITRITE UR QL STRIP: NEGATIVE
PH UR STRIP: 7 [PH] (ref 5–7)
PLATELET # BLD AUTO: 183 K/MCL (ref 140–450)
PROT UR STRIP-MCNC: NEGATIVE MG/DL
RBC # BLD: 4.38 MIL/MCL (ref 4–5.2)
SP GR UR STRIP: 1.01 (ref 1–1.03)
UROBILINOGEN UR STRIP-MCNC: 0.2 MG/DL
WBC # BLD: 6.9 K/MCL (ref 4.2–11)

## 2022-01-19 PROCEDURE — 99214 OFFICE O/P EST MOD 30 MIN: CPT | Performed by: INTERNAL MEDICINE

## 2022-01-19 PROCEDURE — 83690 ASSAY OF LIPASE: CPT | Performed by: CLINICAL MEDICAL LABORATORY

## 2022-01-19 PROCEDURE — 92014 COMPRE OPH EXAM EST PT 1/>: CPT | Performed by: OPTOMETRIST

## 2022-01-19 PROCEDURE — 80053 COMPREHEN METABOLIC PANEL: CPT | Performed by: CLINICAL MEDICAL LABORATORY

## 2022-01-19 PROCEDURE — 36415 COLL VENOUS BLD VENIPUNCTURE: CPT | Performed by: INTERNAL MEDICINE

## 2022-01-19 PROCEDURE — 85025 COMPLETE CBC W/AUTO DIFF WBC: CPT | Performed by: INTERNAL MEDICINE

## 2022-01-19 PROCEDURE — 71046 X-RAY EXAM CHEST 2 VIEWS: CPT | Performed by: RADIOLOGY

## 2022-01-19 PROCEDURE — 81003 URINALYSIS AUTO W/O SCOPE: CPT | Performed by: INTERNAL MEDICINE

## 2022-01-19 RX ORDER — HYDROCODONE BITARTRATE AND ACETAMINOPHEN 5; 325 MG/1; MG/1
1 TABLET ORAL EVERY 6 HOURS PRN
Qty: 20 TABLET | Refills: 0 | Status: SHIPPED | OUTPATIENT
Start: 2022-01-19

## 2022-01-19 ASSESSMENT — VISUAL ACUITY
METHOD: SNELLEN - LINEAR
OD_CC: 20/50+2
OS_CC: 20/25-2
OD_CC: 20/50+2
OS_CC: 20/20
OD_PH_CC: 20/40-2

## 2022-01-19 ASSESSMENT — REFRACTION_WEARINGRX
OS_AXIS: 175
OD_CYLINDER: +1.00
OD_SPHERE: -0.75
OS_SPHERE: -0.50
SPECS_TYPE: BIFOCAL
OD_AXIS: 180
OD_ADD: +2.25
OS_CYLINDER: +0.50
OS_ADD: +2.25

## 2022-01-19 ASSESSMENT — CUP TO DISC RATIO
OD_RATIO: 0.35
OS_RATIO: 0.3

## 2022-01-19 ASSESSMENT — EXTERNAL EXAM - RIGHT EYE: OD_EXAM: NORMAL

## 2022-01-19 ASSESSMENT — REFRACTION_MANIFEST
OS_SPHERE: -0.50
OS_CYLINDER: +0.50
OD_AXIS: 180
OD_ADD: +2.50
OD_SPHERE: -0.75
OD_CYLINDER: +1.00
OS_AXIS: 175
OS_ADD: +2.50

## 2022-01-19 ASSESSMENT — EXTERNAL EXAM - LEFT EYE: OS_EXAM: NORMAL

## 2022-01-19 ASSESSMENT — SLIT LAMP EXAM - LIDS
COMMENTS: NORMAL
COMMENTS: NORMAL

## 2022-01-19 ASSESSMENT — CONF VISUAL FIELD
OS_NORMAL: 1
OD_NORMAL: 1

## 2022-01-19 ASSESSMENT — TONOMETRY
OD_IOP_MMHG: 15
OS_IOP_MMHG: 15
IOP_METHOD: APPLANATION

## 2022-01-20 LAB
ALBUMIN SERPL-MCNC: 3.3 G/DL (ref 3.6–5.1)
ALBUMIN/GLOB SERPL: 1.1 {RATIO} (ref 1–2.4)
ALP SERPL-CCNC: 83 UNITS/L (ref 45–117)
ALT SERPL-CCNC: 24 UNITS/L
ANION GAP SERPL CALC-SCNC: 9 MMOL/L (ref 10–20)
AST SERPL-CCNC: 17 UNITS/L
BILIRUB SERPL-MCNC: 0.5 MG/DL (ref 0.2–1)
BUN SERPL-MCNC: 13 MG/DL (ref 6–20)
BUN/CREAT SERPL: 14 (ref 7–25)
CALCIUM SERPL-MCNC: 8.9 MG/DL (ref 8.4–10.2)
CHLORIDE SERPL-SCNC: 108 MMOL/L (ref 98–107)
CO2 SERPL-SCNC: 24 MMOL/L (ref 21–32)
CREAT SERPL-MCNC: 0.9 MG/DL (ref 0.51–0.95)
FASTING DURATION TIME PATIENT: ABNORMAL H
GFR SERPLBLD BASED ON 1.73 SQ M-ARVRAT: 67 ML/MIN
GLOBULIN SER-MCNC: 3.1 G/DL (ref 2–4)
GLUCOSE SERPL-MCNC: 277 MG/DL (ref 70–99)
LIPASE SERPL-CCNC: <50 UNITS/L (ref 73–393)
POTASSIUM SERPL-SCNC: 4.2 MMOL/L (ref 3.4–5.1)
PROT SERPL-MCNC: 6.4 G/DL (ref 6.4–8.2)
SODIUM SERPL-SCNC: 137 MMOL/L (ref 135–145)

## 2022-01-21 ENCOUNTER — TELEPHONE (OUTPATIENT)
Dept: INTERNAL MEDICINE | Age: 67
End: 2022-01-21

## 2022-01-21 DIAGNOSIS — G95.9 MYELOPATHY (CMD): ICD-10-CM

## 2022-01-21 DIAGNOSIS — M54.6 CHRONIC RIGHT-SIDED THORACIC BACK PAIN: ICD-10-CM

## 2022-01-21 DIAGNOSIS — G89.29 CHRONIC RADICULAR PAIN OF LOWER BACK: Primary | ICD-10-CM

## 2022-01-21 DIAGNOSIS — R32 URINARY INCONTINENCE IN FEMALE: ICD-10-CM

## 2022-01-21 DIAGNOSIS — M54.16 CHRONIC RADICULAR PAIN OF LOWER BACK: Primary | ICD-10-CM

## 2022-01-21 DIAGNOSIS — G89.29 CHRONIC RIGHT-SIDED THORACIC BACK PAIN: ICD-10-CM

## 2022-01-24 ENCOUNTER — OFFICE VISIT (OUTPATIENT)
Dept: HEMATOLOGY/ONCOLOGY | Age: 67
End: 2022-01-24
Attending: INTERNAL MEDICINE

## 2022-01-24 VITALS
TEMPERATURE: 97.8 F | BODY MASS INDEX: 29.67 KG/M2 | DIASTOLIC BLOOD PRESSURE: 63 MMHG | SYSTOLIC BLOOD PRESSURE: 129 MMHG | HEART RATE: 72 BPM | RESPIRATION RATE: 18 BRPM | OXYGEN SATURATION: 100 % | WEIGHT: 172.84 LBS

## 2022-01-24 DIAGNOSIS — D47.2 MGUS (MONOCLONAL GAMMOPATHY OF UNKNOWN SIGNIFICANCE): Primary | ICD-10-CM

## 2022-01-24 DIAGNOSIS — R89.9 ABNORMAL LABORATORY TEST RESULT: ICD-10-CM

## 2022-01-24 PROCEDURE — 99211 OFF/OP EST MAY X REQ PHY/QHP: CPT

## 2022-01-24 PROCEDURE — 99214 OFFICE O/P EST MOD 30 MIN: CPT | Performed by: INTERNAL MEDICINE

## 2022-01-24 ASSESSMENT — PAIN SCALES - GENERAL: PAINLEVEL: 4

## 2022-01-25 DIAGNOSIS — M54.16 CHRONIC RADICULAR PAIN OF LOWER BACK: Primary | ICD-10-CM

## 2022-01-25 DIAGNOSIS — M54.6 CHRONIC RIGHT-SIDED THORACIC BACK PAIN: ICD-10-CM

## 2022-01-25 DIAGNOSIS — G89.29 CHRONIC RIGHT-SIDED THORACIC BACK PAIN: ICD-10-CM

## 2022-01-25 DIAGNOSIS — G89.29 CHRONIC RADICULAR PAIN OF LOWER BACK: Primary | ICD-10-CM

## 2022-01-26 ENCOUNTER — TELEPHONE (OUTPATIENT)
Dept: ENDOCRINOLOGY | Age: 67
End: 2022-01-26

## 2022-01-27 ENCOUNTER — APPOINTMENT (OUTPATIENT)
Dept: ENDOCRINOLOGY | Age: 67
End: 2022-01-27

## 2022-02-08 ENCOUNTER — CLINICAL ABSTRACT (OUTPATIENT)
Dept: PHARMACY | Age: 67
End: 2022-02-08

## 2022-02-09 ENCOUNTER — TELEPHONE (OUTPATIENT)
Dept: ENDOCRINOLOGY | Age: 67
End: 2022-02-09

## 2022-02-09 ENCOUNTER — TELEPHONE (OUTPATIENT)
Dept: PHARMACY | Age: 67
End: 2022-02-09

## 2022-02-10 ENCOUNTER — TELEPHONE (OUTPATIENT)
Dept: EDUCATION SERVICES | Age: 67
End: 2022-02-10

## 2022-02-10 DIAGNOSIS — E10.65 TYPE 1 DIABETES MELLITUS WITH HYPERGLYCEMIA (CMD): ICD-10-CM

## 2022-02-10 RX ORDER — INSULIN DEGLUDEC INJECTION 100 U/ML
INJECTION, SOLUTION SUBCUTANEOUS
Qty: 30 ML | Refills: 3 | Status: SHIPPED | OUTPATIENT
Start: 2022-02-10 | End: 2022-10-06 | Stop reason: SDUPTHER

## 2022-02-14 ENCOUNTER — TELEPHONE (OUTPATIENT)
Dept: EDUCATION SERVICES | Age: 67
End: 2022-02-14

## 2022-02-14 DIAGNOSIS — E10.65 TYPE 1 DIABETES MELLITUS WITH HYPERGLYCEMIA (CMD): Primary | ICD-10-CM

## 2022-02-15 ENCOUNTER — TELEPHONE (OUTPATIENT)
Dept: EDUCATION SERVICES | Age: 67
End: 2022-02-15

## 2022-02-19 ENCOUNTER — LAB SERVICES (OUTPATIENT)
Dept: LAB | Age: 67
End: 2022-02-19

## 2022-02-19 DIAGNOSIS — E10.65 TYPE 1 DIABETES MELLITUS WITH HYPERGLYCEMIA (CMD): ICD-10-CM

## 2022-02-19 DIAGNOSIS — E03.9 ACQUIRED HYPOTHYROIDISM: ICD-10-CM

## 2022-02-19 LAB
CREAT SERPL-MCNC: 1.07 MG/DL (ref 0.51–0.95)
GFR SERPLBLD BASED ON 1.73 SQ M-ARVRAT: 54 ML/MIN
HBA1C MFR BLD: 8.2 % (ref 4.5–5.6)
TSH SERPL-ACNC: 0.39 MCUNITS/ML (ref 0.35–5)

## 2022-02-19 PROCEDURE — 84443 ASSAY THYROID STIM HORMONE: CPT | Performed by: CLINICAL MEDICAL LABORATORY

## 2022-02-19 PROCEDURE — 82565 ASSAY OF CREATININE: CPT | Performed by: CLINICAL MEDICAL LABORATORY

## 2022-02-19 PROCEDURE — 36415 COLL VENOUS BLD VENIPUNCTURE: CPT | Performed by: INTERNAL MEDICINE

## 2022-02-19 PROCEDURE — 83036 HEMOGLOBIN GLYCOSYLATED A1C: CPT | Performed by: CLINICAL MEDICAL LABORATORY

## 2022-02-21 ENCOUNTER — TELEPHONE (OUTPATIENT)
Dept: ENDOCRINOLOGY | Age: 67
End: 2022-02-21

## 2022-02-21 DIAGNOSIS — M81.8 OTHER OSTEOPOROSIS WITHOUT CURRENT PATHOLOGICAL FRACTURE: ICD-10-CM

## 2022-02-21 DIAGNOSIS — E13.9 LADA (LATENT AUTOIMMUNE DIABETES IN ADULTS), MANAGED AS TYPE 1 (CMD): ICD-10-CM

## 2022-02-21 DIAGNOSIS — E03.9 ACQUIRED HYPOTHYROIDISM: ICD-10-CM

## 2022-02-21 DIAGNOSIS — E10.65 TYPE 1 DIABETES MELLITUS WITH HYPERGLYCEMIA (CMD): Primary | ICD-10-CM

## 2022-02-25 ENCOUNTER — TELEPHONE (OUTPATIENT)
Dept: PULMONOLOGY | Age: 67
End: 2022-02-25

## 2022-02-28 ENCOUNTER — OFFICE VISIT (OUTPATIENT)
Dept: INTERNAL MEDICINE | Age: 67
End: 2022-02-28

## 2022-02-28 ENCOUNTER — OFFICE VISIT (OUTPATIENT)
Dept: EDUCATION SERVICES | Age: 67
End: 2022-02-28
Attending: INTERNAL MEDICINE

## 2022-02-28 VITALS
DIASTOLIC BLOOD PRESSURE: 60 MMHG | OXYGEN SATURATION: 99 % | WEIGHT: 177 LBS | BODY MASS INDEX: 30.38 KG/M2 | HEART RATE: 72 BPM | TEMPERATURE: 98.5 F | SYSTOLIC BLOOD PRESSURE: 110 MMHG

## 2022-02-28 DIAGNOSIS — H60.391 ACUTE INFECTIVE OTITIS EXTERNA, RIGHT: Primary | ICD-10-CM

## 2022-02-28 DIAGNOSIS — E10.65 TYPE 1 DIABETES MELLITUS WITH HYPERGLYCEMIA (CMD): Primary | ICD-10-CM

## 2022-02-28 DIAGNOSIS — Z12.11 SCREENING FOR COLON CANCER: ICD-10-CM

## 2022-02-28 DIAGNOSIS — R42 DIZZINESS: ICD-10-CM

## 2022-02-28 PROCEDURE — 99213 OFFICE O/P EST LOW 20 MIN: CPT | Performed by: INTERNAL MEDICINE

## 2022-02-28 PROCEDURE — G0108 DIAB MANAGE TRN  PER INDIV: HCPCS | Performed by: REGISTERED NURSE

## 2022-02-28 RX ORDER — MECLIZINE HCL 12.5 MG/1
TABLET ORAL
Qty: 30 TABLET | Refills: 0 | Status: SHIPPED | OUTPATIENT
Start: 2022-02-28 | End: 2022-12-15

## 2022-03-07 ENCOUNTER — CLINICAL ABSTRACT (OUTPATIENT)
Dept: PHARMACY | Age: 67
End: 2022-03-07

## 2022-03-08 ENCOUNTER — TELEPHONE (OUTPATIENT)
Dept: PHARMACY | Age: 67
End: 2022-03-08

## 2022-04-04 ENCOUNTER — TELEPHONE (OUTPATIENT)
Dept: EDUCATION SERVICES | Age: 67
End: 2022-04-04

## 2022-04-04 ENCOUNTER — E-ADVICE (OUTPATIENT)
Dept: EDUCATION SERVICES | Age: 67
End: 2022-04-04

## 2022-04-05 ENCOUNTER — CLINICAL ABSTRACT (OUTPATIENT)
Dept: PHARMACY | Age: 67
End: 2022-04-05

## 2022-04-06 ENCOUNTER — TELEPHONE (OUTPATIENT)
Dept: PHARMACY | Age: 67
End: 2022-04-06

## 2022-04-20 ENCOUNTER — TELEPHONE (OUTPATIENT)
Dept: EDUCATION SERVICES | Age: 67
End: 2022-04-20

## 2022-04-21 ENCOUNTER — TELEPHONE (OUTPATIENT)
Dept: EDUCATION SERVICES | Age: 67
End: 2022-04-21

## 2022-04-30 ENCOUNTER — PREP FOR CASE (OUTPATIENT)
Dept: GASTROENTEROLOGY | Age: 67
End: 2022-04-30

## 2022-05-03 ENCOUNTER — CLINICAL ABSTRACT (OUTPATIENT)
Dept: PHARMACY | Age: 67
End: 2022-05-03

## 2022-05-03 DIAGNOSIS — M81.8 OTHER OSTEOPOROSIS WITHOUT CURRENT PATHOLOGICAL FRACTURE: ICD-10-CM

## 2022-05-03 RX ORDER — TERIPARATIDE 250 UG/ML
20 INJECTION, SOLUTION SUBCUTANEOUS NIGHTLY
Qty: 7.5 ML | Refills: 3 | Status: SHIPPED | OUTPATIENT
Start: 2022-05-03 | End: 2022-10-06 | Stop reason: SDUPTHER

## 2022-05-09 ENCOUNTER — APPOINTMENT (OUTPATIENT)
Dept: ENDOCRINOLOGY | Age: 67
End: 2022-05-09

## 2022-05-10 ENCOUNTER — OFFICE VISIT (OUTPATIENT)
Dept: PULMONOLOGY | Age: 67
End: 2022-05-10

## 2022-05-10 VITALS
HEART RATE: 78 BPM | DIASTOLIC BLOOD PRESSURE: 72 MMHG | RESPIRATION RATE: 12 BRPM | OXYGEN SATURATION: 97 % | TEMPERATURE: 98.3 F | SYSTOLIC BLOOD PRESSURE: 110 MMHG | BODY MASS INDEX: 30.86 KG/M2 | WEIGHT: 179.8 LBS

## 2022-05-10 DIAGNOSIS — G47.33 OBSTRUCTIVE SLEEP APNEA ON CPAP: Primary | ICD-10-CM

## 2022-05-10 PROCEDURE — 99214 OFFICE O/P EST MOD 30 MIN: CPT | Performed by: NURSE PRACTITIONER

## 2022-05-10 ASSESSMENT — SLEEP AND FATIGUE QUESTIONNAIRES
ESS TOTAL SCORE: 3
HOW LIKELY ARE YOU TO NOD OFF OR FALL ASLEEP WHILE WATCHING TV: 0
HOW LIKELY ARE YOU TO NOD OFF OR FALL ASLEEP IN A CAR, WHILE STOPPED FOR A FEW MINUTES IN TRAFFIC: 0
HOW LIKELY ARE YOU TO NOD OFF OR FALL ASLEEP WHILE SITTING AND READING: 0
HOW LIKELY ARE YOU TO NOD OFF OR FALL ASLEEP WHEN YOU ARE A PASSENGER IN A CAR FOR AN HOUR WITHOUT A BREAK: 0
HOW LIKELY ARE YOU TO NOD OFF OR FALL ASLEEP WHILE SITTING AND TALKING TO SOMEONE: 0
HOW LIKELY ARE YOU TO NOD OFF OR FALL ASLEEP WHILE SITTING QUIETLY AFTER LUNCH WITHOUT ALCOHOL: 0
HOW LIKELY ARE YOU TO NOD OFF OR FALL ASLEEP WHILE LYING DOWN TO REST IN THE AFTERNOON WHEN CIRCUMSTANCES PERMIT: 3
HOW LIKELY ARE YOU TO NOD OFF OR FALL ASLEEP WHILE SITTING INACTIVE IN A PUBLIC PLACE: 0

## 2022-05-11 ENCOUNTER — TELEPHONE (OUTPATIENT)
Dept: PHARMACY | Age: 67
End: 2022-05-11

## 2022-05-11 DIAGNOSIS — M81.8 OTHER OSTEOPOROSIS WITHOUT CURRENT PATHOLOGICAL FRACTURE: ICD-10-CM

## 2022-05-11 RX ORDER — PEN NEEDLE, DIABETIC 30 GX3/16"
NEEDLE, DISPOSABLE MISCELLANEOUS
Qty: 100 EACH | Refills: 3 | Status: CANCELLED | OUTPATIENT
Start: 2022-05-11

## 2022-06-03 ENCOUNTER — CLINICAL ABSTRACT (OUTPATIENT)
Dept: PHARMACY | Age: 67
End: 2022-06-03

## 2022-06-06 ENCOUNTER — TELEPHONE (OUTPATIENT)
Dept: PHARMACY | Age: 67
End: 2022-06-06

## 2022-06-06 DIAGNOSIS — M81.8 OTHER OSTEOPOROSIS WITHOUT CURRENT PATHOLOGICAL FRACTURE: ICD-10-CM

## 2022-06-06 RX ORDER — PEN NEEDLE, DIABETIC 30 GX3/16"
NEEDLE, DISPOSABLE MISCELLANEOUS
Qty: 100 EACH | Refills: 3 | Status: SHIPPED | OUTPATIENT
Start: 2022-06-06

## 2022-06-10 ENCOUNTER — TELEPHONE (OUTPATIENT)
Dept: PHARMACY | Age: 67
End: 2022-06-10

## 2022-06-16 ENCOUNTER — TELEPHONE (OUTPATIENT)
Dept: ENDOCRINOLOGY | Age: 67
End: 2022-06-16

## 2022-07-06 ENCOUNTER — CLINICAL ABSTRACT (OUTPATIENT)
Dept: PHARMACY | Age: 67
End: 2022-07-06

## 2022-07-06 ENCOUNTER — TELEPHONE (OUTPATIENT)
Dept: PULMONOLOGY | Age: 67
End: 2022-07-06

## 2022-07-08 ENCOUNTER — TELEPHONE (OUTPATIENT)
Dept: PHARMACY | Age: 67
End: 2022-07-08

## 2022-07-11 ENCOUNTER — TELEPHONE (OUTPATIENT)
Dept: PULMONOLOGY | Age: 67
End: 2022-07-11

## 2022-07-11 DIAGNOSIS — G47.33 OBSTRUCTIVE SLEEP APNEA ON CPAP: Primary | ICD-10-CM

## 2022-07-25 ENCOUNTER — TELEPHONE (OUTPATIENT)
Dept: EDUCATION SERVICES | Age: 67
End: 2022-07-25

## 2022-08-04 ENCOUNTER — CLINICAL ABSTRACT (OUTPATIENT)
Dept: PHARMACY | Age: 67
End: 2022-08-04

## 2022-08-08 ENCOUNTER — TELEPHONE (OUTPATIENT)
Dept: PHARMACY | Age: 67
End: 2022-08-08

## 2022-08-10 ENCOUNTER — TELEPHONE (OUTPATIENT)
Dept: PHARMACY | Age: 67
End: 2022-08-10

## 2022-08-22 ENCOUNTER — APPOINTMENT (OUTPATIENT)
Dept: EDUCATION SERVICES | Age: 67
End: 2022-08-22

## 2022-08-30 ENCOUNTER — HOSPITAL ENCOUNTER (EMERGENCY)
Age: 67
Discharge: HOME | End: 2022-08-30

## 2022-08-30 ENCOUNTER — CLINICAL ABSTRACT (OUTPATIENT)
Dept: PHARMACY | Age: 67
End: 2022-08-30

## 2022-08-30 VITALS
SYSTOLIC BLOOD PRESSURE: 143 MMHG | OXYGEN SATURATION: 97 % | TEMPERATURE: 97.2 F | DIASTOLIC BLOOD PRESSURE: 64 MMHG | BODY MASS INDEX: 30.65 KG/M2 | HEART RATE: 83 BPM | RESPIRATION RATE: 16 BRPM | WEIGHT: 178.57 LBS

## 2022-08-30 DIAGNOSIS — Z51.89 VISIT FOR WOUND CHECK: Primary | ICD-10-CM

## 2022-08-30 PROCEDURE — 99281 EMR DPT VST MAYX REQ PHY/QHP: CPT | Performed by: EMERGENCY MEDICINE

## 2022-08-30 PROCEDURE — 99282 EMERGENCY DEPT VISIT SF MDM: CPT

## 2022-08-30 ASSESSMENT — PAIN DESCRIPTION - PAIN TYPE: TYPE: ACUTE PAIN

## 2022-08-30 ASSESSMENT — PAIN SCALES - GENERAL: PAINLEVEL_OUTOF10: 4

## 2022-09-01 ENCOUNTER — TELEPHONE (OUTPATIENT)
Dept: PHARMACY | Age: 67
End: 2022-09-01

## 2022-09-21 ENCOUNTER — TELEPHONE (OUTPATIENT)
Dept: ENDOCRINOLOGY | Age: 67
End: 2022-09-21

## 2022-09-22 ENCOUNTER — TELEPHONE (OUTPATIENT)
Dept: PULMONOLOGY | Age: 67
End: 2022-09-22

## 2022-09-23 ENCOUNTER — TELEPHONE (OUTPATIENT)
Dept: ENDOCRINOLOGY | Age: 67
End: 2022-09-23

## 2022-09-23 DIAGNOSIS — E10.65 TYPE 1 DIABETES MELLITUS WITH HYPERGLYCEMIA (CMD): ICD-10-CM

## 2022-09-23 RX ORDER — INSULIN LISPRO 100 [IU]/ML
INJECTION, SOLUTION INTRAVENOUS; SUBCUTANEOUS
Qty: 45 ML | Refills: 3 | Status: SHIPPED | OUTPATIENT
Start: 2022-09-23 | End: 2022-10-06 | Stop reason: DRUGHIGH

## 2022-09-30 ENCOUNTER — CLINICAL ABSTRACT (OUTPATIENT)
Dept: PHARMACY | Age: 67
End: 2022-09-30

## 2022-10-05 ENCOUNTER — TELEPHONE (OUTPATIENT)
Dept: PHARMACY | Age: 67
End: 2022-10-05

## 2022-10-05 ENCOUNTER — WALK IN (OUTPATIENT)
Dept: URGENT CARE | Age: 67
End: 2022-10-05

## 2022-10-05 ENCOUNTER — TELEPHONE (OUTPATIENT)
Dept: URGENT CARE | Age: 67
End: 2022-10-05

## 2022-10-05 ENCOUNTER — APPOINTMENT (OUTPATIENT)
Dept: LAB | Age: 67
End: 2022-10-05

## 2022-10-05 ENCOUNTER — LAB SERVICES (OUTPATIENT)
Dept: LAB | Age: 67
End: 2022-10-05

## 2022-10-05 VITALS
HEIGHT: 64 IN | TEMPERATURE: 97.6 F | BODY MASS INDEX: 32.44 KG/M2 | RESPIRATION RATE: 28 BRPM | HEART RATE: 85 BPM | WEIGHT: 190 LBS | SYSTOLIC BLOOD PRESSURE: 123 MMHG | DIASTOLIC BLOOD PRESSURE: 58 MMHG | OXYGEN SATURATION: 95 %

## 2022-10-05 DIAGNOSIS — R32 URINARY INCONTINENCE, UNSPECIFIED TYPE: ICD-10-CM

## 2022-10-05 DIAGNOSIS — N30.01 ACUTE CYSTITIS WITH HEMATURIA: ICD-10-CM

## 2022-10-05 DIAGNOSIS — R39.9 GU (GENITOURINARY) SYMPTOMS: Primary | ICD-10-CM

## 2022-10-05 DIAGNOSIS — R53.1 WEAKNESS: ICD-10-CM

## 2022-10-05 DIAGNOSIS — M54.50 ACUTE LOW BACK PAIN, UNSPECIFIED BACK PAIN LATERALITY, UNSPECIFIED WHETHER SCIATICA PRESENT: ICD-10-CM

## 2022-10-05 DIAGNOSIS — E10.65 TYPE 1 DIABETES MELLITUS WITH HYPERGLYCEMIA (CMD): ICD-10-CM

## 2022-10-05 LAB
ALBUMIN SERPL-MCNC: 3.3 G/DL (ref 3.6–5.1)
ALBUMIN/GLOB SERPL: 0.9 {RATIO} (ref 1–2.4)
ALP SERPL-CCNC: 115 UNITS/L (ref 45–117)
ALT SERPL-CCNC: 22 UNITS/L
ANION GAP SERPL CALC-SCNC: 17 MMOL/L (ref 7–19)
APPEARANCE UR: ABNORMAL
AST SERPL-CCNC: 27 UNITS/L
BACTERIA #/AREA URNS HPF: ABNORMAL /HPF
BASOPHILS # BLD: 0 K/MCL (ref 0–0.3)
BASOPHILS NFR BLD: 0 %
BILIRUB SERPL-MCNC: 0.6 MG/DL (ref 0.2–1)
BILIRUB UR QL STRIP: NEGATIVE
BUN SERPL-MCNC: 22 MG/DL (ref 6–20)
BUN/CREAT SERPL: 22 (ref 7–25)
CALCIUM SERPL-MCNC: 9.7 MG/DL (ref 8.4–10.2)
CHLORIDE SERPL-SCNC: 99 MMOL/L (ref 97–110)
CO2 SERPL-SCNC: 25 MMOL/L (ref 21–32)
COLOR UR: YELLOW
CREAT SERPL-MCNC: 1 MG/DL (ref 0.51–0.95)
DEPRECATED RDW RBC: 40.2 FL (ref 39–50)
EOSINOPHIL # BLD: 0.2 K/MCL (ref 0–0.5)
EOSINOPHIL NFR BLD: 2 %
ERYTHROCYTE [DISTWIDTH] IN BLOOD: 12.3 % (ref 11–15)
FASTING DURATION TIME PATIENT: ABNORMAL H
GFR SERPLBLD BASED ON 1.73 SQ M-ARVRAT: 62 ML/MIN
GLOBULIN SER-MCNC: 3.6 G/DL (ref 2–4)
GLUCOSE SERPL-MCNC: >500 MG/DL (ref 70–99)
GLUCOSE UR STRIP-MCNC: >1000 MG/DL
HCT VFR BLD CALC: 42.5 % (ref 36–46.5)
HGB BLD-MCNC: 14.3 G/DL (ref 12–15.5)
HGB UR QL STRIP: ABNORMAL
HYALINE CASTS #/AREA URNS LPF: ABNORMAL /LPF
IMM GRANULOCYTES # BLD AUTO: 0 K/MCL (ref 0–0.2)
IMM GRANULOCYTES # BLD: 0 %
KETONES UR STRIP-MCNC: 15 MG/DL
LEUKOCYTE ESTERASE UR QL STRIP: ABNORMAL
LYMPHOCYTES # BLD: 2.8 K/MCL (ref 1–4)
LYMPHOCYTES NFR BLD: 29 %
MCH RBC QN AUTO: 29.5 PG (ref 26–34)
MCHC RBC AUTO-ENTMCNC: 33.6 G/DL (ref 32–36.5)
MCV RBC AUTO: 87.8 FL (ref 78–100)
MONOCYTES # BLD: 0.5 K/MCL (ref 0.3–0.9)
MONOCYTES NFR BLD: 5 %
NEUTROPHILS # BLD: 6.2 K/MCL (ref 1.8–7.7)
NEUTROPHILS NFR BLD: 64 %
NITRITE UR QL STRIP: POSITIVE
PH UR STRIP: 5.5 [PH] (ref 5–7)
PLATELET # BLD AUTO: 225 K/MCL (ref 140–450)
POTASSIUM SERPL-SCNC: 4.6 MMOL/L (ref 3.4–5.1)
PROT SERPL-MCNC: 6.9 G/DL (ref 6.4–8.2)
PROT UR STRIP-MCNC: NEGATIVE MG/DL
RBC # BLD: 4.84 MIL/MCL (ref 4–5.2)
RBC #/AREA URNS HPF: ABNORMAL /HPF
SODIUM SERPL-SCNC: 136 MMOL/L (ref 135–145)
SP GR UR STRIP: 1.01 (ref 1–1.03)
SQUAMOUS #/AREA URNS HPF: ABNORMAL /HPF
UROBILINOGEN UR STRIP-MCNC: 0.2 MG/DL
WBC # BLD: 9.9 K/MCL (ref 4.2–11)
WBC #/AREA URNS HPF: >100 /HPF

## 2022-10-05 PROCEDURE — 36415 COLL VENOUS BLD VENIPUNCTURE: CPT | Performed by: INTERNAL MEDICINE

## 2022-10-05 PROCEDURE — 87077 CULTURE AEROBIC IDENTIFY: CPT | Performed by: CLINICAL MEDICAL LABORATORY

## 2022-10-05 PROCEDURE — 99215 OFFICE O/P EST HI 40 MIN: CPT | Performed by: NURSE PRACTITIONER

## 2022-10-05 PROCEDURE — 87186 SC STD MICRODIL/AGAR DIL: CPT | Performed by: CLINICAL MEDICAL LABORATORY

## 2022-10-05 PROCEDURE — 80053 COMPREHEN METABOLIC PANEL: CPT | Performed by: INTERNAL MEDICINE

## 2022-10-05 PROCEDURE — 87086 URINE CULTURE/COLONY COUNT: CPT | Performed by: CLINICAL MEDICAL LABORATORY

## 2022-10-05 PROCEDURE — 96372 THER/PROPH/DIAG INJ SC/IM: CPT | Performed by: NURSE PRACTITIONER

## 2022-10-05 PROCEDURE — 85025 COMPLETE CBC W/AUTO DIFF WBC: CPT | Performed by: INTERNAL MEDICINE

## 2022-10-05 PROCEDURE — 81001 URINALYSIS AUTO W/SCOPE: CPT | Performed by: INTERNAL MEDICINE

## 2022-10-05 RX ORDER — CEFDINIR 300 MG/1
300 CAPSULE ORAL 2 TIMES DAILY
Qty: 14 CAPSULE | Refills: 0 | Status: SHIPPED | OUTPATIENT
Start: 2022-10-05 | End: 2022-10-12

## 2022-10-06 ENCOUNTER — CLINICAL ABSTRACT (OUTPATIENT)
Dept: PHARMACY | Age: 67
End: 2022-10-06

## 2022-10-06 ENCOUNTER — TELEPHONE (OUTPATIENT)
Dept: ENDOCRINOLOGY | Age: 67
End: 2022-10-06

## 2022-10-06 ENCOUNTER — OFFICE VISIT (OUTPATIENT)
Dept: ENDOCRINOLOGY | Age: 67
End: 2022-10-06

## 2022-10-06 VITALS
DIASTOLIC BLOOD PRESSURE: 66 MMHG | OXYGEN SATURATION: 94 % | RESPIRATION RATE: 18 BRPM | HEART RATE: 84 BPM | SYSTOLIC BLOOD PRESSURE: 108 MMHG

## 2022-10-06 DIAGNOSIS — E11.3299 BACKGROUND DIABETIC RETINOPATHY (CMD): ICD-10-CM

## 2022-10-06 DIAGNOSIS — E03.9 ACQUIRED HYPOTHYROIDISM: ICD-10-CM

## 2022-10-06 DIAGNOSIS — N18.31 TYPE 1 DIABETES MELLITUS WITH STAGE 3A CHRONIC KIDNEY DISEASE (CMD): ICD-10-CM

## 2022-10-06 DIAGNOSIS — E78.5 TYPE 1 DIABETES MELLITUS WITH HYPERLIPIDEMIA (CMD): ICD-10-CM

## 2022-10-06 DIAGNOSIS — E10.22 TYPE 1 DIABETES MELLITUS WITH STAGE 3A CHRONIC KIDNEY DISEASE (CMD): ICD-10-CM

## 2022-10-06 DIAGNOSIS — E10.42 TYPE 1 DIABETES MELLITUS WITH DIABETIC POLYNEUROPATHY (CMD): ICD-10-CM

## 2022-10-06 DIAGNOSIS — M81.8 OTHER OSTEOPOROSIS WITHOUT CURRENT PATHOLOGICAL FRACTURE: ICD-10-CM

## 2022-10-06 DIAGNOSIS — E10.65 TYPE 1 DIABETES MELLITUS WITH HYPERGLYCEMIA (CMD): Primary | ICD-10-CM

## 2022-10-06 DIAGNOSIS — E13.9 LADA (LATENT AUTOIMMUNE DIABETES IN ADULTS), MANAGED AS TYPE 1 (CMD): ICD-10-CM

## 2022-10-06 DIAGNOSIS — E10.69 TYPE 1 DIABETES MELLITUS WITH HYPERLIPIDEMIA (CMD): ICD-10-CM

## 2022-10-06 LAB — HBA1C MFR BLD: 10.4 % (ref 4.5–5.6)

## 2022-10-06 PROCEDURE — 83036 HEMOGLOBIN GLYCOSYLATED A1C: CPT | Performed by: INTERNAL MEDICINE

## 2022-10-06 PROCEDURE — 99214 OFFICE O/P EST MOD 30 MIN: CPT | Performed by: INTERNAL MEDICINE

## 2022-10-06 PROCEDURE — 95251 CONT GLUC MNTR ANALYSIS I&R: CPT | Performed by: INTERNAL MEDICINE

## 2022-10-06 PROCEDURE — 36415 COLL VENOUS BLD VENIPUNCTURE: CPT | Performed by: INTERNAL MEDICINE

## 2022-10-06 RX ORDER — INSULIN LISPRO 100 [IU]/ML
INJECTION, SOLUTION INTRAVENOUS; SUBCUTANEOUS
Qty: 45 ML | Refills: 3 | Status: SHIPPED | OUTPATIENT
Start: 2022-10-06 | End: 2022-10-20 | Stop reason: SDUPTHER

## 2022-10-06 RX ORDER — TERIPARATIDE 250 UG/ML
20 INJECTION, SOLUTION SUBCUTANEOUS NIGHTLY
Qty: 7.5 ML | Refills: 3 | Status: CANCELLED | OUTPATIENT
Start: 2022-10-06

## 2022-10-06 RX ORDER — EZETIMIBE 10 MG/1
10 TABLET ORAL DAILY
Qty: 90 TABLET | Refills: 1 | Status: SHIPPED | OUTPATIENT
Start: 2022-10-06 | End: 2023-02-23

## 2022-10-06 RX ORDER — TERIPARATIDE 250 UG/ML
20 INJECTION, SOLUTION SUBCUTANEOUS NIGHTLY
Qty: 7.5 ML | Refills: 3 | Status: SHIPPED | OUTPATIENT
Start: 2022-10-06 | End: 2023-04-27 | Stop reason: SDUPTHER

## 2022-10-06 RX ORDER — INSULIN DEGLUDEC INJECTION 100 U/ML
INJECTION, SOLUTION SUBCUTANEOUS
Qty: 30 ML | Refills: 3 | Status: SHIPPED | OUTPATIENT
Start: 2022-10-06 | End: 2022-12-15 | Stop reason: DRUGHIGH

## 2022-10-07 ENCOUNTER — TELEPHONE (OUTPATIENT)
Dept: URGENT CARE | Age: 67
End: 2022-10-07

## 2022-10-08 ENCOUNTER — TELEPHONE (OUTPATIENT)
Dept: URGENT CARE | Age: 67
End: 2022-10-08

## 2022-10-08 LAB — BACTERIA UR CULT: ABNORMAL

## 2022-10-10 ENCOUNTER — CLINICAL ABSTRACT (OUTPATIENT)
Dept: PHARMACY | Age: 67
End: 2022-10-10

## 2022-10-12 ENCOUNTER — CLINICAL ABSTRACT (OUTPATIENT)
Dept: PHARMACY | Age: 67
End: 2022-10-12

## 2022-10-17 ENCOUNTER — TELEPHONE (OUTPATIENT)
Dept: PHARMACY | Age: 67
End: 2022-10-17

## 2022-10-20 ENCOUNTER — TELEPHONE (OUTPATIENT)
Dept: ENDOCRINOLOGY | Age: 67
End: 2022-10-20

## 2022-10-20 DIAGNOSIS — E10.22 TYPE 1 DIABETES MELLITUS WITH STAGE 3A CHRONIC KIDNEY DISEASE (CMD): ICD-10-CM

## 2022-10-20 DIAGNOSIS — E10.65 TYPE 1 DIABETES MELLITUS WITH HYPERGLYCEMIA (CMD): ICD-10-CM

## 2022-10-20 DIAGNOSIS — E10.69 TYPE 1 DIABETES MELLITUS WITH HYPERLIPIDEMIA (CMD): ICD-10-CM

## 2022-10-20 DIAGNOSIS — E10.42 TYPE 1 DIABETES MELLITUS WITH DIABETIC POLYNEUROPATHY (CMD): ICD-10-CM

## 2022-10-20 DIAGNOSIS — E03.9 ACQUIRED HYPOTHYROIDISM: ICD-10-CM

## 2022-10-20 DIAGNOSIS — N18.31 TYPE 1 DIABETES MELLITUS WITH STAGE 3A CHRONIC KIDNEY DISEASE (CMD): ICD-10-CM

## 2022-10-20 DIAGNOSIS — E11.3299 BACKGROUND DIABETIC RETINOPATHY (CMD): ICD-10-CM

## 2022-10-20 DIAGNOSIS — E78.5 TYPE 1 DIABETES MELLITUS WITH HYPERLIPIDEMIA (CMD): ICD-10-CM

## 2022-10-20 DIAGNOSIS — E13.9 LADA (LATENT AUTOIMMUNE DIABETES IN ADULTS), MANAGED AS TYPE 1 (CMD): ICD-10-CM

## 2022-10-20 DIAGNOSIS — M81.8 OTHER OSTEOPOROSIS WITHOUT CURRENT PATHOLOGICAL FRACTURE: ICD-10-CM

## 2022-10-20 RX ORDER — INSULIN LISPRO 100 [IU]/ML
INJECTION, SOLUTION INTRAVENOUS; SUBCUTANEOUS
Qty: 45 ML | Refills: 3 | Status: SHIPPED | OUTPATIENT
Start: 2022-10-20 | End: 2022-12-15 | Stop reason: DRUGHIGH

## 2022-10-24 ENCOUNTER — TELEPHONE (OUTPATIENT)
Dept: PHARMACY | Age: 67
End: 2022-10-24

## 2022-10-24 ENCOUNTER — APPOINTMENT (OUTPATIENT)
Dept: CT IMAGING | Age: 67
End: 2022-10-24
Attending: EMERGENCY MEDICINE

## 2022-10-24 ENCOUNTER — HOSPITAL ENCOUNTER (EMERGENCY)
Age: 67
Discharge: HOME | End: 2022-10-24
Attending: EMERGENCY MEDICINE

## 2022-10-24 VITALS
DIASTOLIC BLOOD PRESSURE: 55 MMHG | BODY MASS INDEX: 34.8 KG/M2 | HEART RATE: 71 BPM | OXYGEN SATURATION: 93 % | SYSTOLIC BLOOD PRESSURE: 110 MMHG | TEMPERATURE: 96.5 F | HEIGHT: 63 IN | WEIGHT: 196.43 LBS | RESPIRATION RATE: 18 BRPM
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
## 2022-10-24 DIAGNOSIS — S09.90XA CLOSED HEAD INJURY WITHOUT LOSS OF CONSCIOUSNESS, INITIAL ENCOUNTER: ICD-10-CM
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
## 2022-10-24 PROCEDURE — G1004 CDSM NDSC: HCPCS | Performed by: RADIOLOGY

## 2022-10-24 PROCEDURE — 99284 EMERGENCY DEPT VISIT MOD MDM: CPT | Performed by: PHYSICIAN ASSISTANT

## 2022-10-24 PROCEDURE — G1004 CDSM NDSC: HCPCS

## 2022-10-24 PROCEDURE — 70450 CT HEAD/BRAIN W/O DYE: CPT

## 2022-10-24 PROCEDURE — 70450 CT HEAD/BRAIN W/O DYE: CPT | Performed by: RADIOLOGY

## 2022-10-24 PROCEDURE — 99283 EMERGENCY DEPT VISIT LOW MDM: CPT

## 2022-10-24 ASSESSMENT — PAIN SCALES - GENERAL: PAINLEVEL_OUTOF10: 4

## 2022-10-24 ASSESSMENT — PAIN DESCRIPTION - PAIN TYPE: TYPE: ACUTE PAIN

## 2022-10-27 ENCOUNTER — APPOINTMENT (OUTPATIENT)
Dept: EDUCATION SERVICES | Age: 67
End: 2022-10-27

## 2022-11-14 ENCOUNTER — TELEPHONE (OUTPATIENT)
Dept: ENDOCRINOLOGY | Age: 67
End: 2022-11-14

## 2022-12-02 ENCOUNTER — TELEPHONE (OUTPATIENT)
Dept: ENDOCRINOLOGY | Age: 67
End: 2022-12-02

## 2022-12-02 DIAGNOSIS — E10.65 TYPE 1 DIABETES MELLITUS WITH HYPERGLYCEMIA (CMD): Primary | ICD-10-CM

## 2022-12-15 ENCOUNTER — TELEPHONE (OUTPATIENT)
Dept: ENDOCRINOLOGY | Age: 67
End: 2022-12-15

## 2022-12-15 ENCOUNTER — OFFICE VISIT (OUTPATIENT)
Dept: ENDOCRINOLOGY | Age: 67
End: 2022-12-15

## 2022-12-15 VITALS
BODY MASS INDEX: 33.86 KG/M2 | OXYGEN SATURATION: 98 % | WEIGHT: 191.14 LBS | SYSTOLIC BLOOD PRESSURE: 126 MMHG | HEART RATE: 86 BPM | DIASTOLIC BLOOD PRESSURE: 66 MMHG

## 2022-12-15 DIAGNOSIS — E11.3299 BACKGROUND DIABETIC RETINOPATHY (CMD): ICD-10-CM

## 2022-12-15 DIAGNOSIS — E78.5 TYPE 1 DIABETES MELLITUS WITH HYPERLIPIDEMIA (CMD): ICD-10-CM

## 2022-12-15 DIAGNOSIS — Z87.81 HISTORY OF FRACTURE OF LEFT HIP: ICD-10-CM

## 2022-12-15 DIAGNOSIS — E10.42 TYPE 1 DIABETES MELLITUS WITH DIABETIC POLYNEUROPATHY (CMD): ICD-10-CM

## 2022-12-15 DIAGNOSIS — E10.22 TYPE 1 DIABETES MELLITUS WITH STAGE 3A CHRONIC KIDNEY DISEASE (CMD): ICD-10-CM

## 2022-12-15 DIAGNOSIS — M81.8 OTHER OSTEOPOROSIS WITHOUT CURRENT PATHOLOGICAL FRACTURE: ICD-10-CM

## 2022-12-15 DIAGNOSIS — E03.9 ACQUIRED HYPOTHYROIDISM: ICD-10-CM

## 2022-12-15 DIAGNOSIS — E10.69 TYPE 1 DIABETES MELLITUS WITH HYPERLIPIDEMIA (CMD): ICD-10-CM

## 2022-12-15 DIAGNOSIS — N18.31 TYPE 1 DIABETES MELLITUS WITH STAGE 3A CHRONIC KIDNEY DISEASE (CMD): ICD-10-CM

## 2022-12-15 DIAGNOSIS — Z79.899 ENCOUNTER FOR MONITORING TERIPARATIDE THERAPY: ICD-10-CM

## 2022-12-15 DIAGNOSIS — Z51.81 ENCOUNTER FOR MONITORING TERIPARATIDE THERAPY: ICD-10-CM

## 2022-12-15 DIAGNOSIS — E13.9 LADA (LATENT AUTOIMMUNE DIABETES IN ADULTS), MANAGED AS TYPE 1 (CMD): ICD-10-CM

## 2022-12-15 DIAGNOSIS — E10.65 TYPE 1 DIABETES MELLITUS WITH HYPERGLYCEMIA (CMD): Primary | ICD-10-CM

## 2022-12-15 PROCEDURE — 95251 CONT GLUC MNTR ANALYSIS I&R: CPT | Performed by: INTERNAL MEDICINE

## 2022-12-15 PROCEDURE — 99214 OFFICE O/P EST MOD 30 MIN: CPT | Performed by: INTERNAL MEDICINE

## 2022-12-15 RX ORDER — INSULIN DEGLUDEC INJECTION 100 U/ML
28 INJECTION, SOLUTION SUBCUTANEOUS NIGHTLY
Qty: 30 ML | Refills: 3 | Status: SHIPPED | OUTPATIENT
Start: 2022-12-15 | End: 2023-01-12 | Stop reason: DRUGHIGH

## 2022-12-15 RX ORDER — INSULIN DEGLUDEC INJECTION 100 U/ML
28 INJECTION, SOLUTION SUBCUTANEOUS NIGHTLY
Qty: 30 ML | Refills: 3 | Status: SHIPPED | OUTPATIENT
Start: 2022-12-15 | End: 2022-12-15 | Stop reason: SDUPTHER

## 2022-12-15 RX ORDER — INSULIN LISPRO 100 [IU]/ML
INJECTION, SOLUTION INTRAVENOUS; SUBCUTANEOUS
Qty: 45 ML | Refills: 3 | Status: SHIPPED | OUTPATIENT
Start: 2022-12-15 | End: 2023-01-12 | Stop reason: ALTCHOICE

## 2022-12-22 ENCOUNTER — TELEPHONE (OUTPATIENT)
Dept: INTERNAL MEDICINE | Age: 67
End: 2022-12-22

## 2023-01-04 ENCOUNTER — TELEPHONE (OUTPATIENT)
Dept: EDUCATION SERVICES | Age: 68
End: 2023-01-04

## 2023-01-04 DIAGNOSIS — E10.65 TYPE 1 DIABETES MELLITUS WITH HYPERGLYCEMIA (CMD): Primary | ICD-10-CM

## 2023-01-09 ENCOUNTER — APPOINTMENT (OUTPATIENT)
Dept: EDUCATION SERVICES | Age: 68
End: 2023-01-09

## 2023-01-12 ENCOUNTER — OFFICE VISIT (OUTPATIENT)
Dept: ENDOCRINOLOGY | Age: 68
End: 2023-01-12

## 2023-01-12 VITALS
SYSTOLIC BLOOD PRESSURE: 108 MMHG | BODY MASS INDEX: 33.44 KG/M2 | HEIGHT: 63 IN | DIASTOLIC BLOOD PRESSURE: 58 MMHG | WEIGHT: 188.71 LBS | OXYGEN SATURATION: 94 % | HEART RATE: 71 BPM

## 2023-01-12 DIAGNOSIS — E10.69 TYPE 1 DIABETES MELLITUS WITH HYPERLIPIDEMIA (CMD): ICD-10-CM

## 2023-01-12 DIAGNOSIS — N18.31 TYPE 1 DIABETES MELLITUS WITH STAGE 3A CHRONIC KIDNEY DISEASE (CMD): ICD-10-CM

## 2023-01-12 DIAGNOSIS — M81.8 OTHER OSTEOPOROSIS WITHOUT CURRENT PATHOLOGICAL FRACTURE: ICD-10-CM

## 2023-01-12 DIAGNOSIS — E10.42 TYPE 1 DIABETES MELLITUS WITH DIABETIC POLYNEUROPATHY (CMD): ICD-10-CM

## 2023-01-12 DIAGNOSIS — E11.3299 BACKGROUND DIABETIC RETINOPATHY (CMD): ICD-10-CM

## 2023-01-12 DIAGNOSIS — E13.9 LADA (LATENT AUTOIMMUNE DIABETES IN ADULTS), MANAGED AS TYPE 1 (CMD): ICD-10-CM

## 2023-01-12 DIAGNOSIS — E10.22 TYPE 1 DIABETES MELLITUS WITH STAGE 3A CHRONIC KIDNEY DISEASE (CMD): ICD-10-CM

## 2023-01-12 DIAGNOSIS — E03.9 ACQUIRED HYPOTHYROIDISM: ICD-10-CM

## 2023-01-12 DIAGNOSIS — E78.5 TYPE 1 DIABETES MELLITUS WITH HYPERLIPIDEMIA (CMD): ICD-10-CM

## 2023-01-12 DIAGNOSIS — E10.65 TYPE 1 DIABETES MELLITUS WITH HYPERGLYCEMIA (CMD): Primary | ICD-10-CM

## 2023-01-12 LAB — HBA1C MFR BLD: 9.3 % (ref 4.5–5.6)

## 2023-01-12 PROCEDURE — 99214 OFFICE O/P EST MOD 30 MIN: CPT | Performed by: INTERNAL MEDICINE

## 2023-01-12 PROCEDURE — 36415 COLL VENOUS BLD VENIPUNCTURE: CPT | Performed by: INTERNAL MEDICINE

## 2023-01-12 PROCEDURE — 83036 HEMOGLOBIN GLYCOSYLATED A1C: CPT | Performed by: INTERNAL MEDICINE

## 2023-01-12 RX ORDER — TIZANIDINE 4 MG/1
TABLET ORAL
COMMUNITY

## 2023-01-12 RX ORDER — MECLIZINE HCL 12.5 MG/1
TABLET ORAL
COMMUNITY

## 2023-01-12 RX ORDER — INSULIN LISPRO-AABC 100 [IU]/ML
INJECTION, SOLUTION SUBCUTANEOUS
Qty: 45 ML | Refills: 3 | Status: SHIPPED | OUTPATIENT
Start: 2023-01-12 | End: 2023-09-01 | Stop reason: SDUPTHER

## 2023-01-12 RX ORDER — CETIRIZINE HYDROCHLORIDE 10 MG/1
10 TABLET ORAL DAILY
COMMUNITY

## 2023-01-12 RX ORDER — ACETAMINOPHEN 500 MG
500 TABLET ORAL EVERY 6 HOURS PRN
COMMUNITY

## 2023-01-12 RX ORDER — TOPIRAMATE 100 MG/1
100 TABLET, FILM COATED ORAL 2 TIMES DAILY
COMMUNITY

## 2023-01-12 RX ORDER — NICOTINE POLACRILEX 4 MG
15 LOZENGE BUCCAL PRN
COMMUNITY

## 2023-01-12 RX ORDER — DOCUSATE SODIUM 100 MG/1
100 CAPSULE, LIQUID FILLED ORAL 2 TIMES DAILY
COMMUNITY

## 2023-01-12 RX ORDER — FEXOFENADINE HCL 180 MG/1
180 TABLET ORAL DAILY
COMMUNITY

## 2023-01-12 RX ORDER — INSULIN DEGLUDEC INJECTION 100 U/ML
22 INJECTION, SOLUTION SUBCUTANEOUS NIGHTLY
Qty: 30 ML | Refills: 3 | Status: SHIPPED | OUTPATIENT
Start: 2023-01-12 | End: 2023-08-31 | Stop reason: DRUGHIGH

## 2023-01-12 RX ORDER — AMITRIPTYLINE HYDROCHLORIDE 50 MG/1
25 TABLET, FILM COATED ORAL NIGHTLY
COMMUNITY

## 2023-01-12 RX ORDER — PANTOPRAZOLE SODIUM 40 MG/1
40 TABLET, DELAYED RELEASE ORAL DAILY
COMMUNITY

## 2023-01-12 RX ORDER — FUROSEMIDE 40 MG/1
20 TABLET ORAL DAILY
COMMUNITY

## 2023-01-12 RX ORDER — FUROSEMIDE 20 MG/1
20 TABLET ORAL DAILY PRN
COMMUNITY

## 2023-01-12 RX ORDER — FOLIC ACID 1 MG/1
1 TABLET ORAL DAILY
COMMUNITY

## 2023-01-12 RX ORDER — ASCORBIC ACID 500 MG
500 TABLET ORAL 2 TIMES DAILY
COMMUNITY

## 2023-01-23 ENCOUNTER — TELEPHONE (OUTPATIENT)
Dept: SCHEDULING | Age: 68
End: 2023-01-23

## 2023-01-24 ENCOUNTER — TELEPHONE (OUTPATIENT)
Dept: HEMATOLOGY/ONCOLOGY | Age: 68
End: 2023-01-24

## 2023-02-09 ENCOUNTER — APPOINTMENT (OUTPATIENT)
Dept: EDUCATION SERVICES | Age: 68
End: 2023-02-09
Attending: INTERNAL MEDICINE

## 2023-02-10 ENCOUNTER — TELEPHONE (OUTPATIENT)
Dept: PULMONOLOGY | Age: 68
End: 2023-02-10

## 2023-02-21 ENCOUNTER — TELEPHONE (OUTPATIENT)
Dept: HEMATOLOGY/ONCOLOGY | Age: 68
End: 2023-02-21

## 2023-02-23 DIAGNOSIS — E78.5 TYPE 1 DIABETES MELLITUS WITH HYPERLIPIDEMIA (CMD): ICD-10-CM

## 2023-02-23 DIAGNOSIS — E13.9 LADA (LATENT AUTOIMMUNE DIABETES IN ADULTS), MANAGED AS TYPE 1 (CMD): ICD-10-CM

## 2023-02-23 DIAGNOSIS — M81.8 OTHER OSTEOPOROSIS WITHOUT CURRENT PATHOLOGICAL FRACTURE: ICD-10-CM

## 2023-02-23 DIAGNOSIS — E10.22 TYPE 1 DIABETES MELLITUS WITH STAGE 3A CHRONIC KIDNEY DISEASE (CMD): ICD-10-CM

## 2023-02-23 DIAGNOSIS — E10.42 TYPE 1 DIABETES MELLITUS WITH DIABETIC POLYNEUROPATHY (CMD): ICD-10-CM

## 2023-02-23 DIAGNOSIS — E03.9 ACQUIRED HYPOTHYROIDISM: ICD-10-CM

## 2023-02-23 DIAGNOSIS — E10.69 TYPE 1 DIABETES MELLITUS WITH HYPERLIPIDEMIA (CMD): ICD-10-CM

## 2023-02-23 DIAGNOSIS — E10.65 TYPE 1 DIABETES MELLITUS WITH HYPERGLYCEMIA (CMD): ICD-10-CM

## 2023-02-23 DIAGNOSIS — N18.31 TYPE 1 DIABETES MELLITUS WITH STAGE 3A CHRONIC KIDNEY DISEASE (CMD): ICD-10-CM

## 2023-02-23 DIAGNOSIS — E11.3299 BACKGROUND DIABETIC RETINOPATHY (CMD): ICD-10-CM

## 2023-02-23 RX ORDER — EZETIMIBE 10 MG/1
10 TABLET ORAL DAILY
Qty: 31 TABLET | Refills: 5 | Status: SHIPPED | OUTPATIENT
Start: 2023-02-23

## 2023-03-01 ENCOUNTER — APPOINTMENT (OUTPATIENT)
Dept: OPHTHALMOLOGY | Age: 68
End: 2023-03-01

## 2023-03-16 ENCOUNTER — TELEPHONE (OUTPATIENT)
Dept: SCHEDULING | Age: 68
End: 2023-03-16

## 2023-03-22 ENCOUNTER — OFFICE VISIT (OUTPATIENT)
Dept: OPHTHALMOLOGY | Age: 68
End: 2023-03-22

## 2023-03-22 DIAGNOSIS — Z96.1 PSEUDOPHAKIA OF BOTH EYES: ICD-10-CM

## 2023-03-22 DIAGNOSIS — E11.3299 BACKGROUND DIABETIC RETINOPATHY (CMD): Primary | ICD-10-CM

## 2023-03-22 PROCEDURE — 99213 OFFICE O/P EST LOW 20 MIN: CPT | Performed by: OPTOMETRIST

## 2023-03-22 ASSESSMENT — TONOMETRY
OD_IOP_MMHG: 15
IOP_METHOD: APPLANATION
OS_IOP_MMHG: 15

## 2023-03-22 ASSESSMENT — VISUAL ACUITY
OS_SC: 20/30
METHOD: SNELLEN - LINEAR
OD_SC: 20/60
OD_SC: JAEGER 10
OS_SC: JAEGER 10

## 2023-03-22 ASSESSMENT — CONF VISUAL FIELD
OD_SUPERIOR_TEMPORAL_RESTRICTION: 0
OD_INFERIOR_NASAL_RESTRICTION: 0
OS_SUPERIOR_TEMPORAL_RESTRICTION: 0
OD_SUPERIOR_NASAL_RESTRICTION: 0
OS_NORMAL: 1
OD_NORMAL: 1
OS_INFERIOR_TEMPORAL_RESTRICTION: 0
OD_INFERIOR_TEMPORAL_RESTRICTION: 0
METHOD: COUNTING FINGERS
OS_INFERIOR_NASAL_RESTRICTION: 0
OS_SUPERIOR_NASAL_RESTRICTION: 0

## 2023-03-22 ASSESSMENT — REFRACTION_WEARINGRX
OD_AXIS: 180
OD_SPHERE: -0.75
OS_CYLINDER: +0.50
OD_ADD: +2.50
OS_ADD: +2.50
OS_SPHERE: -0.50
OS_AXIS: 175
OD_CYLINDER: +1.00

## 2023-03-22 ASSESSMENT — REFRACTION_MANIFEST
OS_CYLINDER: +0.50
OS_SPHERE: -0.50
OD_AXIS: 180
OD_SPHERE: -1.00
OS_ADD: +2.50
OS_AXIS: 175
OD_ADD: +2.50
OD_CYLINDER: +0.75

## 2023-03-22 ASSESSMENT — EXTERNAL EXAM - RIGHT EYE: OD_EXAM: NORMAL

## 2023-03-22 ASSESSMENT — CUP TO DISC RATIO
OD_RATIO: 0.35
OS_RATIO: 0.3

## 2023-03-22 ASSESSMENT — SLIT LAMP EXAM - LIDS
COMMENTS: NORMAL
COMMENTS: NORMAL

## 2023-03-22 ASSESSMENT — EXTERNAL EXAM - LEFT EYE: OS_EXAM: NORMAL

## 2023-04-12 ENCOUNTER — TELEPHONE (OUTPATIENT)
Dept: EDUCATION SERVICES | Age: 68
End: 2023-04-12

## 2023-04-12 RX ORDER — PROCHLORPERAZINE 25 MG/1
SUPPOSITORY RECTAL SEE ADMIN INSTRUCTIONS
Status: SHIPPED | COMMUNITY
Start: 2023-04-12

## 2023-04-27 ENCOUNTER — APPOINTMENT (OUTPATIENT)
Dept: ENDOCRINOLOGY | Age: 68
End: 2023-04-27

## 2023-04-27 ENCOUNTER — OFFICE VISIT (OUTPATIENT)
Dept: ENDOCRINOLOGY | Age: 68
End: 2023-04-27

## 2023-04-27 ENCOUNTER — WALK IN (OUTPATIENT)
Dept: URGENT CARE | Age: 68
End: 2023-04-27

## 2023-04-27 VITALS
DIASTOLIC BLOOD PRESSURE: 60 MMHG | BODY MASS INDEX: 31.24 KG/M2 | RESPIRATION RATE: 20 BRPM | WEIGHT: 176.37 LBS | SYSTOLIC BLOOD PRESSURE: 108 MMHG | OXYGEN SATURATION: 99 % | TEMPERATURE: 98.5 F | HEART RATE: 80 BPM

## 2023-04-27 VITALS
BODY MASS INDEX: 31.28 KG/M2 | SYSTOLIC BLOOD PRESSURE: 106 MMHG | WEIGHT: 176.59 LBS | DIASTOLIC BLOOD PRESSURE: 52 MMHG | HEART RATE: 80 BPM | OXYGEN SATURATION: 95 %

## 2023-04-27 DIAGNOSIS — E10.22 TYPE 1 DIABETES MELLITUS WITH STAGE 3A CHRONIC KIDNEY DISEASE (CMD): ICD-10-CM

## 2023-04-27 DIAGNOSIS — M81.8 OTHER OSTEOPOROSIS WITHOUT CURRENT PATHOLOGICAL FRACTURE: ICD-10-CM

## 2023-04-27 DIAGNOSIS — E10.69 TYPE 1 DIABETES MELLITUS WITH HYPERLIPIDEMIA (CMD): ICD-10-CM

## 2023-04-27 DIAGNOSIS — N18.31 TYPE 1 DIABETES MELLITUS WITH STAGE 3A CHRONIC KIDNEY DISEASE (CMD): ICD-10-CM

## 2023-04-27 DIAGNOSIS — E78.5 TYPE 1 DIABETES MELLITUS WITH HYPERLIPIDEMIA (CMD): ICD-10-CM

## 2023-04-27 DIAGNOSIS — Z51.81 ENCOUNTER FOR MONITORING TERIPARATIDE THERAPY: ICD-10-CM

## 2023-04-27 DIAGNOSIS — E10.42 TYPE 1 DIABETES MELLITUS WITH DIABETIC POLYNEUROPATHY (CMD): ICD-10-CM

## 2023-04-27 DIAGNOSIS — E10.65 TYPE 1 DIABETES MELLITUS WITH HYPERGLYCEMIA (CMD): Primary | ICD-10-CM

## 2023-04-27 DIAGNOSIS — Z87.81 HISTORY OF FRACTURE OF LEFT HIP: ICD-10-CM

## 2023-04-27 DIAGNOSIS — E10.649 HYPOGLYCEMIA UNAWARENESS IN TYPE 1 DIABETES MELLITUS (CMD): ICD-10-CM

## 2023-04-27 DIAGNOSIS — E11.3299 BACKGROUND DIABETIC RETINOPATHY (CMD): ICD-10-CM

## 2023-04-27 DIAGNOSIS — E03.9 ACQUIRED HYPOTHYROIDISM: ICD-10-CM

## 2023-04-27 DIAGNOSIS — H92.02 LEFT EAR PAIN: Primary | ICD-10-CM

## 2023-04-27 DIAGNOSIS — E13.9 LADA (LATENT AUTOIMMUNE DIABETES IN ADULTS), MANAGED AS TYPE 1 (CMD): ICD-10-CM

## 2023-04-27 DIAGNOSIS — Z79.899 ENCOUNTER FOR MONITORING TERIPARATIDE THERAPY: ICD-10-CM

## 2023-04-27 DIAGNOSIS — E55.9 VITAMIN D DEFICIENCY: ICD-10-CM

## 2023-04-27 LAB — HBA1C MFR BLD: 10.1 % (ref 4.5–5.6)

## 2023-04-27 PROCEDURE — 99215 OFFICE O/P EST HI 40 MIN: CPT | Performed by: INTERNAL MEDICINE

## 2023-04-27 PROCEDURE — 83036 HEMOGLOBIN GLYCOSYLATED A1C: CPT | Performed by: INTERNAL MEDICINE

## 2023-04-27 PROCEDURE — 99213 OFFICE O/P EST LOW 20 MIN: CPT | Performed by: PHYSICIAN ASSISTANT

## 2023-04-27 PROCEDURE — 95251 CONT GLUC MNTR ANALYSIS I&R: CPT | Performed by: INTERNAL MEDICINE

## 2023-04-27 PROCEDURE — 36415 COLL VENOUS BLD VENIPUNCTURE: CPT | Performed by: INTERNAL MEDICINE

## 2023-04-27 RX ORDER — ALENDRONATE SODIUM 70 MG/1
70 TABLET ORAL
Qty: 12 TABLET | Refills: 3 | Status: SHIPPED | OUTPATIENT
Start: 2023-07-27 | End: 2023-10-27

## 2023-04-27 RX ORDER — TERIPARATIDE 250 UG/ML
20 INJECTION, SOLUTION SUBCUTANEOUS NIGHTLY
Qty: 7.5 ML | Refills: 0 | Status: SHIPPED | OUTPATIENT
Start: 2023-04-27 | End: 2023-07-27

## 2023-04-28 ENCOUNTER — TELEPHONE (OUTPATIENT)
Dept: EDUCATION SERVICES | Age: 68
End: 2023-04-28

## 2023-05-08 RX ORDER — PEN NEEDLE, DIABETIC, SAFETY 30 GX3/16"
NEEDLE, DISPOSABLE MISCELLANEOUS
Qty: 100 EACH | Refills: 3 | Status: SHIPPED | OUTPATIENT
Start: 2023-05-08 | End: 2023-08-03

## 2023-05-15 ENCOUNTER — TELEPHONE (OUTPATIENT)
Dept: ENDOCRINOLOGY | Age: 68
End: 2023-05-15

## 2023-06-05 ENCOUNTER — APPOINTMENT (OUTPATIENT)
Dept: EDUCATION SERVICES | Age: 68
End: 2023-06-05
Attending: INTERNAL MEDICINE

## 2023-06-23 ENCOUNTER — APPOINTMENT (OUTPATIENT)
Dept: LAB | Age: 68
End: 2023-06-23

## 2023-06-23 ENCOUNTER — WALK IN (OUTPATIENT)
Dept: URGENT CARE | Age: 68
End: 2023-06-23

## 2023-06-23 VITALS
TEMPERATURE: 99.3 F | SYSTOLIC BLOOD PRESSURE: 98 MMHG | OXYGEN SATURATION: 97 % | HEART RATE: 73 BPM | DIASTOLIC BLOOD PRESSURE: 49 MMHG | BODY MASS INDEX: 31.28 KG/M2 | RESPIRATION RATE: 16 BRPM | WEIGHT: 176.59 LBS

## 2023-06-23 DIAGNOSIS — B82.9 PARASITES IN STOOL: Primary | ICD-10-CM

## 2023-06-23 PROCEDURE — 99213 OFFICE O/P EST LOW 20 MIN: CPT | Performed by: NURSE PRACTITIONER

## 2023-06-23 ASSESSMENT — ENCOUNTER SYMPTOMS
NAUSEA: 0
BLOOD IN STOOL: 0
APPETITE CHANGE: 1
UNEXPECTED WEIGHT CHANGE: 0
ABDOMINAL PAIN: 1

## 2023-06-29 ENCOUNTER — TELEPHONE (OUTPATIENT)
Dept: EDUCATION SERVICES | Age: 68
End: 2023-06-29

## 2023-08-03 DIAGNOSIS — E78.5 TYPE 1 DIABETES MELLITUS WITH HYPERLIPIDEMIA (CMD): ICD-10-CM

## 2023-08-03 DIAGNOSIS — N18.31 TYPE 1 DIABETES MELLITUS WITH STAGE 3A CHRONIC KIDNEY DISEASE (CMD): ICD-10-CM

## 2023-08-03 DIAGNOSIS — E10.65 TYPE 1 DIABETES MELLITUS WITH HYPERGLYCEMIA (CMD): Primary | ICD-10-CM

## 2023-08-03 DIAGNOSIS — E10.69 TYPE 1 DIABETES MELLITUS WITH HYPERLIPIDEMIA (CMD): ICD-10-CM

## 2023-08-03 DIAGNOSIS — E10.21 TYPE 1 DIABETES MELLITUS WITH NEPHROPATHY (CMD): ICD-10-CM

## 2023-08-03 DIAGNOSIS — E10.22 TYPE 1 DIABETES MELLITUS WITH STAGE 3A CHRONIC KIDNEY DISEASE (CMD): ICD-10-CM

## 2023-08-03 RX ORDER — PEN NEEDLE, DIABETIC, SAFETY 30 GX3/16"
NEEDLE, DISPOSABLE MISCELLANEOUS
Qty: 400 EACH | Refills: 3 | Status: SHIPPED | OUTPATIENT
Start: 2023-08-03

## 2023-08-30 ENCOUNTER — E-ADVICE (OUTPATIENT)
Dept: EDUCATION SERVICES | Age: 68
End: 2023-08-30

## 2023-08-31 ENCOUNTER — OFFICE VISIT (OUTPATIENT)
Dept: ENDOCRINOLOGY | Age: 68
End: 2023-08-31

## 2023-08-31 VITALS
SYSTOLIC BLOOD PRESSURE: 104 MMHG | DIASTOLIC BLOOD PRESSURE: 58 MMHG | HEART RATE: 66 BPM | HEIGHT: 63 IN | OXYGEN SATURATION: 96 % | WEIGHT: 176.59 LBS | BODY MASS INDEX: 31.29 KG/M2

## 2023-08-31 DIAGNOSIS — E78.5 TYPE 1 DIABETES MELLITUS WITH HYPERLIPIDEMIA (CMD): ICD-10-CM

## 2023-08-31 DIAGNOSIS — E03.9 ACQUIRED HYPOTHYROIDISM: ICD-10-CM

## 2023-08-31 DIAGNOSIS — E10.42 TYPE 1 DIABETES MELLITUS WITH DIABETIC POLYNEUROPATHY (CMD): ICD-10-CM

## 2023-08-31 DIAGNOSIS — N18.2 TYPE 1 DIABETES MELLITUS WITH STAGE 2 CHRONIC KIDNEY DISEASE (CMD): ICD-10-CM

## 2023-08-31 DIAGNOSIS — E10.59 TYPE 1 DIABETES MELLITUS WITH CARDIAC COMPLICATION (CMD): ICD-10-CM

## 2023-08-31 DIAGNOSIS — I25.810 CORONARY ARTERY DISEASE INVOLVING CORONARY BYPASS GRAFT OF NATIVE HEART WITHOUT ANGINA PECTORIS: ICD-10-CM

## 2023-08-31 DIAGNOSIS — E10.65 TYPE 1 DIABETES MELLITUS WITH HYPERGLYCEMIA (CMD): Primary | ICD-10-CM

## 2023-08-31 DIAGNOSIS — E13.9 LADA (LATENT AUTOIMMUNE DIABETES IN ADULTS), MANAGED AS TYPE 1 (CMD): ICD-10-CM

## 2023-08-31 DIAGNOSIS — E10.69 TYPE 1 DIABETES MELLITUS WITH HYPERLIPIDEMIA (CMD): ICD-10-CM

## 2023-08-31 DIAGNOSIS — M81.8 OTHER OSTEOPOROSIS WITHOUT CURRENT PATHOLOGICAL FRACTURE: ICD-10-CM

## 2023-08-31 DIAGNOSIS — E11.3299 BACKGROUND DIABETIC RETINOPATHY (CMD): ICD-10-CM

## 2023-08-31 DIAGNOSIS — E10.22 TYPE 1 DIABETES MELLITUS WITH STAGE 2 CHRONIC KIDNEY DISEASE (CMD): ICD-10-CM

## 2023-08-31 LAB — HBA1C MFR BLD: 8.7 % (ref 4.5–5.6)

## 2023-08-31 PROCEDURE — 95251 CONT GLUC MNTR ANALYSIS I&R: CPT | Performed by: INTERNAL MEDICINE

## 2023-08-31 PROCEDURE — 83036 HEMOGLOBIN GLYCOSYLATED A1C: CPT | Performed by: INTERNAL MEDICINE

## 2023-08-31 PROCEDURE — 36415 COLL VENOUS BLD VENIPUNCTURE: CPT | Performed by: INTERNAL MEDICINE

## 2023-08-31 PROCEDURE — 99214 OFFICE O/P EST MOD 30 MIN: CPT | Performed by: INTERNAL MEDICINE

## 2023-08-31 RX ORDER — INSULIN DEGLUDEC INJECTION 100 U/ML
20 INJECTION, SOLUTION SUBCUTANEOUS NIGHTLY
Qty: 30 ML | Refills: 3 | Status: SHIPPED | OUTPATIENT
Start: 2023-08-31 | End: 2023-08-31 | Stop reason: DRUGHIGH

## 2023-08-31 RX ORDER — VARENICLINE TARTRATE 0.5 MG/1
0.5 TABLET, FILM COATED ORAL 2 TIMES DAILY
COMMUNITY

## 2023-08-31 RX ORDER — VARENICLINE TARTRATE 1 MG/1
1 TABLET, FILM COATED ORAL 2 TIMES DAILY
COMMUNITY

## 2023-08-31 RX ORDER — INSULIN DEGLUDEC INJECTION 100 U/ML
18 INJECTION, SOLUTION SUBCUTANEOUS NIGHTLY
Qty: 30 ML | Refills: 3 | Status: SHIPPED | OUTPATIENT
Start: 2023-08-31

## 2023-09-01 ENCOUNTER — TELEPHONE (OUTPATIENT)
Dept: ENDOCRINOLOGY | Age: 68
End: 2023-09-01

## 2023-09-01 DIAGNOSIS — E10.65 TYPE 1 DIABETES MELLITUS WITH HYPERGLYCEMIA (CMD): ICD-10-CM

## 2023-09-01 RX ORDER — INSULIN LISPRO-AABC 100 [IU]/ML
INJECTION, SOLUTION SUBCUTANEOUS
Qty: 45 ML | Refills: 3 | Status: SHIPPED | OUTPATIENT
Start: 2023-09-01

## 2023-10-08 ENCOUNTER — WALK IN (OUTPATIENT)
Dept: URGENT CARE | Age: 68
End: 2023-10-08

## 2023-10-08 VITALS
TEMPERATURE: 97.6 F | HEIGHT: 63 IN | SYSTOLIC BLOOD PRESSURE: 110 MMHG | DIASTOLIC BLOOD PRESSURE: 62 MMHG | OXYGEN SATURATION: 97 % | HEART RATE: 82 BPM | WEIGHT: 176 LBS | BODY MASS INDEX: 31.18 KG/M2 | RESPIRATION RATE: 18 BRPM

## 2023-10-08 DIAGNOSIS — R39.9 GU (GENITOURINARY) SYMPTOMS: ICD-10-CM

## 2023-10-08 DIAGNOSIS — J06.9 ACUTE URI: Primary | ICD-10-CM

## 2023-10-08 PROCEDURE — 87186 SC STD MICRODIL/AGAR DIL: CPT | Performed by: CLINICAL MEDICAL LABORATORY

## 2023-10-08 PROCEDURE — 99213 OFFICE O/P EST LOW 20 MIN: CPT | Performed by: PHYSICIAN ASSISTANT

## 2023-10-08 PROCEDURE — 87088 URINE BACTERIA CULTURE: CPT | Performed by: CLINICAL MEDICAL LABORATORY

## 2023-10-08 PROCEDURE — 87086 URINE CULTURE/COLONY COUNT: CPT | Performed by: CLINICAL MEDICAL LABORATORY

## 2023-10-08 RX ORDER — AMOXICILLIN AND CLAVULANATE POTASSIUM 875; 125 MG/1; MG/1
1 TABLET, FILM COATED ORAL 2 TIMES DAILY
Qty: 20 TABLET | Refills: 0 | Status: SHIPPED | OUTPATIENT
Start: 2023-10-08 | End: 2023-10-11 | Stop reason: SDUPTHER

## 2023-10-09 ENCOUNTER — HOSPITAL ENCOUNTER (EMERGENCY)
Age: 68
Discharge: HOME | End: 2023-10-09
Attending: EMERGENCY MEDICINE

## 2023-10-09 ENCOUNTER — TELEPHONE (OUTPATIENT)
Dept: EDUCATION SERVICES | Age: 68
End: 2023-10-09

## 2023-10-09 VITALS
SYSTOLIC BLOOD PRESSURE: 134 MMHG | OXYGEN SATURATION: 95 % | HEIGHT: 63 IN | BODY MASS INDEX: 31.01 KG/M2 | HEART RATE: 78 BPM | RESPIRATION RATE: 16 BRPM | WEIGHT: 175 LBS | DIASTOLIC BLOOD PRESSURE: 69 MMHG | TEMPERATURE: 97.1 F

## 2023-10-09 DIAGNOSIS — R19.7 DIARRHEA, UNSPECIFIED TYPE: Primary | ICD-10-CM

## 2023-10-09 DIAGNOSIS — N30.00 ACUTE CYSTITIS WITHOUT HEMATURIA: ICD-10-CM

## 2023-10-09 LAB
C DIFF TOX B TCDB STL QL NAA+PROBE: NOT DETECTED
GLUCOSE BLDC GLUCOMTR-MCNC: 197 MG/DL (ref 70–99)

## 2023-10-09 PROCEDURE — 87493 C DIFF AMPLIFIED PROBE: CPT | Performed by: EMERGENCY MEDICINE

## 2023-10-09 PROCEDURE — 82962 GLUCOSE BLOOD TEST: CPT

## 2023-10-09 PROCEDURE — 87507 IADNA-DNA/RNA PROBE TQ 12-25: CPT | Performed by: EMERGENCY MEDICINE

## 2023-10-09 PROCEDURE — 99284 EMERGENCY DEPT VISIT MOD MDM: CPT

## 2023-10-09 PROCEDURE — 99283 EMERGENCY DEPT VISIT LOW MDM: CPT | Performed by: EMERGENCY MEDICINE

## 2023-10-09 RX ORDER — CIPROFLOXACIN 500 MG/1
500 TABLET, FILM COATED ORAL 2 TIMES DAILY
Qty: 14 TABLET | Refills: 0 | Status: SHIPPED | OUTPATIENT
Start: 2023-10-09

## 2023-10-09 RX ORDER — CIPROFLOXACIN 500 MG/1
500 TABLET, FILM COATED ORAL 2 TIMES DAILY
Qty: 14 TABLET | Refills: 0 | Status: SHIPPED | OUTPATIENT
Start: 2023-10-09 | End: 2023-10-09 | Stop reason: SDUPTHER

## 2023-10-09 ASSESSMENT — PAIN SCALES - GENERAL
PAINLEVEL_OUTOF10: 0
PAINLEVEL_OUTOF10: 0

## 2023-10-11 ENCOUNTER — TELEPHONE (OUTPATIENT)
Dept: URGENT CARE | Age: 68
End: 2023-10-11

## 2023-10-11 LAB
BACTERIA UR CULT: ABNORMAL
BACTERIA UR CULT: ABNORMAL

## 2023-10-11 RX ORDER — AMOXICILLIN AND CLAVULANATE POTASSIUM 875; 125 MG/1; MG/1
1 TABLET, FILM COATED ORAL EVERY 12 HOURS
Qty: 14 TABLET | Refills: 0 | Status: SHIPPED | OUTPATIENT
Start: 2023-10-11 | End: 2023-10-18

## 2023-10-27 DIAGNOSIS — M81.8 OTHER OSTEOPOROSIS WITHOUT CURRENT PATHOLOGICAL FRACTURE: ICD-10-CM

## 2023-10-27 RX ORDER — ALENDRONATE SODIUM 70 MG/1
TABLET ORAL
Qty: 4 TABLET | Refills: 11 | Status: SHIPPED | OUTPATIENT
Start: 2023-10-27

## 2023-10-31 ENCOUNTER — TELEPHONE (OUTPATIENT)
Dept: ENDOCRINOLOGY | Age: 68
End: 2023-10-31

## 2023-11-22 ENCOUNTER — TELEPHONE (OUTPATIENT)
Dept: ENDOCRINOLOGY | Age: 68
End: 2023-11-22

## 2023-12-11 ENCOUNTER — APPOINTMENT (OUTPATIENT)
Dept: ENDOCRINOLOGY | Age: 68
End: 2023-12-11

## 2023-12-22 ENCOUNTER — TELEPHONE (OUTPATIENT)
Dept: ENDOCRINOLOGY | Age: 68
End: 2023-12-22

## 2023-12-22 ENCOUNTER — WALK IN (OUTPATIENT)
Dept: URGENT CARE | Age: 68
End: 2023-12-22

## 2023-12-22 VITALS
BODY MASS INDEX: 30.27 KG/M2 | RESPIRATION RATE: 16 BRPM | DIASTOLIC BLOOD PRESSURE: 59 MMHG | TEMPERATURE: 98.3 F | HEART RATE: 78 BPM | WEIGHT: 170.86 LBS | SYSTOLIC BLOOD PRESSURE: 119 MMHG | OXYGEN SATURATION: 98 %

## 2023-12-22 DIAGNOSIS — E08.43 DIABETES MELLITUS DUE TO UNDERLYING CONDITION WITH DIABETIC AUTONOMIC NEUROPATHY, UNSPECIFIED WHETHER LONG TERM INSULIN USE (CMD): Primary | ICD-10-CM

## 2023-12-22 DIAGNOSIS — E03.9 ACQUIRED HYPOTHYROIDISM: ICD-10-CM

## 2023-12-22 DIAGNOSIS — E11.3299 BACKGROUND DIABETIC RETINOPATHY (CMD): ICD-10-CM

## 2023-12-22 DIAGNOSIS — E10.65 TYPE 1 DIABETES MELLITUS WITH HYPERGLYCEMIA (CMD): ICD-10-CM

## 2023-12-22 DIAGNOSIS — N18.2 TYPE 1 DIABETES MELLITUS WITH STAGE 2 CHRONIC KIDNEY DISEASE (CMD): ICD-10-CM

## 2023-12-22 DIAGNOSIS — E13.9 LADA (LATENT AUTOIMMUNE DIABETES IN ADULTS), MANAGED AS TYPE 1 (CMD): ICD-10-CM

## 2023-12-22 DIAGNOSIS — E78.5 TYPE 1 DIABETES MELLITUS WITH HYPERLIPIDEMIA (CMD): ICD-10-CM

## 2023-12-22 DIAGNOSIS — E10.42 TYPE 1 DIABETES MELLITUS WITH DIABETIC POLYNEUROPATHY (CMD): ICD-10-CM

## 2023-12-22 DIAGNOSIS — E10.69 TYPE 1 DIABETES MELLITUS WITH HYPERLIPIDEMIA (CMD): ICD-10-CM

## 2023-12-22 DIAGNOSIS — M81.8 OTHER OSTEOPOROSIS WITHOUT CURRENT PATHOLOGICAL FRACTURE: ICD-10-CM

## 2023-12-22 DIAGNOSIS — E10.22 TYPE 1 DIABETES MELLITUS WITH STAGE 2 CHRONIC KIDNEY DISEASE (CMD): ICD-10-CM

## 2023-12-22 PROCEDURE — 99214 OFFICE O/P EST MOD 30 MIN: CPT | Performed by: NURSE PRACTITIONER

## 2023-12-22 RX ORDER — INSULIN DEGLUDEC INJECTION 100 U/ML
18 INJECTION, SOLUTION SUBCUTANEOUS NIGHTLY
Qty: 30 ML | Refills: 3 | Status: CANCELLED | OUTPATIENT
Start: 2023-12-22

## 2023-12-22 RX ORDER — INSULIN DEGLUDEC 100 U/ML
INJECTION, SOLUTION SUBCUTANEOUS
Qty: 30 ML | Refills: 0 | Status: SHIPPED | OUTPATIENT
Start: 2023-12-22

## 2023-12-22 RX ORDER — INSULIN LISPRO-AABC 100 [IU]/ML
INJECTION, SOLUTION SUBCUTANEOUS
Qty: 45 ML | Refills: 3 | Status: CANCELLED | OUTPATIENT
Start: 2023-12-22

## 2023-12-28 ENCOUNTER — APPOINTMENT (OUTPATIENT)
Dept: ENDOCRINOLOGY | Age: 68
End: 2023-12-28

## 2023-12-28 VITALS
DIASTOLIC BLOOD PRESSURE: 58 MMHG | SYSTOLIC BLOOD PRESSURE: 118 MMHG | HEART RATE: 78 BPM | OXYGEN SATURATION: 98 % | WEIGHT: 169.53 LBS | BODY MASS INDEX: 30.03 KG/M2

## 2023-12-28 DIAGNOSIS — E10.42 TYPE 1 DIABETES MELLITUS WITH DIABETIC POLYNEUROPATHY (CMD): ICD-10-CM

## 2023-12-28 DIAGNOSIS — E03.9 ACQUIRED HYPOTHYROIDISM: ICD-10-CM

## 2023-12-28 DIAGNOSIS — E10.649 HYPOGLYCEMIA UNAWARENESS IN TYPE 1 DIABETES MELLITUS (CMD): ICD-10-CM

## 2023-12-28 DIAGNOSIS — E10.69 TYPE 1 DIABETES MELLITUS WITH HYPERLIPIDEMIA (CMD): ICD-10-CM

## 2023-12-28 DIAGNOSIS — E78.5 TYPE 1 DIABETES MELLITUS WITH HYPERLIPIDEMIA (CMD): ICD-10-CM

## 2023-12-28 DIAGNOSIS — E13.9 LADA (LATENT AUTOIMMUNE DIABETES IN ADULTS), MANAGED AS TYPE 1 (CMD): ICD-10-CM

## 2023-12-28 DIAGNOSIS — M81.8 OTHER OSTEOPOROSIS WITHOUT CURRENT PATHOLOGICAL FRACTURE: ICD-10-CM

## 2023-12-28 DIAGNOSIS — E10.65 TYPE 1 DIABETES MELLITUS WITH HYPERGLYCEMIA (CMD): Primary | ICD-10-CM

## 2023-12-28 DIAGNOSIS — E11.3299 BACKGROUND DIABETIC RETINOPATHY (CMD): ICD-10-CM

## 2023-12-28 LAB — HBA1C MFR BLD: 8.6 % (ref 4.5–5.6)

## 2023-12-28 PROCEDURE — 99214 OFFICE O/P EST MOD 30 MIN: CPT | Performed by: INTERNAL MEDICINE

## 2023-12-28 PROCEDURE — 36415 COLL VENOUS BLD VENIPUNCTURE: CPT | Performed by: INTERNAL MEDICINE

## 2023-12-28 PROCEDURE — 83036 HEMOGLOBIN GLYCOSYLATED A1C: CPT | Performed by: INTERNAL MEDICINE

## 2023-12-28 RX ORDER — EZETIMIBE 10 MG/1
10 TABLET ORAL DAILY
Qty: 90 TABLET | Refills: 3 | Status: SHIPPED | OUTPATIENT
Start: 2023-12-28

## 2023-12-28 RX ORDER — PEN NEEDLE, DIABETIC 30 GX3/16"
NEEDLE, DISPOSABLE MISCELLANEOUS
Qty: 400 EACH | Refills: 3 | Status: SHIPPED | OUTPATIENT
Start: 2023-12-28

## 2023-12-28 RX ORDER — ATORVASTATIN CALCIUM 80 MG/1
80 TABLET, FILM COATED ORAL NIGHTLY
Qty: 90 TABLET | Refills: 3 | Status: SHIPPED | OUTPATIENT
Start: 2023-12-28

## 2023-12-28 RX ORDER — GLUCAGON 3 MG/1
POWDER NASAL
Qty: 1 EACH | Refills: 3 | Status: SHIPPED | OUTPATIENT
Start: 2023-12-28

## 2023-12-28 RX ORDER — ALENDRONATE SODIUM 70 MG/1
TABLET ORAL
Qty: 12 TABLET | Refills: 3 | Status: SHIPPED | OUTPATIENT
Start: 2023-12-28

## 2023-12-28 RX ORDER — INSULIN DEGLUDEC INJECTION 100 U/ML
24 INJECTION, SOLUTION SUBCUTANEOUS NIGHTLY
Qty: 30 ML | Refills: 3 | Status: SHIPPED | OUTPATIENT
Start: 2023-12-28

## 2023-12-28 RX ORDER — LEVOTHYROXINE SODIUM 0.12 MG/1
TABLET ORAL
Qty: 90 TABLET | Refills: 1 | Status: SHIPPED | OUTPATIENT
Start: 2023-12-28

## 2024-01-16 ENCOUNTER — TELEPHONE (OUTPATIENT)
Dept: ENDOCRINOLOGY | Age: 69
End: 2024-01-16

## 2024-02-14 ENCOUNTER — TELEPHONE (OUTPATIENT)
Dept: EDUCATION SERVICES | Age: 69
End: 2024-02-14

## 2024-02-14 DIAGNOSIS — E10.65 TYPE 1 DIABETES MELLITUS WITH HYPERGLYCEMIA (CMD): Primary | ICD-10-CM

## 2024-02-19 ENCOUNTER — TELEPHONE (OUTPATIENT)
Dept: EDUCATION SERVICES | Age: 69
End: 2024-02-19

## 2024-02-19 ENCOUNTER — APPOINTMENT (OUTPATIENT)
Dept: EDUCATION SERVICES | Age: 69
End: 2024-02-19
Attending: INTERNAL MEDICINE

## 2024-02-19 VITALS — WEIGHT: 168.7 LBS | HEIGHT: 64 IN | BODY MASS INDEX: 28.8 KG/M2

## 2024-02-19 DIAGNOSIS — E10.65 TYPE 1 DIABETES MELLITUS WITH HYPERGLYCEMIA (CMD): Primary | ICD-10-CM

## 2024-02-19 DIAGNOSIS — E11.3299 BACKGROUND DIABETIC RETINOPATHY (CMD): ICD-10-CM

## 2024-02-19 DIAGNOSIS — E13.9 LADA (LATENT AUTOIMMUNE DIABETES IN ADULTS), MANAGED AS TYPE 1 (CMD): ICD-10-CM

## 2024-02-19 DIAGNOSIS — E78.5 TYPE 1 DIABETES MELLITUS WITH HYPERLIPIDEMIA (CMD): ICD-10-CM

## 2024-02-19 DIAGNOSIS — E10.42 TYPE 1 DIABETES MELLITUS WITH DIABETIC POLYNEUROPATHY (CMD): ICD-10-CM

## 2024-02-19 DIAGNOSIS — E10.69 TYPE 1 DIABETES MELLITUS WITH HYPERLIPIDEMIA (CMD): ICD-10-CM

## 2024-02-20 ENCOUNTER — TELEPHONE (OUTPATIENT)
Dept: PULMONOLOGY | Age: 69
End: 2024-02-20

## 2024-02-20 ENCOUNTER — TELEPHONE (OUTPATIENT)
Dept: ENDOCRINOLOGY | Age: 69
End: 2024-02-20

## 2024-02-20 DIAGNOSIS — E11.3299 BACKGROUND DIABETIC RETINOPATHY (CMD): ICD-10-CM

## 2024-02-20 DIAGNOSIS — E10.65 TYPE 1 DIABETES MELLITUS WITH HYPERGLYCEMIA (CMD): Primary | ICD-10-CM

## 2024-02-20 DIAGNOSIS — E10.22 TYPE 1 DIABETES MELLITUS WITH STAGE 3A CHRONIC KIDNEY DISEASE (CMD): ICD-10-CM

## 2024-02-20 DIAGNOSIS — N18.2 TYPE 1 DIABETES MELLITUS WITH STAGE 2 CHRONIC KIDNEY DISEASE (CMD): ICD-10-CM

## 2024-02-20 DIAGNOSIS — E10.22 TYPE 1 DIABETES MELLITUS WITH STAGE 2 CHRONIC KIDNEY DISEASE (CMD): ICD-10-CM

## 2024-02-20 DIAGNOSIS — E13.9 LADA (LATENT AUTOIMMUNE DIABETES IN ADULTS), MANAGED AS TYPE 1 (CMD): ICD-10-CM

## 2024-02-20 DIAGNOSIS — E10.42 TYPE 1 DIABETES MELLITUS WITH DIABETIC POLYNEUROPATHY (CMD): ICD-10-CM

## 2024-02-20 DIAGNOSIS — E10.59 TYPE 1 DIABETES MELLITUS WITH CARDIAC COMPLICATION (CMD): ICD-10-CM

## 2024-02-20 DIAGNOSIS — N18.31 TYPE 1 DIABETES MELLITUS WITH STAGE 3A CHRONIC KIDNEY DISEASE (CMD): ICD-10-CM

## 2024-02-20 DIAGNOSIS — E10.649 HYPOGLYCEMIA UNAWARENESS IN TYPE 1 DIABETES MELLITUS (CMD): ICD-10-CM

## 2024-02-20 DIAGNOSIS — E10.69 TYPE 1 DIABETES MELLITUS WITH HYPERLIPIDEMIA (CMD): ICD-10-CM

## 2024-02-20 DIAGNOSIS — E78.5 TYPE 1 DIABETES MELLITUS WITH HYPERLIPIDEMIA (CMD): ICD-10-CM

## 2024-02-22 ENCOUNTER — TELEPHONE (OUTPATIENT)
Dept: OTHER | Age: 69
End: 2024-02-22

## 2024-02-22 ENCOUNTER — TELEPHONE (OUTPATIENT)
Dept: EDUCATION SERVICES | Age: 69
End: 2024-02-22

## 2024-02-23 ENCOUNTER — WALK IN (OUTPATIENT)
Dept: URGENT CARE | Age: 69
End: 2024-02-23

## 2024-02-23 VITALS
TEMPERATURE: 97.8 F | SYSTOLIC BLOOD PRESSURE: 146 MMHG | OXYGEN SATURATION: 100 % | RESPIRATION RATE: 16 BRPM | DIASTOLIC BLOOD PRESSURE: 70 MMHG | WEIGHT: 168 LBS | BODY MASS INDEX: 28.68 KG/M2 | HEART RATE: 65 BPM | HEIGHT: 64 IN

## 2024-02-23 DIAGNOSIS — J34.89 SINUS PAIN: Primary | ICD-10-CM

## 2024-02-23 RX ORDER — AMOXICILLIN AND CLAVULANATE POTASSIUM 875; 125 MG/1; MG/1
1 TABLET, FILM COATED ORAL 2 TIMES DAILY
Qty: 20 TABLET | Refills: 0 | Status: SHIPPED | OUTPATIENT
Start: 2024-02-23 | End: 2024-03-04

## 2024-03-21 ENCOUNTER — TELEPHONE (OUTPATIENT)
Dept: EDUCATION SERVICES | Age: 69
End: 2024-03-21

## 2024-03-26 ENCOUNTER — LAB SERVICES (OUTPATIENT)
Dept: LAB | Age: 69
End: 2024-03-26

## 2024-03-26 DIAGNOSIS — M81.8 OTHER OSTEOPOROSIS WITHOUT CURRENT PATHOLOGICAL FRACTURE: ICD-10-CM

## 2024-03-26 DIAGNOSIS — E10.649 HYPOGLYCEMIA UNAWARENESS IN TYPE 1 DIABETES MELLITUS (CMD): ICD-10-CM

## 2024-03-26 DIAGNOSIS — E03.9 ACQUIRED HYPOTHYROIDISM: ICD-10-CM

## 2024-03-26 DIAGNOSIS — E10.65 TYPE 1 DIABETES MELLITUS WITH HYPERGLYCEMIA (CMD): ICD-10-CM

## 2024-03-26 DIAGNOSIS — E78.5 TYPE 1 DIABETES MELLITUS WITH HYPERLIPIDEMIA (CMD): ICD-10-CM

## 2024-03-26 DIAGNOSIS — E13.9 LADA (LATENT AUTOIMMUNE DIABETES IN ADULTS), MANAGED AS TYPE 1 (CMD): ICD-10-CM

## 2024-03-26 DIAGNOSIS — E11.3299 BACKGROUND DIABETIC RETINOPATHY (CMD): ICD-10-CM

## 2024-03-26 DIAGNOSIS — E10.69 TYPE 1 DIABETES MELLITUS WITH HYPERLIPIDEMIA (CMD): ICD-10-CM

## 2024-03-26 DIAGNOSIS — E10.42 TYPE 1 DIABETES MELLITUS WITH DIABETIC POLYNEUROPATHY (CMD): ICD-10-CM

## 2024-03-26 LAB — 25(OH)D3+25(OH)D2 SERPL-MCNC: 45.2 NG/ML (ref 30–100)

## 2024-03-26 PROCEDURE — 84443 ASSAY THYROID STIM HORMONE: CPT | Performed by: CLINICAL MEDICAL LABORATORY

## 2024-03-26 PROCEDURE — 36415 COLL VENOUS BLD VENIPUNCTURE: CPT | Performed by: INTERNAL MEDICINE

## 2024-03-26 PROCEDURE — 80061 LIPID PANEL: CPT | Performed by: CLINICAL MEDICAL LABORATORY

## 2024-03-26 PROCEDURE — 82306 VITAMIN D 25 HYDROXY: CPT | Performed by: CLINICAL MEDICAL LABORATORY

## 2024-03-26 PROCEDURE — 82565 ASSAY OF CREATININE: CPT | Performed by: CLINICAL MEDICAL LABORATORY

## 2024-03-26 PROCEDURE — 83036 HEMOGLOBIN GLYCOSYLATED A1C: CPT | Performed by: CLINICAL MEDICAL LABORATORY

## 2024-03-27 ENCOUNTER — APPOINTMENT (OUTPATIENT)
Dept: OPHTHALMOLOGY | Age: 69
End: 2024-03-27

## 2024-03-27 LAB
CHOLEST SERPL-MCNC: 153 MG/DL
CHOLEST/HDLC SERPL: 2.3 {RATIO}
CREAT SERPL-MCNC: 1.11 MG/DL (ref 0.51–0.95)
EGFRCR SERPLBLD CKD-EPI 2021: 54 ML/MIN/{1.73_M2}
HBA1C MFR BLD: 9.5 % (ref 4.5–5.6)
HDLC SERPL-MCNC: 66 MG/DL
LDLC SERPL CALC-MCNC: 61 MG/DL
NONHDLC SERPL-MCNC: 87 MG/DL
TRIGL SERPL-MCNC: 128 MG/DL
TSH SERPL-ACNC: 1.98 MCUNITS/ML (ref 0.35–5)

## 2024-03-28 ENCOUNTER — APPOINTMENT (OUTPATIENT)
Dept: LAB | Age: 69
End: 2024-03-28

## 2024-03-28 ENCOUNTER — APPOINTMENT (OUTPATIENT)
Dept: ENDOCRINOLOGY | Age: 69
End: 2024-03-28

## 2024-03-28 VITALS
DIASTOLIC BLOOD PRESSURE: 50 MMHG | OXYGEN SATURATION: 99 % | WEIGHT: 164.3 LBS | BODY MASS INDEX: 28.2 KG/M2 | SYSTOLIC BLOOD PRESSURE: 102 MMHG | HEART RATE: 65 BPM

## 2024-03-28 DIAGNOSIS — N18.31 TYPE 1 DIABETES MELLITUS WITH STAGE 3A CHRONIC KIDNEY DISEASE (CMD): ICD-10-CM

## 2024-03-28 DIAGNOSIS — I25.810 CORONARY ARTERY DISEASE INVOLVING CORONARY BYPASS GRAFT OF NATIVE HEART WITHOUT ANGINA PECTORIS: ICD-10-CM

## 2024-03-28 DIAGNOSIS — E13.9 LADA (LATENT AUTOIMMUNE DIABETES IN ADULTS), MANAGED AS TYPE 1 (CMD): ICD-10-CM

## 2024-03-28 DIAGNOSIS — E10.65 TYPE 1 DIABETES MELLITUS WITH HYPERGLYCEMIA (CMD): Primary | ICD-10-CM

## 2024-03-28 DIAGNOSIS — E10.22 TYPE 1 DIABETES MELLITUS WITH STAGE 3A CHRONIC KIDNEY DISEASE (CMD): ICD-10-CM

## 2024-03-28 DIAGNOSIS — E10.59 TYPE 1 DIABETES MELLITUS WITH CARDIAC COMPLICATION (CMD): ICD-10-CM

## 2024-03-28 DIAGNOSIS — E78.5 TYPE 1 DIABETES MELLITUS WITH HYPERLIPIDEMIA (CMD): ICD-10-CM

## 2024-03-28 DIAGNOSIS — E03.9 ACQUIRED HYPOTHYROIDISM: ICD-10-CM

## 2024-03-28 DIAGNOSIS — E10.42 TYPE 1 DIABETES MELLITUS WITH DIABETIC POLYNEUROPATHY (CMD): ICD-10-CM

## 2024-03-28 DIAGNOSIS — E11.3299 BACKGROUND DIABETIC RETINOPATHY (CMD): ICD-10-CM

## 2024-03-28 DIAGNOSIS — E10.649 HYPOGLYCEMIA UNAWARENESS IN TYPE 1 DIABETES MELLITUS (CMD): ICD-10-CM

## 2024-03-28 DIAGNOSIS — E10.69 TYPE 1 DIABETES MELLITUS WITH HYPERLIPIDEMIA (CMD): ICD-10-CM

## 2024-03-28 PROCEDURE — 82043 UR ALBUMIN QUANTITATIVE: CPT | Performed by: CLINICAL MEDICAL LABORATORY

## 2024-03-28 PROCEDURE — 82570 ASSAY OF URINE CREATININE: CPT | Performed by: CLINICAL MEDICAL LABORATORY

## 2024-03-29 LAB
CREAT UR-MCNC: 59.3 MG/DL
MICROALBUMIN UR-MCNC: <0.5 MG/DL
MICROALBUMIN/CREAT UR: NORMAL MG/G{CREAT}

## 2024-04-03 ENCOUNTER — APPOINTMENT (OUTPATIENT)
Dept: OPHTHALMOLOGY | Age: 69
End: 2024-04-03

## 2024-04-03 DIAGNOSIS — E10.3293 MILD NONPROLIFERATIVE DIABETIC RETINOPATHY OF BOTH EYES WITHOUT MACULAR EDEMA ASSOCIATED WITH TYPE 1 DIABETES MELLITUS (CMD): Primary | ICD-10-CM

## 2024-04-03 DIAGNOSIS — Z96.1 PSEUDOPHAKIA OF BOTH EYES: ICD-10-CM

## 2024-04-03 ASSESSMENT — CONF VISUAL FIELD
OD_SUPERIOR_TEMPORAL_RESTRICTION: 0
OD_INFERIOR_NASAL_RESTRICTION: 0
OS_INFERIOR_NASAL_RESTRICTION: 0
OS_INFERIOR_TEMPORAL_RESTRICTION: 0
OS_SUPERIOR_NASAL_RESTRICTION: 0
OD_NORMAL: 1
OS_NORMAL: 1
OD_INFERIOR_TEMPORAL_RESTRICTION: 0
OS_SUPERIOR_TEMPORAL_RESTRICTION: 0
OD_SUPERIOR_NASAL_RESTRICTION: 0

## 2024-04-03 ASSESSMENT — SLIT LAMP EXAM - LIDS
COMMENTS: NORMAL
COMMENTS: NORMAL

## 2024-04-03 ASSESSMENT — CUP TO DISC RATIO
OD_RATIO: 0.35
OS_RATIO: 0.3

## 2024-04-03 ASSESSMENT — TONOMETRY
OS_IOP_MMHG: 14
IOP_METHOD: APPLANATION
OD_IOP_MMHG: 14

## 2024-04-03 ASSESSMENT — REFRACTION_MANIFEST
OS_AXIS: 175
OD_SPHERE: -1.00
OS_SPHERE: -0.50
OD_CYLINDER: +0.75
OS_ADD: +2.50
OD_ADD: +2.50
OS_CYLINDER: +0.50
OD_AXIS: 180

## 2024-04-03 ASSESSMENT — REFRACTION_WEARINGRX
OS_SPHERE: -0.50
OD_ADD: +2.50
OD_CYLINDER: +0.75
OD_SPHERE: -1.00
OS_AXIS: 175
OS_CYLINDER: +0.50
OD_AXIS: 180
SPECS_TYPE: BIFOCAL
OS_ADD: +2.50

## 2024-04-03 ASSESSMENT — VISUAL ACUITY
OS_CC: 20/20
METHOD: SNELLEN - LINEAR
OD_PH_CC: 20/NI
OD_CC: 20/40
CORRECTION_TYPE: GLASSES
OD_CC: 20/40-2
OS_CC: 20/25+2

## 2024-04-03 ASSESSMENT — EXTERNAL EXAM - LEFT EYE: OS_EXAM: NORMAL

## 2024-04-03 ASSESSMENT — EXTERNAL EXAM - RIGHT EYE: OD_EXAM: NORMAL

## 2024-04-04 ENCOUNTER — APPOINTMENT (OUTPATIENT)
Dept: EDUCATION SERVICES | Age: 69
End: 2024-04-04
Attending: INTERNAL MEDICINE

## 2024-04-22 ENCOUNTER — APPOINTMENT (OUTPATIENT)
Dept: EDUCATION SERVICES | Age: 69
End: 2024-04-22

## 2024-04-22 VITALS — BODY MASS INDEX: 28.99 KG/M2 | WEIGHT: 168.87 LBS

## 2024-04-22 DIAGNOSIS — E78.5 TYPE 1 DIABETES MELLITUS WITH HYPERLIPIDEMIA (CMD): ICD-10-CM

## 2024-04-22 DIAGNOSIS — E13.9 LADA (LATENT AUTOIMMUNE DIABETES IN ADULTS), MANAGED AS TYPE 1 (CMD): ICD-10-CM

## 2024-04-22 DIAGNOSIS — E10.42 TYPE 1 DIABETES MELLITUS WITH DIABETIC POLYNEUROPATHY (CMD): ICD-10-CM

## 2024-04-22 DIAGNOSIS — E10.69 TYPE 1 DIABETES MELLITUS WITH HYPERLIPIDEMIA (CMD): ICD-10-CM

## 2024-04-22 DIAGNOSIS — E10.65 TYPE 1 DIABETES MELLITUS WITH HYPERGLYCEMIA (CMD): Primary | ICD-10-CM

## 2024-04-22 DIAGNOSIS — E11.3299 BACKGROUND DIABETIC RETINOPATHY (CMD): ICD-10-CM

## 2024-04-24 ENCOUNTER — TELEPHONE (OUTPATIENT)
Dept: EDUCATION SERVICES | Age: 69
End: 2024-04-24

## 2024-04-25 ENCOUNTER — TELEPHONE (OUTPATIENT)
Dept: EDUCATION SERVICES | Age: 69
End: 2024-04-25

## 2024-05-15 ENCOUNTER — TELEPHONE (OUTPATIENT)
Dept: ENDOCRINOLOGY | Age: 69
End: 2024-05-15

## 2024-05-22 ENCOUNTER — TELEPHONE (OUTPATIENT)
Dept: EDUCATION SERVICES | Age: 69
End: 2024-05-22

## 2024-05-22 DIAGNOSIS — E10.65 TYPE 1 DIABETES MELLITUS WITH HYPERGLYCEMIA  (CMD): Primary | ICD-10-CM

## 2024-05-23 ENCOUNTER — E-ADVICE (OUTPATIENT)
Dept: EDUCATION SERVICES | Age: 69
End: 2024-05-23

## 2024-05-28 ENCOUNTER — TELEPHONE (OUTPATIENT)
Dept: EDUCATION SERVICES | Age: 69
End: 2024-05-28

## 2024-05-29 ENCOUNTER — TELEPHONE (OUTPATIENT)
Dept: ENDOCRINOLOGY | Age: 69
End: 2024-05-29

## 2024-05-30 ENCOUNTER — APPOINTMENT (OUTPATIENT)
Dept: EDUCATION SERVICES | Age: 69
End: 2024-05-30

## 2024-05-30 ENCOUNTER — TELEPHONE (OUTPATIENT)
Dept: EDUCATION SERVICES | Age: 69
End: 2024-05-30

## 2024-06-06 ENCOUNTER — APPOINTMENT (OUTPATIENT)
Dept: EDUCATION SERVICES | Age: 69
End: 2024-06-06

## 2024-06-06 DIAGNOSIS — E10.65 TYPE 1 DIABETES MELLITUS WITH HYPERGLYCEMIA  (CMD): Primary | ICD-10-CM

## 2024-06-06 DIAGNOSIS — M81.8 OTHER OSTEOPOROSIS WITHOUT CURRENT PATHOLOGICAL FRACTURE: ICD-10-CM

## 2024-06-06 DIAGNOSIS — E10.649 HYPOGLYCEMIA UNAWARENESS IN TYPE 1 DIABETES MELLITUS  (CMD): ICD-10-CM

## 2024-06-06 DIAGNOSIS — E10.42 TYPE 1 DIABETES MELLITUS WITH DIABETIC POLYNEUROPATHY  (CMD): ICD-10-CM

## 2024-06-06 DIAGNOSIS — E10.69 TYPE 1 DIABETES MELLITUS WITH HYPERLIPIDEMIA  (CMD): ICD-10-CM

## 2024-06-06 DIAGNOSIS — E11.3299 BACKGROUND DIABETIC RETINOPATHY  (CMD): ICD-10-CM

## 2024-06-06 DIAGNOSIS — E13.9 LADA (LATENT AUTOIMMUNE DIABETES IN ADULTS), MANAGED AS TYPE 1  (CMD): ICD-10-CM

## 2024-06-06 DIAGNOSIS — E03.9 ACQUIRED HYPOTHYROIDISM: ICD-10-CM

## 2024-06-06 DIAGNOSIS — E78.5 TYPE 1 DIABETES MELLITUS WITH HYPERLIPIDEMIA  (CMD): ICD-10-CM

## 2024-06-06 RX ORDER — INSULIN LISPRO 100 [IU]/ML
INJECTION, SOLUTION INTRAVENOUS; SUBCUTANEOUS
Qty: 60 ML | Refills: 3 | Status: SHIPPED | OUTPATIENT
Start: 2024-06-06

## 2024-06-06 RX ORDER — INSULIN DEGLUDEC 100 U/ML
INJECTION, SOLUTION SUBCUTANEOUS
Qty: 15 ML | Refills: 1 | Status: SHIPPED | OUTPATIENT
Start: 2024-06-06

## 2024-06-07 ENCOUNTER — TELEPHONE (OUTPATIENT)
Dept: ENDOCRINOLOGY | Age: 69
End: 2024-06-07

## 2024-06-07 DIAGNOSIS — E10.65 TYPE 1 DIABETES MELLITUS WITH HYPERGLYCEMIA  (CMD): Primary | ICD-10-CM

## 2024-06-13 ENCOUNTER — APPOINTMENT (OUTPATIENT)
Dept: EDUCATION SERVICES | Age: 69
End: 2024-06-13

## 2024-06-13 DIAGNOSIS — E10.65 TYPE 1 DIABETES MELLITUS WITH HYPERGLYCEMIA  (CMD): Primary | ICD-10-CM

## 2024-06-18 ENCOUNTER — TELEPHONE (OUTPATIENT)
Dept: EDUCATION SERVICES | Age: 69
End: 2024-06-18

## 2024-06-20 ENCOUNTER — TELEPHONE (OUTPATIENT)
Dept: EDUCATION SERVICES | Age: 69
End: 2024-06-20

## 2024-06-20 DIAGNOSIS — E10.65 TYPE 1 DIABETES MELLITUS WITH HYPERGLYCEMIA  (CMD): Primary | ICD-10-CM

## 2024-06-27 ENCOUNTER — APPOINTMENT (OUTPATIENT)
Dept: EDUCATION SERVICES | Age: 69
End: 2024-06-27

## 2024-07-01 ENCOUNTER — TELEPHONE (OUTPATIENT)
Dept: ENDOCRINOLOGY | Age: 69
End: 2024-07-01

## 2024-07-01 ENCOUNTER — TELEPHONE (OUTPATIENT)
Dept: EDUCATION SERVICES | Age: 69
End: 2024-07-01

## 2024-07-15 ENCOUNTER — TELEPHONE (OUTPATIENT)
Dept: EDUCATION SERVICES | Age: 69
End: 2024-07-15

## 2024-07-18 ENCOUNTER — APPOINTMENT (OUTPATIENT)
Dept: EDUCATION SERVICES | Age: 69
End: 2024-07-18
Attending: INTERNAL MEDICINE

## 2024-07-18 DIAGNOSIS — E03.9 ACQUIRED HYPOTHYROIDISM: ICD-10-CM

## 2024-07-18 DIAGNOSIS — E78.5 TYPE 1 DIABETES MELLITUS WITH HYPERLIPIDEMIA  (CMD): ICD-10-CM

## 2024-07-18 DIAGNOSIS — E10.42 TYPE 1 DIABETES MELLITUS WITH DIABETIC POLYNEUROPATHY  (CMD): ICD-10-CM

## 2024-07-18 DIAGNOSIS — E10.65 TYPE 1 DIABETES MELLITUS WITH HYPERGLYCEMIA  (CMD): ICD-10-CM

## 2024-07-18 DIAGNOSIS — M81.8 OTHER OSTEOPOROSIS WITHOUT CURRENT PATHOLOGICAL FRACTURE: ICD-10-CM

## 2024-07-18 DIAGNOSIS — E10.69 TYPE 1 DIABETES MELLITUS WITH HYPERLIPIDEMIA  (CMD): ICD-10-CM

## 2024-07-18 DIAGNOSIS — E11.3299 BACKGROUND DIABETIC RETINOPATHY  (CMD): ICD-10-CM

## 2024-07-18 DIAGNOSIS — E13.9 LADA (LATENT AUTOIMMUNE DIABETES IN ADULTS), MANAGED AS TYPE 1  (CMD): ICD-10-CM

## 2024-07-18 DIAGNOSIS — E10.649 HYPOGLYCEMIA UNAWARENESS IN TYPE 1 DIABETES MELLITUS  (CMD): ICD-10-CM

## 2024-07-20 RX ORDER — LEVOTHYROXINE SODIUM 0.12 MG/1
TABLET ORAL
Qty: 90 TABLET | Refills: 1 | Status: SHIPPED | OUTPATIENT
Start: 2024-07-20

## 2024-07-25 ENCOUNTER — APPOINTMENT (OUTPATIENT)
Dept: EDUCATION SERVICES | Age: 69
End: 2024-07-25
Attending: INTERNAL MEDICINE

## 2024-07-25 DIAGNOSIS — E10.65 TYPE 1 DIABETES MELLITUS WITH HYPERGLYCEMIA  (CMD): Primary | ICD-10-CM

## 2024-08-01 ENCOUNTER — APPOINTMENT (OUTPATIENT)
Dept: EDUCATION SERVICES | Age: 69
End: 2024-08-01

## 2024-08-05 ENCOUNTER — APPOINTMENT (OUTPATIENT)
Dept: ENDOCRINOLOGY | Age: 69
End: 2024-08-05

## 2024-08-15 ENCOUNTER — APPOINTMENT (OUTPATIENT)
Dept: EDUCATION SERVICES | Age: 69
End: 2024-08-15

## 2024-08-15 ENCOUNTER — TELEPHONE (OUTPATIENT)
Dept: EDUCATION SERVICES | Age: 69
End: 2024-08-15

## 2024-08-22 ENCOUNTER — APPOINTMENT (OUTPATIENT)
Dept: EDUCATION SERVICES | Age: 69
End: 2024-08-22

## 2024-08-22 DIAGNOSIS — E10.65 TYPE 1 DIABETES MELLITUS WITH HYPERGLYCEMIA  (CMD): Primary | ICD-10-CM

## 2024-08-26 ENCOUNTER — TELEPHONE (OUTPATIENT)
Dept: EDUCATION SERVICES | Age: 69
End: 2024-08-26

## 2024-08-29 ENCOUNTER — TELEPHONE (OUTPATIENT)
Dept: EDUCATION SERVICES | Age: 69
End: 2024-08-29

## 2024-08-29 DIAGNOSIS — E10.65 TYPE 1 DIABETES MELLITUS WITH HYPERGLYCEMIA  (CMD): Primary | ICD-10-CM

## 2024-09-07 ENCOUNTER — TELEPHONE (OUTPATIENT)
Dept: ENDOCRINOLOGY | Age: 69
End: 2024-09-07

## 2024-09-07 DIAGNOSIS — E10.65 TYPE 1 DIABETES MELLITUS WITH HYPERGLYCEMIA  (CMD): Primary | ICD-10-CM

## 2024-09-08 ENCOUNTER — HOSPITAL ENCOUNTER (INPATIENT)
Age: 69
LOS: 1 days | Discharge: HOME HEALTH | DRG: 919 | End: 2024-09-09
Attending: INTERNAL MEDICINE | Admitting: INTERNAL MEDICINE

## 2024-09-08 DIAGNOSIS — E08.10 DIABETIC KETOACIDOSIS WITHOUT COMA ASSOCIATED WITH DIABETES MELLITUS DUE TO UNDERLYING CONDITION  (CMD): Primary | ICD-10-CM

## 2024-09-08 DIAGNOSIS — E10.10 DKA, TYPE 1, NOT AT GOAL  (CMD): ICD-10-CM

## 2024-09-08 LAB
ALBUMIN SERPL-MCNC: 3.1 G/DL (ref 3.6–5.1)
ALBUMIN/GLOB SERPL: 0.9 {RATIO} (ref 1–2.4)
ALP SERPL-CCNC: 89 UNITS/L (ref 45–117)
ALT SERPL-CCNC: 32 UNITS/L
ANION GAP SERPL CALC-SCNC: 17 MMOL/L (ref 7–19)
ANION GAP SERPL CALC-SCNC: 27 MMOL/L (ref 7–19)
ANION GAP SERPL CALC-SCNC: 32 MMOL/L (ref 7–19)
AST SERPL-CCNC: 18 UNITS/L
B-OH-BUTYR SERPL-SCNC: 5.3 MMOL/L (ref 0–0.3)
BASE DEFICIT BLDV-SCNC: -19 MMOL/L (ref -2–2)
BASOPHILS # BLD: 0.1 K/MCL (ref 0–0.3)
BASOPHILS NFR BLD: 0 %
BILIRUB SERPL-MCNC: 0.9 MG/DL (ref 0.2–1)
BUN SERPL-MCNC: 20 MG/DL (ref 6–20)
BUN SERPL-MCNC: 24 MG/DL (ref 6–20)
BUN SERPL-MCNC: 24 MG/DL (ref 6–20)
BUN/CREAT SERPL: 23 (ref 7–25)
BUN/CREAT SERPL: 24 (ref 7–25)
BUN/CREAT SERPL: 25 (ref 7–25)
CALCIUM SERPL-MCNC: 8.3 MG/DL (ref 8.4–10.2)
CALCIUM SERPL-MCNC: 8.3 MG/DL (ref 8.4–10.2)
CALCIUM SERPL-MCNC: 8.8 MG/DL (ref 8.4–10.2)
CHLORIDE SERPL-SCNC: 105 MMOL/L (ref 97–110)
CHLORIDE SERPL-SCNC: 91 MMOL/L (ref 97–110)
CHLORIDE SERPL-SCNC: 99 MMOL/L (ref 97–110)
CO2 SERPL-SCNC: 12 MMOL/L (ref 21–32)
CO2 SERPL-SCNC: 12 MMOL/L (ref 21–32)
CO2 SERPL-SCNC: 20 MMOL/L (ref 21–32)
CREAT SERPL-MCNC: 0.86 MG/DL (ref 0.51–0.95)
CREAT SERPL-MCNC: 0.96 MG/DL (ref 0.51–0.95)
CREAT SERPL-MCNC: 0.98 MG/DL (ref 0.51–0.95)
DEPRECATED RDW RBC: 47 FL (ref 39–50)
EGFRCR SERPLBLD CKD-EPI 2021: 63 ML/MIN/{1.73_M2}
EGFRCR SERPLBLD CKD-EPI 2021: 64 ML/MIN/{1.73_M2}
EGFRCR SERPLBLD CKD-EPI 2021: 74 ML/MIN/{1.73_M2}
EOSINOPHIL # BLD: 0 K/MCL (ref 0–0.5)
EOSINOPHIL NFR BLD: 0 %
ERYTHROCYTE [DISTWIDTH] IN BLOOD: 13.2 % (ref 11–15)
FASTING DURATION TIME PATIENT: ABNORMAL H
GLOBULIN SER-MCNC: 3.6 G/DL (ref 2–4)
GLUCOSE BLDC GLUCOMTR-MCNC: 199 MG/DL (ref 70–99)
GLUCOSE BLDC GLUCOMTR-MCNC: 204 MG/DL (ref 70–99)
GLUCOSE BLDC GLUCOMTR-MCNC: 215 MG/DL (ref 70–99)
GLUCOSE BLDC GLUCOMTR-MCNC: 237 MG/DL (ref 70–99)
GLUCOSE BLDC GLUCOMTR-MCNC: 271 MG/DL (ref 70–99)
GLUCOSE BLDC GLUCOMTR-MCNC: 310 MG/DL (ref 70–99)
GLUCOSE BLDC GLUCOMTR-MCNC: 389 MG/DL (ref 70–99)
GLUCOSE BLDC GLUCOMTR-MCNC: 441 MG/DL (ref 70–99)
GLUCOSE BLDC GLUCOMTR-MCNC: 582 MG/DL (ref 70–99)
GLUCOSE BLDC GLUCOMTR-MCNC: >600 MG/DL (ref 70–99)
GLUCOSE BLDC GLUCOMTR-MCNC: >600 MG/DL (ref 70–99)
GLUCOSE SERPL-MCNC: 259 MG/DL (ref 70–99)
GLUCOSE SERPL-MCNC: 471 MG/DL (ref 70–99)
GLUCOSE SERPL-MCNC: 737 MG/DL (ref 70–99)
HCO3 BLDV-SCNC: 9 MMOL/L (ref 22–28)
HCT VFR BLD CALC: 42.3 % (ref 36–46.5)
HGB BLD-MCNC: 13.2 G/DL (ref 12–15.5)
HGB BLDA-MCNC: 15.4 G/DL (ref 12–15.5)
IMM GRANULOCYTES # BLD AUTO: 0.1 K/MCL (ref 0–0.2)
IMM GRANULOCYTES # BLD: 1 %
LYMPHOCYTES # BLD: 3 K/MCL (ref 1–4)
LYMPHOCYTES NFR BLD: 21 %
MAGNESIUM SERPL-MCNC: 1.9 MG/DL (ref 1.7–2.4)
MCH RBC QN AUTO: 30.2 PG (ref 26–34)
MCHC RBC AUTO-ENTMCNC: 31.2 G/DL (ref 32–36.5)
MCV RBC AUTO: 96.8 FL (ref 78–100)
MONOCYTES # BLD: 0.5 K/MCL (ref 0.3–0.9)
MONOCYTES NFR BLD: 4 %
NEUTROPHILS # BLD: 10.3 K/MCL (ref 1.8–7.7)
NEUTROPHILS NFR BLD: 74 %
NRBC BLD MANUAL-RTO: 0 /100 WBC
OXYHGB MFR BLDV: 77 % (ref 60–80)
PCO2 BLDV: 25 MM HG (ref 38–51)
PH BLDV: 7.14 UNITS (ref 7.35–7.45)
PHOSPHATE SERPL-MCNC: 4 MG/DL (ref 2.4–4.7)
PLATELET # BLD AUTO: 258 K/MCL (ref 140–450)
PO2 BLDV: 51 MM HG (ref 35–42)
POTASSIUM SERPL-SCNC: 3.6 MMOL/L (ref 3.4–5.1)
POTASSIUM SERPL-SCNC: 3.8 MMOL/L (ref 3.4–5.1)
POTASSIUM SERPL-SCNC: 5 MMOL/L (ref 3.4–5.1)
PROT SERPL-MCNC: 6.7 G/DL (ref 6.4–8.2)
RAINBOW EXTRA TUBES HOLD SPECIMEN: NORMAL
RBC # BLD: 4.37 MIL/MCL (ref 4–5.2)
SAO2 % BLDV: 80 % (ref 60–80)
SODIUM SERPL-SCNC: 130 MMOL/L (ref 135–145)
SODIUM SERPL-SCNC: 134 MMOL/L (ref 135–145)
SODIUM SERPL-SCNC: 138 MMOL/L (ref 135–145)
WBC # BLD: 13.9 K/MCL (ref 4.2–11)

## 2024-09-08 PROCEDURE — 10002807 HB RX 258: Performed by: EMERGENCY MEDICINE

## 2024-09-08 PROCEDURE — 82962 GLUCOSE BLOOD TEST: CPT

## 2024-09-08 PROCEDURE — 10002807 HB RX 258: Performed by: PHYSICIAN ASSISTANT

## 2024-09-08 PROCEDURE — 82805 BLOOD GASES W/O2 SATURATION: CPT | Performed by: EMERGENCY MEDICINE

## 2024-09-08 PROCEDURE — 99223 1ST HOSP IP/OBS HIGH 75: CPT | Performed by: INTERNAL MEDICINE

## 2024-09-08 PROCEDURE — 10002800 HB RX 250 W HCPCS: Performed by: EMERGENCY MEDICINE

## 2024-09-08 PROCEDURE — 36415 COLL VENOUS BLD VENIPUNCTURE: CPT | Performed by: INTERNAL MEDICINE

## 2024-09-08 PROCEDURE — 10002800 HB RX 250 W HCPCS: Performed by: INTERNAL MEDICINE

## 2024-09-08 PROCEDURE — 36415 COLL VENOUS BLD VENIPUNCTURE: CPT

## 2024-09-08 PROCEDURE — 83735 ASSAY OF MAGNESIUM: CPT | Performed by: STUDENT IN AN ORGANIZED HEALTH CARE EDUCATION/TRAINING PROGRAM

## 2024-09-08 PROCEDURE — 82010 KETONE BODYS QUAN: CPT | Performed by: PHYSICIAN ASSISTANT

## 2024-09-08 PROCEDURE — 80048 BASIC METABOLIC PNL TOTAL CA: CPT | Performed by: INTERNAL MEDICINE

## 2024-09-08 PROCEDURE — 83036 HEMOGLOBIN GLYCOSYLATED A1C: CPT | Performed by: INTERNAL MEDICINE

## 2024-09-08 PROCEDURE — 84100 ASSAY OF PHOSPHORUS: CPT | Performed by: EMERGENCY MEDICINE

## 2024-09-08 PROCEDURE — 80048 BASIC METABOLIC PNL TOTAL CA: CPT | Performed by: STUDENT IN AN ORGANIZED HEALTH CARE EDUCATION/TRAINING PROGRAM

## 2024-09-08 PROCEDURE — 99284 EMERGENCY DEPT VISIT MOD MDM: CPT

## 2024-09-08 PROCEDURE — 10000008 HB ROOM CHARGE ICU OR CCU

## 2024-09-08 PROCEDURE — 99285 EMERGENCY DEPT VISIT HI MDM: CPT | Performed by: PHYSICIAN ASSISTANT

## 2024-09-08 PROCEDURE — 85025 COMPLETE CBC W/AUTO DIFF WBC: CPT | Performed by: PHYSICIAN ASSISTANT

## 2024-09-08 PROCEDURE — 83735 ASSAY OF MAGNESIUM: CPT | Performed by: PHYSICIAN ASSISTANT

## 2024-09-08 PROCEDURE — 10002800 HB RX 250 W HCPCS: Performed by: STUDENT IN AN ORGANIZED HEALTH CARE EDUCATION/TRAINING PROGRAM

## 2024-09-08 PROCEDURE — 96372 THER/PROPH/DIAG INJ SC/IM: CPT | Performed by: STUDENT IN AN ORGANIZED HEALTH CARE EDUCATION/TRAINING PROGRAM

## 2024-09-08 PROCEDURE — 96372 THER/PROPH/DIAG INJ SC/IM: CPT | Performed by: INTERNAL MEDICINE

## 2024-09-08 PROCEDURE — 80053 COMPREHEN METABOLIC PANEL: CPT | Performed by: PHYSICIAN ASSISTANT

## 2024-09-08 PROCEDURE — 10002807 HB RX 258: Performed by: INTERNAL MEDICINE

## 2024-09-08 RX ORDER — DEXTROSE MONOHYDRATE AND SODIUM CHLORIDE 5; .9 G/100ML; G/100ML
INJECTION, SOLUTION INTRAVENOUS CONTINUOUS PRN
Status: DISCONTINUED | OUTPATIENT
Start: 2024-09-08 | End: 2024-09-09

## 2024-09-08 RX ORDER — NICOTINE POLACRILEX 4 MG
15 LOZENGE BUCCAL PRN
Status: DISCONTINUED | OUTPATIENT
Start: 2024-09-08 | End: 2024-09-08

## 2024-09-08 RX ORDER — PREGABALIN 100 MG/1
100 CAPSULE ORAL 2 TIMES DAILY
COMMUNITY

## 2024-09-08 RX ORDER — NICOTINE POLACRILEX 4 MG
15 LOZENGE BUCCAL PRN
Status: DISCONTINUED | OUTPATIENT
Start: 2024-09-08 | End: 2024-09-09

## 2024-09-08 RX ORDER — AMITRIPTYLINE HYDROCHLORIDE 10 MG/1
15 TABLET ORAL NIGHTLY
COMMUNITY

## 2024-09-08 RX ORDER — DEXTROSE MONOHYDRATE 25 G/50ML
25 INJECTION, SOLUTION INTRAVENOUS PRN
Status: DISCONTINUED | OUTPATIENT
Start: 2024-09-08 | End: 2024-09-08

## 2024-09-08 RX ORDER — POTASSIUM CHLORIDE 14.9 MG/ML
20 INJECTION INTRAVENOUS PRN
Status: DISCONTINUED | OUTPATIENT
Start: 2024-09-08 | End: 2024-09-09

## 2024-09-08 RX ORDER — SODIUM CHLORIDE 9 MG/ML
INJECTION, SOLUTION INTRAVENOUS CONTINUOUS
Status: DISCONTINUED | OUTPATIENT
Start: 2024-09-08 | End: 2024-09-08

## 2024-09-08 RX ORDER — DEXTROSE MONOHYDRATE 25 G/50ML
25 INJECTION, SOLUTION INTRAVENOUS PRN
Status: DISCONTINUED | OUTPATIENT
Start: 2024-09-08 | End: 2024-09-09

## 2024-09-08 RX ORDER — DEXTROSE MONOHYDRATE AND SODIUM CHLORIDE 5; .9 G/100ML; G/100ML
INJECTION, SOLUTION INTRAVENOUS CONTINUOUS
Status: DISCONTINUED | OUTPATIENT
Start: 2024-09-08 | End: 2024-09-08

## 2024-09-08 RX ORDER — DEXTROSE MONOHYDRATE 25 G/50ML
12.5 INJECTION, SOLUTION INTRAVENOUS PRN
Status: DISCONTINUED | OUTPATIENT
Start: 2024-09-08 | End: 2024-09-08

## 2024-09-08 RX ORDER — NICOTINE POLACRILEX 4 MG
30 LOZENGE BUCCAL PRN
Status: DISCONTINUED | OUTPATIENT
Start: 2024-09-08 | End: 2024-09-08

## 2024-09-08 RX ORDER — DEXTROSE MONOHYDRATE 25 G/50ML
12.5 INJECTION, SOLUTION INTRAVENOUS PRN
Status: DISCONTINUED | OUTPATIENT
Start: 2024-09-08 | End: 2024-09-09

## 2024-09-08 RX ORDER — MAGNESIUM SULFATE 4 G/50ML
4 INJECTION INTRAVENOUS EVERY 4 HOURS PRN
Status: DISCONTINUED | OUTPATIENT
Start: 2024-09-08 | End: 2024-09-09

## 2024-09-08 RX ORDER — TRAZODONE HYDROCHLORIDE 50 MG/1
50-100 TABLET, FILM COATED ORAL
COMMUNITY

## 2024-09-08 RX ORDER — POTASSIUM CHLORIDE 1500 MG/1
20 TABLET, EXTENDED RELEASE ORAL DAILY PRN
COMMUNITY

## 2024-09-08 RX ORDER — NICOTINE POLACRILEX 4 MG
30 LOZENGE BUCCAL PRN
Status: DISCONTINUED | OUTPATIENT
Start: 2024-09-08 | End: 2024-09-09

## 2024-09-08 RX ORDER — 0.9 % SODIUM CHLORIDE 0.9 %
2 VIAL (ML) INJECTION EVERY 12 HOURS SCHEDULED
Status: DISCONTINUED | OUTPATIENT
Start: 2024-09-08 | End: 2024-09-09 | Stop reason: HOSPADM

## 2024-09-08 RX ORDER — ENOXAPARIN SODIUM 100 MG/ML
40 INJECTION SUBCUTANEOUS DAILY
Status: DISCONTINUED | OUTPATIENT
Start: 2024-09-08 | End: 2024-09-09 | Stop reason: HOSPADM

## 2024-09-08 RX ORDER — SODIUM CHLORIDE 9 MG/ML
INJECTION, SOLUTION INTRAVENOUS CONTINUOUS PRN
Status: DISCONTINUED | OUTPATIENT
Start: 2024-09-08 | End: 2024-09-09

## 2024-09-08 RX ORDER — INSULIN GLARGINE 100 [IU]/ML
15 INJECTION, SOLUTION SUBCUTANEOUS NIGHTLY
Status: DISCONTINUED | OUTPATIENT
Start: 2024-09-08 | End: 2024-09-09 | Stop reason: HOSPADM

## 2024-09-08 RX ADMIN — MAGNESIUM SULFATE HEPTAHYDRATE 2 G: 40 INJECTION, SOLUTION INTRAVENOUS at 18:48

## 2024-09-08 RX ADMIN — INSULIN GLARGINE 15 UNITS: 100 INJECTION, SOLUTION SUBCUTANEOUS at 21:29

## 2024-09-08 RX ADMIN — SODIUM CHLORIDE 1000 ML: 9 INJECTION, SOLUTION INTRAVENOUS at 13:28

## 2024-09-08 RX ADMIN — INSULIN HUMAN 8 UNITS/HR: 1 INJECTION, SOLUTION INTRAVENOUS at 13:50

## 2024-09-08 RX ADMIN — POTASSIUM CHLORIDE 20 MEQ: 14.9 INJECTION, SOLUTION INTRAVENOUS at 18:18

## 2024-09-08 RX ADMIN — SODIUM CHLORIDE: 9 INJECTION, SOLUTION INTRAVENOUS at 15:06

## 2024-09-08 RX ADMIN — DEXTROSE AND SODIUM CHLORIDE: 5; 900 INJECTION, SOLUTION INTRAVENOUS at 20:07

## 2024-09-08 RX ADMIN — ENOXAPARIN SODIUM 40 MG: 100 INJECTION SUBCUTANEOUS at 15:35

## 2024-09-08 SDOH — HEALTH STABILITY: GENERAL: BECAUSE OF A PHYSICAL, MENTAL, OR EMOTIONAL CONDITION, DO YOU HAVE DIFFICULTY DOING ERRANDS ALONE?: YES

## 2024-09-08 SDOH — ECONOMIC STABILITY: FOOD INSECURITY: WITHIN THE PAST 12 MONTHS, THE FOOD YOU BOUGHT JUST DIDN'T LAST AND YOU DIDN'T HAVE MONEY TO GET MORE.: NEVER TRUE

## 2024-09-08 SDOH — ECONOMIC STABILITY: HOUSING INSECURITY: DO YOU HAVE PROBLEMS WITH ANY OF THE FOLLOWING?: NONE OF THE ABOVE

## 2024-09-08 SDOH — SOCIAL STABILITY: SOCIAL INSECURITY: HOW OFTEN DOES ANYONE, INCLUDING FAMILY AND FRIENDS, INSULT OR TALK DOWN TO YOU?: NEVER

## 2024-09-08 SDOH — SOCIAL STABILITY: SOCIAL NETWORK
HOW OFTEN DO YOU SEE OR TALK TO PEOPLE THAT YOU CARE ABOUT AND FEEL CLOSE TO? (FOR EXAMPLE: TALKING TO FRIENDS ON THE PHONE, VISITING FRIENDS OR FAMILY, GOING TO CHURCH OR CLUB MEETINGS): 5 OR MORE TIMES A WEEK

## 2024-09-08 SDOH — ECONOMIC STABILITY: INCOME INSECURITY: IN THE PAST 12 MONTHS, HAS THE ELECTRIC, GAS, OIL, OR WATER COMPANY THREATENED TO SHUT OFF SERVICE IN YOUR HOME?: NO

## 2024-09-08 SDOH — ECONOMIC STABILITY: HOUSING INSECURITY: WHAT IS YOUR LIVING SITUATION TODAY?: I HAVE A STEADY PLACE TO LIVE

## 2024-09-08 SDOH — ECONOMIC STABILITY: HOUSING INSECURITY: WHAT IS YOUR LIVING SITUATION TODAY?: ALONE

## 2024-09-08 SDOH — SOCIAL STABILITY: SOCIAL NETWORK: SUPPORT SYSTEMS: CHILDREN;FAMILY MEMBERS;FRIENDS;NEIGHBORS

## 2024-09-08 SDOH — ECONOMIC STABILITY: GENERAL

## 2024-09-08 SDOH — SOCIAL STABILITY: SOCIAL INSECURITY: HOW OFTEN DOES ANYONE, INCLUDING FAMILY AND FRIENDS, THREATEN YOU WITH HARM?: NEVER

## 2024-09-08 SDOH — ECONOMIC STABILITY: TRANSPORTATION INSECURITY
IN THE PAST 12 MONTHS, HAS LACK OF RELIABLE TRANSPORTATION KEPT YOU FROM MEDICAL APPOINTMENTS, MEETINGS, WORK OR FROM GETTING THINGS NEEDED FOR DAILY LIVING?: NO

## 2024-09-08 SDOH — HEALTH STABILITY: PHYSICAL HEALTH: DO YOU HAVE DIFFICULTY DRESSING OR BATHING?: NO

## 2024-09-08 SDOH — HEALTH STABILITY: GENERAL
BECAUSE OF A PHYSICAL, MENTAL, OR EMOTIONAL CONDITION, DO YOU HAVE SERIOUS DIFFICULTY CONCENTRATING, REMEMBERING OR MAKING DECISIONS?: NO

## 2024-09-08 SDOH — ECONOMIC STABILITY: GENERAL: WOULD YOU LIKE HELP WITH ANY OF THE FOLLOWING NEEDS?: I DON'T WANT HELP WITH ANY OF THESE

## 2024-09-08 SDOH — HEALTH STABILITY: PHYSICAL HEALTH: DO YOU HAVE SERIOUS DIFFICULTY WALKING OR CLIMBING STAIRS?: YES

## 2024-09-08 SDOH — SOCIAL STABILITY: SOCIAL INSECURITY: HOW OFTEN DOES ANYONE, INCLUDING FAMILY AND FRIENDS, SCREAM OR CURSE AT YOU?: NEVER

## 2024-09-08 SDOH — SOCIAL STABILITY: SOCIAL INSECURITY: HOW OFTEN DOES ANYONE, INCLUDING FAMILY AND FRIENDS, PHYSICALLY HURT YOU?: NEVER

## 2024-09-08 SDOH — ECONOMIC STABILITY: HOUSING INSECURITY: WHAT IS YOUR LIVING SITUATION TODAY?: HOUSE

## 2024-09-08 ASSESSMENT — ENCOUNTER SYMPTOMS
COUGH: 0
SHORTNESS OF BREATH: 0
CHILLS: 0
ADENOPATHY: 0
APPETITE CHANGE: 0
LIGHT-HEADEDNESS: 0
FEVER: 0
HEADACHES: 0
CHEST TIGHTNESS: 0
VOMITING: 0
SORE THROAT: 0
WEAKNESS: 0
DIZZINESS: 0
BACK PAIN: 0
ABDOMINAL PAIN: 0
DIARRHEA: 0
NAUSEA: 1
CONSTIPATION: 0

## 2024-09-08 ASSESSMENT — PATIENT HEALTH QUESTIONNAIRE - PHQ9
1. LITTLE INTEREST OR PLEASURE IN DOING THINGS: NOT AT ALL
SUM OF ALL RESPONSES TO PHQ9 QUESTIONS 1 AND 2: 0
2. FEELING DOWN, DEPRESSED OR HOPELESS: NOT AT ALL
CLINICAL INTERPRETATION OF PHQ2 SCORE: NO FURTHER SCREENING NEEDED
IS PATIENT ABLE TO COMPLETE PHQ2 OR PHQ9: YES
SUM OF ALL RESPONSES TO PHQ9 QUESTIONS 1 AND 2: 0

## 2024-09-08 ASSESSMENT — PAIN SCALES - GENERAL
PAINLEVEL_OUTOF10: 0

## 2024-09-08 ASSESSMENT — LIFESTYLE VARIABLES
ALCOHOL_USE_STATUS: NO OR LOW RISK WITH VALIDATED TOOL
AUDIT-C TOTAL SCORE: 0
HOW OFTEN DO YOU HAVE 6 OR MORE DRINKS ON ONE OCCASION: NEVER
HOW MANY STANDARD DRINKS CONTAINING ALCOHOL DO YOU HAVE ON A TYPICAL DAY: 0,1 OR 2
HOW OFTEN DO YOU HAVE A DRINK CONTAINING ALCOHOL: NEVER

## 2024-09-08 ASSESSMENT — ACTIVITIES OF DAILY LIVING (ADL)
ADL_SCORE: 12
ADL_BEFORE_ADMISSION: INDEPENDENT
RECENT_DECLINE_ADL: NO
ADL_SHORT_OF_BREATH: NO

## 2024-09-09 ENCOUNTER — APPOINTMENT (OUTPATIENT)
Dept: EDUCATION SERVICES | Age: 69
End: 2024-09-09

## 2024-09-09 VITALS
HEART RATE: 74 BPM | TEMPERATURE: 98.4 F | RESPIRATION RATE: 16 BRPM | SYSTOLIC BLOOD PRESSURE: 122 MMHG | OXYGEN SATURATION: 99 % | HEIGHT: 64 IN | BODY MASS INDEX: 26.08 KG/M2 | WEIGHT: 152.78 LBS | DIASTOLIC BLOOD PRESSURE: 72 MMHG

## 2024-09-09 LAB
ALBUMIN SERPL-MCNC: 2.3 G/DL (ref 3.6–5.1)
ALBUMIN/GLOB SERPL: 0.8 {RATIO} (ref 1–2.4)
ALP SERPL-CCNC: 62 UNITS/L (ref 45–117)
ALT SERPL-CCNC: 18 UNITS/L
ANION GAP SERPL CALC-SCNC: 14 MMOL/L (ref 7–19)
ANION GAP SERPL CALC-SCNC: 15 MMOL/L (ref 7–19)
ANION GAP SERPL CALC-SCNC: 16 MMOL/L (ref 7–19)
AST SERPL-CCNC: 11 UNITS/L
BASOPHILS # BLD: 0.1 K/MCL (ref 0–0.3)
BASOPHILS NFR BLD: 1 %
BILIRUB SERPL-MCNC: 0.4 MG/DL (ref 0.2–1)
BUN SERPL-MCNC: 11 MG/DL (ref 6–20)
BUN SERPL-MCNC: 11 MG/DL (ref 6–20)
BUN SERPL-MCNC: 16 MG/DL (ref 6–20)
BUN/CREAT SERPL: 14 (ref 7–25)
BUN/CREAT SERPL: 15 (ref 7–25)
BUN/CREAT SERPL: 21 (ref 7–25)
CALCIUM SERPL-MCNC: 7.5 MG/DL (ref 8.4–10.2)
CALCIUM SERPL-MCNC: 7.8 MG/DL (ref 8.4–10.2)
CALCIUM SERPL-MCNC: 8.3 MG/DL (ref 8.4–10.2)
CHLORIDE SERPL-SCNC: 110 MMOL/L (ref 97–110)
CHLORIDE SERPL-SCNC: 110 MMOL/L (ref 97–110)
CHLORIDE SERPL-SCNC: 113 MMOL/L (ref 97–110)
CO2 SERPL-SCNC: 20 MMOL/L (ref 21–32)
CO2 SERPL-SCNC: 20 MMOL/L (ref 21–32)
CO2 SERPL-SCNC: 23 MMOL/L (ref 21–32)
CREAT SERPL-MCNC: 0.73 MG/DL (ref 0.51–0.95)
CREAT SERPL-MCNC: 0.75 MG/DL (ref 0.51–0.95)
CREAT SERPL-MCNC: 0.76 MG/DL (ref 0.51–0.95)
DEPRECATED RDW RBC: 44 FL (ref 39–50)
EGFRCR SERPLBLD CKD-EPI 2021: 85 ML/MIN/{1.73_M2}
EGFRCR SERPLBLD CKD-EPI 2021: 87 ML/MIN/{1.73_M2}
EGFRCR SERPLBLD CKD-EPI 2021: 90 ML/MIN/{1.73_M2}
EOSINOPHIL # BLD: 0.1 K/MCL (ref 0–0.5)
EOSINOPHIL NFR BLD: 1 %
ERYTHROCYTE [DISTWIDTH] IN BLOOD: 13.2 % (ref 11–15)
FASTING DURATION TIME PATIENT: ABNORMAL H
GLOBULIN SER-MCNC: 2.9 G/DL (ref 2–4)
GLUCOSE BLDC GLUCOMTR-MCNC: 209 MG/DL (ref 70–99)
GLUCOSE BLDC GLUCOMTR-MCNC: 219 MG/DL (ref 70–99)
GLUCOSE BLDC GLUCOMTR-MCNC: 76 MG/DL (ref 70–99)
GLUCOSE SERPL-MCNC: 208 MG/DL (ref 70–99)
GLUCOSE SERPL-MCNC: 253 MG/DL (ref 70–99)
GLUCOSE SERPL-MCNC: 70 MG/DL (ref 70–99)
HBA1C MFR BLD: 8.4 % (ref 4.5–5.6)
HCT VFR BLD CALC: 35.7 % (ref 36–46.5)
HGB BLD-MCNC: 11.7 G/DL (ref 12–15.5)
IMM GRANULOCYTES # BLD AUTO: 0 K/MCL (ref 0–0.2)
IMM GRANULOCYTES # BLD: 0 %
LYMPHOCYTES # BLD: 3.4 K/MCL (ref 1–4)
LYMPHOCYTES NFR BLD: 39 %
MAGNESIUM SERPL-MCNC: 2.5 MG/DL (ref 1.7–2.4)
MCH RBC QN AUTO: 30.1 PG (ref 26–34)
MCHC RBC AUTO-ENTMCNC: 32.8 G/DL (ref 32–36.5)
MCV RBC AUTO: 91.8 FL (ref 78–100)
MONOCYTES # BLD: 0.6 K/MCL (ref 0.3–0.9)
MONOCYTES NFR BLD: 7 %
NEUTROPHILS # BLD: 4.5 K/MCL (ref 1.8–7.7)
NEUTROPHILS NFR BLD: 52 %
NRBC BLD MANUAL-RTO: 0 /100 WBC
PLATELET # BLD AUTO: 210 K/MCL (ref 140–450)
POTASSIUM SERPL-SCNC: 3.5 MMOL/L (ref 3.4–5.1)
POTASSIUM SERPL-SCNC: 3.6 MMOL/L (ref 3.4–5.1)
POTASSIUM SERPL-SCNC: 3.8 MMOL/L (ref 3.4–5.1)
PROT SERPL-MCNC: 5.2 G/DL (ref 6.4–8.2)
RAINBOW EXTRA TUBES HOLD SPECIMEN: NORMAL
RAINBOW EXTRA TUBES HOLD SPECIMEN: NORMAL
RBC # BLD: 3.89 MIL/MCL (ref 4–5.2)
SODIUM SERPL-SCNC: 141 MMOL/L (ref 135–145)
SODIUM SERPL-SCNC: 142 MMOL/L (ref 135–145)
SODIUM SERPL-SCNC: 146 MMOL/L (ref 135–145)
WBC # BLD: 8.6 K/MCL (ref 4.2–11)

## 2024-09-09 PROCEDURE — 80048 BASIC METABOLIC PNL TOTAL CA: CPT | Performed by: INTERNAL MEDICINE

## 2024-09-09 PROCEDURE — 80053 COMPREHEN METABOLIC PANEL: CPT | Performed by: INTERNAL MEDICINE

## 2024-09-09 PROCEDURE — 10004651 HB RX, NO CHARGE ITEM: Performed by: STUDENT IN AN ORGANIZED HEALTH CARE EDUCATION/TRAINING PROGRAM

## 2024-09-09 PROCEDURE — 82962 GLUCOSE BLOOD TEST: CPT

## 2024-09-09 PROCEDURE — 10002803 HB RX 637: Performed by: INTERNAL MEDICINE

## 2024-09-09 PROCEDURE — 96372 THER/PROPH/DIAG INJ SC/IM: CPT | Performed by: INTERNAL MEDICINE

## 2024-09-09 PROCEDURE — 10002803 HB RX 637: Performed by: STUDENT IN AN ORGANIZED HEALTH CARE EDUCATION/TRAINING PROGRAM

## 2024-09-09 PROCEDURE — 99239 HOSP IP/OBS DSCHRG MGMT >30: CPT | Performed by: INTERNAL MEDICINE

## 2024-09-09 PROCEDURE — 96372 THER/PROPH/DIAG INJ SC/IM: CPT | Performed by: STUDENT IN AN ORGANIZED HEALTH CARE EDUCATION/TRAINING PROGRAM

## 2024-09-09 PROCEDURE — 10002800 HB RX 250 W HCPCS: Performed by: STUDENT IN AN ORGANIZED HEALTH CARE EDUCATION/TRAINING PROGRAM

## 2024-09-09 PROCEDURE — 85025 COMPLETE CBC W/AUTO DIFF WBC: CPT | Performed by: INTERNAL MEDICINE

## 2024-09-09 PROCEDURE — 36415 COLL VENOUS BLD VENIPUNCTURE: CPT | Performed by: INTERNAL MEDICINE

## 2024-09-09 PROCEDURE — 10002800 HB RX 250 W HCPCS: Performed by: INTERNAL MEDICINE

## 2024-09-09 PROCEDURE — 10002807 HB RX 258: Performed by: STUDENT IN AN ORGANIZED HEALTH CARE EDUCATION/TRAINING PROGRAM

## 2024-09-09 PROCEDURE — 10004651 HB RX, NO CHARGE ITEM: Performed by: INTERNAL MEDICINE

## 2024-09-09 RX ORDER — SODIUM CHLORIDE 9 MG/ML
INJECTION, SOLUTION INTRAVENOUS CONTINUOUS
Status: ACTIVE | OUTPATIENT
Start: 2024-09-09 | End: 2024-09-09

## 2024-09-09 RX ORDER — PANTOPRAZOLE SODIUM 40 MG/1
40 TABLET, DELAYED RELEASE ORAL DAILY
Status: DISCONTINUED | OUTPATIENT
Start: 2024-09-09 | End: 2024-09-09 | Stop reason: HOSPADM

## 2024-09-09 RX ORDER — CLOPIDOGREL BISULFATE 75 MG/1
75 TABLET ORAL AT BEDTIME
Status: DISCONTINUED | OUTPATIENT
Start: 2024-09-09 | End: 2024-09-09 | Stop reason: HOSPADM

## 2024-09-09 RX ORDER — INSULIN LISPRO-AABC 100 [IU]/ML
0-14 INJECTION, SOLUTION SUBCUTANEOUS
Qty: 12.6 ML | Refills: 0 | Status: SHIPPED | OUTPATIENT
Start: 2024-09-09 | End: 2024-09-12 | Stop reason: ALTCHOICE

## 2024-09-09 RX ORDER — ATORVASTATIN CALCIUM 80 MG/1
80 TABLET, FILM COATED ORAL NIGHTLY
Status: DISCONTINUED | OUTPATIENT
Start: 2024-09-09 | End: 2024-09-09 | Stop reason: HOSPADM

## 2024-09-09 RX ORDER — DULOXETIN HYDROCHLORIDE 30 MG/1
90 CAPSULE, DELAYED RELEASE ORAL DAILY
Status: DISCONTINUED | OUTPATIENT
Start: 2024-09-09 | End: 2024-09-09 | Stop reason: HOSPADM

## 2024-09-09 RX ORDER — ACYCLOVIR 400 MG/1
TABLET ORAL
Qty: 9 EACH | Refills: 3 | Status: SHIPPED | OUTPATIENT
Start: 2024-09-09

## 2024-09-09 RX ORDER — POLYETHYLENE GLYCOL 3350 17 G/17G
17 POWDER, FOR SOLUTION ORAL DAILY PRN
Status: DISCONTINUED | OUTPATIENT
Start: 2024-09-09 | End: 2024-09-09 | Stop reason: HOSPADM

## 2024-09-09 RX ORDER — LEVETIRACETAM 500 MG/1
750 TABLET ORAL 2 TIMES DAILY
Status: DISCONTINUED | OUTPATIENT
Start: 2024-09-09 | End: 2024-09-09 | Stop reason: HOSPADM

## 2024-09-09 RX ORDER — POTASSIUM CHLORIDE 1500 MG/1
40 TABLET, EXTENDED RELEASE ORAL ONCE
Status: COMPLETED | OUTPATIENT
Start: 2024-09-09 | End: 2024-09-09

## 2024-09-09 RX ORDER — ASPIRIN 81 MG/1
81 TABLET ORAL DAILY
Status: DISCONTINUED | OUTPATIENT
Start: 2024-09-09 | End: 2024-09-09 | Stop reason: HOSPADM

## 2024-09-09 RX ORDER — AMITRIPTYLINE HYDROCHLORIDE 10 MG/1
15 TABLET ORAL NIGHTLY
Status: DISCONTINUED | OUTPATIENT
Start: 2024-09-09 | End: 2024-09-09 | Stop reason: HOSPADM

## 2024-09-09 RX ADMIN — ENOXAPARIN SODIUM 40 MG: 100 INJECTION SUBCUTANEOUS at 08:34

## 2024-09-09 RX ADMIN — POLYETHYLENE GLYCOL (3350) 17 G: 17 POWDER, FOR SOLUTION ORAL at 11:57

## 2024-09-09 RX ADMIN — POLYETHYLENE GLYCOL (3350) 17 G: 17 POWDER, FOR SOLUTION ORAL at 12:00

## 2024-09-09 RX ADMIN — AMITRIPTYLINE HYDROCHLORIDE 15 MG: 10 TABLET, FILM COATED ORAL at 02:12

## 2024-09-09 RX ADMIN — POTASSIUM CHLORIDE 40 MEQ: 1500 TABLET, EXTENDED RELEASE ORAL at 11:57

## 2024-09-09 RX ADMIN — SODIUM CHLORIDE, PRESERVATIVE FREE 2 ML: 5 INJECTION INTRAVENOUS at 08:36

## 2024-09-09 RX ADMIN — PANTOPRAZOLE SODIUM 40 MG: 40 TABLET, DELAYED RELEASE ORAL at 08:15

## 2024-09-09 RX ADMIN — LEVETIRACETAM 750 MG: 500 TABLET, FILM COATED ORAL at 08:34

## 2024-09-09 RX ADMIN — POTASSIUM CHLORIDE 40 MEQ: 1500 TABLET, EXTENDED RELEASE ORAL at 08:34

## 2024-09-09 RX ADMIN — ASPIRIN 81 MG: 81 TABLET, COATED ORAL at 08:34

## 2024-09-09 RX ADMIN — INSULIN LISPRO 6 UNITS: 100 INJECTION, SOLUTION INTRAVENOUS; SUBCUTANEOUS at 08:35

## 2024-09-09 RX ADMIN — INSULIN LISPRO 5 UNITS: 100 INJECTION, SOLUTION INTRAVENOUS; SUBCUTANEOUS at 08:35

## 2024-09-09 RX ADMIN — SODIUM CHLORIDE: 9 INJECTION, SOLUTION INTRAVENOUS at 00:19

## 2024-09-09 RX ADMIN — LEVETIRACETAM 750 MG: 500 TABLET, FILM COATED ORAL at 00:33

## 2024-09-09 RX ADMIN — CLOPIDOGREL BISULFATE 75 MG: 75 TABLET, FILM COATED ORAL at 00:33

## 2024-09-09 ASSESSMENT — COGNITIVE AND FUNCTIONAL STATUS - GENERAL
BECAUSE OF A PHYSICAL, MENTAL, OR EMOTIONAL CONDITION, DO YOU HAVE DIFFICULTY DOING ERRANDS ALONE: YES
DO YOU HAVE DIFFICULTY DRESSING OR BATHING: NO
BECAUSE OF A PHYSICAL, MENTAL, OR EMOTIONAL CONDITION, DO YOU HAVE SERIOUS DIFFICULTY CONCENTRATING, REMEMBERING OR MAKING DECISIONS: NO

## 2024-09-09 ASSESSMENT — PAIN SCALES - GENERAL
PAINLEVEL_OUTOF10: 0

## 2024-09-11 ENCOUNTER — TELEPHONE (OUTPATIENT)
Dept: CARE COORDINATION | Age: 69
End: 2024-09-11

## 2024-09-12 ENCOUNTER — OFFICE VISIT (OUTPATIENT)
Dept: ENDOCRINOLOGY | Age: 69
End: 2024-09-12

## 2024-09-12 ENCOUNTER — TELEPHONE (OUTPATIENT)
Dept: CARE COORDINATION | Age: 69
End: 2024-09-12

## 2024-09-12 VITALS
HEART RATE: 81 BPM | WEIGHT: 148 LBS | DIASTOLIC BLOOD PRESSURE: 64 MMHG | OXYGEN SATURATION: 98 % | SYSTOLIC BLOOD PRESSURE: 114 MMHG | BODY MASS INDEX: 25.4 KG/M2

## 2024-09-12 DIAGNOSIS — E03.9 ACQUIRED HYPOTHYROIDISM: ICD-10-CM

## 2024-09-12 DIAGNOSIS — E78.5 TYPE 1 DIABETES MELLITUS WITH HYPERLIPIDEMIA  (CMD): ICD-10-CM

## 2024-09-12 DIAGNOSIS — E10.65 TYPE 1 DIABETES MELLITUS WITH HYPERGLYCEMIA  (CMD): Primary | ICD-10-CM

## 2024-09-12 DIAGNOSIS — E10.59 TYPE 1 DIABETES MELLITUS WITH CARDIAC COMPLICATION  (CMD): ICD-10-CM

## 2024-09-12 DIAGNOSIS — E55.9 VITAMIN D DEFICIENCY: ICD-10-CM

## 2024-09-12 DIAGNOSIS — E10.69 TYPE 1 DIABETES MELLITUS WITH HYPERLIPIDEMIA  (CMD): ICD-10-CM

## 2024-09-12 DIAGNOSIS — I25.810 CORONARY ARTERY DISEASE INVOLVING CORONARY BYPASS GRAFT OF NATIVE HEART WITHOUT ANGINA PECTORIS: ICD-10-CM

## 2024-09-12 DIAGNOSIS — M81.8 OTHER OSTEOPOROSIS WITHOUT CURRENT PATHOLOGICAL FRACTURE: ICD-10-CM

## 2024-09-12 DIAGNOSIS — E10.42 TYPE 1 DIABETES MELLITUS WITH DIABETIC POLYNEUROPATHY  (CMD): ICD-10-CM

## 2024-09-12 RX ORDER — INSULIN LISPRO 100 [IU]/ML
INJECTION, SOLUTION INTRAVENOUS; SUBCUTANEOUS
Qty: 40 ML | Refills: 11 | Status: SHIPPED | OUTPATIENT
Start: 2024-09-12

## 2024-09-13 ENCOUNTER — APPOINTMENT (OUTPATIENT)
Dept: GENERAL RADIOLOGY | Age: 69
End: 2024-09-13
Attending: PHYSICIAN ASSISTANT

## 2024-09-13 ENCOUNTER — HOSPITAL ENCOUNTER (EMERGENCY)
Age: 69
Discharge: HOME | End: 2024-09-13

## 2024-09-13 ENCOUNTER — APPOINTMENT (OUTPATIENT)
Dept: CT IMAGING | Age: 69
End: 2024-09-13
Attending: PHYSICIAN ASSISTANT

## 2024-09-13 VITALS
WEIGHT: 154.54 LBS | TEMPERATURE: 97.9 F | HEIGHT: 64 IN | DIASTOLIC BLOOD PRESSURE: 60 MMHG | BODY MASS INDEX: 26.38 KG/M2 | HEART RATE: 64 BPM | SYSTOLIC BLOOD PRESSURE: 127 MMHG | RESPIRATION RATE: 16 BRPM | OXYGEN SATURATION: 97 %

## 2024-09-13 DIAGNOSIS — R55 NEAR SYNCOPE: Primary | ICD-10-CM

## 2024-09-13 DIAGNOSIS — R31.9 URINARY TRACT INFECTION WITH HEMATURIA, SITE UNSPECIFIED: ICD-10-CM

## 2024-09-13 DIAGNOSIS — R42 LIGHTHEADEDNESS: ICD-10-CM

## 2024-09-13 DIAGNOSIS — T07.XXXA ABRASIONS OF MULTIPLE SITES: ICD-10-CM

## 2024-09-13 DIAGNOSIS — N39.0 URINARY TRACT INFECTION WITH HEMATURIA, SITE UNSPECIFIED: ICD-10-CM
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
## 2024-09-13 DIAGNOSIS — R79.89 ELEVATED SERUM CREATININE: ICD-10-CM

## 2024-09-13 DIAGNOSIS — S09.90XA CLOSED HEAD INJURY, INITIAL ENCOUNTER: ICD-10-CM

## 2024-09-13 LAB
ALBUMIN SERPL-MCNC: 2.7 G/DL (ref 3.6–5.1)
ALBUMIN/GLOB SERPL: 0.8 {RATIO} (ref 1–2.4)
ALP SERPL-CCNC: 71 UNITS/L (ref 45–117)
ALT SERPL-CCNC: 30 UNITS/L
ANION GAP SERPL CALC-SCNC: 13 MMOL/L (ref 7–19)
APPEARANCE UR: CLEAR
APTT PPP: 25 SEC (ref 22–32)
AST SERPL-CCNC: 32 UNITS/L
ATRIAL RATE (BPM): 67
BACTERIA #/AREA URNS HPF: ABNORMAL /HPF
BASOPHILS # BLD: 0 K/MCL (ref 0–0.3)
BASOPHILS NFR BLD: 0 %
BILIRUB SERPL-MCNC: 0.4 MG/DL (ref 0.2–1)
BILIRUB UR QL STRIP: NEGATIVE
BUN SERPL-MCNC: 10 MG/DL (ref 6–20)
BUN/CREAT SERPL: 10 (ref 7–25)
CALCIUM SERPL-MCNC: 9.4 MG/DL (ref 8.4–10.2)
CHLORIDE SERPL-SCNC: 106 MMOL/L (ref 97–110)
CO2 SERPL-SCNC: 26 MMOL/L (ref 21–32)
COLOR UR: YELLOW
CREAT SERPL-MCNC: 1.03 MG/DL (ref 0.51–0.95)
DEPRECATED RDW RBC: 47 FL (ref 39–50)
DIASTOLIC BLOOD PRESSURE: 122
EGFRCR SERPLBLD CKD-EPI 2021: 59 ML/MIN/{1.73_M2}
EOSINOPHIL # BLD: 0.2 K/MCL (ref 0–0.5)
EOSINOPHIL NFR BLD: 2 %
ERYTHROCYTE [DISTWIDTH] IN BLOOD: 13.9 % (ref 11–15)
FASTING DURATION TIME PATIENT: ABNORMAL H
GLOBULIN SER-MCNC: 3.3 G/DL (ref 2–4)
GLUCOSE SERPL-MCNC: 164 MG/DL (ref 70–99)
GLUCOSE UR STRIP-MCNC: NEGATIVE MG/DL
HCT VFR BLD CALC: 39.8 % (ref 36–46.5)
HGB BLD-MCNC: 12.7 G/DL (ref 12–15.5)
HGB UR QL STRIP: NEGATIVE
HYALINE CASTS #/AREA URNS LPF: ABNORMAL /LPF
IMM GRANULOCYTES # BLD AUTO: 0 K/MCL (ref 0–0.2)
IMM GRANULOCYTES # BLD: 0 %
INR PPP: 0.9
KETONES UR STRIP-MCNC: NEGATIVE MG/DL
LEUKOCYTE ESTERASE UR QL STRIP: ABNORMAL
LYMPHOCYTES # BLD: 2.7 K/MCL (ref 1–4)
LYMPHOCYTES NFR BLD: 39 %
MAGNESIUM SERPL-MCNC: 2.1 MG/DL (ref 1.7–2.4)
MCH RBC QN AUTO: 29.5 PG (ref 26–34)
MCHC RBC AUTO-ENTMCNC: 31.9 G/DL (ref 32–36.5)
MCV RBC AUTO: 92.6 FL (ref 78–100)
MONOCYTES # BLD: 0.4 K/MCL (ref 0.3–0.9)
MONOCYTES NFR BLD: 6 %
MUCOUS THREADS URNS QL MICRO: PRESENT
NEUTROPHILS # BLD: 3.7 K/MCL (ref 1.8–7.7)
NEUTROPHILS NFR BLD: 53 %
NITRITE UR QL STRIP: NEGATIVE
NRBC BLD MANUAL-RTO: 0 /100 WBC
NT-PROBNP SERPL-MCNC: 173 PG/ML
P AXIS (DEGREES): 66
PH UR STRIP: 6.5 [PH] (ref 5–7)
PLATELET # BLD AUTO: 194 K/MCL (ref 140–450)
POTASSIUM SERPL-SCNC: 4.7 MMOL/L (ref 3.4–5.1)
PR-INTERVAL (MSEC): 194
PROT SERPL-MCNC: 6 G/DL (ref 6.4–8.2)
PROT UR STRIP-MCNC: NEGATIVE MG/DL
PROTHROMBIN TIME: 9.8 SEC (ref 9.7–11.8)
QRS-INTERVAL (MSEC): 56
QT-INTERVAL (MSEC): 412
QTC: 435
R AXIS (DEGREES): 47
RAINBOW EXTRA TUBES HOLD SPECIMEN: NORMAL
RAINBOW EXTRA TUBES HOLD SPECIMEN: NORMAL
RBC # BLD: 4.3 MIL/MCL (ref 4–5.2)
RBC #/AREA URNS HPF: ABNORMAL /HPF
REPORT TEXT: NORMAL
SODIUM SERPL-SCNC: 140 MMOL/L (ref 135–145)
SP GR UR STRIP: <1.005 (ref 1–1.03)
SQUAMOUS #/AREA URNS HPF: ABNORMAL /HPF
SYSTOLIC BLOOD PRESSURE: 161
T AXIS (DEGREES): 71
TROPONIN I SERPL DL<=0.01 NG/ML-MCNC: <4 NG/L
UROBILINOGEN UR STRIP-MCNC: 0.2 MG/DL
VENTRICULAR RATE EKG/MIN (BPM): 67
WBC # BLD: 7 K/MCL (ref 4.2–11)
WBC #/AREA URNS HPF: ABNORMAL /HPF

## 2024-09-13 PROCEDURE — 93010 ELECTROCARDIOGRAM REPORT: CPT | Performed by: INTERNAL MEDICINE

## 2024-09-13 PROCEDURE — 70450 CT HEAD/BRAIN W/O DYE: CPT | Performed by: STUDENT IN AN ORGANIZED HEALTH CARE EDUCATION/TRAINING PROGRAM

## 2024-09-13 PROCEDURE — 72170 X-RAY EXAM OF PELVIS: CPT

## 2024-09-13 PROCEDURE — 72125 CT NECK SPINE W/O DYE: CPT | Performed by: STUDENT IN AN ORGANIZED HEALTH CARE EDUCATION/TRAINING PROGRAM

## 2024-09-13 PROCEDURE — 73030 X-RAY EXAM OF SHOULDER: CPT

## 2024-09-13 PROCEDURE — 71045 X-RAY EXAM CHEST 1 VIEW: CPT

## 2024-09-13 PROCEDURE — 73030 X-RAY EXAM OF SHOULDER: CPT | Performed by: RADIOLOGY

## 2024-09-13 PROCEDURE — 71045 X-RAY EXAM CHEST 1 VIEW: CPT | Performed by: RADIOLOGY

## 2024-09-13 PROCEDURE — 70450 CT HEAD/BRAIN W/O DYE: CPT

## 2024-09-13 PROCEDURE — 85025 COMPLETE CBC W/AUTO DIFF WBC: CPT | Performed by: PHYSICIAN ASSISTANT

## 2024-09-13 PROCEDURE — 85730 THROMBOPLASTIN TIME PARTIAL: CPT | Performed by: PHYSICIAN ASSISTANT

## 2024-09-13 PROCEDURE — 83735 ASSAY OF MAGNESIUM: CPT | Performed by: PHYSICIAN ASSISTANT

## 2024-09-13 PROCEDURE — 90715 TDAP VACCINE 7 YRS/> IM: CPT | Performed by: PHYSICIAN ASSISTANT

## 2024-09-13 PROCEDURE — L0150 CERV SEMI-RIG ADJ MOLDED CHN: HCPCS | Performed by: PHYSICIAN ASSISTANT

## 2024-09-13 PROCEDURE — 87086 URINE CULTURE/COLONY COUNT: CPT | Performed by: PHYSICIAN ASSISTANT

## 2024-09-13 PROCEDURE — 72170 X-RAY EXAM OF PELVIS: CPT | Performed by: RADIOLOGY

## 2024-09-13 PROCEDURE — 85610 PROTHROMBIN TIME: CPT | Performed by: PHYSICIAN ASSISTANT

## 2024-09-13 PROCEDURE — 72125 CT NECK SPINE W/O DYE: CPT

## 2024-09-13 PROCEDURE — 10004651 HB RX, NO CHARGE ITEM: Performed by: PHYSICIAN ASSISTANT

## 2024-09-13 PROCEDURE — 93005 ELECTROCARDIOGRAM TRACING: CPT | Performed by: PHYSICIAN ASSISTANT

## 2024-09-13 PROCEDURE — 10002800 HB RX 250 W HCPCS: Performed by: PHYSICIAN ASSISTANT

## 2024-09-13 PROCEDURE — 90471 IMMUNIZATION ADMIN: CPT | Performed by: PHYSICIAN ASSISTANT

## 2024-09-13 PROCEDURE — 83880 ASSAY OF NATRIURETIC PEPTIDE: CPT | Performed by: PHYSICIAN ASSISTANT

## 2024-09-13 PROCEDURE — 80053 COMPREHEN METABOLIC PANEL: CPT | Performed by: PHYSICIAN ASSISTANT

## 2024-09-13 PROCEDURE — 96360 HYDRATION IV INFUSION INIT: CPT

## 2024-09-13 PROCEDURE — 10002807 HB RX 258: Performed by: PHYSICIAN ASSISTANT

## 2024-09-13 PROCEDURE — 36415 COLL VENOUS BLD VENIPUNCTURE: CPT

## 2024-09-13 PROCEDURE — 81001 URINALYSIS AUTO W/SCOPE: CPT | Performed by: PHYSICIAN ASSISTANT

## 2024-09-13 PROCEDURE — 99285 EMERGENCY DEPT VISIT HI MDM: CPT | Performed by: PHYSICIAN ASSISTANT

## 2024-09-13 PROCEDURE — 99285 EMERGENCY DEPT VISIT HI MDM: CPT

## 2024-09-13 PROCEDURE — 84484 ASSAY OF TROPONIN QUANT: CPT | Performed by: PHYSICIAN ASSISTANT

## 2024-09-13 RX ORDER — CEPHALEXIN 500 MG/1
500 CAPSULE ORAL 2 TIMES DAILY
Qty: 14 CAPSULE | Refills: 0 | Status: SHIPPED | OUTPATIENT
Start: 2024-09-13 | End: 2024-09-20

## 2024-09-13 RX ORDER — ACETAMINOPHEN 325 MG/1
650 TABLET ORAL ONCE
Status: COMPLETED | OUTPATIENT
Start: 2024-09-13 | End: 2024-09-13

## 2024-09-13 RX ADMIN — TETANUS TOXOID, REDUCED DIPHTHERIA TOXOID AND ACELLULAR PERTUSSIS VACCINE, ADSORBED 0.5 ML: 5; 2.5; 8; 8; 2.5 SUSPENSION INTRAMUSCULAR at 14:53

## 2024-09-13 RX ADMIN — SODIUM CHLORIDE 500 ML: 9 INJECTION, SOLUTION INTRAVENOUS at 15:12

## 2024-09-13 RX ADMIN — ACETAMINOPHEN 650 MG: 325 TABLET ORAL at 14:12

## 2024-09-13 RX ADMIN — SODIUM CHLORIDE 500 ML: 9 INJECTION, SOLUTION INTRAVENOUS at 13:56

## 2024-09-13 SDOH — SOCIAL STABILITY: SOCIAL INSECURITY: HOW OFTEN DOES ANYONE, INCLUDING FAMILY AND FRIENDS, THREATEN YOU WITH HARM?: NEVER

## 2024-09-13 SDOH — SOCIAL STABILITY: SOCIAL INSECURITY: HOW OFTEN DOES ANYONE, INCLUDING FAMILY AND FRIENDS, SCREAM OR CURSE AT YOU?: NEVER

## 2024-09-13 SDOH — SOCIAL STABILITY: SOCIAL INSECURITY: HOW OFTEN DOES ANYONE, INCLUDING FAMILY AND FRIENDS, INSULT OR TALK DOWN TO YOU?: NEVER

## 2024-09-13 SDOH — SOCIAL STABILITY: SOCIAL INSECURITY: HOW OFTEN DOES ANYONE, INCLUDING FAMILY AND FRIENDS, PHYSICALLY HURT YOU?: NEVER

## 2024-09-13 ASSESSMENT — PAIN SCALES - GENERAL: PAINLEVEL_OUTOF10: 9

## 2024-09-13 ASSESSMENT — PAIN DESCRIPTION - PAIN TYPE: TYPE: ACUTE PAIN

## 2024-09-15 ENCOUNTER — HOSPITAL ENCOUNTER (EMERGENCY)
Age: 69
Discharge: HOME | End: 2024-09-15

## 2024-09-15 ENCOUNTER — NURSE TRIAGE (OUTPATIENT)
Dept: TELEHEALTH | Age: 69
End: 2024-09-15

## 2024-09-15 VITALS
SYSTOLIC BLOOD PRESSURE: 113 MMHG | DIASTOLIC BLOOD PRESSURE: 56 MMHG | HEART RATE: 68 BPM | WEIGHT: 156.09 LBS | HEIGHT: 64 IN | OXYGEN SATURATION: 95 % | RESPIRATION RATE: 18 BRPM | TEMPERATURE: 98.7 F | BODY MASS INDEX: 26.65 KG/M2

## 2024-09-15 DIAGNOSIS — N30.00 ACUTE CYSTITIS WITHOUT HEMATURIA: Primary | ICD-10-CM

## 2024-09-15 LAB
ANION GAP SERPL CALC-SCNC: 14 MMOL/L (ref 7–19)
APPEARANCE UR: CLEAR
ATRIAL RATE (BPM): 67
ATRIAL RATE (BPM): 72
BACTERIA #/AREA URNS HPF: ABNORMAL /HPF
BASOPHILS # BLD: 0 K/MCL (ref 0–0.3)
BASOPHILS NFR BLD: 0 %
BILIRUB UR QL STRIP: NEGATIVE
BUN SERPL-MCNC: 7 MG/DL (ref 6–20)
BUN/CREAT SERPL: 7 (ref 7–25)
CALCIUM SERPL-MCNC: 9.2 MG/DL (ref 8.4–10.2)
CHLORIDE SERPL-SCNC: 104 MMOL/L (ref 97–110)
CO2 SERPL-SCNC: 26 MMOL/L (ref 21–32)
COLOR UR: YELLOW
CREAT SERPL-MCNC: 0.99 MG/DL (ref 0.51–0.95)
DEPRECATED RDW RBC: 46.6 FL (ref 39–50)
DIASTOLIC BLOOD PRESSURE: 122
DIASTOLIC BLOOD PRESSURE: 59
EGFRCR SERPLBLD CKD-EPI 2021: 62 ML/MIN/{1.73_M2}
EOSINOPHIL # BLD: 0.2 K/MCL (ref 0–0.5)
EOSINOPHIL NFR BLD: 2 %
ERYTHROCYTE [DISTWIDTH] IN BLOOD: 13.7 % (ref 11–15)
FASTING DURATION TIME PATIENT: ABNORMAL H
GLUCOSE SERPL-MCNC: 205 MG/DL (ref 70–99)
GLUCOSE UR STRIP-MCNC: NEGATIVE MG/DL
HCT VFR BLD CALC: 38.4 % (ref 36–46.5)
HGB BLD-MCNC: 12.4 G/DL (ref 12–15.5)
HGB UR QL STRIP: NEGATIVE
HYALINE CASTS #/AREA URNS LPF: ABNORMAL /LPF
IMM GRANULOCYTES # BLD AUTO: 0 K/MCL (ref 0–0.2)
IMM GRANULOCYTES # BLD: 0 %
KETONES UR STRIP-MCNC: NEGATIVE MG/DL
LEUKOCYTE ESTERASE UR QL STRIP: ABNORMAL
LYMPHOCYTES # BLD: 3.2 K/MCL (ref 1–4)
LYMPHOCYTES NFR BLD: 36 %
MCH RBC QN AUTO: 30 PG (ref 26–34)
MCHC RBC AUTO-ENTMCNC: 32.3 G/DL (ref 32–36.5)
MCV RBC AUTO: 92.8 FL (ref 78–100)
MONOCYTES # BLD: 0.5 K/MCL (ref 0.3–0.9)
MONOCYTES NFR BLD: 5 %
MUCOUS THREADS URNS QL MICRO: PRESENT
NEUTROPHILS # BLD: 5.1 K/MCL (ref 1.8–7.7)
NEUTROPHILS NFR BLD: 57 %
NITRITE UR QL STRIP: NEGATIVE
NRBC BLD MANUAL-RTO: 0 /100 WBC
P AXIS (DEGREES): 52
P AXIS (DEGREES): 66
PH UR STRIP: 7 [PH] (ref 5–7)
PLATELET # BLD AUTO: 224 K/MCL (ref 140–450)
POTASSIUM SERPL-SCNC: 3.6 MMOL/L (ref 3.4–5.1)
PR-INTERVAL (MSEC): 186
PR-INTERVAL (MSEC): 194
PROT UR STRIP-MCNC: NEGATIVE MG/DL
QRS-INTERVAL (MSEC): 56
QRS-INTERVAL (MSEC): 64
QT-INTERVAL (MSEC): 400
QT-INTERVAL (MSEC): 412
QTC: 435
QTC: 438
R AXIS (DEGREES): 44
R AXIS (DEGREES): 47
RBC # BLD: 4.14 MIL/MCL (ref 4–5.2)
RBC #/AREA URNS HPF: ABNORMAL /HPF
REPORT TEXT: NORMAL
REPORT TEXT: NORMAL
SODIUM SERPL-SCNC: 140 MMOL/L (ref 135–145)
SP GR UR STRIP: 1.01 (ref 1–1.03)
SQUAMOUS #/AREA URNS HPF: ABNORMAL /HPF
SYSTOLIC BLOOD PRESSURE: 104
SYSTOLIC BLOOD PRESSURE: 161
T AXIS (DEGREES): 28
T AXIS (DEGREES): 71
UROBILINOGEN UR STRIP-MCNC: 0.2 MG/DL
VENTRICULAR RATE EKG/MIN (BPM): 67
VENTRICULAR RATE EKG/MIN (BPM): 72
WBC # BLD: 9 K/MCL (ref 4.2–11)
WBC #/AREA URNS HPF: ABNORMAL /HPF

## 2024-09-15 PROCEDURE — 99284 EMERGENCY DEPT VISIT MOD MDM: CPT

## 2024-09-15 PROCEDURE — 36415 COLL VENOUS BLD VENIPUNCTURE: CPT

## 2024-09-15 PROCEDURE — 93010 ELECTROCARDIOGRAM REPORT: CPT | Performed by: INTERNAL MEDICINE

## 2024-09-15 PROCEDURE — 99283 EMERGENCY DEPT VISIT LOW MDM: CPT

## 2024-09-15 PROCEDURE — 81001 URINALYSIS AUTO W/SCOPE: CPT

## 2024-09-15 PROCEDURE — 10002803 HB RX 637

## 2024-09-15 PROCEDURE — 80048 BASIC METABOLIC PNL TOTAL CA: CPT

## 2024-09-15 PROCEDURE — 93005 ELECTROCARDIOGRAM TRACING: CPT

## 2024-09-15 PROCEDURE — 85025 COMPLETE CBC W/AUTO DIFF WBC: CPT

## 2024-09-15 PROCEDURE — 87086 URINE CULTURE/COLONY COUNT: CPT

## 2024-09-15 RX ORDER — NITROFURANTOIN 25; 75 MG/1; MG/1
100 CAPSULE ORAL 2 TIMES DAILY
Qty: 10 CAPSULE | Refills: 0 | Status: SHIPPED | OUTPATIENT
Start: 2024-09-15 | End: 2024-09-20

## 2024-09-15 RX ORDER — NITROFURANTOIN 25; 75 MG/1; MG/1
100 CAPSULE ORAL ONCE
Status: COMPLETED | OUTPATIENT
Start: 2024-09-15 | End: 2024-09-15

## 2024-09-15 RX ADMIN — NITROFURANTOIN (MONOHYDRATE/MACROCRYSTALS) 100 MG: 75; 25 CAPSULE ORAL at 16:28

## 2024-09-15 SDOH — SOCIAL STABILITY: SOCIAL INSECURITY: HOW OFTEN DOES ANYONE, INCLUDING FAMILY AND FRIENDS, INSULT OR TALK DOWN TO YOU?: NEVER

## 2024-09-15 SDOH — SOCIAL STABILITY: SOCIAL INSECURITY: HOW OFTEN DOES ANYONE, INCLUDING FAMILY AND FRIENDS, SCREAM OR CURSE AT YOU?: NEVER

## 2024-09-15 SDOH — SOCIAL STABILITY: SOCIAL INSECURITY: HOW OFTEN DOES ANYONE, INCLUDING FAMILY AND FRIENDS, PHYSICALLY HURT YOU?: NEVER

## 2024-09-15 SDOH — SOCIAL STABILITY: SOCIAL INSECURITY: HOW OFTEN DOES ANYONE, INCLUDING FAMILY AND FRIENDS, THREATEN YOU WITH HARM?: NEVER

## 2024-09-15 ASSESSMENT — PAIN SCALES - GENERAL: PAINLEVEL_OUTOF10: 7

## 2024-09-16 ENCOUNTER — TELEPHONE (OUTPATIENT)
Dept: EDUCATION SERVICES | Age: 69
End: 2024-09-16

## 2024-09-16 LAB
BACTERIA UR CULT: NORMAL
RAINBOW EXTRA TUBES HOLD SPECIMEN: NORMAL

## 2024-09-17 LAB — BACTERIA UR CULT: NORMAL

## 2024-09-18 ENCOUNTER — TELEPHONE (OUTPATIENT)
Dept: CARE COORDINATION | Age: 69
End: 2024-09-18

## 2024-09-19 ENCOUNTER — TELEPHONE (OUTPATIENT)
Dept: CARE COORDINATION | Age: 69
End: 2024-09-19

## 2024-09-25 ENCOUNTER — TELEPHONE (OUTPATIENT)
Dept: CARE COORDINATION | Age: 69
End: 2024-09-25

## 2024-09-26 ENCOUNTER — TELEPHONE (OUTPATIENT)
Dept: CARE COORDINATION | Age: 69
End: 2024-09-26

## 2024-09-26 ENCOUNTER — APPOINTMENT (OUTPATIENT)
Dept: EDUCATION SERVICES | Age: 69
End: 2024-09-26

## 2024-09-26 ENCOUNTER — TELEPHONE (OUTPATIENT)
Dept: EDUCATION SERVICES | Age: 69
End: 2024-09-26

## 2024-10-02 ENCOUNTER — TELEPHONE (OUTPATIENT)
Dept: CARE COORDINATION | Age: 69
End: 2024-10-02

## 2024-10-09 ENCOUNTER — TELEPHONE (OUTPATIENT)
Dept: CARE COORDINATION | Age: 69
End: 2024-10-09

## 2024-10-10 ENCOUNTER — APPOINTMENT (OUTPATIENT)
Dept: EDUCATION SERVICES | Age: 69
End: 2024-10-10

## 2024-10-10 DIAGNOSIS — E10.65 TYPE 1 DIABETES MELLITUS WITH HYPERGLYCEMIA  (CMD): Primary | ICD-10-CM

## 2024-10-10 PROCEDURE — G0108 DIAB MANAGE TRN  PER INDIV: HCPCS

## 2024-10-14 ENCOUNTER — TELEPHONE (OUTPATIENT)
Dept: ENDOCRINOLOGY | Age: 69
End: 2024-10-14

## 2024-10-21 ENCOUNTER — APPOINTMENT (OUTPATIENT)
Dept: ENDOCRINOLOGY | Age: 69
End: 2024-10-21

## 2024-11-22 ENCOUNTER — TELEPHONE (OUTPATIENT)
Dept: ENDOCRINOLOGY | Age: 69
End: 2024-11-22

## 2024-11-25 ENCOUNTER — TELEPHONE (OUTPATIENT)
Dept: ENDOCRINOLOGY | Age: 69
End: 2024-11-25

## 2024-12-09 ENCOUNTER — TELEPHONE (OUTPATIENT)
Dept: EDUCATION SERVICES | Age: 69
End: 2024-12-09

## 2024-12-17 ENCOUNTER — NURSE TRIAGE (OUTPATIENT)
Dept: TELEHEALTH | Age: 69
End: 2024-12-17

## 2024-12-20 ENCOUNTER — TELEPHONE (OUTPATIENT)
Dept: EDUCATION SERVICES | Age: 69
End: 2024-12-20

## 2024-12-29 DIAGNOSIS — E10.65 TYPE 1 DIABETES MELLITUS WITH HYPERGLYCEMIA  (CMD): ICD-10-CM

## 2024-12-29 DIAGNOSIS — E13.9 LADA (LATENT AUTOIMMUNE DIABETES IN ADULTS), MANAGED AS TYPE 1  (CMD): ICD-10-CM

## 2024-12-29 DIAGNOSIS — E10.69 TYPE 1 DIABETES MELLITUS WITH HYPERLIPIDEMIA  (CMD): ICD-10-CM

## 2024-12-29 DIAGNOSIS — E10.42 TYPE 1 DIABETES MELLITUS WITH DIABETIC POLYNEUROPATHY (CMD): ICD-10-CM

## 2024-12-29 DIAGNOSIS — E10.649 HYPOGLYCEMIA UNAWARENESS IN TYPE 1 DIABETES MELLITUS (CMD): ICD-10-CM

## 2024-12-29 DIAGNOSIS — E03.9 ACQUIRED HYPOTHYROIDISM: ICD-10-CM

## 2024-12-29 DIAGNOSIS — E78.5 TYPE 1 DIABETES MELLITUS WITH HYPERLIPIDEMIA  (CMD): ICD-10-CM

## 2024-12-29 DIAGNOSIS — E11.3299 BACKGROUND DIABETIC RETINOPATHY  (CMD): ICD-10-CM

## 2024-12-29 DIAGNOSIS — M81.8 OTHER OSTEOPOROSIS WITHOUT CURRENT PATHOLOGICAL FRACTURE: ICD-10-CM

## 2024-12-30 ENCOUNTER — APPOINTMENT (OUTPATIENT)
Dept: ENDOCRINOLOGY | Age: 69
End: 2024-12-30

## 2024-12-30 VITALS
BODY MASS INDEX: 24.85 KG/M2 | SYSTOLIC BLOOD PRESSURE: 118 MMHG | DIASTOLIC BLOOD PRESSURE: 70 MMHG | WEIGHT: 144.8 LBS | HEART RATE: 82 BPM

## 2024-12-30 DIAGNOSIS — E78.5 TYPE 1 DIABETES MELLITUS WITH HYPERLIPIDEMIA  (CMD): ICD-10-CM

## 2024-12-30 DIAGNOSIS — E11.3299 BACKGROUND DIABETIC RETINOPATHY  (CMD): ICD-10-CM

## 2024-12-30 DIAGNOSIS — Z96.41 INSULIN PUMP IN PLACE: ICD-10-CM

## 2024-12-30 DIAGNOSIS — E10.65 TYPE 1 DIABETES MELLITUS WITH HYPERGLYCEMIA  (CMD): Primary | ICD-10-CM

## 2024-12-30 DIAGNOSIS — E10.649 HYPOGLYCEMIA UNAWARENESS IN TYPE 1 DIABETES MELLITUS (CMD): ICD-10-CM

## 2024-12-30 DIAGNOSIS — Z46.81 INSULIN PUMP TITRATION: ICD-10-CM

## 2024-12-30 DIAGNOSIS — E13.9 LADA (LATENT AUTOIMMUNE DIABETES IN ADULTS), MANAGED AS TYPE 1  (CMD): ICD-10-CM

## 2024-12-30 DIAGNOSIS — E10.42 TYPE 1 DIABETES MELLITUS WITH DIABETIC POLYNEUROPATHY (CMD): ICD-10-CM

## 2024-12-30 DIAGNOSIS — M81.8 OTHER OSTEOPOROSIS WITHOUT CURRENT PATHOLOGICAL FRACTURE: ICD-10-CM

## 2024-12-30 DIAGNOSIS — E10.69 TYPE 1 DIABETES MELLITUS WITH HYPERLIPIDEMIA  (CMD): ICD-10-CM

## 2024-12-30 DIAGNOSIS — E03.9 ACQUIRED HYPOTHYROIDISM: ICD-10-CM

## 2024-12-30 RX ORDER — GLUCAGON 3 MG/1
POWDER NASAL
Qty: 1 EACH | Refills: 3 | Status: SHIPPED | OUTPATIENT
Start: 2024-12-30

## 2024-12-30 RX ORDER — ALENDRONATE SODIUM 70 MG/1
TABLET ORAL
Qty: 12 TABLET | Refills: 3 | Status: SHIPPED | OUTPATIENT
Start: 2024-12-30

## 2024-12-30 RX ORDER — ATORVASTATIN CALCIUM 80 MG/1
80 TABLET, FILM COATED ORAL NIGHTLY
Qty: 90 TABLET | Refills: 1 | Status: SHIPPED | OUTPATIENT
Start: 2024-12-30

## 2024-12-30 RX ORDER — INSULIN LISPRO 100 [IU]/ML
INJECTION, SOLUTION INTRAVENOUS; SUBCUTANEOUS
Qty: 150 ML | Refills: 3 | Status: SHIPPED | OUTPATIENT
Start: 2024-12-30

## 2025-01-05 DIAGNOSIS — E03.9 ACQUIRED HYPOTHYROIDISM: ICD-10-CM

## 2025-01-05 DIAGNOSIS — E10.42 TYPE 1 DIABETES MELLITUS WITH DIABETIC POLYNEUROPATHY (CMD): ICD-10-CM

## 2025-01-05 DIAGNOSIS — E13.9 LADA (LATENT AUTOIMMUNE DIABETES IN ADULTS), MANAGED AS TYPE 1  (CMD): ICD-10-CM

## 2025-01-05 DIAGNOSIS — E10.69 TYPE 1 DIABETES MELLITUS WITH HYPERLIPIDEMIA  (CMD): ICD-10-CM

## 2025-01-05 DIAGNOSIS — M81.8 OTHER OSTEOPOROSIS WITHOUT CURRENT PATHOLOGICAL FRACTURE: ICD-10-CM

## 2025-01-05 DIAGNOSIS — E10.649 HYPOGLYCEMIA UNAWARENESS IN TYPE 1 DIABETES MELLITUS (CMD): ICD-10-CM

## 2025-01-05 DIAGNOSIS — E11.3299 BACKGROUND DIABETIC RETINOPATHY  (CMD): ICD-10-CM

## 2025-01-05 DIAGNOSIS — E78.5 TYPE 1 DIABETES MELLITUS WITH HYPERLIPIDEMIA  (CMD): ICD-10-CM

## 2025-01-05 DIAGNOSIS — E10.65 TYPE 1 DIABETES MELLITUS WITH HYPERGLYCEMIA  (CMD): ICD-10-CM

## 2025-01-06 ENCOUNTER — TELEPHONE (OUTPATIENT)
Dept: ENDOCRINOLOGY | Age: 70
End: 2025-01-06

## 2025-01-06 DIAGNOSIS — M81.8 OTHER OSTEOPOROSIS WITHOUT CURRENT PATHOLOGICAL FRACTURE: ICD-10-CM

## 2025-01-06 DIAGNOSIS — E11.3299 BACKGROUND DIABETIC RETINOPATHY  (CMD): ICD-10-CM

## 2025-01-06 DIAGNOSIS — E10.65 TYPE 1 DIABETES MELLITUS WITH HYPERGLYCEMIA  (CMD): ICD-10-CM

## 2025-01-06 DIAGNOSIS — E03.9 ACQUIRED HYPOTHYROIDISM: ICD-10-CM

## 2025-01-06 DIAGNOSIS — E78.5 TYPE 1 DIABETES MELLITUS WITH HYPERLIPIDEMIA  (CMD): ICD-10-CM

## 2025-01-06 DIAGNOSIS — E10.42 TYPE 1 DIABETES MELLITUS WITH DIABETIC POLYNEUROPATHY (CMD): ICD-10-CM

## 2025-01-06 DIAGNOSIS — E10.649 HYPOGLYCEMIA UNAWARENESS IN TYPE 1 DIABETES MELLITUS (CMD): ICD-10-CM

## 2025-01-06 DIAGNOSIS — E10.69 TYPE 1 DIABETES MELLITUS WITH HYPERLIPIDEMIA  (CMD): ICD-10-CM

## 2025-01-06 RX ORDER — EZETIMIBE 10 MG/1
10 TABLET ORAL DAILY
Qty: 90 TABLET | Refills: 1 | Status: SHIPPED | OUTPATIENT
Start: 2025-01-06

## 2025-01-07 RX ORDER — INSULIN LISPRO 100 [IU]/ML
INJECTION, SOLUTION INTRAVENOUS; SUBCUTANEOUS
Qty: 70 ML | Refills: 3 | Status: SHIPPED | OUTPATIENT
Start: 2025-01-07

## 2025-01-13 DIAGNOSIS — E03.9 ACQUIRED HYPOTHYROIDISM: ICD-10-CM

## 2025-01-13 DIAGNOSIS — E10.65 TYPE 1 DIABETES MELLITUS WITH HYPERGLYCEMIA  (CMD): ICD-10-CM

## 2025-01-13 DIAGNOSIS — E78.5 TYPE 1 DIABETES MELLITUS WITH HYPERLIPIDEMIA  (CMD): ICD-10-CM

## 2025-01-13 DIAGNOSIS — E10.69 TYPE 1 DIABETES MELLITUS WITH HYPERLIPIDEMIA  (CMD): ICD-10-CM

## 2025-01-13 DIAGNOSIS — M81.8 OTHER OSTEOPOROSIS WITHOUT CURRENT PATHOLOGICAL FRACTURE: ICD-10-CM

## 2025-01-13 DIAGNOSIS — E11.3299 BACKGROUND DIABETIC RETINOPATHY  (CMD): ICD-10-CM

## 2025-01-13 DIAGNOSIS — E10.649 HYPOGLYCEMIA UNAWARENESS IN TYPE 1 DIABETES MELLITUS (CMD): ICD-10-CM

## 2025-01-13 DIAGNOSIS — E13.9 LADA (LATENT AUTOIMMUNE DIABETES IN ADULTS), MANAGED AS TYPE 1  (CMD): ICD-10-CM

## 2025-01-13 DIAGNOSIS — E10.42 TYPE 1 DIABETES MELLITUS WITH DIABETIC POLYNEUROPATHY (CMD): ICD-10-CM

## 2025-01-13 RX ORDER — LEVOTHYROXINE SODIUM 125 UG/1
TABLET ORAL
Qty: 90 TABLET | Refills: 1 | Status: SHIPPED | OUTPATIENT
Start: 2025-01-13

## 2025-01-24 ENCOUNTER — TELEPHONE (OUTPATIENT)
Dept: ENDOCRINOLOGY | Age: 70
End: 2025-01-24

## 2025-01-28 ENCOUNTER — TELEPHONE (OUTPATIENT)
Dept: ENDOCRINOLOGY | Age: 70
End: 2025-01-28

## 2025-03-19 ENCOUNTER — TELEPHONE (OUTPATIENT)
Dept: EDUCATION SERVICES | Age: 70
End: 2025-03-19

## 2025-03-31 ENCOUNTER — APPOINTMENT (OUTPATIENT)
Dept: EDUCATION SERVICES | Age: 70
End: 2025-03-31

## 2025-04-01 ENCOUNTER — APPOINTMENT (OUTPATIENT)
Dept: EDUCATION SERVICES | Age: 70
End: 2025-04-01

## 2025-04-07 ENCOUNTER — TELEPHONE (OUTPATIENT)
Dept: ENDOCRINOLOGY | Age: 70
End: 2025-04-07

## 2025-04-07 DIAGNOSIS — E10.65 TYPE 1 DIABETES MELLITUS WITH HYPERGLYCEMIA  (CMD): Primary | ICD-10-CM

## 2025-04-14 ENCOUNTER — APPOINTMENT (OUTPATIENT)
Dept: EDUCATION SERVICES | Age: 70
End: 2025-04-14

## 2025-04-22 ENCOUNTER — HOSPITAL ENCOUNTER (OUTPATIENT)
Age: 70
Setting detail: OBSERVATION
Discharge: HOME | End: 2025-04-23
Attending: STUDENT IN AN ORGANIZED HEALTH CARE EDUCATION/TRAINING PROGRAM | Admitting: STUDENT IN AN ORGANIZED HEALTH CARE EDUCATION/TRAINING PROGRAM

## 2025-04-22 ENCOUNTER — APPOINTMENT (OUTPATIENT)
Dept: GENERAL RADIOLOGY | Age: 70
End: 2025-04-22

## 2025-04-22 DIAGNOSIS — I20.0 UNSTABLE ANGINA PECTORIS  (CMD): Primary | ICD-10-CM

## 2025-04-22 DIAGNOSIS — R07.9 CHEST PAIN, UNSPECIFIED TYPE: ICD-10-CM

## 2025-04-22 DIAGNOSIS — Z95.1 POSTSURGICAL AORTOCORONARY BYPASS STATUS: ICD-10-CM

## 2025-04-22 DIAGNOSIS — Z95.5 STATUS POST CORONARY ARTERY STENT PLACEMENT: ICD-10-CM

## 2025-04-22 DIAGNOSIS — E78.5 DYSLIPIDEMIA: ICD-10-CM

## 2025-04-22 LAB
ALBUMIN SERPL-MCNC: 3 G/DL (ref 3.4–5)
ALBUMIN/GLOB SERPL: 1 {RATIO} (ref 1–2.4)
ALP SERPL-CCNC: 50 UNITS/L (ref 45–117)
ALT SERPL-CCNC: 19 UNITS/L
ANION GAP SERPL CALC-SCNC: 12 MMOL/L (ref 7–19)
AST SERPL-CCNC: 16 UNITS/L
ATRIAL RATE (BPM): 63
BASOPHILS # BLD: 0 K/MCL (ref 0–0.3)
BASOPHILS NFR BLD: 1 %
BILIRUB SERPL-MCNC: 0.4 MG/DL (ref 0.2–1)
BUN SERPL-MCNC: 15 MG/DL (ref 6–20)
BUN/CREAT SERPL: 15 (ref 7–25)
CALCIUM SERPL-MCNC: 8.8 MG/DL (ref 8.4–10.2)
CHLORIDE SERPL-SCNC: 110 MMOL/L (ref 97–110)
CO2 SERPL-SCNC: 26 MMOL/L (ref 21–32)
CREAT SERPL-MCNC: 1.02 MG/DL (ref 0.51–0.95)
DEPRECATED RDW RBC: 43.2 FL (ref 39–50)
DIASTOLIC BLOOD PRESSURE: 62
EGFRCR SERPLBLD CKD-EPI 2021: 60 ML/MIN/{1.73_M2}
EOSINOPHIL # BLD: 0.1 K/MCL (ref 0–0.5)
EOSINOPHIL NFR BLD: 1 %
ERYTHROCYTE [DISTWIDTH] IN BLOOD: 12.6 % (ref 11–15)
FASTING DURATION TIME PATIENT: ABNORMAL H
GLOBULIN SER-MCNC: 3 G/DL (ref 2–4)
GLUCOSE BLDC GLUCOMTR-MCNC: 182 MG/DL (ref 70–99)
GLUCOSE BLDC GLUCOMTR-MCNC: 245 MG/DL (ref 70–99)
GLUCOSE SERPL-MCNC: 120 MG/DL (ref 70–99)
HCT VFR BLD CALC: 38.3 % (ref 36–46.5)
HGB BLD-MCNC: 12.5 G/DL (ref 12–15.5)
IMM GRANULOCYTES # BLD AUTO: 0 K/MCL (ref 0–0.2)
IMM GRANULOCYTES # BLD: 0 %
LIPASE SERPL-CCNC: 18 UNITS/L (ref 15–77)
LYMPHOCYTES # BLD: 2.6 K/MCL (ref 1–4)
LYMPHOCYTES NFR BLD: 38 %
MCH RBC QN AUTO: 30.3 PG (ref 26–34)
MCHC RBC AUTO-ENTMCNC: 32.6 G/DL (ref 32–36.5)
MCV RBC AUTO: 93 FL (ref 78–100)
MONOCYTES # BLD: 0.5 K/MCL (ref 0.3–0.9)
MONOCYTES NFR BLD: 7 %
NEUTROPHILS # BLD: 3.8 K/MCL (ref 1.8–7.7)
NEUTROPHILS NFR BLD: 53 %
NRBC BLD MANUAL-RTO: 0 /100 WBC
NT-PROBNP SERPL-MCNC: 368 PG/ML
P AXIS (DEGREES): 84
PLATELET # BLD AUTO: 143 K/MCL (ref 140–450)
POTASSIUM SERPL-SCNC: 3.9 MMOL/L (ref 3.4–5.1)
PR-INTERVAL (MSEC): 206
PROT SERPL-MCNC: 6 G/DL (ref 6.4–8.2)
QRS-INTERVAL (MSEC): 78
QT-INTERVAL (MSEC): 416
QTC: 426
R AXIS (DEGREES): 57
RAINBOW EXTRA TUBES HOLD SPECIMEN: NORMAL
RAINBOW EXTRA TUBES HOLD SPECIMEN: NORMAL
RBC # BLD: 4.12 MIL/MCL (ref 4–5.2)
REPORT TEXT: NORMAL
SODIUM SERPL-SCNC: 144 MMOL/L (ref 135–145)
SYSTOLIC BLOOD PRESSURE: 118
T AXIS (DEGREES): 69
TROPONIN I SERPL DL<=0.01 NG/ML-MCNC: 5 NG/L
VENTRICULAR RATE EKG/MIN (BPM): 63
WBC # BLD: 7 K/MCL (ref 4.2–11)

## 2025-04-22 PROCEDURE — 10002800 HB RX 250 W HCPCS: Performed by: STUDENT IN AN ORGANIZED HEALTH CARE EDUCATION/TRAINING PROGRAM

## 2025-04-22 PROCEDURE — 93005 ELECTROCARDIOGRAM TRACING: CPT

## 2025-04-22 PROCEDURE — 10002803 HB RX 637

## 2025-04-22 PROCEDURE — 96372 THER/PROPH/DIAG INJ SC/IM: CPT | Performed by: STUDENT IN AN ORGANIZED HEALTH CARE EDUCATION/TRAINING PROGRAM

## 2025-04-22 PROCEDURE — 36415 COLL VENOUS BLD VENIPUNCTURE: CPT

## 2025-04-22 PROCEDURE — 99223 1ST HOSP IP/OBS HIGH 75: CPT | Performed by: INTERNAL MEDICINE

## 2025-04-22 PROCEDURE — 99285 EMERGENCY DEPT VISIT HI MDM: CPT

## 2025-04-22 PROCEDURE — 71046 X-RAY EXAM CHEST 2 VIEWS: CPT

## 2025-04-22 PROCEDURE — G0378 HOSPITAL OBSERVATION PER HR: HCPCS

## 2025-04-22 PROCEDURE — 84484 ASSAY OF TROPONIN QUANT: CPT

## 2025-04-22 PROCEDURE — 71046 X-RAY EXAM CHEST 2 VIEWS: CPT | Performed by: RADIOLOGY

## 2025-04-22 PROCEDURE — 83880 ASSAY OF NATRIURETIC PEPTIDE: CPT

## 2025-04-22 PROCEDURE — 10004651 HB RX, NO CHARGE ITEM

## 2025-04-22 PROCEDURE — 83690 ASSAY OF LIPASE: CPT

## 2025-04-22 PROCEDURE — 93010 ELECTROCARDIOGRAM REPORT: CPT | Performed by: INTERNAL MEDICINE

## 2025-04-22 PROCEDURE — 10002803 HB RX 637: Performed by: FAMILY MEDICINE

## 2025-04-22 PROCEDURE — 80053 COMPREHEN METABOLIC PANEL: CPT

## 2025-04-22 PROCEDURE — 99222 1ST HOSP IP/OBS MODERATE 55: CPT | Performed by: STUDENT IN AN ORGANIZED HEALTH CARE EDUCATION/TRAINING PROGRAM

## 2025-04-22 PROCEDURE — 82962 GLUCOSE BLOOD TEST: CPT

## 2025-04-22 PROCEDURE — 85025 COMPLETE CBC W/AUTO DIFF WBC: CPT

## 2025-04-22 RX ORDER — ATORVASTATIN CALCIUM 80 MG/1
80 TABLET, FILM COATED ORAL NIGHTLY
Status: DISCONTINUED | OUTPATIENT
Start: 2025-04-22 | End: 2025-04-23 | Stop reason: HOSPADM

## 2025-04-22 RX ORDER — PREGABALIN 100 MG/1
300 CAPSULE ORAL 2 TIMES DAILY
Status: DISCONTINUED | OUTPATIENT
Start: 2025-04-22 | End: 2025-04-23 | Stop reason: HOSPADM

## 2025-04-22 RX ORDER — FOLIC ACID 1 MG/1
1 TABLET ORAL DAILY
Status: DISCONTINUED | OUTPATIENT
Start: 2025-04-23 | End: 2025-04-23 | Stop reason: HOSPADM

## 2025-04-22 RX ORDER — TOPIRAMATE 100 MG/1
100 TABLET, FILM COATED ORAL EVERY 12 HOURS SCHEDULED
Status: DISCONTINUED | OUTPATIENT
Start: 2025-04-22 | End: 2025-04-23 | Stop reason: HOSPADM

## 2025-04-22 RX ORDER — LEVETIRACETAM 750 MG/1
750 TABLET ORAL
Status: DISCONTINUED | OUTPATIENT
Start: 2025-04-23 | End: 2025-04-23 | Stop reason: HOSPADM

## 2025-04-22 RX ORDER — EZETIMIBE 10 MG/1
10 TABLET ORAL DAILY
Status: DISCONTINUED | OUTPATIENT
Start: 2025-04-23 | End: 2025-04-23 | Stop reason: HOSPADM

## 2025-04-22 RX ORDER — PANTOPRAZOLE SODIUM 40 MG/1
40 TABLET, DELAYED RELEASE ORAL DAILY
Status: DISCONTINUED | OUTPATIENT
Start: 2025-04-23 | End: 2025-04-23 | Stop reason: HOSPADM

## 2025-04-22 RX ORDER — LEVETIRACETAM 750 MG/1
750 TABLET ORAL DAILY
Status: DISCONTINUED | OUTPATIENT
Start: 2025-04-23 | End: 2025-04-22

## 2025-04-22 RX ORDER — ACETAMINOPHEN 325 MG/1
650 TABLET ORAL 3 TIMES DAILY PRN
Status: DISCONTINUED | OUTPATIENT
Start: 2025-04-22 | End: 2025-04-23 | Stop reason: HOSPADM

## 2025-04-22 RX ORDER — NITROGLYCERIN 0.4 MG/1
0.4 TABLET SUBLINGUAL ONCE
Status: COMPLETED | OUTPATIENT
Start: 2025-04-22 | End: 2025-04-22

## 2025-04-22 RX ORDER — SENNOSIDES A AND B 8.6 MG/1
5 TABLET, FILM COATED ORAL AT BEDTIME
Status: DISCONTINUED | OUTPATIENT
Start: 2025-04-22 | End: 2025-04-23 | Stop reason: HOSPADM

## 2025-04-22 RX ORDER — DULOXETIN HYDROCHLORIDE 60 MG/1
60 CAPSULE, DELAYED RELEASE ORAL DAILY
Status: DISCONTINUED | OUTPATIENT
Start: 2025-04-23 | End: 2025-04-23 | Stop reason: HOSPADM

## 2025-04-22 RX ORDER — ASPIRIN 81 MG/1
324 TABLET, CHEWABLE ORAL ONCE
Status: COMPLETED | OUTPATIENT
Start: 2025-04-22 | End: 2025-04-22

## 2025-04-22 RX ORDER — PREGABALIN 300 MG/1
300 CAPSULE ORAL 2 TIMES DAILY
COMMUNITY

## 2025-04-22 RX ORDER — NICOTINE POLACRILEX 4 MG
30 LOZENGE BUCCAL PRN
Status: DISCONTINUED | OUTPATIENT
Start: 2025-04-22 | End: 2025-04-23 | Stop reason: HOSPADM

## 2025-04-22 RX ORDER — DEXTROSE MONOHYDRATE 25 G/50ML
12.5 INJECTION, SOLUTION INTRAVENOUS PRN
Status: DISCONTINUED | OUTPATIENT
Start: 2025-04-22 | End: 2025-04-23 | Stop reason: HOSPADM

## 2025-04-22 RX ORDER — NICOTINE POLACRILEX 4 MG
15 LOZENGE BUCCAL PRN
Status: DISCONTINUED | OUTPATIENT
Start: 2025-04-22 | End: 2025-04-23 | Stop reason: HOSPADM

## 2025-04-22 RX ORDER — LEVETIRACETAM 500 MG/1
250 TABLET ORAL 2 TIMES DAILY
COMMUNITY

## 2025-04-22 RX ORDER — FUROSEMIDE 20 MG/1
10 TABLET ORAL DAILY
Status: DISCONTINUED | OUTPATIENT
Start: 2025-04-23 | End: 2025-04-23 | Stop reason: HOSPADM

## 2025-04-22 RX ORDER — 0.9 % SODIUM CHLORIDE 0.9 %
2 VIAL (ML) INJECTION EVERY 12 HOURS SCHEDULED
Status: DISCONTINUED | OUTPATIENT
Start: 2025-04-22 | End: 2025-04-23 | Stop reason: HOSPADM

## 2025-04-22 RX ORDER — 0.9 % SODIUM CHLORIDE 0.9 %
10 VIAL (ML) INJECTION PRN
Status: DISCONTINUED | OUTPATIENT
Start: 2025-04-22 | End: 2025-04-23 | Stop reason: HOSPADM

## 2025-04-22 RX ORDER — ENOXAPARIN SODIUM 100 MG/ML
40 INJECTION SUBCUTANEOUS DAILY
Status: DISCONTINUED | OUTPATIENT
Start: 2025-04-22 | End: 2025-04-23 | Stop reason: HOSPADM

## 2025-04-22 RX ORDER — TRAZODONE HYDROCHLORIDE 50 MG/1
75 TABLET ORAL NIGHTLY
Status: DISCONTINUED | OUTPATIENT
Start: 2025-04-23 | End: 2025-04-23 | Stop reason: HOSPADM

## 2025-04-22 RX ORDER — LEVETIRACETAM 500 MG/1
250 TABLET ORAL 2 TIMES DAILY
Status: DISCONTINUED | OUTPATIENT
Start: 2025-04-22 | End: 2025-04-23 | Stop reason: HOSPADM

## 2025-04-22 RX ORDER — CLOPIDOGREL BISULFATE 75 MG/1
75 TABLET ORAL DAILY
Status: DISCONTINUED | OUTPATIENT
Start: 2025-04-23 | End: 2025-04-23 | Stop reason: HOSPADM

## 2025-04-22 RX ORDER — LEVETIRACETAM 750 MG/1
750 TABLET ORAL
COMMUNITY

## 2025-04-22 RX ORDER — DEXTROSE MONOHYDRATE 25 G/50ML
25 INJECTION, SOLUTION INTRAVENOUS PRN
Status: DISCONTINUED | OUTPATIENT
Start: 2025-04-22 | End: 2025-04-23 | Stop reason: HOSPADM

## 2025-04-22 RX ORDER — POLYETHYLENE GLYCOL 3350 17 G/17G
17 POWDER, FOR SOLUTION ORAL DAILY PRN
Status: DISCONTINUED | OUTPATIENT
Start: 2025-04-22 | End: 2025-04-23 | Stop reason: HOSPADM

## 2025-04-22 RX ORDER — MAGNESIUM GLUCONATE 30 MG(550)
1 TABLET ORAL DAILY
COMMUNITY

## 2025-04-22 RX ADMIN — ASPIRIN 324 MG: 81 TABLET, CHEWABLE ORAL at 13:04

## 2025-04-22 RX ADMIN — PREGABALIN 300 MG: 100 CAPSULE ORAL at 22:17

## 2025-04-22 RX ADMIN — ATORVASTATIN CALCIUM 80 MG: 80 TABLET, FILM COATED ORAL at 22:18

## 2025-04-22 RX ADMIN — SENNOSIDES 43 MG: 8.6 TABLET, FILM COATED ORAL at 22:17

## 2025-04-22 RX ADMIN — Medication 3 MG: at 22:26

## 2025-04-22 RX ADMIN — ENOXAPARIN SODIUM 40 MG: 100 INJECTION SUBCUTANEOUS at 22:26

## 2025-04-22 RX ADMIN — NITROGLYCERIN 0.4 MG: 0.4 TABLET SUBLINGUAL at 13:40

## 2025-04-22 RX ADMIN — LEVETIRACETAM 250 MG: 500 TABLET, FILM COATED ORAL at 22:17

## 2025-04-22 RX ADMIN — TOPIRAMATE 100 MG: 100 TABLET ORAL at 22:18

## 2025-04-22 RX ADMIN — SODIUM CHLORIDE, PRESERVATIVE FREE 2 ML: 5 INJECTION INTRAVENOUS at 22:22

## 2025-04-22 SDOH — SOCIAL STABILITY: SOCIAL INSECURITY: HOW OFTEN DOES ANYONE, INCLUDING FAMILY AND FRIENDS, PHYSICALLY HURT YOU?: NEVER

## 2025-04-22 SDOH — ECONOMIC STABILITY: FOOD INSECURITY: WITHIN THE PAST 12 MONTHS, THE FOOD YOU BOUGHT JUST DIDN'T LAST AND YOU DIDN'T HAVE MONEY TO GET MORE.: NEVER TRUE

## 2025-04-22 SDOH — ECONOMIC STABILITY: HOUSING INSECURITY: WHAT IS YOUR LIVING SITUATION TODAY?: I HAVE A STEADY PLACE TO LIVE

## 2025-04-22 SDOH — SOCIAL STABILITY: SOCIAL INSECURITY: HOW OFTEN DOES ANYONE, INCLUDING FAMILY AND FRIENDS, THREATEN YOU WITH HARM?: NEVER

## 2025-04-22 SDOH — ECONOMIC STABILITY: INCOME INSECURITY: IN THE PAST 12 MONTHS, HAS THE ELECTRIC, GAS, OIL, OR WATER COMPANY THREATENED TO SHUT OFF SERVICE IN YOUR HOME?: NO

## 2025-04-22 SDOH — SOCIAL STABILITY: SOCIAL NETWORK: SUPPORT SYSTEMS: CHILDREN;NEIGHBORS;FAMILY MEMBERS

## 2025-04-22 SDOH — HEALTH STABILITY: PHYSICAL HEALTH: DO YOU HAVE SERIOUS DIFFICULTY WALKING OR CLIMBING STAIRS?: YES

## 2025-04-22 SDOH — ECONOMIC STABILITY: GENERAL

## 2025-04-22 SDOH — HEALTH STABILITY: GENERAL: BECAUSE OF A PHYSICAL, MENTAL, OR EMOTIONAL CONDITION, DO YOU HAVE DIFFICULTY DOING ERRANDS ALONE?: YES

## 2025-04-22 SDOH — SOCIAL STABILITY: SOCIAL INSECURITY: HOW OFTEN DOES ANYONE, INCLUDING FAMILY AND FRIENDS, INSULT OR TALK DOWN TO YOU?: NEVER

## 2025-04-22 SDOH — ECONOMIC STABILITY: HOUSING INSECURITY: DO YOU HAVE PROBLEMS WITH ANY OF THE FOLLOWING?: NONE OF THE ABOVE

## 2025-04-22 SDOH — HEALTH STABILITY: PHYSICAL HEALTH: DO YOU HAVE DIFFICULTY DRESSING OR BATHING?: NO

## 2025-04-22 SDOH — SOCIAL STABILITY: SOCIAL INSECURITY: HOW OFTEN DOES ANYONE, INCLUDING FAMILY AND FRIENDS, SCREAM OR CURSE AT YOU?: NEVER

## 2025-04-22 SDOH — ECONOMIC STABILITY: HOUSING INSECURITY: WHAT IS YOUR LIVING SITUATION TODAY?: APARTMENT

## 2025-04-22 SDOH — ECONOMIC STABILITY: HOUSING INSECURITY: WHAT IS YOUR LIVING SITUATION TODAY?: ALONE

## 2025-04-22 ASSESSMENT — PATIENT HEALTH QUESTIONNAIRE - PHQ9
2. FEELING DOWN, DEPRESSED OR HOPELESS: NOT AT ALL
SUM OF ALL RESPONSES TO PHQ9 QUESTIONS 1 AND 2: 0
1. LITTLE INTEREST OR PLEASURE IN DOING THINGS: NOT AT ALL
SUM OF ALL RESPONSES TO PHQ9 QUESTIONS 1 AND 2: 0
CLINICAL INTERPRETATION OF PHQ2 SCORE: NO FURTHER SCREENING NEEDED
IS PATIENT ABLE TO COMPLETE PHQ2 OR PHQ9: YES

## 2025-04-22 ASSESSMENT — ORIENTATION MEMORY CONCENTRATION TEST (OMCT)
COUNT BACKWARDS FROM 20 TO 1: CORRECT
SAY THE MONTHS IN REVERSE ORDER STARTING WITH LAST MONTH: CORRECT
WHAT YEAR IS IT NOW (MUST BE EXACT): CORRECT
WHAT TIME IS IT (NO WATCH OR CLOCK): CORRECT
OMCT INTERPRETATION: 0-6: NO SIGNIFICANT IMPAIRMENT
REPEAT THE NAME AND ADDRESS I ASKED YOU TO REMEMBER: CORRECT
WHAT MONTH IS IT NOW: CORRECT
OMCT SCORE: 0

## 2025-04-22 ASSESSMENT — ACTIVITIES OF DAILY LIVING (ADL)
ADL_SCORE: 12
RECENT_DECLINE_ADL: NO
ADL_BEFORE_ADMISSION: INDEPENDENT
ADL_SHORT_OF_BREATH: YES

## 2025-04-22 ASSESSMENT — LIFESTYLE VARIABLES
ALCOHOL_USE_STATUS: NO OR LOW RISK WITH VALIDATED TOOL
HOW OFTEN DO YOU HAVE 6 OR MORE DRINKS ON ONE OCCASION: NEVER
HOW MANY STANDARD DRINKS CONTAINING ALCOHOL DO YOU HAVE ON A TYPICAL DAY: 0,1 OR 2
HOW OFTEN DO YOU HAVE A DRINK CONTAINING ALCOHOL: NEVER
AUDIT-C TOTAL SCORE: 0

## 2025-04-22 ASSESSMENT — HEART SCORE
EKG: NON SPECIFIC REPOLARIZATION DISTURBANCE
HEART SCORE: 7
HISTORY: HIGHLY SUSPICIOUS
AGE: EQUAL OR GREATER THAN 65
RISK FACTORS: EQUAL OR GREATER  THAN 3 RISK FACTORS OR HISTORY OF ATHEROSCLEROTIC DISEASE
TROPONIN: EQUAL OR LESS THAN NORMAL LIMIT

## 2025-04-22 ASSESSMENT — PAIN SCALES - GENERAL
PAINLEVEL_OUTOF10: 3
PAINLEVEL_OUTOF10: 0
PAINLEVEL_OUTOF10: 2

## 2025-04-23 ENCOUNTER — APPOINTMENT (OUTPATIENT)
Dept: NUCLEAR MEDICINE | Age: 70
End: 2025-04-23
Attending: NURSE PRACTITIONER

## 2025-04-23 ENCOUNTER — APPOINTMENT (OUTPATIENT)
Dept: CARDIOLOGY | Age: 70
End: 2025-04-23
Attending: STUDENT IN AN ORGANIZED HEALTH CARE EDUCATION/TRAINING PROGRAM

## 2025-04-23 VITALS
DIASTOLIC BLOOD PRESSURE: 58 MMHG | TEMPERATURE: 98.4 F | HEIGHT: 64 IN | SYSTOLIC BLOOD PRESSURE: 103 MMHG | WEIGHT: 140.43 LBS | HEART RATE: 65 BPM | OXYGEN SATURATION: 100 % | RESPIRATION RATE: 16 BRPM | BODY MASS INDEX: 23.98 KG/M2

## 2025-04-23 LAB
ANION GAP SERPL CALC-SCNC: 10 MMOL/L (ref 7–19)
AV MEAN PRESS GRAD SYS DOP V1V2: 2 MMHG
AV VMAX SYS DOP: 0.8 M/S
BUN SERPL-MCNC: 19 MG/DL (ref 6–20)
BUN/CREAT SERPL: 17 (ref 7–25)
CALCIUM SERPL-MCNC: 8.5 MG/DL (ref 8.4–10.2)
CHLORIDE SERPL-SCNC: 114 MMOL/L (ref 97–110)
CO2 SERPL-SCNC: 25 MMOL/L (ref 21–32)
CREAT SERPL-MCNC: 1.09 MG/DL (ref 0.51–0.95)
DOP CALC LVOT PEAK VEL (LVOTPV): 0.8 M/S
E WAVE DECELARATION TIME (MDT): 0.16 S
EGFRCR SERPLBLD CKD-EPI 2021: 55 ML/MIN/{1.73_M2}
EST RIGHT VENT SYSTOLIC PRESSURE BY TRICUSPID REGURGITATION JET (RVSP): 26.23 MMHG
FASTING DURATION TIME PATIENT: ABNORMAL H
GLUCOSE BLDC GLUCOMTR-MCNC: 120 MG/DL (ref 70–99)
GLUCOSE BLDC GLUCOMTR-MCNC: 139 MG/DL (ref 70–99)
GLUCOSE SERPL-MCNC: 132 MG/DL (ref 70–99)
HEART RATE RESERVE PREDICTED: 41.72 BPM
INTERVENTRICULAR SEPTUM IN END DIASTOLE (IVSD): 0.9 CM
LEFT INTERNAL DIMENSION IN SYSTOLE (LVSD): 3 CM
LEFT VENTRICULAR INTERNAL DIMENSION IN DIASTOLE (LVDD): 4.6 CM
LEFT VENTRICULAR POSTERIOR WALL IN END DIASTOLE (LVPW): 0.9 CM
LV EF 2D ECHO EST: 60 %
LVOT 2D (LVOTD): 1.8 CM
MV E TISSUE VEL MED (MESV): 0 M/S
MV E WAVE VEL/E TISSUE VEL MED(MSR): 10.1 UNITLESS
MV PEAK A VELOCITY (MVPAV): 0.9 M/S
MV PEAK E VELOCITY (MVPEV): 0.8 M/S
POTASSIUM SERPL-SCNC: 3.8 MMOL/L (ref 3.4–5.1)
PV PEAK VELOCITY (PVPV): 0.4 M/S
RAINBOW EXTRA TUBES HOLD SPECIMEN: NORMAL
RESTING HR ACHIEVED: 63 BPM
SODIUM SERPL-SCNC: 145 MMOL/L (ref 135–145)
STRESS BASELINE BP: NORMAL MMHG
STRESS PEAK HR: 88 BPM
STRESS PERCENT HR: 58 %
STRESS POST ESTIMATED WORKLOAD: 1 METS
STRESS POST EXERCISE DUR MIN: 5 MIN
STRESS POST EXERCISE DUR SEC: 20 SEC
STRESS POST PEAK BP: NORMAL MMHG
STRESS TARGET HR: 151 BPM
TRICUSPID VALVE PEAK REGURGITATION VELOCITY (TRPV): 2.4 M/S
TV ESTIMATED RIGHT ARTERIAL PRESSURE (RAP): 3 MMHG

## 2025-04-23 PROCEDURE — 10002800 HB RX 250 W HCPCS: Performed by: STUDENT IN AN ORGANIZED HEALTH CARE EDUCATION/TRAINING PROGRAM

## 2025-04-23 PROCEDURE — 36415 COLL VENOUS BLD VENIPUNCTURE: CPT | Performed by: STUDENT IN AN ORGANIZED HEALTH CARE EDUCATION/TRAINING PROGRAM

## 2025-04-23 PROCEDURE — 10002800 HB RX 250 W HCPCS: Performed by: NURSE PRACTITIONER

## 2025-04-23 PROCEDURE — 93306 TTE W/DOPPLER COMPLETE: CPT | Performed by: INTERNAL MEDICINE

## 2025-04-23 PROCEDURE — 96372 THER/PROPH/DIAG INJ SC/IM: CPT | Performed by: STUDENT IN AN ORGANIZED HEALTH CARE EDUCATION/TRAINING PROGRAM

## 2025-04-23 PROCEDURE — 10002803 HB RX 637: Performed by: FAMILY MEDICINE

## 2025-04-23 PROCEDURE — 10004651 HB RX, NO CHARGE ITEM: Performed by: STUDENT IN AN ORGANIZED HEALTH CARE EDUCATION/TRAINING PROGRAM

## 2025-04-23 PROCEDURE — 78452 HT MUSCLE IMAGE SPECT MULT: CPT | Performed by: INTERNAL MEDICINE

## 2025-04-23 PROCEDURE — 82962 GLUCOSE BLOOD TEST: CPT

## 2025-04-23 PROCEDURE — 80048 BASIC METABOLIC PNL TOTAL CA: CPT | Performed by: STUDENT IN AN ORGANIZED HEALTH CARE EDUCATION/TRAINING PROGRAM

## 2025-04-23 PROCEDURE — G0378 HOSPITAL OBSERVATION PER HR: HCPCS

## 2025-04-23 PROCEDURE — 10002803 HB RX 637: Performed by: STUDENT IN AN ORGANIZED HEALTH CARE EDUCATION/TRAINING PROGRAM

## 2025-04-23 PROCEDURE — 93018 CV STRESS TEST I&R ONLY: CPT | Performed by: NURSE PRACTITIONER

## 2025-04-23 PROCEDURE — 93306 TTE W/DOPPLER COMPLETE: CPT

## 2025-04-23 PROCEDURE — 93017 CV STRESS TEST TRACING ONLY: CPT

## 2025-04-23 PROCEDURE — A9502 TC99M TETROFOSMIN: HCPCS | Performed by: NURSE PRACTITIONER

## 2025-04-23 PROCEDURE — 93016 CV STRESS TEST SUPVJ ONLY: CPT | Performed by: NURSE PRACTITIONER

## 2025-04-23 PROCEDURE — 99239 HOSP IP/OBS DSCHRG MGMT >30: CPT | Performed by: STUDENT IN AN ORGANIZED HEALTH CARE EDUCATION/TRAINING PROGRAM

## 2025-04-23 PROCEDURE — 99232 SBSQ HOSP IP/OBS MODERATE 35: CPT | Performed by: NURSE PRACTITIONER

## 2025-04-23 PROCEDURE — 10006150 HB RX 343: Performed by: NURSE PRACTITIONER

## 2025-04-23 PROCEDURE — 78452 HT MUSCLE IMAGE SPECT MULT: CPT

## 2025-04-23 RX ORDER — POTASSIUM CHLORIDE 1500 MG/1
40 TABLET, EXTENDED RELEASE ORAL ONCE
Status: COMPLETED | OUTPATIENT
Start: 2025-04-23 | End: 2025-04-23

## 2025-04-23 RX ORDER — REGADENOSON 0.08 MG/ML
0.4 INJECTION, SOLUTION INTRAVENOUS ONCE
Status: COMPLETED | OUTPATIENT
Start: 2025-04-23 | End: 2025-04-23

## 2025-04-23 RX ORDER — DULOXETIN HYDROCHLORIDE 60 MG/1
60 CAPSULE, DELAYED RELEASE ORAL DAILY
COMMUNITY

## 2025-04-23 RX ADMIN — TOPIRAMATE 100 MG: 100 TABLET ORAL at 07:51

## 2025-04-23 RX ADMIN — LEVETIRACETAM 250 MG: 500 TABLET, FILM COATED ORAL at 07:51

## 2025-04-23 RX ADMIN — LEVOTHYROXINE SODIUM 125 MCG: 0.1 TABLET ORAL at 05:12

## 2025-04-23 RX ADMIN — PANTOPRAZOLE SODIUM 40 MG: 40 TABLET, DELAYED RELEASE ORAL at 07:51

## 2025-04-23 RX ADMIN — TETROFOSMIN 8.8 MILLICURIE: 1.38 INJECTION, POWDER, LYOPHILIZED, FOR SOLUTION INTRAVENOUS at 07:05

## 2025-04-23 RX ADMIN — POTASSIUM CHLORIDE 40 MEQ: 1500 TABLET, EXTENDED RELEASE ORAL at 07:51

## 2025-04-23 RX ADMIN — PREGABALIN 300 MG: 100 CAPSULE ORAL at 07:51

## 2025-04-23 RX ADMIN — CLOPIDOGREL BISULFATE 75 MG: 75 TABLET, FILM COATED ORAL at 10:07

## 2025-04-23 RX ADMIN — REGADENOSON 0.4 MG: 0.08 INJECTION, SOLUTION INTRAVENOUS at 08:45

## 2025-04-23 RX ADMIN — SODIUM CHLORIDE, PRESERVATIVE FREE 10 ML: 5 INJECTION INTRAVENOUS at 07:52

## 2025-04-23 RX ADMIN — ENOXAPARIN SODIUM 40 MG: 100 INJECTION SUBCUTANEOUS at 10:07

## 2025-04-23 RX ADMIN — FUROSEMIDE 10 MG: 20 TABLET ORAL at 10:07

## 2025-04-23 RX ADMIN — DULOXETINE 60 MG: 60 CAPSULE, DELAYED RELEASE ORAL at 10:07

## 2025-04-23 RX ADMIN — LEVETIRACETAM 750 MG: 750 TABLET, FILM COATED ORAL at 12:25

## 2025-04-23 RX ADMIN — EZETIMIBE 10 MG: 10 TABLET ORAL at 10:07

## 2025-04-23 RX ADMIN — TETROFOSMIN 26 MILLICURIE: 1.38 INJECTION, POWDER, LYOPHILIZED, FOR SOLUTION INTRAVENOUS at 08:45

## 2025-04-23 RX ADMIN — FOLIC ACID 1 MG: 1 TABLET ORAL at 10:07

## 2025-04-23 ASSESSMENT — PAIN SCALES - GENERAL
PAINLEVEL_OUTOF10: 0
PAINLEVEL_OUTOF10: 0

## 2025-04-23 ASSESSMENT — COGNITIVE AND FUNCTIONAL STATUS - GENERAL
DO YOU HAVE SERIOUS DIFFICULTY WALKING OR CLIMBING STAIRS: YES
DO YOU HAVE DIFFICULTY DRESSING OR BATHING: NO

## 2025-04-25 ENCOUNTER — TELEPHONE (OUTPATIENT)
Dept: CARE COORDINATION | Age: 70
End: 2025-04-25

## 2025-04-26 ENCOUNTER — TELEPHONE (OUTPATIENT)
Dept: CARE COORDINATION | Age: 70
End: 2025-04-26

## 2025-05-02 ENCOUNTER — TELEPHONE (OUTPATIENT)
Dept: CARE COORDINATION | Age: 70
End: 2025-05-02

## 2025-05-03 ENCOUNTER — TELEPHONE (OUTPATIENT)
Dept: CARE COORDINATION | Age: 70
End: 2025-05-03

## 2025-05-05 ENCOUNTER — APPOINTMENT (OUTPATIENT)
Dept: CARDIOLOGY | Age: 70
End: 2025-05-05

## 2025-05-09 ENCOUNTER — TELEPHONE (OUTPATIENT)
Dept: CARE COORDINATION | Age: 70
End: 2025-05-09

## 2025-05-10 ENCOUNTER — TELEPHONE (OUTPATIENT)
Dept: CARE COORDINATION | Age: 70
End: 2025-05-10

## 2025-05-12 PROBLEM — H52.4 MYOPIA OF BOTH EYES WITH ASTIGMATISM AND PRESBYOPIA: Status: ACTIVE | Noted: 2025-05-12

## 2025-05-12 PROBLEM — H52.203 MYOPIA OF BOTH EYES WITH ASTIGMATISM AND PRESBYOPIA: Status: ACTIVE | Noted: 2025-05-12

## 2025-05-12 PROBLEM — H52.13 MYOPIA OF BOTH EYES WITH ASTIGMATISM AND PRESBYOPIA: Status: ACTIVE | Noted: 2025-05-12

## 2025-05-16 ENCOUNTER — TELEPHONE (OUTPATIENT)
Dept: CARE COORDINATION | Age: 70
End: 2025-05-16

## 2025-05-17 ENCOUNTER — TELEPHONE (OUTPATIENT)
Dept: CARE COORDINATION | Age: 70
End: 2025-05-17

## 2025-05-21 ENCOUNTER — APPOINTMENT (OUTPATIENT)
Dept: OPHTHALMOLOGY | Age: 70
End: 2025-05-21

## 2025-05-23 ENCOUNTER — TELEPHONE (OUTPATIENT)
Dept: CARE COORDINATION | Age: 70
End: 2025-05-23

## 2025-05-24 ENCOUNTER — TELEPHONE (OUTPATIENT)
Dept: CARE COORDINATION | Age: 70
End: 2025-05-24

## 2025-05-29 ENCOUNTER — APPOINTMENT (OUTPATIENT)
Dept: EDUCATION SERVICES | Age: 70
End: 2025-05-29

## 2025-05-29 ENCOUNTER — TELEPHONE (OUTPATIENT)
Dept: EDUCATION SERVICES | Age: 70
End: 2025-05-29

## 2025-06-05 ENCOUNTER — APPOINTMENT (OUTPATIENT)
Dept: EDUCATION SERVICES | Age: 70
End: 2025-06-05
Attending: INTERNAL MEDICINE

## 2025-06-05 DIAGNOSIS — E10.65 TYPE 1 DIABETES MELLITUS WITH HYPERGLYCEMIA  (CMD): Primary | ICD-10-CM

## 2025-06-10 ENCOUNTER — HOSPITAL ENCOUNTER (EMERGENCY)
Age: 70
Discharge: HOME | End: 2025-06-10

## 2025-06-10 ENCOUNTER — APPOINTMENT (OUTPATIENT)
Dept: CT IMAGING | Age: 70
End: 2025-06-10

## 2025-06-10 VITALS
DIASTOLIC BLOOD PRESSURE: 55 MMHG | HEART RATE: 61 BPM | BODY MASS INDEX: 24.06 KG/M2 | HEIGHT: 63 IN | TEMPERATURE: 98.3 F | RESPIRATION RATE: 18 BRPM | OXYGEN SATURATION: 95 % | WEIGHT: 135.8 LBS | SYSTOLIC BLOOD PRESSURE: 115 MMHG

## 2025-06-10 DIAGNOSIS — N30.00 ACUTE CYSTITIS WITHOUT HEMATURIA: ICD-10-CM

## 2025-06-10 DIAGNOSIS — R73.9 HYPERGLYCEMIA: Primary | ICD-10-CM

## 2025-06-10 LAB
ALBUMIN SERPL-MCNC: 2.8 G/DL (ref 3.4–5)
ALBUMIN SERPL-MCNC: 2.9 G/DL (ref 3.4–5)
ALBUMIN/GLOB SERPL: 1 {RATIO} (ref 1–2.4)
ALBUMIN/GLOB SERPL: 1 {RATIO} (ref 1–2.4)
ALP SERPL-CCNC: 61 UNITS/L (ref 45–117)
ALP SERPL-CCNC: 63 UNITS/L (ref 45–117)
ALT SERPL-CCNC: 21 UNITS/L
ALT SERPL-CCNC: 24 UNITS/L
ANION GAP SERPL CALC-SCNC: 16 MMOL/L (ref 7–19)
ANION GAP SERPL CALC-SCNC: 19 MMOL/L (ref 7–19)
APPEARANCE UR: ABNORMAL
AST SERPL-CCNC: 15 UNITS/L
AST SERPL-CCNC: 21 UNITS/L
BACTERIA #/AREA URNS HPF: ABNORMAL /HPF
BASE DEFICIT BLDV-SCNC: -7 MMOL/L (ref -2–2)
BASOPHILS # BLD: 0 K/MCL (ref 0–0.3)
BASOPHILS NFR BLD: 0 %
BILIRUB SERPL-MCNC: 0.6 MG/DL (ref 0.2–1)
BILIRUB SERPL-MCNC: 0.8 MG/DL (ref 0.2–1)
BILIRUB UR QL STRIP: NEGATIVE
BUN SERPL-MCNC: 26 MG/DL (ref 6–20)
BUN SERPL-MCNC: 27 MG/DL (ref 6–20)
BUN/CREAT SERPL: 23 (ref 7–25)
BUN/CREAT SERPL: 25 (ref 7–25)
CALCIUM SERPL-MCNC: 8.5 MG/DL (ref 8.4–10.2)
CALCIUM SERPL-MCNC: 8.8 MG/DL (ref 8.4–10.2)
CHLORIDE SERPL-SCNC: 103 MMOL/L (ref 97–110)
CHLORIDE SERPL-SCNC: 98 MMOL/L (ref 97–110)
CO2 SERPL-SCNC: 20 MMOL/L (ref 21–32)
CO2 SERPL-SCNC: 21 MMOL/L (ref 21–32)
COLOR UR: ABNORMAL
CREAT SERPL-MCNC: 1.1 MG/DL (ref 0.51–0.95)
CREAT SERPL-MCNC: 1.14 MG/DL (ref 0.51–0.95)
DEPRECATED RDW RBC: 41.8 FL (ref 39–50)
EGFRCR SERPLBLD CKD-EPI 2021: 52 ML/MIN/{1.73_M2}
EGFRCR SERPLBLD CKD-EPI 2021: 54 ML/MIN/{1.73_M2}
EOSINOPHIL # BLD: 0 K/MCL (ref 0–0.5)
EOSINOPHIL NFR BLD: 0 %
ERYTHROCYTE [DISTWIDTH] IN BLOOD: 12.5 % (ref 11–15)
FASTING DURATION TIME PATIENT: ABNORMAL H
FASTING DURATION TIME PATIENT: ABNORMAL H
GLOBULIN SER-MCNC: 2.9 G/DL (ref 2–4)
GLOBULIN SER-MCNC: 2.9 G/DL (ref 2–4)
GLUCOSE BLDC GLUCOMTR-MCNC: >600 MG/DL (ref 70–99)
GLUCOSE SERPL-MCNC: 547 MG/DL (ref 70–99)
GLUCOSE SERPL-MCNC: 712 MG/DL (ref 70–99)
GLUCOSE UR STRIP-MCNC: >1000 MG/DL
HCO3 BLDV-SCNC: 19 MMOL/L (ref 22–28)
HCT VFR BLD CALC: 39.2 % (ref 36–46.5)
HGB BLD-MCNC: 12.7 G/DL (ref 12–15.5)
HGB BLDA-MCNC: 13.3 G/DL (ref 12–15.5)
HGB UR QL STRIP: NEGATIVE
HYALINE CASTS #/AREA URNS LPF: ABNORMAL /LPF
IMM GRANULOCYTES # BLD AUTO: 0 K/MCL (ref 0–0.2)
IMM GRANULOCYTES # BLD: 0 %
KETONES UR STRIP-MCNC: ABNORMAL MG/DL
LEUKOCYTE ESTERASE UR QL STRIP: ABNORMAL
LIPASE SERPL-CCNC: 12 UNITS/L (ref 15–77)
LYMPHOCYTES # BLD: 1.8 K/MCL (ref 1–4)
LYMPHOCYTES NFR BLD: 17 %
MAGNESIUM SERPL-MCNC: 2 MG/DL (ref 1.7–2.4)
MCH RBC QN AUTO: 29.8 PG (ref 26–34)
MCHC RBC AUTO-ENTMCNC: 32.4 G/DL (ref 32–36.5)
MCV RBC AUTO: 92 FL (ref 78–100)
MONOCYTES # BLD: 0.6 K/MCL (ref 0.3–0.9)
MONOCYTES NFR BLD: 5 %
NEUTROPHILS # BLD: 8.3 K/MCL (ref 1.8–7.7)
NEUTROPHILS NFR BLD: 78 %
NITRITE UR QL STRIP: NEGATIVE
NRBC BLD MANUAL-RTO: 0 /100 WBC
OXYHGB MFR BLDV: 64 % (ref 60–80)
PCO2 BLDV: 37 MM HG (ref 38–51)
PH BLDV: 7.31 UNITS (ref 7.35–7.45)
PH UR STRIP: 6 [PH] (ref 5–7)
PLATELET # BLD AUTO: 137 K/MCL (ref 140–450)
PO2 BLDV: 40 MM HG (ref 35–42)
POTASSIUM SERPL-SCNC: 4 MMOL/L (ref 3.4–5.1)
POTASSIUM SERPL-SCNC: 4.7 MMOL/L (ref 3.4–5.1)
PROT SERPL-MCNC: 5.7 G/DL (ref 6.4–8.2)
PROT SERPL-MCNC: 5.8 G/DL (ref 6.4–8.2)
PROT UR STRIP-MCNC: NEGATIVE MG/DL
RBC # BLD: 4.26 MIL/MCL (ref 4–5.2)
RBC #/AREA URNS HPF: ABNORMAL /HPF
SAO2 % BLDV: 69 % (ref 60–80)
SODIUM SERPL-SCNC: 132 MMOL/L (ref 135–145)
SODIUM SERPL-SCNC: 136 MMOL/L (ref 135–145)
SP GR UR STRIP: <1.005 (ref 1–1.03)
SQUAMOUS #/AREA URNS HPF: ABNORMAL /HPF
UROBILINOGEN UR STRIP-MCNC: 0.2 MG/DL
WBC # BLD: 10.8 K/MCL (ref 4.2–11)
WBC #/AREA URNS HPF: ABNORMAL /HPF

## 2025-06-10 PROCEDURE — 83690 ASSAY OF LIPASE: CPT

## 2025-06-10 PROCEDURE — 85025 COMPLETE CBC W/AUTO DIFF WBC: CPT

## 2025-06-10 PROCEDURE — 96372 THER/PROPH/DIAG INJ SC/IM: CPT

## 2025-06-10 PROCEDURE — 99284 EMERGENCY DEPT VISIT MOD MDM: CPT

## 2025-06-10 PROCEDURE — 81001 URINALYSIS AUTO W/SCOPE: CPT

## 2025-06-10 PROCEDURE — 99285 EMERGENCY DEPT VISIT HI MDM: CPT

## 2025-06-10 PROCEDURE — 10002807 HB RX 258

## 2025-06-10 PROCEDURE — 70450 CT HEAD/BRAIN W/O DYE: CPT

## 2025-06-10 PROCEDURE — 93010 ELECTROCARDIOGRAM REPORT: CPT | Performed by: INTERNAL MEDICINE

## 2025-06-10 PROCEDURE — 10002800 HB RX 250 W HCPCS

## 2025-06-10 PROCEDURE — 87186 SC STD MICRODIL/AGAR DIL: CPT

## 2025-06-10 PROCEDURE — 36415 COLL VENOUS BLD VENIPUNCTURE: CPT

## 2025-06-10 PROCEDURE — 82962 GLUCOSE BLOOD TEST: CPT

## 2025-06-10 PROCEDURE — 96361 HYDRATE IV INFUSION ADD-ON: CPT

## 2025-06-10 PROCEDURE — 93005 ELECTROCARDIOGRAM TRACING: CPT

## 2025-06-10 PROCEDURE — 70450 CT HEAD/BRAIN W/O DYE: CPT | Performed by: RADIOLOGY

## 2025-06-10 PROCEDURE — 80053 COMPREHEN METABOLIC PANEL: CPT

## 2025-06-10 PROCEDURE — 83735 ASSAY OF MAGNESIUM: CPT

## 2025-06-10 PROCEDURE — 96360 HYDRATION IV INFUSION INIT: CPT

## 2025-06-10 PROCEDURE — 82805 BLOOD GASES W/O2 SATURATION: CPT

## 2025-06-10 RX ORDER — CEFUROXIME AXETIL 500 MG/1
500 TABLET ORAL 2 TIMES DAILY
Qty: 14 TABLET | Refills: 0 | Status: SHIPPED | OUTPATIENT
Start: 2025-06-10 | End: 2025-06-17

## 2025-06-10 RX ORDER — INSULIN LISPRO 100 [IU]/ML
12 INJECTION, SOLUTION INTRAVENOUS; SUBCUTANEOUS ONCE
Status: COMPLETED | OUTPATIENT
Start: 2025-06-10 | End: 2025-06-10

## 2025-06-10 RX ADMIN — INSULIN LISPRO 12 UNITS: 100 INJECTION, SOLUTION INTRAVENOUS; SUBCUTANEOUS at 18:11

## 2025-06-10 RX ADMIN — SODIUM CHLORIDE, POTASSIUM CHLORIDE, SODIUM LACTATE AND CALCIUM CHLORIDE 1000 ML: 600; 310; 30; 20 INJECTION, SOLUTION INTRAVENOUS at 19:55

## 2025-06-10 RX ADMIN — SODIUM CHLORIDE, POTASSIUM CHLORIDE, SODIUM LACTATE AND CALCIUM CHLORIDE 1000 ML: 600; 310; 30; 20 INJECTION, SOLUTION INTRAVENOUS at 15:23

## 2025-06-10 SDOH — SOCIAL STABILITY: SOCIAL INSECURITY: HOW OFTEN DOES ANYONE, INCLUDING FAMILY AND FRIENDS, SCREAM OR CURSE AT YOU?: NEVER

## 2025-06-10 SDOH — SOCIAL STABILITY: SOCIAL INSECURITY: HOW OFTEN DOES ANYONE, INCLUDING FAMILY AND FRIENDS, INSULT OR TALK DOWN TO YOU?: NEVER

## 2025-06-10 SDOH — SOCIAL STABILITY: SOCIAL INSECURITY: HOW OFTEN DOES ANYONE, INCLUDING FAMILY AND FRIENDS, THREATEN YOU WITH HARM?: NEVER

## 2025-06-10 SDOH — SOCIAL STABILITY: SOCIAL INSECURITY: HOW OFTEN DOES ANYONE, INCLUDING FAMILY AND FRIENDS, PHYSICALLY HURT YOU?: NEVER

## 2025-06-10 ASSESSMENT — PAIN SCALES - GENERAL: PAINLEVEL_OUTOF10: 6

## 2025-06-11 ENCOUNTER — TELEPHONE (OUTPATIENT)
Dept: EDUCATION SERVICES | Age: 70
End: 2025-06-11

## 2025-06-11 LAB
RAINBOW EXTRA TUBES HOLD SPECIMEN: NORMAL

## 2025-06-12 ENCOUNTER — TELEPHONE (OUTPATIENT)
Dept: EDUCATION SERVICES | Age: 70
End: 2025-06-12

## 2025-06-12 ENCOUNTER — RESULTS FOLLOW-UP (OUTPATIENT)
Dept: EMERGENCY MEDICINE | Age: 70
End: 2025-06-12

## 2025-06-12 LAB
ATRIAL RATE (BPM): 81
BACTERIA UR CULT: ABNORMAL
DIASTOLIC BLOOD PRESSURE: 57
P AXIS (DEGREES): 74
PR-INTERVAL (MSEC): 214
QRS-INTERVAL (MSEC): 70
QT-INTERVAL (MSEC): 388
QTC: 450
R AXIS (DEGREES): 62
REPORT TEXT: NORMAL
SYSTOLIC BLOOD PRESSURE: 117
T AXIS (DEGREES): 68
VENTRICULAR RATE EKG/MIN (BPM): 81

## 2025-06-19 ENCOUNTER — TELEPHONE (OUTPATIENT)
Dept: EDUCATION SERVICES | Age: 70
End: 2025-06-19

## 2025-06-25 ENCOUNTER — APPOINTMENT (OUTPATIENT)
Dept: OPHTHALMOLOGY | Age: 70
End: 2025-06-25

## 2025-06-25 ENCOUNTER — APPOINTMENT (OUTPATIENT)
Dept: LAB | Age: 70
End: 2025-06-25

## 2025-06-25 DIAGNOSIS — Z96.1 PSEUDOPHAKIA OF BOTH EYES: ICD-10-CM

## 2025-06-25 DIAGNOSIS — E10.3293 MILD NONPROLIFERATIVE DIABETIC RETINOPATHY OF BOTH EYES WITHOUT MACULAR EDEMA ASSOCIATED WITH TYPE 1 DIABETES MELLITUS  (CMD): Primary | ICD-10-CM

## 2025-06-25 DIAGNOSIS — H52.4 MYOPIA OF BOTH EYES WITH ASTIGMATISM AND PRESBYOPIA: ICD-10-CM

## 2025-06-25 DIAGNOSIS — H52.203 MYOPIA OF BOTH EYES WITH ASTIGMATISM AND PRESBYOPIA: ICD-10-CM

## 2025-06-25 DIAGNOSIS — H52.13 MYOPIA OF BOTH EYES WITH ASTIGMATISM AND PRESBYOPIA: ICD-10-CM

## 2025-06-25 ASSESSMENT — TONOMETRY
OS_IOP_MMHG: 15
IOP_METHOD: APPLANATION
OD_IOP_MMHG: 15

## 2025-06-25 ASSESSMENT — VISUAL ACUITY
OS_SC+: -1
OS_SC: 20/30
OD_SC: JAEGER 10
OS_SC: JAEGER 7
OD_SC+: -1
METHOD: SNELLEN - LINEAR
OD_SC: 20/40

## 2025-06-25 ASSESSMENT — REFRACTION_WEARINGRX
OS_ADD: +2.50
OS_CYLINDER: +0.50
OD_AXIS: 180
OD_SPHERE: -1.00
OD_ADD: +2.50
OD_CYLINDER: +0.75
OS_SPHERE: -0.50
OS_AXIS: 175

## 2025-06-25 ASSESSMENT — REFRACTION_MANIFEST
OS_CYLINDER: +0.50
OS_ADD: +2.50
OD_AXIS: 180
OD_SPHERE: -1.00
OD_ADD: +2.50
OD_CYLINDER: +0.75
OS_SPHERE: -0.50
OS_AXIS: 175

## 2025-06-25 ASSESSMENT — CONF VISUAL FIELD
OS_NORMAL: 1
OD_NORMAL: 1
OS_SUPERIOR_NASAL_RESTRICTION: 0
OD_INFERIOR_TEMPORAL_RESTRICTION: 0
METHOD: COUNTING FINGERS
OS_SUPERIOR_TEMPORAL_RESTRICTION: 0
OD_INFERIOR_NASAL_RESTRICTION: 0
OS_INFERIOR_TEMPORAL_RESTRICTION: 0
OS_INFERIOR_NASAL_RESTRICTION: 0
OD_SUPERIOR_NASAL_RESTRICTION: 0
OD_SUPERIOR_TEMPORAL_RESTRICTION: 0

## 2025-06-25 ASSESSMENT — SLIT LAMP EXAM - LIDS
COMMENTS: NORMAL
COMMENTS: NORMAL

## 2025-06-25 ASSESSMENT — CUP TO DISC RATIO
OD_RATIO: 0.35
OS_RATIO: 0.3

## 2025-06-25 ASSESSMENT — EXTERNAL EXAM - LEFT EYE: OS_EXAM: NORMAL

## 2025-06-25 ASSESSMENT — EXTERNAL EXAM - RIGHT EYE: OD_EXAM: NORMAL

## 2025-06-26 DIAGNOSIS — E11.3299 BACKGROUND DIABETIC RETINOPATHY  (CMD): ICD-10-CM

## 2025-06-26 DIAGNOSIS — E10.69 TYPE 1 DIABETES MELLITUS WITH HYPERLIPIDEMIA  (CMD): ICD-10-CM

## 2025-06-26 DIAGNOSIS — E13.9 LADA (LATENT AUTOIMMUNE DIABETES IN ADULTS), MANAGED AS TYPE 1  (CMD): ICD-10-CM

## 2025-06-26 DIAGNOSIS — E10.42 TYPE 1 DIABETES MELLITUS WITH DIABETIC POLYNEUROPATHY (CMD): ICD-10-CM

## 2025-06-26 DIAGNOSIS — E03.9 ACQUIRED HYPOTHYROIDISM: ICD-10-CM

## 2025-06-26 DIAGNOSIS — E10.649 HYPOGLYCEMIA UNAWARENESS IN TYPE 1 DIABETES MELLITUS (CMD): ICD-10-CM

## 2025-06-26 DIAGNOSIS — E10.65 TYPE 1 DIABETES MELLITUS WITH HYPERGLYCEMIA  (CMD): ICD-10-CM

## 2025-06-26 DIAGNOSIS — E78.5 TYPE 1 DIABETES MELLITUS WITH HYPERLIPIDEMIA  (CMD): ICD-10-CM

## 2025-06-26 DIAGNOSIS — M81.8 OTHER OSTEOPOROSIS WITHOUT CURRENT PATHOLOGICAL FRACTURE: ICD-10-CM

## 2025-06-26 RX ORDER — INSULIN PMP CART,AUT,G6/7,CNTR
EACH SUBCUTANEOUS
Qty: 30 EACH | Refills: 2 | Status: SHIPPED | OUTPATIENT
Start: 2025-06-26

## 2025-06-26 RX ORDER — INSULIN PMP CART,AUT,G6/7,CNTR
EACH SUBCUTANEOUS
Qty: 1 KIT | Refills: 0 | Status: SHIPPED | OUTPATIENT
Start: 2025-06-26

## 2025-06-27 RX ORDER — EZETIMIBE 10 MG/1
10 TABLET ORAL DAILY
Qty: 90 TABLET | Refills: 1 | Status: SHIPPED | OUTPATIENT
Start: 2025-06-27

## 2025-06-30 ENCOUNTER — APPOINTMENT (OUTPATIENT)
Dept: ENDOCRINOLOGY | Age: 70
End: 2025-06-30

## 2025-06-30 DIAGNOSIS — E10.59 TYPE 1 DIABETES MELLITUS WITH CARDIAC COMPLICATION (CMD): ICD-10-CM

## 2025-06-30 DIAGNOSIS — Z96.41 INSULIN PUMP IN PLACE: ICD-10-CM

## 2025-06-30 DIAGNOSIS — M81.8 OTHER OSTEOPOROSIS WITHOUT CURRENT PATHOLOGICAL FRACTURE: ICD-10-CM

## 2025-06-30 DIAGNOSIS — E11.3299 BACKGROUND DIABETIC RETINOPATHY  (CMD): ICD-10-CM

## 2025-06-30 DIAGNOSIS — E03.9 ACQUIRED HYPOTHYROIDISM: ICD-10-CM

## 2025-06-30 DIAGNOSIS — I25.810 CORONARY ARTERY DISEASE INVOLVING CORONARY BYPASS GRAFT OF NATIVE HEART WITHOUT ANGINA PECTORIS: ICD-10-CM

## 2025-06-30 DIAGNOSIS — Z46.81 INSULIN PUMP TITRATION: ICD-10-CM

## 2025-06-30 DIAGNOSIS — E10.69 TYPE 1 DIABETES MELLITUS WITH HYPERLIPIDEMIA  (CMD): ICD-10-CM

## 2025-06-30 DIAGNOSIS — E78.5 TYPE 1 DIABETES MELLITUS WITH HYPERLIPIDEMIA  (CMD): ICD-10-CM

## 2025-06-30 DIAGNOSIS — E10.42 TYPE 1 DIABETES MELLITUS WITH DIABETIC POLYNEUROPATHY (CMD): ICD-10-CM

## 2025-06-30 DIAGNOSIS — E10.65 TYPE 1 DIABETES MELLITUS WITH HYPERGLYCEMIA  (CMD): Primary | ICD-10-CM

## 2025-07-03 ENCOUNTER — TELEPHONE (OUTPATIENT)
Dept: EDUCATION SERVICES | Age: 70
End: 2025-07-03

## 2025-07-10 ENCOUNTER — TELEPHONE (OUTPATIENT)
Dept: EDUCATION SERVICES | Age: 70
End: 2025-07-10

## 2025-07-14 ENCOUNTER — HOSPITAL ENCOUNTER (EMERGENCY)
Age: 70
Discharge: HOME | End: 2025-07-14
Attending: EMERGENCY MEDICINE

## 2025-07-14 VITALS
RESPIRATION RATE: 16 BRPM | DIASTOLIC BLOOD PRESSURE: 64 MMHG | BODY MASS INDEX: 23.03 KG/M2 | WEIGHT: 130 LBS | SYSTOLIC BLOOD PRESSURE: 144 MMHG | TEMPERATURE: 98.3 F | OXYGEN SATURATION: 97 % | HEART RATE: 63 BPM

## 2025-07-14 DIAGNOSIS — K59.00 CONSTIPATION, UNSPECIFIED CONSTIPATION TYPE: Primary | ICD-10-CM

## 2025-07-14 PROCEDURE — 99282 EMERGENCY DEPT VISIT SF MDM: CPT

## 2025-07-14 PROCEDURE — 99283 EMERGENCY DEPT VISIT LOW MDM: CPT | Performed by: EMERGENCY MEDICINE

## 2025-07-14 ASSESSMENT — ENCOUNTER SYMPTOMS: CONSTIPATION: 1

## 2025-07-14 ASSESSMENT — PAIN DESCRIPTION - PAIN TYPE: TYPE: ACUTE PAIN

## 2025-07-14 ASSESSMENT — PAIN SCALES - GENERAL: PAINLEVEL_OUTOF10: 2

## 2025-07-17 ENCOUNTER — APPOINTMENT (OUTPATIENT)
Dept: EDUCATION SERVICES | Age: 70
End: 2025-07-17

## 2025-07-17 DIAGNOSIS — E10.65 TYPE 1 DIABETES MELLITUS WITH HYPERGLYCEMIA  (CMD): Primary | ICD-10-CM

## 2025-07-21 ENCOUNTER — IMAGING SERVICES (OUTPATIENT)
Dept: GENERAL RADIOLOGY | Age: 70
End: 2025-07-21
Attending: PHYSICIAN ASSISTANT

## 2025-07-21 ENCOUNTER — WALK IN (OUTPATIENT)
Dept: URGENT CARE | Age: 70
End: 2025-07-21

## 2025-07-21 VITALS
HEART RATE: 67 BPM | SYSTOLIC BLOOD PRESSURE: 114 MMHG | OXYGEN SATURATION: 100 % | DIASTOLIC BLOOD PRESSURE: 56 MMHG | RESPIRATION RATE: 18 BRPM | TEMPERATURE: 98 F

## 2025-07-21 DIAGNOSIS — M79.672 PAIN OF LEFT HEEL: Primary | ICD-10-CM

## 2025-07-21 DIAGNOSIS — M72.2 PLANTAR FASCIITIS OF LEFT FOOT: ICD-10-CM

## 2025-07-21 PROCEDURE — 99213 OFFICE O/P EST LOW 20 MIN: CPT | Performed by: PHYSICIAN ASSISTANT

## 2025-07-21 PROCEDURE — 73630 X-RAY EXAM OF FOOT: CPT | Performed by: RADIOLOGY

## 2025-07-22 ENCOUNTER — RESULTS FOLLOW-UP (OUTPATIENT)
Dept: URGENT CARE | Age: 70
End: 2025-07-22

## 2025-07-31 ENCOUNTER — TELEPHONE (OUTPATIENT)
Dept: ENDOCRINOLOGY | Age: 70
End: 2025-07-31

## 2025-07-31 ENCOUNTER — APPOINTMENT (OUTPATIENT)
Dept: EDUCATION SERVICES | Age: 70
End: 2025-07-31

## 2025-07-31 DIAGNOSIS — E10.65 TYPE 1 DIABETES MELLITUS WITH HYPERGLYCEMIA  (CMD): Primary | ICD-10-CM

## 2025-07-31 RX ORDER — INSULIN PMP CART,AUT,G6/7,CNTR
EACH SUBCUTANEOUS
Qty: 30 EACH | Refills: 5 | Status: SHIPPED | OUTPATIENT
Start: 2025-07-31

## 2025-09-04 ENCOUNTER — APPOINTMENT (OUTPATIENT)
Dept: EDUCATION SERVICES | Age: 70
End: 2025-09-04

## 2025-10-02 ENCOUNTER — APPOINTMENT (OUTPATIENT)
Dept: EDUCATION SERVICES | Age: 70
End: 2025-10-02

## 2025-10-30 ENCOUNTER — APPOINTMENT (OUTPATIENT)
Dept: ENDOCRINOLOGY | Age: 70
End: 2025-10-30

## (undated) DEVICE — MMIS - COVER SRG CNTRL STRL LF LIGHT HNDL HARMONYAIR M

## (undated) DEVICE — MMIS - CANNULA VIAL 16GA ANTICORE ANTIPARTICULATE NDLS 3

## (undated) DEVICE — MMIS - TUBING SCT CLR 12FT .25IN MDVC NCDTV MALE TO MALE

## (undated) DEVICE — MMIS - SYRINGE 10ML GRAD N-PYRG DEHP-FR PVC FREE STRL MED

## (undated) DEVICE — MMIS - CORD BP 12FT OLSEN STD FTSWTCH TWIN PIN CNCT 144IN

## (undated) DEVICE — MMIS - CUP MEDICINE/PORTION 3/4 OZ.

## (undated) DEVICE — MMIS - STRAP PSTN 60X3IN DEVON BODY KN

## (undated) DEVICE — MMIS - SPONGE GAUZE 4X4IN RAYON POLY 4 PLY HI ABS

## (undated) DEVICE — MMIS - SUTURE ETHILON MTPS 5-0 P-3 18IN MONO NABSB BLK

## (undated) DEVICE — MMIS - SYRINGE 10ML ROTATE MALE LL ADPR RING GRIP SOLID

## (undated) DEVICE — MMIS - CLIP INTERNAL MED LG CHEVRON HRT LIGATE NABSB

## (undated) DEVICE — MMIS - LINE ANGIO 121.92CM 4.78MM 2.26MM 1000 PSI FEMALE

## (undated) DEVICE — MMIS - ELECTRODE EKG THIN RECT 1 7/8INX1 3/8IN ADH CNDCT

## (undated) DEVICE — MMIS - APPLICATOR 70% ISO ALC 2% CHG 10.5ML CHLRPRP HI-LT

## (undated) DEVICE — MMIS - SOLUTION ANTI-FOG 6G FOAM PAD RADOPQ ADH BACK STRI

## (undated) DEVICE — MMIS - SPONGE GAUZE 4X4IN RAYON POLY 3 PLY HI ABS

## (undated) DEVICE — MMIS - CANNULA VIAL 16GA ANTICORE ANTIPARTICULATE NDLS 6

## (undated) DEVICE — MMIS - SUTURE PROLENE 0 CT2 30IN MONO NABSB BLUE

## (undated) DEVICE — MMIS - GLOVE SURG 7.5 PROTEXIS LF CRM PF BEAD CUFF STRL

## (undated) DEVICE — MMIS - ELECTRODE PT RTN C30- LB 9FT CORD NONIRRITATE

## (undated) DEVICE — MMIS - TUBING INSFL PNEUMOSURE SET HFL

## (undated) DEVICE — MMIS - SPONGE SURG 4X4IN PS 16 PLY RADOPQ BAND LOW LINT

## (undated) DEVICE — MMIS - TUBING IRR 82IN ENDOGATOR SFGRD BACK FLOW VLV COST

## (undated) DEVICE — MMIS - GLOVE SURG 7 PROTEXIS LF CRM PF BEAD CUFF STRL

## (undated) DEVICE — MMIS - PACK SURG STRL LF CNVRT ASTOUND TIBURON MAYO POLY

## (undated) DEVICE — MMIS - CATHETER 6FR JR4 CLASSIC CRV 100CM RADOPQ BRAID

## (undated) DEVICE — MMIS - NEEDLE BLUNT 18GA 1.5IN REG WALL FILL LL HUB

## (undated) DEVICE — MMIS - SLEEVE LAPSCP 5MM 100MM UNV BLUNT TIP RADOPQ

## (undated) DEVICE — MMIS - DRESSING PETRO 8X1IN XEROFORM 3% BI 3BRM GAUZE OCL

## (undated) DEVICE — MMIS - TROCAR LAPSCP 100MM 5MM EPTH XCEL STAB SLV RDLCNT

## (undated) DEVICE — MMIS - MARKER 2 TIP RULER CAP LBL NTOX SURG DEVON SKIN

## (undated) DEVICE — MMIS - APPLICATOR 70% ISO ALC 2% CHG 26ML 13.2X13.2IN

## (undated) DEVICE — MMIS - SYRINGE 10ML STRL MED LF DISP LL

## (undated) DEVICE — MMIS - DEVICE CLN SITE-SCRUB UNV ISOPROPYL ALC IV ACC

## (undated) DEVICE — MMIS - DRAPE SURG HAND 146X77IN CRC 34X16.75IN REINFORCE

## (undated) DEVICE — MMIS - SOLUTION AQLT 0.9% NACL 1000ML LF IRR

## (undated) DEVICE — MMIS - GUIDEWIRE GLDWR 3CM 150CM ANG .035IN VASC NTNL PU

## (undated) DEVICE — MMIS - PACK AMCG AMCK ASMMC CDL SETUP

## (undated) DEVICE — MMIS - GLOVE SURG 6.5 PROTEXIS LF CRM PF BEAD CUFF STRL

## (undated) DEVICE — MMIS - TRAY SURG PRP SCR CR ESTM

## (undated) DEVICE — Device

## (undated) DEVICE — MMIS - INTRODUCER SHTH 6FR 50CM 11CM GRN HUB SNPFT DIL

## (undated) DEVICE — MMIS - CATHETER 6FR JL4 CRV 100CM RADOPQ BRAID SLCT SUP

## (undated) DEVICE — MMIS - SLR/DIVR BP 37CM 5MM LGSR MARYLAND JAW LAPSCP STRL

## (undated) DEVICE — MMIS - ADHESIVE SKIN TOPICAL LIQUIBAND EXCEED 0.8ML

## (undated) DEVICE — MMIS - NEEDLE HPO 25GA 1.5IN REG WALL REG BVL LL HUB

## (undated) DEVICE — MMIS - TUBING SCT 10FT 5MM STRYKFLOW 2 SET PUMP TRMPT VLV

## (undated) DEVICE — MMIS - SET ANGIO 1IN 1 WAY STPCK FLUID MGMT LG BORE DEAD

## (undated) DEVICE — MMIS - DECANTER FLUID 9IN SET BAG

## (undated) DEVICE — MMIS - JELLY LUB 2OZ SRGLB BCTRST H2O SOL SCR CAP TUBE

## (undated) DEVICE — MMIS - TROCAR ENDO 11MM 100MM EPTH XCEL STAB SLV BLDLS

## (undated) DEVICE — MMIS - SUTURE VICRYL 0 UR-6 27IN BRAID COAT ABS VIOL

## (undated) DEVICE — MMIS - SYSTEM ENDO FLUR IMG PNPT PAQ

## (undated) DEVICE — MMIS - CANNULA NSL CO2 O2 STD SMPL LN SIDESTREAM ADULT

## (undated) DEVICE — MMIS - SYSTEM IMG 8X6IN WARM HUB TROCAR WIPE CLEARIFY

## (undated) DEVICE — MMIS - TUBING IRR 2IN E SMRTCP UNV METAL CO2 CNCT 140 160

## (undated) DEVICE — MMIS - MARKER PERMANENT STERILE MINI FINE TIP WATERPROOF

## (undated) DEVICE — MMIS - SCISSORS LAPSCP RCHT HNDL MNPLR CAUT INSULATE

## (undated) DEVICE — MMIS - CLIPPER SURG 3M STD 1 PASS LTWT RCHRG 9680 BLADE

## (undated) DEVICE — MMIS - COUNTER 30 CNT 2 MAG STRIP NDL BLDGRD DISARMER

## (undated) DEVICE — MMIS - SYRINGE 150ML FILL TUBE STRL ANGIO LF DISP MEDRAD

## (undated) DEVICE — MMIS - DEVICE ESURG 10FT CORD HLSTR HEX LOCK BLADE PWR

## (undated) DEVICE — MMIS - BANDAGE CMPR HOOK-LOCK 5YDX3IN VELCRO POLY YARN

## (undated) DEVICE — MMIS - PAD ALC MED 70% ISOPROPYL ALC STRL

## (undated) DEVICE — MMIS - PEN SURGMRK STD TIP FLXB RULER LBL PREP RST

## (undated) DEVICE — MMIS - CANISTER SCT 1200CC DISP MDVC GUARDIAN 90 DEGREE

## (undated) DEVICE — MMIS - GOWN SURG LG STD AAMI L3 STRL BACK SET IN SLV TWL

## (undated) DEVICE — MMIS - DECANTER VIAL FLUID DSPNSR VLJT

## (undated) DEVICE — MMIS - BASIN EMESIS 500ML MOLD GRAD GRFT PLASTIC 16OZ LF

## (undated) DEVICE — MMIS - DRAPE SURG 120X100IN 13IN 12IN 76IN ABD FENESTRATE

## (undated) DEVICE — MMIS - SUTURE MONOCRYL 3-0 SH 27IN MONO ABS UNDYED

## (undated) DEVICE — MMIS - SOLUTION PREP 8OZ 4% CHG WASH NS LF

## (undated) DEVICE — MMIS - SYRINGE 20ML CONC TIP 1ML GRAD N-PYRG DEHP-FR STRL

## (undated) DEVICE — MMIS - SYRINGE 12ML LL TIP PLNG ROD CNTRL RING BRL STRL

## (undated) DEVICE — MMIS - CATHETER 6FR 145D PGTL CRV 110CM 6 SH RADOPQ BRAID

## (undated) DEVICE — MMIS - BAG SPEC RTRVL 6X4IN 10MM EPCH LG PLASTIC INTRO

## (undated) DEVICE — MMIS - SUTURE PROLENE MTPS 3-0 PS1 18IN MONO NABSB BLUE

## (undated) DEVICE — MMIS - SYRINGE 10ML PREFL PRSV FR PUMP STRL IV FLUSH LF

## (undated) DEVICE — MMIS - CONNECTOR IRR ENDOGATOR AUX H2O JET TUBE OLYMPUS

## (undated) DEVICE — MMIS - KIT INTRO 4FR 21GA 10CM 7CM .018IN STF DIL NTNL GW

## (undated) DEVICE — MMIS - GLOVE SURG 6.5 PROTEXIS LF BLUE PF SMTH BEAD CUFF

## (undated) DEVICE — MMIS - GLOVE SURG 6 PROTEXIS LF CRM PF BEAD CUFF STRL

## (undated) DEVICE — MMIS - TROCAR LAPSCP 100MM 12MM EPTH XCEL SMTH SLV OBT

## (undated) DEVICE — MMIS - PACK SURG BASIN SET